# Patient Record
Sex: FEMALE | Race: WHITE | NOT HISPANIC OR LATINO | Employment: OTHER | URBAN - METROPOLITAN AREA
[De-identification: names, ages, dates, MRNs, and addresses within clinical notes are randomized per-mention and may not be internally consistent; named-entity substitution may affect disease eponyms.]

---

## 2017-01-16 ENCOUNTER — COMMUNICATION - HEALTHEAST (OUTPATIENT)
Dept: FAMILY MEDICINE | Facility: CLINIC | Age: 82
End: 2017-01-16

## 2017-03-06 ENCOUNTER — COMMUNICATION - HEALTHEAST (OUTPATIENT)
Dept: FAMILY MEDICINE | Facility: CLINIC | Age: 82
End: 2017-03-06

## 2017-03-06 DIAGNOSIS — E78.00 PURE HYPERCHOLESTEROLEMIA: ICD-10-CM

## 2017-04-15 ENCOUNTER — COMMUNICATION - HEALTHEAST (OUTPATIENT)
Dept: FAMILY MEDICINE | Facility: CLINIC | Age: 82
End: 2017-04-15

## 2017-05-25 ENCOUNTER — COMMUNICATION - HEALTHEAST (OUTPATIENT)
Dept: FAMILY MEDICINE | Facility: CLINIC | Age: 82
End: 2017-05-25

## 2017-05-25 DIAGNOSIS — I10 ESSENTIAL HYPERTENSION: ICD-10-CM

## 2017-06-07 ENCOUNTER — OFFICE VISIT - HEALTHEAST (OUTPATIENT)
Dept: FAMILY MEDICINE | Facility: CLINIC | Age: 82
End: 2017-06-07

## 2017-06-07 DIAGNOSIS — R53.83 OTHER MALAISE AND FATIGUE: ICD-10-CM

## 2017-06-07 DIAGNOSIS — E78.00 PURE HYPERCHOLESTEROLEMIA: ICD-10-CM

## 2017-06-07 DIAGNOSIS — M10.9 GOUT: ICD-10-CM

## 2017-06-07 DIAGNOSIS — R53.81 OTHER MALAISE AND FATIGUE: ICD-10-CM

## 2017-06-07 DIAGNOSIS — I10 ESSENTIAL HYPERTENSION: ICD-10-CM

## 2017-06-07 DIAGNOSIS — M12.9 ARTHROPATHY, MULTIPLE SITES: ICD-10-CM

## 2017-06-07 LAB
CHOLEST SERPL-MCNC: 152 MG/DL
FASTING STATUS PATIENT QL REPORTED: YES
HDLC SERPL-MCNC: 48 MG/DL
LDLC SERPL CALC-MCNC: 92 MG/DL
TRIGL SERPL-MCNC: 61 MG/DL

## 2017-06-07 ASSESSMENT — MIFFLIN-ST. JEOR: SCORE: 1009.88

## 2017-06-08 ENCOUNTER — COMMUNICATION - HEALTHEAST (OUTPATIENT)
Dept: ADMINISTRATIVE | Facility: CLINIC | Age: 82
End: 2017-06-08

## 2017-06-08 ENCOUNTER — AMBULATORY - HEALTHEAST (OUTPATIENT)
Dept: FAMILY MEDICINE | Facility: CLINIC | Age: 82
End: 2017-06-08

## 2017-06-08 ENCOUNTER — COMMUNICATION - HEALTHEAST (OUTPATIENT)
Dept: FAMILY MEDICINE | Facility: CLINIC | Age: 82
End: 2017-06-08

## 2017-06-08 DIAGNOSIS — R53.83 TIRED: ICD-10-CM

## 2017-07-16 ENCOUNTER — COMMUNICATION - HEALTHEAST (OUTPATIENT)
Dept: FAMILY MEDICINE | Facility: CLINIC | Age: 82
End: 2017-07-16

## 2017-07-19 ENCOUNTER — COMMUNICATION - HEALTHEAST (OUTPATIENT)
Dept: FAMILY MEDICINE | Facility: CLINIC | Age: 82
End: 2017-07-19

## 2017-09-03 ENCOUNTER — COMMUNICATION - HEALTHEAST (OUTPATIENT)
Dept: FAMILY MEDICINE | Facility: CLINIC | Age: 82
End: 2017-09-03

## 2017-10-02 ENCOUNTER — OFFICE VISIT - HEALTHEAST (OUTPATIENT)
Dept: FAMILY MEDICINE | Facility: CLINIC | Age: 82
End: 2017-10-02

## 2017-10-02 DIAGNOSIS — I10 HYPERTENSION: ICD-10-CM

## 2017-10-02 DIAGNOSIS — E78.5 HYPERLIPIDEMIA: ICD-10-CM

## 2017-10-02 ASSESSMENT — MIFFLIN-ST. JEOR: SCORE: 998.54

## 2017-10-24 ENCOUNTER — RECORDS - HEALTHEAST (OUTPATIENT)
Dept: ADMINISTRATIVE | Facility: OTHER | Age: 82
End: 2017-10-24

## 2018-01-12 ENCOUNTER — RECORDS - HEALTHEAST (OUTPATIENT)
Dept: ADMINISTRATIVE | Facility: OTHER | Age: 83
End: 2018-01-12

## 2018-01-18 ENCOUNTER — RECORDS - HEALTHEAST (OUTPATIENT)
Dept: ADMINISTRATIVE | Facility: OTHER | Age: 83
End: 2018-01-18

## 2018-01-18 ENCOUNTER — AMBULATORY - HEALTHEAST (OUTPATIENT)
Dept: RESPIRATORY THERAPY | Facility: CLINIC | Age: 83
End: 2018-01-18

## 2018-01-18 ENCOUNTER — AMBULATORY - HEALTHEAST (OUTPATIENT)
Dept: CARDIOLOGY | Facility: CLINIC | Age: 83
End: 2018-01-18

## 2018-01-18 DIAGNOSIS — L98.499: ICD-10-CM

## 2018-01-24 ENCOUNTER — HOSPITAL ENCOUNTER (OUTPATIENT)
Dept: RESPIRATORY THERAPY | Facility: CLINIC | Age: 83
Discharge: HOME | End: 2018-01-24
Attending: SURGERY

## 2018-01-24 DIAGNOSIS — L98.499: ICD-10-CM

## 2018-01-25 ENCOUNTER — HOSPITAL ENCOUNTER (OUTPATIENT)
Dept: MRI IMAGING | Facility: CLINIC | Age: 83
Discharge: HOME | End: 2018-01-25
Attending: SURGERY

## 2018-01-25 DIAGNOSIS — L98.499 NON-PRESSURE CHRONIC ULCER OF SKIN OF OTHER SITES WITH UNSPECIFIED SEVERITY (H): ICD-10-CM

## 2018-02-07 ENCOUNTER — RECORDS - HEALTHEAST (OUTPATIENT)
Dept: ADMINISTRATIVE | Facility: OTHER | Age: 83
End: 2018-02-07

## 2018-02-13 ENCOUNTER — HOSPITAL ENCOUNTER (OUTPATIENT)
Dept: INTERVENTIONAL RADIOLOGY/VASCULAR | Facility: CLINIC | Age: 83
Discharge: HOME | End: 2018-02-13
Attending: SURGERY | Admitting: RADIOLOGY

## 2018-02-13 DIAGNOSIS — I99.8 ISCHEMIA OF DIGITS OF HAND: ICD-10-CM

## 2018-02-13 LAB
CREAT SERPL-MCNC: 0.71 MG/DL (ref 0.6–1.1)
GFR SERPL CREATININE-BSD FRML MDRD: >60 ML/MIN/1.73M2
GLUCOSE BLDC GLUCOMTR-MCNC: 91 MG/DL
HGB BLD-MCNC: 14.5 G/DL (ref 12–16)
INR PPP: 1 (ref 0.9–1.1)
PLATELET # BLD AUTO: 159 THOU/UL (ref 140–440)

## 2018-02-13 ASSESSMENT — MIFFLIN-ST. JEOR: SCORE: 978.82

## 2018-03-19 ENCOUNTER — COMMUNICATION - HEALTHEAST (OUTPATIENT)
Dept: FAMILY MEDICINE | Facility: CLINIC | Age: 83
End: 2018-03-19

## 2018-03-19 DIAGNOSIS — E78.00 PURE HYPERCHOLESTEROLEMIA: ICD-10-CM

## 2018-05-08 ENCOUNTER — OFFICE VISIT - HEALTHEAST (OUTPATIENT)
Dept: FAMILY MEDICINE | Facility: CLINIC | Age: 83
End: 2018-05-08

## 2018-05-08 DIAGNOSIS — I10 ESSENTIAL HYPERTENSION: ICD-10-CM

## 2018-05-08 DIAGNOSIS — I73.9 PERIPHERAL VASCULAR DISEASE (H): ICD-10-CM

## 2018-05-08 DIAGNOSIS — E78.00 PURE HYPERCHOLESTEROLEMIA: ICD-10-CM

## 2018-05-08 ASSESSMENT — MIFFLIN-ST. JEOR: SCORE: 972.19

## 2018-05-31 ASSESSMENT — MIFFLIN-ST. JEOR: SCORE: 965.67

## 2018-06-03 ENCOUNTER — SURGERY - HEALTHEAST (OUTPATIENT)
Dept: GASTROENTEROLOGY | Facility: CLINIC | Age: 83
End: 2018-06-03

## 2018-06-04 ENCOUNTER — COMMUNICATION - HEALTHEAST (OUTPATIENT)
Dept: FAMILY MEDICINE | Facility: CLINIC | Age: 83
End: 2018-06-04

## 2018-06-08 ENCOUNTER — RECORDS - HEALTHEAST (OUTPATIENT)
Dept: ADMINISTRATIVE | Facility: OTHER | Age: 83
End: 2018-06-08

## 2018-06-11 ENCOUNTER — OFFICE VISIT - HEALTHEAST (OUTPATIENT)
Dept: FAMILY MEDICINE | Facility: CLINIC | Age: 83
End: 2018-06-11

## 2018-06-11 DIAGNOSIS — D64.9 ANEMIA: ICD-10-CM

## 2018-06-11 LAB — HGB BLD-MCNC: 12.5 G/DL (ref 12–16)

## 2018-06-11 ASSESSMENT — MIFFLIN-ST. JEOR: SCORE: 971.9

## 2018-07-17 ENCOUNTER — AMBULATORY - HEALTHEAST (OUTPATIENT)
Dept: MULTI SPECIALTY CLINIC | Facility: CLINIC | Age: 83
End: 2018-07-17

## 2018-07-17 LAB — HPV_EXT - HISTORICAL: NORMAL

## 2018-07-18 LAB — PAP SMEAR - HIM PATIENT REPORTED: NORMAL

## 2018-09-06 ENCOUNTER — OFFICE VISIT - HEALTHEAST (OUTPATIENT)
Dept: FAMILY MEDICINE | Facility: CLINIC | Age: 83
End: 2018-09-06

## 2018-09-06 DIAGNOSIS — N93.9 VAGINAL BLEEDING: ICD-10-CM

## 2018-09-06 LAB
ANION GAP SERPL CALCULATED.3IONS-SCNC: 9 MMOL/L (ref 5–18)
BUN SERPL-MCNC: 23 MG/DL (ref 8–28)
CALCIUM SERPL-MCNC: 9.6 MG/DL (ref 8.5–10.5)
CHLORIDE BLD-SCNC: 106 MMOL/L (ref 98–107)
CO2 SERPL-SCNC: 29 MMOL/L (ref 22–31)
CREAT SERPL-MCNC: 0.76 MG/DL (ref 0.6–1.1)
ERYTHROCYTE [DISTWIDTH] IN BLOOD BY AUTOMATED COUNT: 14 % (ref 11–14.5)
GFR SERPL CREATININE-BSD FRML MDRD: >60 ML/MIN/1.73M2
GLUCOSE BLD-MCNC: 95 MG/DL (ref 70–125)
HCT VFR BLD AUTO: 46.4 % (ref 35–47)
HGB BLD-MCNC: 15.3 G/DL (ref 12–16)
MCH RBC QN AUTO: 29.6 PG (ref 27–34)
MCHC RBC AUTO-ENTMCNC: 33 G/DL (ref 32–36)
MCV RBC AUTO: 90 FL (ref 80–100)
PLATELET # BLD AUTO: 165 THOU/UL (ref 140–440)
PMV BLD AUTO: 8.7 FL (ref 7–10)
POTASSIUM BLD-SCNC: 4.3 MMOL/L (ref 3.5–5)
RBC # BLD AUTO: 5.16 MILL/UL (ref 3.8–5.4)
SODIUM SERPL-SCNC: 144 MMOL/L (ref 136–145)
WBC: 6.7 THOU/UL (ref 4–11)

## 2018-09-06 ASSESSMENT — MIFFLIN-ST. JEOR: SCORE: 954.33

## 2018-09-07 ASSESSMENT — MIFFLIN-ST. JEOR: SCORE: 952.06

## 2018-09-11 ENCOUNTER — SURGERY - HEALTHEAST (OUTPATIENT)
Dept: SURGERY | Facility: CLINIC | Age: 83
End: 2018-09-11

## 2018-09-11 ENCOUNTER — ANESTHESIA - HEALTHEAST (OUTPATIENT)
Dept: SURGERY | Facility: CLINIC | Age: 83
End: 2018-09-11

## 2018-09-11 ASSESSMENT — MIFFLIN-ST. JEOR: SCORE: 948.37

## 2018-10-04 ENCOUNTER — OFFICE VISIT - HEALTHEAST (OUTPATIENT)
Dept: FAMILY MEDICINE | Facility: CLINIC | Age: 83
End: 2018-10-04

## 2018-10-04 DIAGNOSIS — M54.9 BACKACHE: ICD-10-CM

## 2018-10-10 ENCOUNTER — COMMUNICATION - HEALTHEAST (OUTPATIENT)
Dept: FAMILY MEDICINE | Facility: CLINIC | Age: 83
End: 2018-10-10

## 2018-10-18 ENCOUNTER — RECORDS - HEALTHEAST (OUTPATIENT)
Dept: ADMINISTRATIVE | Facility: OTHER | Age: 83
End: 2018-10-18

## 2018-11-23 ENCOUNTER — OFFICE VISIT - HEALTHEAST (OUTPATIENT)
Dept: FAMILY MEDICINE | Facility: CLINIC | Age: 83
End: 2018-11-23

## 2018-11-23 DIAGNOSIS — R41.3 SHORT-TERM MEMORY LOSS: ICD-10-CM

## 2018-11-23 DIAGNOSIS — I49.9 IRREGULAR HEART BEATS: ICD-10-CM

## 2018-11-23 LAB
ALBUMIN SERPL-MCNC: 3.9 G/DL (ref 3.5–5)
ALP SERPL-CCNC: 75 U/L (ref 45–120)
ALT SERPL W P-5'-P-CCNC: <9 U/L (ref 0–45)
ANION GAP SERPL CALCULATED.3IONS-SCNC: 10 MMOL/L (ref 5–18)
AST SERPL W P-5'-P-CCNC: 16 U/L (ref 0–40)
BILIRUB SERPL-MCNC: 0.5 MG/DL (ref 0–1)
BUN SERPL-MCNC: 19 MG/DL (ref 8–28)
CALCIUM SERPL-MCNC: 9.7 MG/DL (ref 8.5–10.5)
CHLORIDE BLD-SCNC: 105 MMOL/L (ref 98–107)
CO2 SERPL-SCNC: 29 MMOL/L (ref 22–31)
CREAT SERPL-MCNC: 0.76 MG/DL (ref 0.6–1.1)
ERYTHROCYTE [DISTWIDTH] IN BLOOD BY AUTOMATED COUNT: 14 % (ref 11–14.5)
FOLATE SERPL-MCNC: 9.5 NG/ML
GFR SERPL CREATININE-BSD FRML MDRD: >60 ML/MIN/1.73M2
GLUCOSE BLD-MCNC: 95 MG/DL (ref 70–125)
HCT VFR BLD AUTO: 44.1 % (ref 35–47)
HGB BLD-MCNC: 14.3 G/DL (ref 12–16)
MCH RBC QN AUTO: 30.1 PG (ref 27–34)
MCHC RBC AUTO-ENTMCNC: 32.5 G/DL (ref 32–36)
MCV RBC AUTO: 93 FL (ref 80–100)
PLATELET # BLD AUTO: 190 THOU/UL (ref 140–440)
PMV BLD AUTO: 9 FL (ref 7–10)
POTASSIUM BLD-SCNC: 4.4 MMOL/L (ref 3.5–5)
PROT SERPL-MCNC: 6.3 G/DL (ref 6–8)
RBC # BLD AUTO: 4.76 MILL/UL (ref 3.8–5.4)
SODIUM SERPL-SCNC: 144 MMOL/L (ref 136–145)
TSH SERPL DL<=0.005 MIU/L-ACNC: 1.04 UIU/ML (ref 0.3–5)
VIT B12 SERPL-MCNC: >2000 PG/ML (ref 213–816)
WBC: 7.3 THOU/UL (ref 4–11)

## 2018-11-23 ASSESSMENT — MIFFLIN-ST. JEOR: SCORE: 952.56

## 2018-11-26 LAB
ATRIAL RATE - MUSE: 74 BPM
DIASTOLIC BLOOD PRESSURE - MUSE: NORMAL MMHG
INTERPRETATION ECG - MUSE: NORMAL
P AXIS - MUSE: 36 DEGREES
PR INTERVAL - MUSE: 156 MS
QRS DURATION - MUSE: 70 MS
QT - MUSE: 378 MS
QTC - MUSE: 419 MS
R AXIS - MUSE: -19 DEGREES
SYSTOLIC BLOOD PRESSURE - MUSE: NORMAL MMHG
T AXIS - MUSE: 18 DEGREES
VENTRICULAR RATE- MUSE: 74 BPM

## 2018-12-12 ENCOUNTER — COMMUNICATION - HEALTHEAST (OUTPATIENT)
Dept: LAB | Facility: CLINIC | Age: 83
End: 2018-12-12

## 2018-12-12 DIAGNOSIS — R79.89 ELEVATED VITAMIN B12 LEVEL: ICD-10-CM

## 2018-12-13 ENCOUNTER — AMBULATORY - HEALTHEAST (OUTPATIENT)
Dept: LAB | Facility: CLINIC | Age: 83
End: 2018-12-13

## 2018-12-13 DIAGNOSIS — R79.89 ELEVATED VITAMIN B12 LEVEL: ICD-10-CM

## 2018-12-13 LAB — VIT B12 SERPL-MCNC: 1656 PG/ML (ref 213–816)

## 2018-12-14 ENCOUNTER — COMMUNICATION - HEALTHEAST (OUTPATIENT)
Dept: FAMILY MEDICINE | Facility: CLINIC | Age: 83
End: 2018-12-14

## 2019-01-11 ENCOUNTER — COMMUNICATION - HEALTHEAST (OUTPATIENT)
Dept: LAB | Facility: CLINIC | Age: 84
End: 2019-01-11

## 2019-01-11 DIAGNOSIS — R79.89 ELEVATED VITAMIN B12 LEVEL: ICD-10-CM

## 2019-01-14 ENCOUNTER — AMBULATORY - HEALTHEAST (OUTPATIENT)
Dept: LAB | Facility: CLINIC | Age: 84
End: 2019-01-14

## 2019-01-14 DIAGNOSIS — R79.89 ELEVATED VITAMIN B12 LEVEL: ICD-10-CM

## 2019-01-14 LAB — VIT B12 SERPL-MCNC: 1228 PG/ML (ref 213–816)

## 2019-01-15 ENCOUNTER — OFFICE VISIT - HEALTHEAST (OUTPATIENT)
Dept: FAMILY MEDICINE | Facility: CLINIC | Age: 84
End: 2019-01-15

## 2019-01-15 DIAGNOSIS — I10 ESSENTIAL HYPERTENSION: ICD-10-CM

## 2019-01-15 DIAGNOSIS — R79.89 ELEVATED VITAMIN B12 LEVEL: ICD-10-CM

## 2019-01-15 DIAGNOSIS — R41.3 MEMORY LOSS: ICD-10-CM

## 2019-02-08 ENCOUNTER — RECORDS - HEALTHEAST (OUTPATIENT)
Dept: ADMINISTRATIVE | Facility: OTHER | Age: 84
End: 2019-02-08

## 2019-02-13 ENCOUNTER — AMBULATORY - HEALTHEAST (OUTPATIENT)
Dept: LAB | Facility: CLINIC | Age: 84
End: 2019-02-13

## 2019-02-13 DIAGNOSIS — R41.3 MEMORY LOSS: ICD-10-CM

## 2019-02-13 LAB
CALCIUM, IONIZED MEASURED: 1.19 MMOL/L (ref 1.11–1.3)
ION CA PH 7.4: 1.17 MMOL/L (ref 1.11–1.3)
PH: 7.36 (ref 7.35–7.45)

## 2019-02-14 ENCOUNTER — HOSPITAL ENCOUNTER (OUTPATIENT)
Dept: MRI IMAGING | Facility: CLINIC | Age: 84
Discharge: HOME | End: 2019-02-14
Attending: PSYCHIATRY & NEUROLOGY

## 2019-02-14 DIAGNOSIS — R41.3 MEMORY DIFFICULTY: ICD-10-CM

## 2019-02-14 LAB
CREAT BLD-MCNC: 0.7 MG/DL
POC GFR AMER AF HE - HISTORICAL: >60 ML/MIN/1.73M2
POC GFR NON AMER AF HE - HISTORICAL: >60 ML/MIN/1.73M2

## 2019-02-17 LAB — VIT B1 PYROPHOSHATE BLD-SCNC: 93 NMOL/L (ref 70–180)

## 2019-02-26 ENCOUNTER — OFFICE VISIT - HEALTHEAST (OUTPATIENT)
Dept: FAMILY MEDICINE | Facility: CLINIC | Age: 84
End: 2019-02-26

## 2019-02-26 DIAGNOSIS — Z76.89 ESTABLISHING CARE WITH NEW DOCTOR, ENCOUNTER FOR: ICD-10-CM

## 2019-02-26 ASSESSMENT — MIFFLIN-ST. JEOR: SCORE: 948.65

## 2019-05-07 ENCOUNTER — RECORDS - HEALTHEAST (OUTPATIENT)
Dept: ADMINISTRATIVE | Facility: OTHER | Age: 84
End: 2019-05-07

## 2019-09-24 ENCOUNTER — RECORDS - HEALTHEAST (OUTPATIENT)
Dept: ADMINISTRATIVE | Facility: OTHER | Age: 84
End: 2019-09-24

## 2019-09-30 ENCOUNTER — RECORDS - HEALTHEAST (OUTPATIENT)
Dept: ADMINISTRATIVE | Facility: OTHER | Age: 84
End: 2019-09-30

## 2020-01-07 ENCOUNTER — HOSPITAL ENCOUNTER (INPATIENT)
Facility: CLINIC | Age: 85
LOS: 2 days | Discharge: HOME | DRG: 378 | End: 2020-01-09
Attending: INTERNAL MEDICINE | Admitting: INTERNAL MEDICINE
Payer: MEDICARE

## 2020-01-07 DIAGNOSIS — K92.2 GASTROINTESTINAL HEMORRHAGE, UNSPECIFIED GASTROINTESTINAL HEMORRHAGE TYPE: Primary | ICD-10-CM

## 2020-01-07 PROBLEM — N93.9 VAGINA BLEEDING: Status: ACTIVE | Noted: 2020-01-07

## 2020-01-07 LAB
ALBUMIN SERPL-MCNC: 3.4 G/DL (ref 3.4–5)
ALP SERPL-CCNC: 71 U/L (ref 40–150)
ALT SERPL W P-5'-P-CCNC: 13 U/L (ref 0–50)
ANION GAP SERPL CALCULATED.3IONS-SCNC: 3 MMOL/L (ref 3–14)
AST SERPL W P-5'-P-CCNC: 12 U/L (ref 0–45)
BASOPHILS # BLD AUTO: 0.1 10E9/L (ref 0–0.2)
BASOPHILS NFR BLD AUTO: 0.9 %
BILIRUB SERPL-MCNC: 0.4 MG/DL (ref 0.2–1.3)
BUN SERPL-MCNC: 19 MG/DL (ref 7–30)
CALCIUM SERPL-MCNC: 8.7 MG/DL (ref 8.5–10.1)
CHLORIDE SERPL-SCNC: 109 MMOL/L (ref 94–109)
CO2 SERPL-SCNC: 31 MMOL/L (ref 20–32)
CREAT SERPL-MCNC: 0.74 MG/DL (ref 0.52–1.04)
DIFFERENTIAL METHOD BLD: ABNORMAL
EOSINOPHIL # BLD AUTO: 0.2 10E9/L (ref 0–0.7)
EOSINOPHIL NFR BLD AUTO: 2.3 %
ERYTHROCYTE [DISTWIDTH] IN BLOOD BY AUTOMATED COUNT: 15 % (ref 10–15)
ERYTHROCYTE [DISTWIDTH] IN BLOOD BY AUTOMATED COUNT: 15 % (ref 10–15)
GFR SERPL CREATININE-BSD FRML MDRD: 73 ML/MIN/{1.73_M2}
GLUCOSE SERPL-MCNC: 103 MG/DL (ref 70–99)
HCT VFR BLD AUTO: 38.4 % (ref 35–47)
HCT VFR BLD AUTO: 39.8 % (ref 35–47)
HGB BLD-MCNC: 11.6 G/DL (ref 11.7–15.7)
HGB BLD-MCNC: 12 G/DL (ref 11.7–15.7)
IMM GRANULOCYTES # BLD: 0.1 10E9/L (ref 0–0.4)
IMM GRANULOCYTES NFR BLD: 1 %
INR PPP: 1.08 (ref 0.86–1.14)
LYMPHOCYTES # BLD AUTO: 1.7 10E9/L (ref 0.8–5.3)
LYMPHOCYTES NFR BLD AUTO: 20.7 %
MCH RBC QN AUTO: 30.1 PG (ref 26.5–33)
MCH RBC QN AUTO: 30.3 PG (ref 26.5–33)
MCHC RBC AUTO-ENTMCNC: 30.2 G/DL (ref 31.5–36.5)
MCHC RBC AUTO-ENTMCNC: 30.2 G/DL (ref 31.5–36.5)
MCV RBC AUTO: 100 FL (ref 78–100)
MCV RBC AUTO: 101 FL (ref 78–100)
MONOCYTES # BLD AUTO: 1 10E9/L (ref 0–1.3)
MONOCYTES NFR BLD AUTO: 12.7 %
NEUTROPHILS # BLD AUTO: 5.1 10E9/L (ref 1.6–8.3)
NEUTROPHILS NFR BLD AUTO: 62.4 %
NRBC # BLD AUTO: 0 10*3/UL
NRBC BLD AUTO-RTO: 0 /100
PLATELET # BLD AUTO: 159 10E9/L (ref 150–450)
PLATELET # BLD AUTO: 191 10E9/L (ref 150–450)
POTASSIUM SERPL-SCNC: 4.5 MMOL/L (ref 3.4–5.3)
PROT SERPL-MCNC: 6 G/DL (ref 6.8–8.8)
RBC # BLD AUTO: 3.86 10E12/L (ref 3.8–5.2)
RBC # BLD AUTO: 3.96 10E12/L (ref 3.8–5.2)
SODIUM SERPL-SCNC: 144 MMOL/L (ref 133–144)
WBC # BLD AUTO: 12.4 10E9/L (ref 4–11)
WBC # BLD AUTO: 8.2 10E9/L (ref 4–11)

## 2020-01-07 PROCEDURE — 85027 COMPLETE CBC AUTOMATED: CPT | Performed by: INTERNAL MEDICINE

## 2020-01-07 PROCEDURE — 36415 COLL VENOUS BLD VENIPUNCTURE: CPT | Performed by: INTERNAL MEDICINE

## 2020-01-07 PROCEDURE — 25000132 ZZH RX MED GY IP 250 OP 250 PS 637: Mod: GY | Performed by: INTERNAL MEDICINE

## 2020-01-07 PROCEDURE — 12000001 ZZH R&B MED SURG/OB UMMC

## 2020-01-07 PROCEDURE — 25800030 ZZH RX IP 258 OP 636: Performed by: INTERNAL MEDICINE

## 2020-01-07 PROCEDURE — 99221 1ST HOSP IP/OBS SF/LOW 40: CPT | Performed by: INTERNAL MEDICINE

## 2020-01-07 PROCEDURE — 85025 COMPLETE CBC W/AUTO DIFF WBC: CPT | Performed by: INTERNAL MEDICINE

## 2020-01-07 PROCEDURE — 99207 ZZC CDG-HISTORY COMP: MEETS EXP. PROB FOCUSED- DOWN CODED LACK OF PFSH: CPT | Performed by: INTERNAL MEDICINE

## 2020-01-07 PROCEDURE — 85610 PROTHROMBIN TIME: CPT | Performed by: INTERNAL MEDICINE

## 2020-01-07 PROCEDURE — 80053 COMPREHEN METABOLIC PANEL: CPT | Performed by: INTERNAL MEDICINE

## 2020-01-07 RX ORDER — NALOXONE HYDROCHLORIDE 0.4 MG/ML
.1-.4 INJECTION, SOLUTION INTRAMUSCULAR; INTRAVENOUS; SUBCUTANEOUS
Status: DISCONTINUED | OUTPATIENT
Start: 2020-01-07 | End: 2020-01-08

## 2020-01-07 RX ORDER — LIDOCAINE 40 MG/G
CREAM TOPICAL
Status: DISCONTINUED | OUTPATIENT
Start: 2020-01-07 | End: 2020-01-09 | Stop reason: HOSPADM

## 2020-01-07 RX ORDER — NALOXONE HYDROCHLORIDE 0.4 MG/ML
.1-.4 INJECTION, SOLUTION INTRAMUSCULAR; INTRAVENOUS; SUBCUTANEOUS
Status: DISCONTINUED | OUTPATIENT
Start: 2020-01-07 | End: 2020-01-09 | Stop reason: HOSPADM

## 2020-01-07 RX ORDER — SODIUM CHLORIDE 9 MG/ML
INJECTION, SOLUTION INTRAVENOUS CONTINUOUS
Status: DISCONTINUED | OUTPATIENT
Start: 2020-01-07 | End: 2020-01-07

## 2020-01-07 RX ORDER — ACETAMINOPHEN 325 MG/1
650 TABLET ORAL EVERY 4 HOURS PRN
Status: DISCONTINUED | OUTPATIENT
Start: 2020-01-07 | End: 2020-01-09 | Stop reason: HOSPADM

## 2020-01-07 RX ADMIN — POLYETHYLENE GLYCOL 3350, SODIUM SULFATE ANHYDROUS, SODIUM BICARBONATE, SODIUM CHLORIDE, POTASSIUM CHLORIDE 4000 ML: 236; 22.74; 6.74; 5.86; 2.97 POWDER, FOR SOLUTION ORAL at 20:20

## 2020-01-07 RX ADMIN — SODIUM CHLORIDE: 9 INJECTION, SOLUTION INTRAVENOUS at 09:27

## 2020-01-07 RX ADMIN — DEXTROSE AND SODIUM CHLORIDE: 5; 900 INJECTION, SOLUTION INTRAVENOUS at 15:42

## 2020-01-07 ASSESSMENT — ACTIVITIES OF DAILY LIVING (ADL)
ADLS_ACUITY_SCORE: 12
ADLS_ACUITY_SCORE: 10

## 2020-01-07 NOTE — CONSULTS
ATTESTATION:  I performed a history and physical examination of the above patient and discussed the management with the GI Fellow, Dr. STARK on 1/7/2020. I reviewed the note and there are no changes to the past medical, family or social history. A complete 10 point review of systems was obtained. Please see the HPI for pertinent positives and negatives. All other systems were reviewed and were found to be negative. I agree with the documented findings and plan of care as outlined.    Amanda Carson MD  Gastroenterology - Luminal Service  -----------------------------------------------------                GASTROENTEROLOGY CONSULTATION      Date of Admission:  1/7/2020           Reason for Consultation:   We were asked by Dr Sanches of Internal Medicine to evaluate this patient with hematochezia         ASSESSMENT AND RECOMMENDATIONS:   Assessment:  88 year old female with a history of obscure lower GI bleeding, hypertension hyperlipidemia, who presented with acute onset painless hematochezia 1 day ago.  Differential diagnosis includes diverticular bleed, arteriovenous malformations, or malignancy.  Due to lack of pain, less likely to be ischemic colitis.  This is unlikely to be upper GI bleed.  Discussed with the family and patient our thoughts and that she will need a colonoscopy for further evaluation. She is agreeable.    Recommendations  - continue to monitor Hgb  - transfuse if <7  - ensure two large bore IVs  - Please start colonoscopy prep tonight with 4 L GoLytely. If the stools aren't clear in the morning, please give additional GoLytely (1-2L) until she clears  - Will plan to do the colonoscopy tomorrow if prep is clear by the morning  - OK to take clear liquid down now, please keep NPO at midnight    Gastroenterology outpatient follow up recommendations: TBD    Thank you for involving us in this patient's care. Please do not hesitate to contact the GI service with any questions or concerns.     Pt care  plan discussed with Dr. Carson, GI staff physician.    Krista STARK MD  Gastroenterology Fellow  Division of Gastroenterology, Hepatology and Nutrition  [facility]    -------------------------------------------------------------------------------------------------------------------           History of Present Illness:   Angle Agudelo is a 88 year old female with a history of hypertension, hyperlipidemia, diverticular disease, who presents with 1 day history of hematochezia.    History is somewhat limited because of her memory loss.      Patient reports that she woke up last night because she could not fall asleep, went to the bathroom and had large amount of bright red blood in her toilet bowl.  Patient unable to quantify.  Patient does have history of abnormal uterine bleeding, patient did think it could be either rectal bleeding or vaginal bleeding, she was unable to differentiate.  She was in usual state of health prior day. She denies any abdominal pain. Denies any fever, chills, nausea, vomiting, sick contacts, travel.  She does have a history of prior acute GI bleed in June 2018, and no etiology could be identified per notes. She reportedly had a flex sig and tagged RBC scan at that time.              Past Medical History:   Reviewed and edited as appropriate  Osteopenia  HLD  HTN  Chronic low back pain  History of Diverticulosis         Past Surgical History:   Reviewed and edited as appropriate   Cataract surgery  Total should arthroplasty  Tonsillectomy  Adenoidectomy  Appendectomy  Cholecystectomy         Previous Endoscopy:     Sigmoidoscopy -- 2018, report not available  Prior colonoscopy - 2012 - report not available  Prior EGD - None           Social History:   Reviewed and edited as appropriate  Never smoker  Occasionally drinks small amount of alcohol  No other drug use         Family History:   Reviewed and edited as appropriate  CAD in father, brother  Dementia in sister  No known  history of colorectal cancer, liver disease, or inflammatory bowel disease.         Allergies:   Reviewed and edited as appropriate  Atorvastatin  Dextromethorphan  Oxycodoe  Pravastatin  Simvastatin  Codeine         Medications:     Aspirin-acetaminophen-caffeine PRN  Ca-VitD PRN  Premarin vaginal cream           Review of Systems:     A complete 10 point review of systems was performed and is negative except as noted in the HPI           Physical Exam:   /73 (BP Location: Right arm)   Pulse 77   Temp 97.9  F (36.6  C) (Oral)   Resp 16   SpO2 97%   Wt:   Wt Readings from Last 2 Encounters:   No data found for Wt      Constitutional: cooperative, pleasant, not dyspneic/diaphoretic, no acute distress  Eyes: Sclera anicteric/injected  Ears/nose/mouth/throat: Normal oropharynx without ulcers or exudate, mucus membranes moist, hearing intact  Neck: supple, thyroid normal size  CV: No edema  Respiratory: Unlabored breathing  Lymph: No axillary, submandibular, supraclavicular or inguinal lymphadenopathy  Abd:  Nondistended, +bs, no hepatosplenomegaly, nontender, no peritoneal signs  Rectal: No masses palpated. Bright red blood return on the glove   Skin: warm, perfused, no jaundice  Neuro: AAO x 3, No asterixis  Psych: Normal affect  MSK: No gross deformities         Data:   Labs and imaging below were independently reviewed and interpreted    BMP  Recent Labs   Lab 01/07/20  0717      POTASSIUM 4.5   CHLORIDE 109   BUBBA 8.7   CO2 31   BUN 19   CR 0.74   *     CBC  Recent Labs   Lab 01/07/20  1259 01/07/20  0717   WBC 12.4* 8.2   RBC 3.86 3.96   HGB 11.6* 12.0   HCT 38.4 39.8    101*   MCH 30.1 30.3   MCHC 30.2* 30.2*   RDW 15.0 15.0    159     INR  Recent Labs   Lab 01/07/20  0717   INR 1.08     LFTs  Recent Labs   Lab 01/07/20  0717   ALKPHOS 71   AST 12   ALT 13   BILITOTAL 0.4   PROTTOTAL 6.0*   ALBUMIN 3.4      PANCNo lab results found in last 7 days.    Imaging:  None new

## 2020-01-07 NOTE — PROGRESS NOTES
"SPIRITUAL HEALTH SERVICES  SPIRITUAL ASSESSMENT Progress Note  [facility] 5A     REFERRAL SOURCE: Consult to Spiritual Health.    I went to visit Angle.  She stated that she was Latter-day and that she had received Communion earlier today.  She also talked of \"Praying the Rosary every morning.\"  She said that her  and other family were coming shortly to visit.  When I said that she looked wonderful, she said, \"You have to go on living.\"    PLAN: I will follow up with Angle as appropriate.    Marvin Case  Chaplain Resident  Pager [phone]  "

## 2020-01-07 NOTE — PLAN OF CARE
Pt admitted due to vaginal bleeding/rectal bleeding. Pt arrived on unit @ 0530.Transferred via [facility] from [facility]. Pt ambulated from stretcher to bed, steady on feet. VSS on RA- hypertensive noted. Alert & Oriented. Oriented to room & unit. Pt up to bathroom, bright red blood with small clots noted in pullup. 4 eyes completed.     Admission/Transfer from: [facility]  2 RN skin assessment completed. Yes  Significant findings include:    -Small dime sized red area on left shin   -Blanchable redness on L/R heel   WOC Nurse Consult Ordered? No

## 2020-01-07 NOTE — PLAN OF CARE
8045-9280: A&Ox4 but forgetful. VSS on RA. Up A1 to the bathroom. Bed alarm on for safety. Voids spontaneously. Pt had 1 bloody BM today. Denies pain. 2 L PIVs. 1 SL and 1 running D5 NS at 75ml/hr. On clear liquid diet, tolerating well. Starting golytely tonight for colonoscopy tomorrow morning. Will continue to monitor and follow POC.

## 2020-01-07 NOTE — H&P
[facility]    History and Physical - Hospitalist Service, [place]       Date of Admission:  1/7/2020    Assessment & Plan   Angle Agudelo is a 88 year old female admitted on 1/7/2020. She presented to the hospital after she was transferred out from another facility for GI consult.  Patient reported to have 3 bloody bowel movement before she presented to the hospital.  Patient still- she was fine until 1 AM this morning when she went to the bathroom to move her bowel and noticed that she was red blood.  Patient was immediately taken to the ER where she was transferred to [facility].  Patient denied any abdominal pain, nausea or vomiting  Patient states that she had the same episode about 3 years ago for which she was observed in the hospital and discharged home  She denies taking any anticoagulant except baby aspirin  Patient lives in a [facility] but she is independent    GI bleed likely lower GI  - Hemoglobin dropped from 14-12  -Repeat hemoglobin  -GI consult requested  - Rectal exam was done which showed bright red blood per rectum  - Monitor CBC daily  - Normal saline at the rate of 50 mL/h  - Keep patient n.p.o.      Diet: NPO for Medical/Clinical Reasons Except for: Meds    DVT Prophylaxis: Pneumatic Compression Devices  Graves Catheter: not present  Code Status: Full Code      Disposition Plan   Expected discharge: 4 - 7 days, recommended to Work-up for GI bleed once GI bleed resolved.  Entered: Amrik Sanches MD 01/07/2020, 1:13 PM     The patient's care was discussed with the Bedside Nurse and Patient.    Amrik Sanches MD  [facility] [place]  Pager: [phone]  Please see sticky note for cross cover information  ______________________________________________________________________    Chief Complaint   Bloody bowel movements      History is obtained from the patient    History of Present Illness   Angle Agudelo is a 88 year old female who  presented today after she was transferred from another hospital for bright blood per rectum.  Patient reported to have 3 bloody bowel movement before she presented to the hospital.  Patient still- she was fine until 1 AM this morning when she went to the bathroom to move her bowel and noticed that she was red blood.  Patient was immediately taken to the ER where she was transferred to [facility].  Patient denied any abdominal pain, nausea or vomiting  Patient states that she had the same episode about 3 years ago for which she was observed in the hospital and discharged home  She denies taking any anticoagulant except baby aspirin  Patient lives in a [facility] but she is independent    Review of Systems    The 10 point Review of Systems is negative other than noted in the HPI or here.       Past Medical History    I have reviewed this patient's medical history and updated it with pertinent information if needed.   No past medical history on file.    Past Surgical History   I have reviewed this patient's surgical history and updated it with pertinent information if needed.  No past surgical history on file.    Social History   I have reviewed this patient's social history and updated it with pertinent information if needed.  Social History     Tobacco Use     Smoking status: Not on file   Substance Use Topics     Alcohol use: Not on file     Drug use: Not on file       Family History   I have reviewed this patient's family history and updated it with pertinent information if needed.   No family history on file.    Prior to Admission Medications   None     Allergies   No Known Allergies    Physical Exam   Vital Signs: Temp: 97.9  F (36.6  C) Temp src: Oral BP: (!) 141/85 Pulse: 98     SpO2: 96 % O2 Device: None (Room air)    Weight: 0 lbs 0 oz    Constitutional: awake, alert, cooperative, no apparent distress, and appears stated age  Hematologic / Lymphatic: no cervical lymphadenopathy  Respiratory: No  increased work of breathing, good air exchange, clear to auscultation bilaterally, no crackles or wheezing  Cardiovascular: Normal apical impulse, regular rate and rhythm, normal S1 and S2, no S3 or S4, and no murmur noted  GI: No scars, normal bowel sounds, soft, non-distended, non-tender, no masses palpated, no hepatosplenomegally and Hemorrhoids absent and Abnormal findings: Dark blood on the examining finger  Neurologic: Awake, alert, oriented to name, place and time.  Cranial nerves II-XII are grossly intact.  Motor is 5 out of 5 bilaterally.  Cerebellar finger to nose, heel to shin intact.  Sensory is intact.  Babinski down going, Romberg negative, and gait is normal.    Data   Data reviewed today: I reviewed all medications, new labs and imaging results over the last 24 hours. I personally reviewed no images or EKG's today.    Recent Labs   Lab 01/07/20  0717   WBC 8.2   HGB 12.0   *      INR 1.08      POTASSIUM 4.5   CHLORIDE 109   CO2 31   BUN 19   CR 0.74   ANIONGAP 3   BUBBA 8.7   *   ALBUMIN 3.4   PROTTOTAL 6.0*   BILITOTAL 0.4   ALKPHOS 71   ALT 13   AST 12

## 2020-01-08 LAB
ANION GAP SERPL CALCULATED.3IONS-SCNC: 2 MMOL/L (ref 3–14)
BUN SERPL-MCNC: 13 MG/DL (ref 7–30)
CALCIUM SERPL-MCNC: 8.1 MG/DL (ref 8.5–10.1)
CHLORIDE SERPL-SCNC: 113 MMOL/L (ref 94–109)
CO2 SERPL-SCNC: 30 MMOL/L (ref 20–32)
COLONOSCOPY: NORMAL
CREAT SERPL-MCNC: 0.58 MG/DL (ref 0.52–1.04)
ERYTHROCYTE [DISTWIDTH] IN BLOOD BY AUTOMATED COUNT: 15.3 % (ref 10–15)
GFR SERPL CREATININE-BSD FRML MDRD: 82 ML/MIN/{1.73_M2}
GLUCOSE SERPL-MCNC: 119 MG/DL (ref 70–99)
HCT VFR BLD AUTO: 31.6 % (ref 35–47)
HGB BLD-MCNC: 9.6 G/DL (ref 11.7–15.7)
MCH RBC QN AUTO: 30.5 PG (ref 26.5–33)
MCHC RBC AUTO-ENTMCNC: 30.4 G/DL (ref 31.5–36.5)
MCV RBC AUTO: 100 FL (ref 78–100)
PLATELET # BLD AUTO: 147 10E9/L (ref 150–450)
POTASSIUM SERPL-SCNC: 4.7 MMOL/L (ref 3.4–5.3)
RBC # BLD AUTO: 3.15 10E12/L (ref 3.8–5.2)
SODIUM SERPL-SCNC: 144 MMOL/L (ref 133–144)
WBC # BLD AUTO: 8.4 10E9/L (ref 4–11)

## 2020-01-08 PROCEDURE — 40000556 ZZH STATISTIC PERIPHERAL IV START W US GUIDANCE

## 2020-01-08 PROCEDURE — 45378 DIAGNOSTIC COLONOSCOPY: CPT | Performed by: INTERNAL MEDICINE

## 2020-01-08 PROCEDURE — 99153 MOD SED SAME PHYS/QHP EA: CPT | Performed by: INTERNAL MEDICINE

## 2020-01-08 PROCEDURE — 99207 ZZC CDG-MDM COMPONENT: MEETS LOW - DOWN CODED: CPT | Performed by: INTERNAL MEDICINE

## 2020-01-08 PROCEDURE — 25000132 ZZH RX MED GY IP 250 OP 250 PS 637: Mod: GY | Performed by: INTERNAL MEDICINE

## 2020-01-08 PROCEDURE — 12000001 ZZH R&B MED SURG/OB UMMC

## 2020-01-08 PROCEDURE — 0DJD8ZZ INSPECTION OF LOWER INTESTINAL TRACT, VIA NATURAL OR ARTIFICIAL OPENING ENDOSCOPIC: ICD-10-PCS | Performed by: INTERNAL MEDICINE

## 2020-01-08 PROCEDURE — 25000128 H RX IP 250 OP 636: Performed by: INTERNAL MEDICINE

## 2020-01-08 PROCEDURE — 25800030 ZZH RX IP 258 OP 636: Performed by: INTERNAL MEDICINE

## 2020-01-08 PROCEDURE — 85027 COMPLETE CBC AUTOMATED: CPT | Performed by: INTERNAL MEDICINE

## 2020-01-08 PROCEDURE — G0500 MOD SEDAT ENDO SERVICE >5YRS: HCPCS | Performed by: INTERNAL MEDICINE

## 2020-01-08 PROCEDURE — 80048 BASIC METABOLIC PNL TOTAL CA: CPT | Performed by: INTERNAL MEDICINE

## 2020-01-08 PROCEDURE — 99232 SBSQ HOSP IP/OBS MODERATE 35: CPT | Performed by: INTERNAL MEDICINE

## 2020-01-08 PROCEDURE — 36415 COLL VENOUS BLD VENIPUNCTURE: CPT | Performed by: INTERNAL MEDICINE

## 2020-01-08 RX ORDER — FENTANYL CITRATE 50 UG/ML
INJECTION, SOLUTION INTRAMUSCULAR; INTRAVENOUS PRN
Status: DISCONTINUED | OUTPATIENT
Start: 2020-01-08 | End: 2020-01-08 | Stop reason: HOSPADM

## 2020-01-08 RX ORDER — SIMETHICONE
LIQUID (ML) MISCELLANEOUS PRN
Status: DISCONTINUED | OUTPATIENT
Start: 2020-01-08 | End: 2020-01-08 | Stop reason: HOSPADM

## 2020-01-08 RX ADMIN — DEXTROSE AND SODIUM CHLORIDE: 5; 900 INJECTION, SOLUTION INTRAVENOUS at 05:25

## 2020-01-08 RX ADMIN — DEXTROSE AND SODIUM CHLORIDE: 5; 900 INJECTION, SOLUTION INTRAVENOUS at 18:50

## 2020-01-08 ASSESSMENT — ACTIVITIES OF DAILY LIVING (ADL)
ADLS_ACUITY_SCORE: 10
ADLS_ACUITY_SCORE: 14

## 2020-01-08 NOTE — PROCEDURES
Gastroenterology Endoscopy Suite Brief Operative Note    Procedure:  Upper endoscopy   Post-operative diagnosis:  BRBPR   Staff Physician:  Dr. Amanda Carson   Fellow/Assistant(s):  Dr. Krista STARK    Specimens:  Please see final procedure note for further details.   Findings:  Many diverticulae - most prominent on left side, suspected source of bleeding. 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 Otherwise normal exam.   Complications:  None.   Condition:  Stable   Recommendations  Diet:  Return to previous diet  PPI:  N/A  Anti-coagulants/platelets:  N/A  Octreotide:  N/A  Discharge Planning:   [facility] to [facility].

## 2020-01-08 NOTE — OR NURSING
EGD performed, tolerated well. Conscious sedation. No interventions. On 2LNC throughout procedure. VSS. Over to recovery with RN.

## 2020-01-08 NOTE — PROGRESS NOTES
[facility]    Medicine Progress Note - Hospitalist Service, Gold 09       Date of Admission:  1/7/2020  Assessment & Plan   Angle Agudelo is a 88 year old female admitted on 1/7/2020. She presented to the hospital after she was transferred out from another facility for GI consult.    Changes today   - Colonoscopy done   - AVM and diverticulosis: likely source of bleed   - resume diet   - stool softners     GI bleed likely lower GI: Due to Diverticular bleed and AVM   - Hemoglobin dropped from 14-to 9  -monitor CBC   -GI consult requested  - Colonoscopy done  Showed AVM and diverticulosis: likely source of bleed    - Monitor CBC daily  - discontinue Iv fluid   -restart diet      Acute blood loss Anemia   - continue to monitor CBC   - Iron on discharge        Diet: regular diet  DVT Prophylaxis: Pneumatic Compression Devices  Graves Catheter: not present  Code Status: Full Code         Disposition Plan   Expected discharge: 2 - 3 days, recommended to prior living arrangement once hemoglobin stable.  Entered: Amrik Sanches MD 01/08/2020, 2:31 PM       The patient's care was discussed with the Bedside Nurse and Patient.    Amrik Sanches MD  Hospitalist Service, [address]  Pager: [phone]   Please see sticky note for cross cover information  ______________________________________________________________________    Interval History   underwent colonoscopy   Hgb drop noted   Resume diet       Data reviewed today: I reviewed all medications, new labs and imaging results over the last 24 hours.    Physical Exam   Vital Signs: Temp: 97.8  F (36.6  C) Temp src: Oral BP: (!) 150/75 Pulse: 62 Heart Rate: 64 Resp: 18 SpO2: 96 % O2 Device: None (Room air)    Weight: 0 lbs 0 oz  Constitutional: awake, alert, cooperative, no apparent distress, and appears stated age  Hematologic / Lymphatic: no cervical lymphadenopathy  Respiratory: No increased work  of breathing, good air exchange, clear to auscultation bilaterally, no crackles or wheezing  Cardiovascular: Normal apical impulse, regular rate and rhythm, normal S1 and S2, no S3 or S4, and no murmur noted  GI: No scars, normal bowel sounds, soft, non-distended, non-tender, no masses palpated, no hepatosplenomegally  Neurologic: Awake, alert, oriented to name, place and time.  Cranial nerves II-XII are grossly intact.  Motor is 5 out of 5 bilaterally.  Cerebellar finger to nose, heel to shin intact.  Sensory is intact.  Babinski down going, Romberg negative, and gait is normal.    Data   Recent Labs   Lab 01/08/20  0642 01/07/20  1259 01/07/20  0717   WBC 8.4 12.4* 8.2   HGB 9.6* 11.6* 12.0    100 101*   * 191 159   INR  --   --  1.08     --  144   POTASSIUM 4.7  --  4.5   CHLORIDE 113*  --  109   CO2 30  --  31   BUN 13  --  19   CR 0.58  --  0.74   ANIONGAP 2*  --  3   BUBBA 8.1*  --  8.7   *  --  103*   ALBUMIN  --   --  3.4   PROTTOTAL  --   --  6.0*   BILITOTAL  --   --  0.4   ALKPHOS  --   --  71   ALT  --   --  13   AST  --   --  12     No results found for this or any previous visit (from the past 24 hour(s)).  Medications     dextrose 5% and 0.9% NaCl 75 mL/hr at 01/08/20 0525       sodium chloride (PF)  3 mL Intracatheter Q8H     sodium chloride (PF)  3 mL Intracatheter Q8H

## 2020-01-08 NOTE — PLAN OF CARE
1900 - 0730:    VSS on RA. A&Ox4. Forgetful. SBA/Assist of 1 to BSC. Bed alarm on for safety. PIV infusing D5NS @ 75 mL/hour. Pt denies pain overnight. NPO since midnight ex bowel prep. Voiding WNL. Numerous bloody BM's overnight. GoLytely bowel prep completed. Stools are liquid but remain red tinged becoming more clear however still remain fairly cloudy.

## 2020-01-08 NOTE — PLAN OF CARE
7032-3267: A&Ox4 but forgetful. VSS on RA. Up A1 to the bathroom. Bed alarm on for safety. Voids spontaneously. Pt had 1 bloody BM today. Denies pain. 1 L PIV running D5 NS at 75ml/hr. 1 R PIV SL. Went for colonoscopy this am and came back around 1300. Will continue to monitor and follow POC.

## 2020-01-09 ENCOUNTER — PATIENT OUTREACH (OUTPATIENT)
Dept: CARE COORDINATION | Facility: CLINIC | Age: 85
End: 2020-01-09

## 2020-01-09 ENCOUNTER — APPOINTMENT (OUTPATIENT)
Dept: PHYSICAL THERAPY | Facility: CLINIC | Age: 85
DRG: 378 | End: 2020-01-09
Attending: INTERNAL MEDICINE
Payer: MEDICARE

## 2020-01-09 VITALS
SYSTOLIC BLOOD PRESSURE: 134 MMHG | DIASTOLIC BLOOD PRESSURE: 67 MMHG | TEMPERATURE: 98.2 F | RESPIRATION RATE: 16 BRPM | OXYGEN SATURATION: 98 % | HEART RATE: 77 BPM

## 2020-01-09 LAB
ERYTHROCYTE [DISTWIDTH] IN BLOOD BY AUTOMATED COUNT: 15 % (ref 10–15)
HCT VFR BLD AUTO: 29.6 % (ref 35–47)
HGB BLD-MCNC: 8.9 G/DL (ref 11.7–15.7)
MCH RBC QN AUTO: 30.1 PG (ref 26.5–33)
MCHC RBC AUTO-ENTMCNC: 30.1 G/DL (ref 31.5–36.5)
MCV RBC AUTO: 100 FL (ref 78–100)
PLATELET # BLD AUTO: 148 10E9/L (ref 150–450)
RBC # BLD AUTO: 2.96 10E12/L (ref 3.8–5.2)
WBC # BLD AUTO: 7.5 10E9/L (ref 4–11)

## 2020-01-09 PROCEDURE — 36415 COLL VENOUS BLD VENIPUNCTURE: CPT | Performed by: INTERNAL MEDICINE

## 2020-01-09 PROCEDURE — 97161 PT EVAL LOW COMPLEX 20 MIN: CPT | Mod: GP

## 2020-01-09 PROCEDURE — 97530 THERAPEUTIC ACTIVITIES: CPT | Mod: GP

## 2020-01-09 PROCEDURE — 97116 GAIT TRAINING THERAPY: CPT | Mod: GP

## 2020-01-09 PROCEDURE — 99239 HOSP IP/OBS DSCHRG MGMT >30: CPT | Performed by: INTERNAL MEDICINE

## 2020-01-09 PROCEDURE — 25800030 ZZH RX IP 258 OP 636: Performed by: INTERNAL MEDICINE

## 2020-01-09 PROCEDURE — 85027 COMPLETE CBC AUTOMATED: CPT | Performed by: INTERNAL MEDICINE

## 2020-01-09 RX ORDER — DOCUSATE SODIUM 100 MG/1
100 CAPSULE, LIQUID FILLED ORAL 2 TIMES DAILY
Qty: 60 CAPSULE | Refills: 3 | Status: SHIPPED | OUTPATIENT
Start: 2020-01-09 | End: 2020-07-22

## 2020-01-09 RX ORDER — FERROUS SULFATE 325(65) MG
325 TABLET ORAL
Qty: 30 TABLET | Refills: 3 | Status: SHIPPED | OUTPATIENT
Start: 2020-01-09 | End: 2020-07-22

## 2020-01-09 RX ADMIN — DEXTROSE AND SODIUM CHLORIDE: 5; 900 INJECTION, SOLUTION INTRAVENOUS at 08:14

## 2020-01-09 ASSESSMENT — ACTIVITIES OF DAILY LIVING (ADL)
ADLS_ACUITY_SCORE: 14
ADLS_ACUITY_SCORE: 13

## 2020-01-09 NOTE — PROGRESS NOTES
Care Coordinator - Discharge Planning    Admission Date/Time:  1/7/2020  Attending MD:  Amrik Sanches MD     Data  Chart reviewed, discussed with interdisciplinary team.   Patient was admitted for: No diagnosis found.     Assessment   Full assessment completed in previous note    Met with patient in room. RNCC role was introduced and discharge plans were discussed. Patient lives in an independent apartment in a senior living [facility] ([facility] with her , with no services. Patient feels comfortable with a discharge back there and anticipates no needs. Patient's  will pick her up and he will have apartment keys.     IMM given, signature obtained and placed in patient chart.       Plan  Anticipated Discharge Date:  1/9 vs 1/10  Anticipated Discharge Plan:  Home    Shauna Garcia RN, A  Internal Medicine Care Coordinator  Phone: [phone] Pager: [phone]  [facility]     To contact Weekend RNCC, dial [phone] and enter job code [phone] at prompt.   This pager can not be contacted by text page or outside line.

## 2020-01-09 NOTE — PLAN OF CARE
Time 4541-8201     Reason for admission: GI bleed    Vitals: VSS on RA, except HTN    BP (!) 144/57 (BP Location: Left arm)   Pulse 62   Temp 98.7  F (37.1  C) (Oral)   Resp 16   SpO2 92%     Activity: A1  Pain: denies  Neuro: A&Ox4, forgetful  Cardiac: WDL  Respiratory: WDL on RA  GI/: voiding spontaneously. No BM on shift. Denies nausea.  Diet: Reg  Lines: 2 PIVs  Labs: hgb 9.6, colonoscopy completed today  New this shift: denies pain. Rested in bed during shift. No BM. No s/s of bleed.     Plan: trend hgb       Continue to monitor and follow POC

## 2020-01-09 NOTE — PLAN OF CARE
Discharge Planner PT   5A - PT evaluation completed, treatment initiated.  Patient plan for discharge: Home with assist as needed.  Current status: Pt completes bed mobility IND. Pt transfers with SBA. Pt ambulates ~500ft with no AD and SBA. Pt ambulating at slow pace and occasionally reaching out for railing in hallway - though when further questioned pt reporting it to be a habit of hers and that she does in fact feel stable on her feet.  Barriers to return to prior living situation: Medical needs, deconditioning.  Recommendations for discharge: Home with assist as needed.  Rationale for recommendations: Pt appears near baseline level of function - anticipate safe discharge home with assist as needed once medically cleared.

## 2020-01-09 NOTE — PLAN OF CARE
Pt A&O, forgetful, bed alarm on. VSS on RA. Up with assist x 1. No BM this shift. D5NS at 75 ml/hr. Denies pain/nausea. PIV x 2. Plan to monitor stool output, trend hgb, discharge back to senior residence when medically stable.

## 2020-01-09 NOTE — PLAN OF CARE
A&Ox4. VSS on RA. Up A1 to the bathroom. Voids spontaneously. No BM this shift but passing flatus. Bed alarm in place for safety. Denies pain. PIVs removed before discharge. On regular diet, tolerating well. Discharged home at 1330.

## 2020-01-09 NOTE — PROGRESS NOTES
"   01/09/20 1156   Quick Adds   Type of Visit Initial PT Evaluation   Living Environment   Lives With spouse   Living Arrangements independent living facility   Home Accessibility no concerns   Transportation Anticipated family or friend will provide   Self-Care   Usual Activity Tolerance moderate   Current Activity Tolerance fair   Regular Exercise Other (see comments)  (Reports no formal program but lives an 'active lifestyle'.)   Equipment Currently Used at Home none   Functional Level Prior   Ambulation 0-->independent   Transferring 0-->independent   Toileting 0-->independent   Bathing 0-->independent   Communication 0-->understands/communicates without difficulty   Swallowing 0-->swallows foods/liquids without difficulty   Cognition 0 - no cognition issues reported   Fall history within last six months no   Which of the above functional risks had a recent onset or change? ambulation;transferring   Prior Functional Level Comment Pt IND with mobility/ADLs at baseline.   General Information   Onset of Illness/Injury or Date of Surgery - Date 01/07/20   Referring Physician Amrik Sanches MD    Patient/Family Goals Statement Pt would like to return home.   Pertinent History of Current Problem (include personal factors and/or comorbidities that impact the POC) Per chart \"Angle Agudelo is a 88 year old female admitted on 1/7/2020. She presented to the hospital after she was transferred out from another facility for GI consult.\"   Precautions/Limitations no known precautions/limitations   General Observations Upon arrival, pt supine in bed and agreeable to PT session.   General Info Comments Activity - up ad avelino.   Cognitive Status Examination   Orientation orientation to person, place and time   Level of Consciousness alert   Follows Commands and Answers Questions 100% of the time   Personal Safety and Judgment intact   Memory intact   Pain Assessment   Patient Currently in Pain No   Posture    Posture Forward " "head position;Protracted shoulders   Range of Motion (ROM)   ROM Comment B LE ROM WFL.   Strength   Strength Comments B LE strength at least >3/5 based on functional mobility assessment, generalized functional weakness noted.   Bed Mobility   Bed Mobility Comments Pt completes supine <> sit IND.   Transfer Skills   Transfer Comments Pt transfers sit <> stand with SBA.   Gait   Gait Comments Pt ambulates ~500ft with no AD and SBA. Pt ambulating at slow pace and occasionally reaching out for railing in hallway though experiences no overt LOB.   Balance   Balance Comments See gait comments.   Sensory Examination   Sensory Perception Comments Pt denies N/T.   General Therapy Interventions   Planned Therapy Interventions balance training;gait training;strengthening;transfer training;home program guidelines;progressive activity/exercise   Clinical Impression   Criteria for Skilled Therapeutic Intervention yes, treatment indicated   PT Diagnosis Impaired functional mobility   Influenced by the following impairments LE weakness, impaired dynamic standing balance, decreased activity tolerance   Functional limitations due to impairments Transfers, gait, functional endurance   Clinical Presentation Stable/Uncomplicated   Clinical Presentation Rationale Clinical judgement   Clinical Decision Making (Complexity) Low complexity   Therapy Frequency 5x/week   Predicted Duration of Therapy Intervention (days/wks) 1 week   Anticipated Equipment Needs at Discharge   (TBD.)   Anticipated Discharge Disposition Home with Assist   Risk & Benefits of therapy have been explained Yes   Patient, Family & other staff in agreement with plan of care Yes   [facility] [employer] \"6 Clicks\"   2016, Trustees of [facility], under license to [employer].  All rights reserved.   6 Clicks Short Forms Basic Mobility Inpatient Short Form   [facility] AM-PAC  \"6 Clicks\" V.2 Basic Mobility Inpatient Short Form   1. Turning from your " back to your side while in a flat bed without using bedrails? 4 - None   2. Moving from lying on your back to sitting on the side of a flat bed without using bedrails? 4 - None   3. Moving to and from a bed to a chair (including a wheelchair)? 4 - None   4. Standing up from a chair using your arms (e.g., wheelchair, or bedside chair)? 4 - None   5. To walk in hospital room? 3 - A Little   6. Climbing 3-5 steps with a railing? 3 - A Little   Basic Mobility Raw Score (Score out of 24.Lower scores equate to lower levels of function) 22   Total Evaluation Time   Total Evaluation Time (Minutes) 8

## 2020-01-09 NOTE — DISCHARGE SUMMARY
[facility], [place]  Hospitalist Discharge Summary       Date of Admission:  1/7/2020  Date of Discharge:  1/9/2020  Discharging Provider: Amrik Sanches MD  Discharge Team: Hospitalist Service, [place]     Discharge Diagnoses   Admitted to the hospital with GI bleed  Status post endoscopy and colonoscopy  Acute blood loss anemia    Follow-ups Needed After Discharge   {Additional follow-up instructions/to-do's for PCP: Post discharge follow-up    Unresulted Labs Ordered in the Past 30 Days of this Admission     No orders found from 12/8/2019 to 1/8/2020.      These results will be followed up by CBC in a week    Discharge Disposition   Discharged to home  Condition at discharge: Stable    Hospital Course   Angle Agudelo is a 88 year old female admitted on 1/7/2020. She presented to the hospital after she was transferred out from another facility for GI consult.  After patient was transferred to the hospital GI was consulted and patient was scheduled for colonoscopy and bowel preparation was initiated.  In the meantime her hemoglobin and hematocrit started to decline from 14-9.  In the course of her bowel preparation patient reported that she never had any blood in the stool anymore.  Colonoscopy was performed and was found to have diverticulosis and mild AVM but no major bleeding no active bleeding was observed.  Following the procedure patient is doing well and eating well and has no bloody bowel movements.  Her hemoglobin/hematocrit remained stable  Patient was seen by physical therapy and she is ambulatory independently.  Patient is being discharged home to follow-up with primary physician within a week.  Patient is given iron documentation for iron deficiency anemia.      GI bleed likely lower GI: Due to Diverticular bleed and AVM   - Hemoglobin dropped from 14-to 9  -monitor CBC   -GI consult requested  - Colonoscopy done  Showed AVM and diverticulosis: likely source of bleed     -restart diet      Acute blood loss Anemia   - continue to monitor CBC   - Iron on discharge       Consultations This Hospital Stay   OB GYN IP CONSULT  GI LUMINAL ADULT IP CONSULT  VASCULAR ACCESS CARE ADULT IP CONSULT  PHYSICAL THERAPY ADULT IP CONSULT    Code Status   Full Code    Time Spent on this Encounter   I, Amrik Sanches MD, personally saw the patient today and spent greater than 30 minutes discharging this patient.       Amrik Sanches MD  [facility], [place]  ______________________________________________________________________    Physical Exam   Vital Signs: Temp: 98.2  F (36.8  C) Temp src: Oral BP: 134/67 Pulse: 77 Heart Rate: 77 Resp: 16 SpO2: 98 % O2 Device: None (Room air)    Weight: 0 lbs 0 oz  Constitutional: awake, alert, cooperative, no apparent distress, and appears stated age  Hematologic / Lymphatic: no cervical lymphadenopathy  Respiratory: No increased work of breathing, good air exchange, clear to auscultation bilaterally, no crackles or wheezing  Cardiovascular: Normal apical impulse, regular rate and rhythm, normal S1 and S2, no S3 or S4, and no murmur noted  GI: No scars, normal bowel sounds, soft, non-distended, non-tender, no masses palpated, no hepatosplenomegally  Neurologic: Awake, alert, oriented to name, place and time.  Cranial nerves II-XII are grossly intact.  Motor is 5 out of 5 bilaterally.  Cerebellar finger to nose, heel to shin intact.  Sensory is intact.  Babinski down going, Romberg negative, and gait is normal.          Primary Care Physician   Physician No Ref-Primary    Discharge Orders      Reason for your hospital stay    Patient was admitted to the hospital  with bloody stools and was found top have acute blood loss anemia     Adult [facility] Follow-up and recommended labs and tests    Follow up with primary care provider, Physician No Ref-Primary, within 7 days for hospital follow- up.  {recommended     Appointments on [place]  and/or [facility] (with [facility] or [facility] provider or service). Call [phone] if you haven't heard regarding these appointments within 7 days of discharge.     Activity    Your activity upon discharge: activity as tolerated     Full Code     Diet    Follow this diet upon discharge: Regular       Significant Results and Procedures   Most Recent 3 CBC's:  Recent Labs   Lab Test 01/09/20  0552 01/08/20  0642 01/07/20  1259   WBC 7.5 8.4 12.4*   HGB 8.9* 9.6* 11.6*    100 100   * 147* 191       Discharge Medications   Current Discharge Medication List      START taking these medications    Details   docusate sodium (COLACE) 100 MG capsule Take 1 capsule (100 mg) by mouth 2 times daily  Qty: 60 capsule, Refills: 3    Associated Diagnoses: Gastrointestinal hemorrhage, unspecified gastrointestinal hemorrhage type      ferrous sulfate (FEROSUL) 325 (65 Fe) MG tablet Take 1 tablet (325 mg) by mouth daily (with breakfast)  Qty: 30 tablet, Refills: 3    Associated Diagnoses: Gastrointestinal hemorrhage, unspecified gastrointestinal hemorrhage type           Allergies   Allergies   Allergen Reactions     Atorvastatin Muscle Pain (Myalgia)     Dextromethorphan Hives     Oxycodone Unknown     Pravastatin Muscle Pain (Myalgia)     Simvastatin Muscle Pain (Myalgia)     Codeine Rash

## 2020-01-10 NOTE — PLAN OF CARE
Physical Therapy Discharge Summary    Reason for therapy discharge:    Discharged to home.    Progress towards therapy goal(s). See goals on Care Plan in [facility] electronic health record for goal details.  Goals not met.  Barriers to achieving goals:   discharge on same date as initial evaluation.    Therapy recommendation(s):    No further therapy is recommended.

## 2020-01-13 ENCOUNTER — OFFICE VISIT - HEALTHEAST (OUTPATIENT)
Dept: FAMILY MEDICINE | Facility: CLINIC | Age: 85
End: 2020-01-13

## 2020-01-13 DIAGNOSIS — K57.91 GASTROINTESTINAL HEMORRHAGE ASSOCIATED WITH INTESTINAL DIVERTICULOSIS: ICD-10-CM

## 2020-01-13 DIAGNOSIS — Z78.9 INPATIENT HOSPITALIZATION WITHIN LAST 30 DAYS: ICD-10-CM

## 2020-01-13 DIAGNOSIS — I49.9 IRREGULAR HEARTBEAT: ICD-10-CM

## 2020-01-13 LAB
ANION GAP SERPL CALCULATED.3IONS-SCNC: 9 MMOL/L (ref 5–18)
ATRIAL RATE - MUSE: 74 BPM
BUN SERPL-MCNC: 11 MG/DL (ref 8–28)
CALCIUM SERPL-MCNC: 9.5 MG/DL (ref 8.5–10.5)
CHLORIDE BLD-SCNC: 105 MMOL/L (ref 98–107)
CO2 SERPL-SCNC: 29 MMOL/L (ref 22–31)
CREAT SERPL-MCNC: 0.75 MG/DL (ref 0.6–1.1)
DIASTOLIC BLOOD PRESSURE - MUSE: NORMAL
GFR SERPL CREATININE-BSD FRML MDRD: >60 ML/MIN/1.73M2
GLUCOSE BLD-MCNC: 89 MG/DL (ref 70–125)
HGB BLD-MCNC: 9.8 G/DL (ref 12–16)
INTERPRETATION ECG - MUSE: NORMAL
P AXIS - MUSE: 22 DEGREES
POTASSIUM BLD-SCNC: 4.1 MMOL/L (ref 3.5–5)
PR INTERVAL - MUSE: 144 MS
QRS DURATION - MUSE: 70 MS
QT - MUSE: 362 MS
QTC - MUSE: 401 MS
R AXIS - MUSE: -17 DEGREES
SODIUM SERPL-SCNC: 143 MMOL/L (ref 136–145)
SYSTOLIC BLOOD PRESSURE - MUSE: NORMAL
T AXIS - MUSE: 4 DEGREES
VENTRICULAR RATE- MUSE: 74 BPM

## 2020-01-13 ASSESSMENT — MIFFLIN-ST. JEOR: SCORE: 910.77

## 2020-01-14 ENCOUNTER — COMMUNICATION - HEALTHEAST (OUTPATIENT)
Dept: FAMILY MEDICINE | Facility: CLINIC | Age: 85
End: 2020-01-14

## 2020-01-14 DIAGNOSIS — D64.9 LOW HEMOGLOBIN: ICD-10-CM

## 2020-01-21 ENCOUNTER — AMBULATORY - HEALTHEAST (OUTPATIENT)
Dept: LAB | Facility: CLINIC | Age: 85
End: 2020-01-21

## 2020-01-21 DIAGNOSIS — D64.9 LOW HEMOGLOBIN: ICD-10-CM

## 2020-01-21 LAB — HGB BLD-MCNC: 10.9 G/DL (ref 12–16)

## 2020-03-11 ENCOUNTER — COMMUNICATION - HEALTHEAST (OUTPATIENT)
Dept: FAMILY MEDICINE | Facility: CLINIC | Age: 85
End: 2020-03-11

## 2020-05-01 ENCOUNTER — AMBULATORY - HEALTHEAST (OUTPATIENT)
Dept: SURGERY | Facility: CLINIC | Age: 85
End: 2020-05-01

## 2020-05-01 ENCOUNTER — COMMUNICATION - HEALTHEAST (OUTPATIENT)
Dept: FAMILY MEDICINE | Facility: CLINIC | Age: 85
End: 2020-05-01

## 2020-05-01 DIAGNOSIS — Z11.59 ENCOUNTER FOR SCREENING FOR OTHER VIRAL DISEASES: ICD-10-CM

## 2020-05-07 ENCOUNTER — OFFICE VISIT - HEALTHEAST (OUTPATIENT)
Dept: FAMILY MEDICINE | Facility: CLINIC | Age: 85
End: 2020-05-07

## 2020-05-07 DIAGNOSIS — Z01.818 PRE-OP EXAM: ICD-10-CM

## 2020-05-07 DIAGNOSIS — I10 ESSENTIAL HYPERTENSION: ICD-10-CM

## 2020-05-07 LAB
ANION GAP SERPL CALCULATED.3IONS-SCNC: 8 MMOL/L (ref 5–18)
BUN SERPL-MCNC: 16 MG/DL (ref 8–28)
CALCIUM SERPL-MCNC: 9.5 MG/DL (ref 8.5–10.5)
CHLORIDE BLD-SCNC: 105 MMOL/L (ref 98–107)
CO2 SERPL-SCNC: 30 MMOL/L (ref 22–31)
CREAT SERPL-MCNC: 0.8 MG/DL (ref 0.6–1.1)
ERYTHROCYTE [DISTWIDTH] IN BLOOD BY AUTOMATED COUNT: 14.3 % (ref 11–14.5)
GFR SERPL CREATININE-BSD FRML MDRD: >60 ML/MIN/1.73M2
GLUCOSE BLD-MCNC: 72 MG/DL (ref 70–125)
HCT VFR BLD AUTO: 42.3 % (ref 35–47)
HGB BLD-MCNC: 13.8 G/DL (ref 12–16)
MCH RBC QN AUTO: 29.4 PG (ref 27–34)
MCHC RBC AUTO-ENTMCNC: 32.6 G/DL (ref 32–36)
MCV RBC AUTO: 90 FL (ref 80–100)
PLATELET # BLD AUTO: 178 THOU/UL (ref 140–440)
PMV BLD AUTO: 8.8 FL (ref 7–10)
POTASSIUM BLD-SCNC: 4.2 MMOL/L (ref 3.5–5)
RBC # BLD AUTO: 4.69 MILL/UL (ref 3.8–5.4)
SODIUM SERPL-SCNC: 143 MMOL/L (ref 136–145)
WBC: 7.1 THOU/UL (ref 4–11)

## 2020-05-07 ASSESSMENT — MIFFLIN-ST. JEOR: SCORE: 894.32

## 2020-05-11 ASSESSMENT — MIFFLIN-ST. JEOR: SCORE: 906.23

## 2020-05-12 ENCOUNTER — OFFICE VISIT - HEALTHEAST (OUTPATIENT)
Dept: FAMILY MEDICINE | Facility: CLINIC | Age: 85
End: 2020-05-12

## 2020-05-12 DIAGNOSIS — Z11.59 ENCOUNTER FOR SCREENING FOR OTHER VIRAL DISEASES: ICD-10-CM

## 2020-05-13 ENCOUNTER — ANESTHESIA - HEALTHEAST (OUTPATIENT)
Dept: SURGERY | Facility: CLINIC | Age: 85
End: 2020-05-13

## 2020-05-14 ENCOUNTER — SURGERY - HEALTHEAST (OUTPATIENT)
Dept: SURGERY | Facility: CLINIC | Age: 85
End: 2020-05-14

## 2020-05-14 ASSESSMENT — MIFFLIN-ST. JEOR: SCORE: 897.16

## 2020-05-22 ENCOUNTER — RECORDS - HEALTHEAST (OUTPATIENT)
Dept: ADMINISTRATIVE | Facility: OTHER | Age: 85
End: 2020-05-22

## 2020-06-10 ENCOUNTER — HOME CARE/HOSPICE - HEALTHEAST (OUTPATIENT)
Dept: HOME HEALTH SERVICES | Facility: HOME HEALTH | Age: 85
End: 2020-06-10

## 2020-06-12 ENCOUNTER — HOME CARE/HOSPICE - HEALTHEAST (OUTPATIENT)
Dept: HOME HEALTH SERVICES | Facility: HOME HEALTH | Age: 85
End: 2020-06-12

## 2020-06-12 ENCOUNTER — COMMUNICATION - HEALTHEAST (OUTPATIENT)
Dept: HOME HEALTH SERVICES | Facility: HOME HEALTH | Age: 85
End: 2020-06-12

## 2020-06-15 ENCOUNTER — HOME CARE/HOSPICE - HEALTHEAST (OUTPATIENT)
Dept: HOME HEALTH SERVICES | Facility: HOME HEALTH | Age: 85
End: 2020-06-15

## 2020-06-15 ENCOUNTER — COMMUNICATION - HEALTHEAST (OUTPATIENT)
Dept: SCHEDULING | Facility: CLINIC | Age: 85
End: 2020-06-15

## 2020-06-15 DIAGNOSIS — I63.9 ACUTE CVA (CEREBROVASCULAR ACCIDENT) (H): ICD-10-CM

## 2020-06-16 ENCOUNTER — HOME CARE/HOSPICE - HEALTHEAST (OUTPATIENT)
Dept: HOME HEALTH SERVICES | Facility: HOME HEALTH | Age: 85
End: 2020-06-16

## 2020-06-17 ENCOUNTER — COMMUNICATION - HEALTHEAST (OUTPATIENT)
Dept: HOME HEALTH SERVICES | Facility: HOME HEALTH | Age: 85
End: 2020-06-17

## 2020-06-18 ENCOUNTER — OFFICE VISIT - HEALTHEAST (OUTPATIENT)
Dept: FAMILY MEDICINE | Facility: CLINIC | Age: 85
End: 2020-06-18

## 2020-06-18 DIAGNOSIS — F03.90 DEMENTIA WITHOUT BEHAVIORAL DISTURBANCE, UNSPECIFIED DEMENTIA TYPE: ICD-10-CM

## 2020-06-18 DIAGNOSIS — I63.9 ACUTE CEREBROVASCULAR ACCIDENT (CVA) (H): ICD-10-CM

## 2020-06-18 DIAGNOSIS — I10 ESSENTIAL HYPERTENSION: ICD-10-CM

## 2020-06-18 DIAGNOSIS — I71.40 ABDOMINAL AORTIC ANEURYSM (AAA) WITHOUT RUPTURE (H): ICD-10-CM

## 2020-06-18 DIAGNOSIS — E04.9 ENLARGED THYROID: ICD-10-CM

## 2020-06-18 DIAGNOSIS — E78.00 PURE HYPERCHOLESTEROLEMIA: ICD-10-CM

## 2020-06-19 ENCOUNTER — HOME CARE/HOSPICE - HEALTHEAST (OUTPATIENT)
Dept: HOME HEALTH SERVICES | Facility: HOME HEALTH | Age: 85
End: 2020-06-19

## 2020-06-22 ENCOUNTER — HOME CARE/HOSPICE - HEALTHEAST (OUTPATIENT)
Dept: HOME HEALTH SERVICES | Facility: HOME HEALTH | Age: 85
End: 2020-06-22

## 2020-06-22 ENCOUNTER — AMBULATORY - HEALTHEAST (OUTPATIENT)
Dept: CARE COORDINATION | Facility: CLINIC | Age: 85
End: 2020-06-22

## 2020-06-22 ENCOUNTER — COMMUNICATION - HEALTHEAST (OUTPATIENT)
Dept: NURSING | Facility: CLINIC | Age: 85
End: 2020-06-22

## 2020-06-22 ENCOUNTER — COMMUNICATION - HEALTHEAST (OUTPATIENT)
Dept: FAMILY MEDICINE | Facility: CLINIC | Age: 85
End: 2020-06-22

## 2020-06-22 DIAGNOSIS — I63.9 ACUTE CEREBROVASCULAR ACCIDENT (CVA) (H): ICD-10-CM

## 2020-06-23 ENCOUNTER — HOME CARE/HOSPICE - HEALTHEAST (OUTPATIENT)
Dept: HOME HEALTH SERVICES | Facility: HOME HEALTH | Age: 85
End: 2020-06-23

## 2020-06-24 ENCOUNTER — HOME CARE/HOSPICE - HEALTHEAST (OUTPATIENT)
Dept: HOME HEALTH SERVICES | Facility: HOME HEALTH | Age: 85
End: 2020-06-24

## 2020-06-25 ENCOUNTER — HOME CARE/HOSPICE - HEALTHEAST (OUTPATIENT)
Dept: HOME HEALTH SERVICES | Facility: HOME HEALTH | Age: 85
End: 2020-06-25

## 2020-06-25 ENCOUNTER — COMMUNICATION - HEALTHEAST (OUTPATIENT)
Dept: NURSING | Facility: CLINIC | Age: 85
End: 2020-06-25

## 2020-06-25 ASSESSMENT — ACTIVITIES OF DAILY LIVING (ADL): DEPENDENT_IADLS:: TRANSPORTATION

## 2020-06-26 ENCOUNTER — AMBULATORY - HEALTHEAST (OUTPATIENT)
Dept: VASCULAR SURGERY | Facility: CLINIC | Age: 85
End: 2020-06-26

## 2020-06-26 DIAGNOSIS — I71.40 ABDOMINAL AORTIC ANEURYSM (AAA) WITHOUT RUPTURE (H): ICD-10-CM

## 2020-06-29 ENCOUNTER — HOME CARE/HOSPICE - HEALTHEAST (OUTPATIENT)
Dept: HOME HEALTH SERVICES | Facility: HOME HEALTH | Age: 85
End: 2020-06-29

## 2020-06-30 ENCOUNTER — HOME CARE/HOSPICE - HEALTHEAST (OUTPATIENT)
Dept: HOME HEALTH SERVICES | Facility: HOME HEALTH | Age: 85
End: 2020-06-30

## 2020-07-01 ENCOUNTER — HOME CARE/HOSPICE - HEALTHEAST (OUTPATIENT)
Dept: HOME HEALTH SERVICES | Facility: HOME HEALTH | Age: 85
End: 2020-07-01

## 2020-07-02 ENCOUNTER — HOME CARE/HOSPICE - HEALTHEAST (OUTPATIENT)
Dept: HOME HEALTH SERVICES | Facility: HOME HEALTH | Age: 85
End: 2020-07-02

## 2020-07-06 ENCOUNTER — COMMUNICATION - HEALTHEAST (OUTPATIENT)
Dept: NURSING | Facility: CLINIC | Age: 85
End: 2020-07-06

## 2020-07-06 ENCOUNTER — HOME CARE/HOSPICE - HEALTHEAST (OUTPATIENT)
Dept: HOME HEALTH SERVICES | Facility: HOME HEALTH | Age: 85
End: 2020-07-06

## 2020-07-08 ENCOUNTER — HOME CARE/HOSPICE - HEALTHEAST (OUTPATIENT)
Dept: HOME HEALTH SERVICES | Facility: HOME HEALTH | Age: 85
End: 2020-07-08

## 2020-07-09 ENCOUNTER — HOME CARE/HOSPICE - HEALTHEAST (OUTPATIENT)
Dept: HOME HEALTH SERVICES | Facility: HOME HEALTH | Age: 85
End: 2020-07-09

## 2020-07-14 ENCOUNTER — OFFICE VISIT - HEALTHEAST (OUTPATIENT)
Dept: FAMILY MEDICINE | Facility: CLINIC | Age: 85
End: 2020-07-14

## 2020-07-14 DIAGNOSIS — E04.9 ENLARGED THYROID: ICD-10-CM

## 2020-07-14 DIAGNOSIS — E78.00 PURE HYPERCHOLESTEROLEMIA: ICD-10-CM

## 2020-07-14 DIAGNOSIS — I71.40 ABDOMINAL AORTIC ANEURYSM (AAA) WITHOUT RUPTURE (H): ICD-10-CM

## 2020-07-15 ENCOUNTER — HOSPITAL ENCOUNTER (OUTPATIENT)
Dept: ULTRASOUND IMAGING | Facility: CLINIC | Age: 85
Discharge: HOME | End: 2020-07-15

## 2020-07-15 DIAGNOSIS — E04.9 ENLARGED THYROID: ICD-10-CM

## 2020-07-17 ENCOUNTER — COMMUNICATION - HEALTHEAST (OUTPATIENT)
Dept: FAMILY MEDICINE | Facility: CLINIC | Age: 85
End: 2020-07-17

## 2020-07-22 ENCOUNTER — RECORDS - HEALTHEAST (OUTPATIENT)
Dept: ADMINISTRATIVE | Facility: OTHER | Age: 85
End: 2020-07-22

## 2020-07-22 ENCOUNTER — OFFICE VISIT (OUTPATIENT)
Dept: NEUROLOGY | Facility: CLINIC | Age: 85
End: 2020-07-22
Payer: MEDICARE

## 2020-07-22 VITALS — BODY MASS INDEX: 25.4 KG/M2 | HEIGHT: 59 IN | WEIGHT: 126 LBS

## 2020-07-22 DIAGNOSIS — F03.90 DEMENTIA WITHOUT BEHAVIORAL DISTURBANCE, UNSPECIFIED DEMENTIA TYPE: Primary | ICD-10-CM

## 2020-07-22 DIAGNOSIS — I63.9 ACUTE CEREBROVASCULAR ACCIDENT (CVA) (H): ICD-10-CM

## 2020-07-22 DIAGNOSIS — G89.29 CHRONIC BILATERAL LOW BACK PAIN WITHOUT SCIATICA: ICD-10-CM

## 2020-07-22 DIAGNOSIS — M54.50 CHRONIC BILATERAL LOW BACK PAIN WITHOUT SCIATICA: ICD-10-CM

## 2020-07-22 PROBLEM — M89.9 DISORDER OF BONE AND CARTILAGE: Status: ACTIVE | Noted: 2020-07-22

## 2020-07-22 PROBLEM — R41.3 MEMORY IMPAIRMENT: Status: ACTIVE | Noted: 2020-07-22

## 2020-07-22 PROBLEM — M94.9 DISORDER OF BONE AND CARTILAGE: Status: ACTIVE | Noted: 2020-07-22

## 2020-07-22 PROBLEM — M48.061 SPINAL STENOSIS OF LUMBAR REGION: Status: ACTIVE | Noted: 2020-07-22

## 2020-07-22 PROBLEM — D12.6 BENIGN NEOPLASM OF COLON: Status: ACTIVE | Noted: 2020-07-22

## 2020-07-22 PROBLEM — E78.00 PRIMARY HYPERCHOLESTEROLEMIA: Status: ACTIVE | Noted: 2020-07-22

## 2020-07-22 PROCEDURE — 99442 ZZC PHYSICIAN TELEPHONE EVALUATION 11-20 MIN: CPT | Performed by: PSYCHIATRY & NEUROLOGY

## 2020-07-22 RX ORDER — LOVASTATIN 40 MG
40 TABLET ORAL
COMMUNITY
Start: 2020-06-22 | End: 2022-03-17

## 2020-07-22 RX ORDER — ASPIRIN 81 MG/1
81 TABLET, CHEWABLE ORAL DAILY
COMMUNITY
Start: 2020-06-11 | End: 2023-10-06

## 2020-07-22 RX ORDER — MEMANTINE HYDROCHLORIDE 10 MG/1
TABLET ORAL
COMMUNITY
Start: 2020-06-01 | End: 2020-12-01

## 2020-07-22 RX ORDER — LATANOPROST 50 UG/ML
SOLUTION/ DROPS OPHTHALMIC
Status: ON HOLD | COMMUNITY
Start: 2020-07-16 | End: 2022-03-31

## 2020-07-22 ASSESSMENT — MIFFLIN-ST. JEOR: SCORE: 907.16

## 2020-07-22 NOTE — PROGRESS NOTES
NEUROLOGY NOTE        Assessment/Plan        Dementia: progressing, most AD type. thiamine, calcium, TSH, B12 ok. MRI study of the brain showing atrophy in hippocampus. Discussed about safety issues. Off Aricept. Will add Namenda. Her  is the caregiver, but having a lot health problem now with skin cancer, PD, and breaching difficulty. The added stress can be a problem, discussed about home safety issues and getting more help.    Recurrent stroke in right IC, old left thalamic on 6/6/2020 at [facility]. Lacunar infarct in the left frontal white matter in the past: recommend ASA 81 mg. F/u with primary to keep BP in normal range, and LDL below 75, if above 85, add statin medications. Echo ok.     HTN: keep in normal range for stroke prevention and to work with primary.    Some enlarged nodule in the neck likely enlarged thyroid. Her TSH was normal. Follow-up with primary on this.    History of myalgia and abnormal EMG suggestive of motor neuropathy in 2012. Clinically doing well. Asymptomatic and neurological examination does not suggest any neuropathy at this point.    Large left thyroid nodule noted 7/15/2020 awaiting for biopsy.     This is a telephone visit due to COVID-19 Pandemic to mitigate potential disease spreading. Consent to charge obtained for call visit. Total time spent about 15 minutes.       SUBJECTIVE         The patient came here with her , Deejay Agudelo who is also my patient for Parkinson disease, for evaluation and managements of dementia.    She has noted some memory difficulty initially in about 2017. No language difficulty. Short-term memory significantly affected. They have moved into independent senior apartment October 2018. She has cooked all her life for the family but now having trouble to do that. She quit driving in since 10/2018. Denies any headaches. No vision difficulty. No trouble to hear. Denies any weakness or sensory difficulties. Has very active  life. No depression or anxiety. Memory difficulty progressing. But at the same time, her  is having more difficulty with his health too. No falls.    ADL: not driving, some difficulty with cooking.    MOCA 23/30 on 2/8/19. She has high school diploma. 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    No h/o of head trauma.    No family history of dementia.     She has seen Dr. Ramos in 2012. She was evaluated for pain in her thigh as well as calf area for 5 years. It was severe in degree. She was on Lipitor with history of arthropathy of shoulders hyperlipidemia, hypertension and ostial feilpe. 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 The symptoms has since resolved.    Blood test in 2012 showed an elevated calcium, otherwise CBC, C-reactive protein, TSH, iron level, ESR, A1c, unremarkable. Immunoelectrophoresis negative for monoclonal IgG.    MRI of the lumbar spine showing L5-S1 severe degenerative disc disease is but no nerve impingement. Severe foraminal stenosis at the right L4 ganglion impingement. Moderate to severe foraminal stenosis on the left and impingement left L3 nerve root.    IMPRESSION: MRI brain 2/2019  CONCLUSION:  1. No finding for acute infarct, intracranial hemorrhage, or abnormally enhancing mass.  2. Age-related changes including mild burden scattered chronic small vessel ischemic change with a moderate diffuse intraparenchymal volume loss. Small chronic lacunar infarct in the left frontal white matter.  3. Hippocampal volume loss is in keeping with the background diffuse intraparenchymal volume loss. No findings to suggest a mesial temporal lobe directed volume loss.      HEAD MRI: 6/8/2020  1.  Small acute infarct involving the posterior limb of the right internal capsule.  2.  Chronic lacunar infarcts in the left thalamus and inferior left cerebellum.    HEAD MRA:   1.  No aneurysm, high flow AVM or significant stenosis  "identified.    NECK MRA:  1.  No significant stenosis of the bilateral internal carotid arteries based on NASCET criteria.  2.  Enlarged left lobe of the thyroid gland with deviation of the trachea to the right. Further evaluation with thyroid sonogram is advised if not already performed.    Thyroid [id number]  1.  Large central nodule left lobe of the thyroid. This nodule is a TI- RADS 4 lesion greater than 1.5 cm. This nodule meets criteria for ultrasound-guided fine-needle aspiration biopsy and biopsy is recommended.             Review of system     10 point system review otherwise unremarkable    PHYSICAL EXAMINATION     Vital signs in last 24 hours:  Vitals:    07/22/20 1224   Weight: 57.2 kg (126 lb)   Height: 1.499 m (4' 11\")           per patient report, similar to last visit note. Please refer to my last clinic note and and subjective report documentation of the current note.             Problem List     Patient Active Problem List    Diagnosis Date Noted     Memory impairment 07/22/2020     Priority: Medium     Myalgia and myositis, unspecified 07/22/2020     Priority: Medium     Created by Conversion       Spinal stenosis of lumbar region 07/22/2020     Priority: Medium     Primary hypercholesterolemia 07/22/2020     Priority: Medium     Created by Conversion       Benign neoplasm of colon 07/22/2020     Priority: Medium     Created by Conversion       Dementia without behavioral disturbance, unspecified dementia type (H) 07/22/2020     Priority: Medium     Disorder of bone and cartilage 07/22/2020     Priority: Medium     Created by Conversion    Replacement Utility updated for latest IMO load       Acute cerebrovascular accident (CVA) (H) 06/08/2020     Priority: Medium     GI bleeding 01/07/2020     Priority: Medium     Vagina bleeding 01/07/2020     Priority: Medium     Chronic bilateral low back pain without sciatica 08/12/2016     Priority: Medium     Backache 06/08/2015     Priority: Medium "         Past medical history     Past Surgical History:   Procedure Laterality Date     CHOLECYSTECTOMY       COLONOSCOPY N/A 1/8/2020    Procedure: COLONOSCOPY;  Surgeon: Amanda Carson MD;  Location:  GI     SHOULDER SURGERY      bilateral shoulder       History reviewed. No pertinent past medical history.      Family history     Family History   Problem Relation Age of Onset     Heart Disease Father          Social history     Social History     Socioeconomic History     Marital status:      Spouse name: Not on file     Number of children: Not on file     Years of education: Not on file     Highest education level: Not on file   Occupational History     Not on file   Social Needs     Financial resource strain: Not on file     Food insecurity     Worry: Not on file     Inability: Not on file     Transportation needs     Medical: Not on file     Non-medical: Not on file   Tobacco Use     Smoking status: Never Smoker     Smokeless tobacco: Never Used   Substance and Sexual Activity     Alcohol use: Yes     Comment: social     Drug use: Never     Sexual activity: Not on file   Lifestyle     Physical activity     Days per week: Not on file     Minutes per session: Not on file     Stress: Not on file   Relationships     Social connections     Talks on phone: Not on file     Gets together: Not on file     Attends [employer] service: Not on file     Active member of club or organization: Not on file     Attends meetings of clubs or organizations: Not on file     Relationship status: Not on file     Intimate partner violence     Fear of current or ex partner: Not on file     Emotionally abused: Not on file     Physically abused: Not on file     Forced sexual activity: Not on file   Other Topics Concern     Parent/sibling w/ CABG, MI or angioplasty before 65F 55M? Not Asked   Social History Narrative     Not on file         Allergy     Atorvastatin; Dextromethorphan; Oxycodone; Pravastatin;  Simvastatin; and Codeine    MEDICATIONS List     Current Outpatient Medications   Medication Sig Dispense Refill     aspirin (ASA) 81 MG chewable tablet Take 81 mg by mouth       calcium citrate-vitamin D (CITRACAL) 315-200 MG-UNIT TABS per tablet Take 1 tablet by mouth       latanoprost (XALATAN) 0.005 % ophthalmic solution        lovastatin (MEVACOR) 40 MG tablet Take 40 mg by mouth       memantine (NAMENDA) 10 MG tablet                    RESULTS REVIEW         Nona Mckeon MD, MD, PhD  Neurology   Office tel: [phone]

## 2020-07-22 NOTE — LETTER
7/22/2020         RE: Angle Agudelo  [id number]  [facility] [address]        Dear Colleague,    Thank you for referring your patient, Angle Agudelo, to the [facility] [place]. Please see a copy of my visit note below.        NEUROLOGY NOTE        Assessment/Plan        Dementia: progressing, most AD type. thiamine, calcium, TSH, B12 ok. MRI study of the brain showing atrophy in hippocampus. Discussed about safety issues. Off Aricept. Will add Namenda. Her  is the caregiver, but having a lot health problem now with skin cancer, PD, and breaching difficulty. The added stress can be a problem, discussed about home safety issues and getting more help.    Recurrent stroke in right IC, old left thalamic on 6/6/2020 at [facility]. Lacunar infarct in the left frontal white matter in the past: recommend ASA 81 mg. F/u with primary to keep BP in normal range, and LDL below 75, if above 85, add statin medications. Echo ok.     HTN: keep in normal range for stroke prevention and to work with primary.    Some enlarged nodule in the neck likely enlarged thyroid. Her TSH was normal. Follow-up with primary on this.    History of myalgia and abnormal EMG suggestive of motor neuropathy in 2012. Clinically doing well. Asymptomatic and neurological examination does not suggest any neuropathy at this point.    Large left thyroid nodule noted 7/15/2020 awaiting for biopsy.     This is a telephone visit due to COVID-19 Pandemic to mitigate potential disease spreading. Consent to charge obtained for call visit. Total time spent about 15 minutes.       SUBJECTIVE         The patient came here with her , Deejay Agudelo who is also my patient for Parkinson disease, for evaluation and managements of dementia.    She has noted some memory difficulty initially in about 2017. No language difficulty. Short-term memory significantly affected. They have moved into independent senior  apartment October 2018. She has cooked all her life for the family but now having trouble to do that. She quit driving in since 10/2018. Denies any headaches. No vision difficulty. No trouble to hear. Denies any weakness or sensory difficulties. Has very active life. No depression or anxiety. Memory difficulty progressing. But at the same time, her  is having more difficulty with his health too. No falls.    ADL: not driving, some difficulty with cooking.    MOCA 23/30 on 2/8/19. She has high school diploma. 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    No h/o of head trauma.    No family history of dementia.     She has seen Dr. Ramos in 2012. She was evaluated for pain in her thigh as well as calf area for 5 years. It was severe in degree. She was on Lipitor with history of arthropathy of shoulders hyperlipidemia, hypertension and ostial felipe. 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 The symptoms has since resolved.    Blood test in 2012 showed an elevated calcium, otherwise CBC, C-reactive protein, TSH, iron level, ESR, A1c, unremarkable. Immunoelectrophoresis negative for monoclonal IgG.    MRI of the lumbar spine showing L5-S1 severe degenerative disc disease is but no nerve impingement. Severe foraminal stenosis at the right L4 ganglion impingement. Moderate to severe foraminal stenosis on the left and impingement left L3 nerve root.    IMPRESSION: MRI brain 2/2019  CONCLUSION:  1. No finding for acute infarct, intracranial hemorrhage, or abnormally enhancing mass.  2. Age-related changes including mild burden scattered chronic small vessel ischemic change with a moderate diffuse intraparenchymal volume loss. Small chronic lacunar infarct in the left frontal white matter.  3. Hippocampal volume loss is in keeping with the background diffuse intraparenchymal volume loss. No findings to suggest a mesial temporal lobe directed volume  "loss.      HEAD MRI: 6/8/2020  1.  Small acute infarct involving the posterior limb of the right internal capsule.  2.  Chronic lacunar infarcts in the left thalamus and inferior left cerebellum.    HEAD MRA:   1.  No aneurysm, high flow AVM or significant stenosis identified.    NECK MRA:  1.  No significant stenosis of the bilateral internal carotid arteries based on NASCET criteria.  2.  Enlarged left lobe of the thyroid gland with deviation of the trachea to the right. Further evaluation with thyroid sonogram is advised if not already performed.    Thyroid [id number]  1.  Large central nodule left lobe of the thyroid. This nodule is a TI- RADS 4 lesion greater than 1.5 cm. This nodule meets criteria for ultrasound-guided fine-needle aspiration biopsy and biopsy is recommended.             Review of system     10 point system review otherwise unremarkable    PHYSICAL EXAMINATION     Vital signs in last 24 hours:  Vitals:    07/22/20 1224   Weight: 57.2 kg (126 lb)   Height: 1.499 m (4' 11\")           per patient report, similar to last visit note. Please refer to my last clinic note and and subjective report documentation of the current note.             Problem List     Patient Active Problem List    Diagnosis Date Noted     Memory impairment 07/22/2020     Priority: Medium     Myalgia and myositis, unspecified 07/22/2020     Priority: Medium     Created by Conversion       Spinal stenosis of lumbar region 07/22/2020     Priority: Medium     Primary hypercholesterolemia 07/22/2020     Priority: Medium     Created by Conversion       Benign neoplasm of colon 07/22/2020     Priority: Medium     Created by Conversion       Dementia without behavioral disturbance, unspecified dementia type (H) 07/22/2020     Priority: Medium     Disorder of bone and cartilage 07/22/2020     Priority: Medium     Created by Conversion    Replacement Utility updated for latest IMO load       Acute cerebrovascular accident (CVA) (H) " 06/08/2020     Priority: Medium     GI bleeding 01/07/2020     Priority: Medium     Vagina bleeding 01/07/2020     Priority: Medium     Chronic bilateral low back pain without sciatica 08/12/2016     Priority: Medium     Backache 06/08/2015     Priority: Medium         Past medical history     Past Surgical History:   Procedure Laterality Date     CHOLECYSTECTOMY       COLONOSCOPY N/A 1/8/2020    Procedure: COLONOSCOPY;  Surgeon: Amanda Carson MD;  Location:  GI     SHOULDER SURGERY      bilateral shoulder       History reviewed. No pertinent past medical history.      Family history     Family History   Problem Relation Age of Onset     Heart Disease Father          Social history     Social History     Socioeconomic History     Marital status:      Spouse name: Not on file     Number of children: Not on file     Years of education: Not on file     Highest education level: Not on file   Occupational History     Not on file   Social Needs     Financial resource strain: Not on file     Food insecurity     Worry: Not on file     Inability: Not on file     Transportation needs     Medical: Not on file     Non-medical: Not on file   Tobacco Use     Smoking status: Never Smoker     Smokeless tobacco: Never Used   Substance and Sexual Activity     Alcohol use: Yes     Comment: social     Drug use: Never     Sexual activity: Not on file   Lifestyle     Physical activity     Days per week: Not on file     Minutes per session: Not on file     Stress: Not on file   Relationships     Social connections     Talks on phone: Not on file     Gets together: Not on file     Attends Mormonism service: Not on file     Active member of club or organization: Not on file     Attends meetings of clubs or organizations: Not on file     Relationship status: Not on file     Intimate partner violence     Fear of current or ex partner: Not on file     Emotionally abused: Not on file     Physically abused: Not on file      Forced sexual activity: Not on file   Other Topics Concern     Parent/sibling w/ CABG, MI or angioplasty before 65F 55M? Not Asked   Social History Narrative     Not on file         Allergy     Atorvastatin; Dextromethorphan; Oxycodone; Pravastatin; Simvastatin; and Codeine    MEDICATIONS List     Current Outpatient Medications   Medication Sig Dispense Refill     aspirin (ASA) 81 MG chewable tablet Take 81 mg by mouth       calcium citrate-vitamin D (CITRACAL) 315-200 MG-UNIT TABS per tablet Take 1 tablet by mouth       latanoprost (XALATAN) 0.005 % ophthalmic solution        lovastatin (MEVACOR) 40 MG tablet Take 40 mg by mouth       memantine (NAMENDA) 10 MG tablet                    RESULTS REVIEW         Nona Mckeon MD, MD, PhD  Neurology   Office tel: [phone]        Again, thank you for allowing me to participate in the care of your patient.        Sincerely,        Nona Mckeon MD

## 2020-07-23 ENCOUNTER — AMBULATORY - HEALTHEAST (OUTPATIENT)
Dept: FAMILY MEDICINE | Facility: CLINIC | Age: 85
End: 2020-07-23

## 2020-07-23 ENCOUNTER — AMBULATORY - HEALTHEAST (OUTPATIENT)
Dept: ULTRASOUND IMAGING | Facility: CLINIC | Age: 85
End: 2020-07-23

## 2020-07-23 DIAGNOSIS — Z11.59 ENCOUNTER FOR SCREENING FOR OTHER VIRAL DISEASES: ICD-10-CM

## 2020-07-23 DIAGNOSIS — E07.9 THYROID MASS: ICD-10-CM

## 2020-07-24 ENCOUNTER — COMMUNICATION - HEALTHEAST (OUTPATIENT)
Dept: CARE COORDINATION | Facility: CLINIC | Age: 85
End: 2020-07-24

## 2020-07-27 ENCOUNTER — RECORDS - HEALTHEAST (OUTPATIENT)
Dept: VASCULAR ULTRASOUND | Facility: CLINIC | Age: 85
End: 2020-07-27

## 2020-07-27 DIAGNOSIS — I71.40 ABDOMINAL AORTIC ANEURYSM, WITHOUT RUPTURE: ICD-10-CM

## 2020-07-30 ENCOUNTER — OFFICE VISIT - HEALTHEAST (OUTPATIENT)
Dept: VASCULAR SURGERY | Facility: CLINIC | Age: 85
End: 2020-07-30

## 2020-07-30 DIAGNOSIS — I71.40 ABDOMINAL AORTIC ANEURYSM (AAA) WITHOUT RUPTURE (H): ICD-10-CM

## 2020-08-04 ENCOUNTER — AMBULATORY - HEALTHEAST (OUTPATIENT)
Dept: FAMILY MEDICINE | Facility: CLINIC | Age: 85
End: 2020-08-04

## 2020-08-04 DIAGNOSIS — Z11.59 ENCOUNTER FOR SCREENING FOR OTHER VIRAL DISEASES: ICD-10-CM

## 2020-08-06 ENCOUNTER — COMMUNICATION - HEALTHEAST (OUTPATIENT)
Dept: NURSING | Facility: CLINIC | Age: 85
End: 2020-08-06

## 2020-08-07 ENCOUNTER — COMMUNICATION - HEALTHEAST (OUTPATIENT)
Dept: TELEHEALTH | Facility: CLINIC | Age: 85
End: 2020-08-07

## 2020-08-07 ENCOUNTER — HOSPITAL ENCOUNTER (OUTPATIENT)
Dept: ULTRASOUND IMAGING | Facility: CLINIC | Age: 85
Discharge: HOME | End: 2020-08-07
Admitting: RADIOLOGY

## 2020-08-07 DIAGNOSIS — E07.9 THYROID MASS: ICD-10-CM

## 2020-08-14 LAB
CAP COMMENT: NORMAL
LAB AP CHARGES (HE HISTORICAL CONVERSION): NORMAL
LAB AP INITIAL CYTO EVAL (HE HISTORICAL CONVERSION): NORMAL
LAB MED GENERAL PATH INTERP (HE HISTORICAL CONVERSION): NORMAL
PATH REPORT.COMMENTS IMP SPEC: NORMAL
PATH REPORT.COMMENTS IMP SPEC: NORMAL
PATH REPORT.FINAL DX SPEC: NORMAL
PATH REPORT.MICROSCOPIC SPEC OTHER STN: NORMAL
PATH REPORT.RELEVANT HX SPEC: NORMAL
SPECIMEN DESCRIPTION: NORMAL

## 2020-08-21 ENCOUNTER — COMMUNICATION - HEALTHEAST (OUTPATIENT)
Dept: NURSING | Facility: CLINIC | Age: 85
End: 2020-08-21

## 2020-08-24 ENCOUNTER — COMMUNICATION - HEALTHEAST (OUTPATIENT)
Dept: CARE COORDINATION | Facility: CLINIC | Age: 85
End: 2020-08-24

## 2020-08-27 ENCOUNTER — COMMUNICATION - HEALTHEAST (OUTPATIENT)
Dept: TELEHEALTH | Facility: CLINIC | Age: 85
End: 2020-08-27

## 2020-08-27 ENCOUNTER — COMMUNICATION - HEALTHEAST (OUTPATIENT)
Dept: FAMILY MEDICINE | Facility: CLINIC | Age: 85
End: 2020-08-27

## 2020-09-11 ENCOUNTER — COMMUNICATION - HEALTHEAST (OUTPATIENT)
Dept: NURSING | Facility: CLINIC | Age: 85
End: 2020-09-11

## 2020-09-14 ENCOUNTER — COMMUNICATION - HEALTHEAST (OUTPATIENT)
Dept: CARE COORDINATION | Facility: CLINIC | Age: 85
End: 2020-09-14

## 2020-10-08 ENCOUNTER — COMMUNICATION - HEALTHEAST (OUTPATIENT)
Dept: FAMILY MEDICINE | Facility: CLINIC | Age: 85
End: 2020-10-08

## 2020-10-08 DIAGNOSIS — I63.9 ACUTE CEREBROVASCULAR ACCIDENT (CVA) (H): ICD-10-CM

## 2020-10-21 ENCOUNTER — OFFICE VISIT (OUTPATIENT)
Dept: NEUROLOGY | Facility: CLINIC | Age: 85
End: 2020-10-21
Payer: MEDICARE

## 2020-10-21 VITALS — BODY MASS INDEX: 25.4 KG/M2 | WEIGHT: 126 LBS | HEIGHT: 59 IN

## 2020-10-21 DIAGNOSIS — M54.50 CHRONIC BILATERAL LOW BACK PAIN WITHOUT SCIATICA: ICD-10-CM

## 2020-10-21 DIAGNOSIS — G89.29 CHRONIC BILATERAL LOW BACK PAIN WITHOUT SCIATICA: ICD-10-CM

## 2020-10-21 DIAGNOSIS — Z86.73 HISTORY OF STROKE: ICD-10-CM

## 2020-10-21 DIAGNOSIS — F03.90 DEMENTIA WITHOUT BEHAVIORAL DISTURBANCE, UNSPECIFIED DEMENTIA TYPE: Primary | ICD-10-CM

## 2020-10-21 PROCEDURE — 99214 OFFICE O/P EST MOD 30 MIN: CPT | Performed by: PSYCHIATRY & NEUROLOGY

## 2020-10-21 RX ORDER — ACETAMINOPHEN 500 MG
500-1000 TABLET ORAL EVERY 6 HOURS PRN
Status: ON HOLD | COMMUNITY
End: 2022-03-31

## 2020-10-21 RX ORDER — VALACYCLOVIR HYDROCHLORIDE 1 G/1
1000 TABLET, FILM COATED ORAL
COMMUNITY
Start: 2020-10-19 | End: 2021-10-15

## 2020-10-21 ASSESSMENT — MIFFLIN-ST. JEOR: SCORE: 907.16

## 2020-10-21 NOTE — PROGRESS NOTES
NEUROLOGY NOTE        Assessment/Plan          Dementia: progressing, most AD type. thiamine, calcium, TSH, B12 ok. MRI study of the brain showing atrophy in hippocampus. Discussed about safety issues. Off Aricept. Will add Namenda. Her  is the caregiver, but having a lot health problem now with skin cancer, PD, and breaching difficulty. The added stress can be a problem, discussed about home safety issues and getting more help.     Recurrent stroke in right IC, old left thalamic on 6/6/2020 at [facility]. Lacunar infarct in the left frontal white matter in the past: recommend ASA 81 mg. F/u with primary to keep BP in normal range, and LDL below 75, if above 85, add statin medications. Echo ok.     HTN: keep in normal range for stroke prevention and to work with primary.    Some enlarged nodule in the neck likely enlarged thyroid. Her TSH was normal. Follow-up with primary on this.    History of myalgia and abnormal EMG suggestive of motor neuropathy in 2012. Clinically doing well. Asymptomatic and neurological examination does not suggest any neuropathy at this point.     Large left thyroid nodule noted 7/15/2020 awaiting for biopsy.     Left UE shingles developed 10/2020 and was treated at the ER 10/18/2020.    Plan:  Labs in 6 months  Continue memantine.  No current treatment for stroke prevention.    This is a virtual visit due to COVID-19 Pandemic to mitigate potential disease spreading. Consent obtained for charge with this visit. Total time spent about 20 minutes.       SUBJECTIVE         The patient came here with her , Deejay Agudelo, who goes by Jamal, who is also my patient for Parkinson disease, for evaluation and managements of dementia.    Daughter: Daija.      Left UE shingles developed 10/2020 and was treated at the ER 10/18/2020.  No weakness.  No confusion.  Clinically improving at this time.  Last shingle vaccine received 6 years ago in 2014.      She has noted some memory  difficulty initially in about 2017. No language difficulty. Short-term memory significantly affected. They have moved into independent senior apartment October 2018. She has cooked all her life for the family but now having trouble to do that, quit cooking in 2019. She quit driving in since 10/2018. Denies any headaches. No vision difficulty. No trouble to hear. Denies any weakness or sensory difficulties. Has very active life. No depression or anxiety. Memory difficulty progressing. But at the same time, her  is having more difficulty with his health too. No falls.    ADL: not driving or cooking.  And then independent dressing self and showering.  Crease balance using a walker since 2020.  Good support from family's.  They have 8 children taking turns helping.    MOCA 23/30 on 2/8/19. She has high school diploma. 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    No h/o of head trauma.    No family history of dementia.     She has seen Dr. Ramos in 2012. She was evaluated for pain in her thigh as well as calf area for 5 years. It was severe in degree. She was on Lipitor with history of arthropathy of shoulders hyperlipidemia, hypertension. 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 The symptoms has since resolved.    Blood test in 2012 showed an elevated calcium, otherwise CBC, C-reactive protein, TSH, iron level, ESR, A1c, unremarkable. Immunoelectrophoresis negative for monoclonal IgG.    MRI of the lumbar spine showing L5-S1 severe degenerative disc disease is but no nerve impingement. Severe foraminal stenosis at the right L4 ganglion impingement. Moderate to severe foraminal stenosis on the left and impingement left L3 nerve root.    IMPRESSION: MRI brain 2/2019  CONCLUSION:  1. No finding for acute infarct, intracranial hemorrhage, or abnormally enhancing mass.  2. Age-related changes including mild burden scattered chronic small vessel  "ischemic change with a moderate diffuse intraparenchymal volume loss. Small chronic lacunar infarct in the left frontal white matter.  3. Hippocampal volume loss is in keeping with the background diffuse intraparenchymal volume loss. No findings to suggest a mesial temporal lobe directed volume loss.      HEAD MRI: 6/8/2020  1.  Small acute infarct involving the posterior limb of the right internal capsule.  2.  Chronic lacunar infarcts in the left thalamus and inferior left cerebellum.    HEAD MRA:   1.  No aneurysm, high flow AVM or significant stenosis identified.    NECK MRA:  1.  No significant stenosis of the bilateral internal carotid arteries based on NASCET criteria.  2.  Enlarged left lobe of the thyroid gland with deviation of the trachea to the right. Further evaluation with thyroid sonogram is advised if not already performed.    Thyroid [id number]  1.  Large central nodule left lobe of the thyroid. This nodule is a TI- RADS 4 lesion greater than 1.5 cm. This nodule meets criteria for ultrasound-guided fine-needle aspiration biopsy and biopsy is recommended.              Review of system     10 point system review otherwise unremarkable    PHYSICAL EXAMINATION     Vital signs in last 24 hours:  Vitals:    10/21/20 1139   Weight: 57.2 kg (126 lb)   Height: 1.499 m (4' 11\")     Very pleasant.  Normal mental status, language and speech.  Cranial nerves II through XII intact.  Seem to have adequate muscle strength.  Decreased balance but able to walk in the room without any assistive device.  No complaint of sensory difficulty.  Decreased coordination.  Elderly cautious gait.      Problem List     Patient Active Problem List    Diagnosis Date Noted     Memory impairment 07/22/2020     Priority: Medium     Myalgia and myositis, unspecified 07/22/2020     Priority: Medium     Created by Conversion       Spinal stenosis of lumbar region 07/22/2020     Priority: Medium     Primary hypercholesterolemia 07/22/2020 "     Priority: Medium     Created by Conversion       Benign neoplasm of colon 07/22/2020     Priority: Medium     Created by Conversion       Dementia without behavioral disturbance, unspecified dementia type (H) 07/22/2020     Priority: Medium     Disorder of bone and cartilage 07/22/2020     Priority: Medium     Created by Conversion    Replacement Utility updated for latest IMO load       Acute cerebrovascular accident (CVA) (H) 06/08/2020     Priority: Medium     GI bleeding 01/07/2020     Priority: Medium     Vagina bleeding 01/07/2020     Priority: Medium     Chronic bilateral low back pain without sciatica 08/12/2016     Priority: Medium     Backache 06/08/2015     Priority: Medium         Past medical history     Past Surgical History:   Procedure Laterality Date     CHOLECYSTECTOMY       COLONOSCOPY N/A 1/8/2020    Procedure: COLONOSCOPY;  Surgeon: Amanda Carson MD;  Location:  GI     SHOULDER SURGERY      bilateral shoulder       History reviewed. No pertinent past medical history.        Family history     Family History   Problem Relation Age of Onset     Heart Disease Father          Social history     Social History     Socioeconomic History     Marital status:      Spouse name: Not on file     Number of children: Not on file     Years of education: Not on file     Highest education level: Not on file   Occupational History     Not on file   Social Needs     Financial resource strain: Not on file     Food insecurity     Worry: Not on file     Inability: Not on file     Transportation needs     Medical: Not on file     Non-medical: Not on file   Tobacco Use     Smoking status: Never Smoker     Smokeless tobacco: Never Used   Substance and Sexual Activity     Alcohol use: Yes     Comment: social     Drug use: Never     Sexual activity: Not on file   Lifestyle     Physical activity     Days per week: Not on file     Minutes per session: Not on file     Stress: Not on file    Relationships     Social connections     Talks on phone: Not on file     Gets together: Not on file     Attends Spiritism service: Not on file     Active member of club or organization: Not on file     Attends meetings of clubs or organizations: Not on file     Relationship status: Not on file     Intimate partner violence     Fear of current or ex partner: Not on file     Emotionally abused: Not on file     Physically abused: Not on file     Forced sexual activity: Not on file   Other Topics Concern     Parent/sibling w/ CABG, MI or angioplasty before 65F 55M? Not Asked   Social History Narrative     Not on file         Allergy     Atorvastatin, Dextromethorphan, Oxycodone, Pravastatin, Simvastatin, and Codeine    MEDICATIONS List     Current Outpatient Medications   Medication Sig Dispense Refill     acetaminophen (TYLENOL) 500 MG tablet Take 500-1,000 mg by mouth every 6 hours as needed for mild pain       aspirin (ASA) 81 MG chewable tablet Take 81 mg by mouth       calcium citrate-vitamin D (CITRACAL) 315-200 MG-UNIT TABS per tablet Take 1 tablet by mouth       latanoprost (XALATAN) 0.005 % ophthalmic solution        lovastatin (MEVACOR) 40 MG tablet Take 40 mg by mouth       memantine (NAMENDA) 10 MG tablet        valACYclovir (VALTREX) 1000 mg tablet Take 1,000 mg by mouth                     Nona Mckeon MD, MD, PhD  Neurology   Office tel: [phone]

## 2020-10-21 NOTE — LETTER
10/21/2020         RE: Angle Agudelo  [address]  [facility] [address]        Dear Colleague,    Thank you for referring your patient, Angle Agudelo, to the [facility] [place]. Please see a copy of my visit note below.        NEUROLOGY NOTE        Assessment/Plan          Dementia: progressing, most AD type. thiamine, calcium, TSH, B12 ok. MRI study of the brain showing atrophy in hippocampus. Discussed about safety issues. Off Aricept. Will add Namenda. Her  is the caregiver, but having a lot health problem now with skin cancer, PD, and breaching difficulty. The added stress can be a problem, discussed about home safety issues and getting more help.     Recurrent stroke in right IC, old left thalamic on 6/6/2020 at [facility]. Lacunar infarct in the left frontal white matter in the past: recommend ASA 81 mg. F/u with primary to keep BP in normal range, and LDL below 75, if above 85, add statin medications. Echo ok.     HTN: keep in normal range for stroke prevention and to work with primary.    Some enlarged nodule in the neck likely enlarged thyroid. Her TSH was normal. Follow-up with primary on this.    History of myalgia and abnormal EMG suggestive of motor neuropathy in 2012. Clinically doing well. Asymptomatic and neurological examination does not suggest any neuropathy at this point.     Large left thyroid nodule noted 7/15/2020 awaiting for biopsy.     Left UE shingles developed 10/2020 and was treated at the ER 10/18/2020.    Plan:  Labs in 6 months  Continue memantine.  No current treatment for stroke prevention.    This is a virtual visit due to COVID-19 Pandemic to mitigate potential disease spreading. Consent obtained for charge with this visit. Total time spent about 20 minutes.       SUBJECTIVE         The patient came here with her , Deejay Agudelo, who goes by Jamal, who is also my patient for Parkinson disease, for evaluation and  managements of dementia.    Daughter: Daija.      Left UE shingles developed 10/2020 and was treated at the ER 10/18/2020.  No weakness.  No confusion.  Clinically improving at this time.  Last shingle vaccine received 6 years ago in 2014.      She has noted some memory difficulty initially in about 2017. No language difficulty. Short-term memory significantly affected. They have moved into independent senior apartment October 2018. She has cooked all her life for the family but now having trouble to do that, quit cooking in 2019. She quit driving in since 10/2018. Denies any headaches. No vision difficulty. No trouble to hear. Denies any weakness or sensory difficulties. Has very active life. No depression or anxiety. Memory difficulty progressing. But at the same time, her  is having more difficulty with his health too. No falls.    ADL: not driving or cooking.  And then independent dressing self and showering.  Crease balance using a walker since 2020.  Good support from family's.  They have 8 children taking turns helping.    MOCA 23/30 on 2/8/19. She has high school diploma. 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    No h/o of head trauma.    No family history of dementia.     She has seen Dr. Ramos in 2012. She was evaluated for pain in her thigh as well as calf area for 5 years. It was severe in degree. She was on Lipitor with history of arthropathy of shoulders hyperlipidemia, hypertension. 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 The symptoms has since resolved.    Blood test in 2012 showed an elevated calcium, otherwise CBC, C-reactive protein, TSH, iron level, ESR, A1c, unremarkable. Immunoelectrophoresis negative for monoclonal IgG.    MRI of the lumbar spine showing L5-S1 severe degenerative disc disease is but no nerve impingement. Severe foraminal stenosis at the right L4 ganglion impingement. Moderate to severe foraminal  "stenosis on the left and impingement left L3 nerve root.    IMPRESSION: MRI brain 2/2019  CONCLUSION:  1. No finding for acute infarct, intracranial hemorrhage, or abnormally enhancing mass.  2. Age-related changes including mild burden scattered chronic small vessel ischemic change with a moderate diffuse intraparenchymal volume loss. Small chronic lacunar infarct in the left frontal white matter.  3. Hippocampal volume loss is in keeping with the background diffuse intraparenchymal volume loss. No findings to suggest a mesial temporal lobe directed volume loss.      HEAD MRI: 6/8/2020  1.  Small acute infarct involving the posterior limb of the right internal capsule.  2.  Chronic lacunar infarcts in the left thalamus and inferior left cerebellum.    HEAD MRA:   1.  No aneurysm, high flow AVM or significant stenosis identified.    NECK MRA:  1.  No significant stenosis of the bilateral internal carotid arteries based on NASCET criteria.  2.  Enlarged left lobe of the thyroid gland with deviation of the trachea to the right. Further evaluation with thyroid sonogram is advised if not already performed.    Thyroid 7/039638  1.  Large central nodule left lobe of the thyroid. This nodule is a TI- RADS 4 lesion greater than 1.5 cm. This nodule meets criteria for ultrasound-guided fine-needle aspiration biopsy and biopsy is recommended.              Review of system     10 point system review otherwise unremarkable    PHYSICAL EXAMINATION     Vital signs in last 24 hours:  Vitals:    10/21/20 1139   Weight: 57.2 kg (126 lb)   Height: 1.499 m (4' 11\")     Very pleasant.  Normal mental status, language and speech.  Cranial nerves II through XII intact.  Seem to have adequate muscle strength.  Decreased balance but able to walk in the room without any assistive device.  No complaint of sensory difficulty.  Decreased coordination.  Elderly cautious gait.      Problem List     Patient Active Problem List    Diagnosis Date " Noted     Memory impairment 07/22/2020     Priority: Medium     Myalgia and myositis, unspecified 07/22/2020     Priority: Medium     Created by Conversion       Spinal stenosis of lumbar region 07/22/2020     Priority: Medium     Primary hypercholesterolemia 07/22/2020     Priority: Medium     Created by Conversion       Benign neoplasm of colon 07/22/2020     Priority: Medium     Created by Conversion       Dementia without behavioral disturbance, unspecified dementia type (H) 07/22/2020     Priority: Medium     Disorder of bone and cartilage 07/22/2020     Priority: Medium     Created by Conversion    Replacement Utility updated for latest IMO load       Acute cerebrovascular accident (CVA) (H) 06/08/2020     Priority: Medium     GI bleeding 01/07/2020     Priority: Medium     Vagina bleeding 01/07/2020     Priority: Medium     Chronic bilateral low back pain without sciatica 08/12/2016     Priority: Medium     Backache 06/08/2015     Priority: Medium         Past medical history     Past Surgical History:   Procedure Laterality Date     CHOLECYSTECTOMY       COLONOSCOPY N/A 1/8/2020    Procedure: COLONOSCOPY;  Surgeon: Amanda Carson MD;  Location:  GI     SHOULDER SURGERY      bilateral shoulder       History reviewed. No pertinent past medical history.        Family history     Family History   Problem Relation Age of Onset     Heart Disease Father          Social history     Social History     Socioeconomic History     Marital status:      Spouse name: Not on file     Number of children: Not on file     Years of education: Not on file     Highest education level: Not on file   Occupational History     Not on file   Social Needs     Financial resource strain: Not on file     Food insecurity     Worry: Not on file     Inability: Not on file     Transportation needs     Medical: Not on file     Non-medical: Not on file   Tobacco Use     Smoking status: Never Smoker     Smokeless tobacco:  Never Used   Substance and Sexual Activity     Alcohol use: Yes     Comment: social     Drug use: Never     Sexual activity: Not on file   Lifestyle     Physical activity     Days per week: Not on file     Minutes per session: Not on file     Stress: Not on file   Relationships     Social connections     Talks on phone: Not on file     Gets together: Not on file     Attends Hinduism service: Not on file     Active member of club or organization: Not on file     Attends meetings of clubs or organizations: Not on file     Relationship status: Not on file     Intimate partner violence     Fear of current or ex partner: Not on file     Emotionally abused: Not on file     Physically abused: Not on file     Forced sexual activity: Not on file   Other Topics Concern     Parent/sibling w/ CABG, MI or angioplasty before 65F 55M? Not Asked   Social History Narrative     Not on file         Allergy     Atorvastatin, Dextromethorphan, Oxycodone, Pravastatin, Simvastatin, and Codeine    MEDICATIONS List     Current Outpatient Medications   Medication Sig Dispense Refill     acetaminophen (TYLENOL) 500 MG tablet Take 500-1,000 mg by mouth every 6 hours as needed for mild pain       aspirin (ASA) 81 MG chewable tablet Take 81 mg by mouth       calcium citrate-vitamin D (CITRACAL) 315-200 MG-UNIT TABS per tablet Take 1 tablet by mouth       latanoprost (XALATAN) 0.005 % ophthalmic solution        lovastatin (MEVACOR) 40 MG tablet Take 40 mg by mouth       memantine (NAMENDA) 10 MG tablet        valACYclovir (VALTREX) 1000 mg tablet Take 1,000 mg by mouth                     Nona Mckeon MD, MD, PhD  Neurology   Office tel: [phone]          Again, thank you for allowing me to participate in the care of your patient.        Sincerely,        Nona Mckeon MD

## 2020-11-24 ENCOUNTER — RECORDS - HEALTHEAST (OUTPATIENT)
Dept: ADMINISTRATIVE | Facility: OTHER | Age: 85
End: 2020-11-24

## 2020-11-28 DIAGNOSIS — F03.90 DEMENTIA WITHOUT BEHAVIORAL DISTURBANCE, UNSPECIFIED DEMENTIA TYPE: Primary | ICD-10-CM

## 2020-12-01 RX ORDER — MEMANTINE HYDROCHLORIDE 10 MG/1
TABLET ORAL
Qty: 180 TABLET | Refills: 3 | Status: SHIPPED | OUTPATIENT
Start: 2020-12-01 | End: 2024-01-17

## 2021-01-09 ENCOUNTER — HOSPITAL ENCOUNTER (INPATIENT)
Facility: CLINIC | Age: 86
LOS: 4 days | Discharge: HOME | DRG: 378 | End: 2021-01-13
Attending: INTERNAL MEDICINE | Admitting: INTERNAL MEDICINE
Payer: MEDICARE

## 2021-01-09 ENCOUNTER — RECORDS - HEALTHEAST (OUTPATIENT)
Dept: ADMINISTRATIVE | Facility: OTHER | Age: 86
End: 2021-01-09

## 2021-01-09 PROBLEM — K92.2 GI BLEED: Status: ACTIVE | Noted: 2021-01-09

## 2021-01-09 PROCEDURE — 120N000002 HC R&B MED SURG/OB UMMC

## 2021-01-09 RX ORDER — LIDOCAINE 40 MG/G
CREAM TOPICAL
Status: DISCONTINUED | OUTPATIENT
Start: 2021-01-09 | End: 2021-01-13 | Stop reason: HOSPADM

## 2021-01-10 LAB
ABO + RH BLD: NORMAL
ABO + RH BLD: NORMAL
ALBUMIN SERPL-MCNC: 3.2 G/DL (ref 3.4–5)
ALP SERPL-CCNC: 66 U/L (ref 40–150)
ALT SERPL W P-5'-P-CCNC: 13 U/L (ref 0–50)
ANION GAP SERPL CALCULATED.3IONS-SCNC: 5 MMOL/L (ref 3–14)
ANION GAP SERPL CALCULATED.3IONS-SCNC: 6 MMOL/L (ref 3–14)
AST SERPL W P-5'-P-CCNC: 11 U/L (ref 0–45)
BILIRUB SERPL-MCNC: 0.6 MG/DL (ref 0.2–1.3)
BLD GP AB SCN SERPL QL: NORMAL
BLD PROD TYP BPU: NORMAL
BLD PROD TYP BPU: NORMAL
BLD UNIT ID BPU: 0
BLOOD BANK CMNT PATIENT-IMP: NORMAL
BLOOD PRODUCT CODE: NORMAL
BPU ID: NORMAL
BUN SERPL-MCNC: 21 MG/DL (ref 7–30)
BUN SERPL-MCNC: 24 MG/DL (ref 7–30)
CALCIUM SERPL-MCNC: 8.4 MG/DL (ref 8.5–10.1)
CALCIUM SERPL-MCNC: 8.6 MG/DL (ref 8.5–10.1)
CHLORIDE SERPL-SCNC: 112 MMOL/L (ref 94–109)
CHLORIDE SERPL-SCNC: 112 MMOL/L (ref 94–109)
CO2 SERPL-SCNC: 26 MMOL/L (ref 20–32)
CO2 SERPL-SCNC: 28 MMOL/L (ref 20–32)
CREAT SERPL-MCNC: 0.78 MG/DL (ref 0.52–1.04)
CREAT SERPL-MCNC: 0.81 MG/DL (ref 0.52–1.04)
ERYTHROCYTE [DISTWIDTH] IN BLOOD BY AUTOMATED COUNT: 15.1 % (ref 10–15)
ERYTHROCYTE [DISTWIDTH] IN BLOOD BY AUTOMATED COUNT: 15.3 % (ref 10–15)
GFR SERPL CREATININE-BSD FRML MDRD: 65 ML/MIN/{1.73_M2}
GFR SERPL CREATININE-BSD FRML MDRD: 68 ML/MIN/{1.73_M2}
GLUCOSE SERPL-MCNC: 144 MG/DL (ref 70–99)
GLUCOSE SERPL-MCNC: 90 MG/DL (ref 70–99)
HCT VFR BLD AUTO: 33.6 % (ref 35–47)
HCT VFR BLD AUTO: 36.4 % (ref 35–47)
HCT VFR BLD AUTO: 37.3 % (ref 35–47)
HGB BLD-MCNC: 10.2 G/DL (ref 11.7–15.7)
HGB BLD-MCNC: 10.3 G/DL (ref 11.7–15.7)
HGB BLD-MCNC: 11.3 G/DL (ref 11.7–15.7)
HGB BLD-MCNC: 11.7 G/DL (ref 11.7–15.7)
INR PPP: 1.09 (ref 0.86–1.14)
MCH RBC QN AUTO: 30.5 PG (ref 26.5–33)
MCH RBC QN AUTO: 30.9 PG (ref 26.5–33)
MCHC RBC AUTO-ENTMCNC: 30.4 G/DL (ref 31.5–36.5)
MCHC RBC AUTO-ENTMCNC: 31.4 G/DL (ref 31.5–36.5)
MCV RBC AUTO: 101 FL (ref 78–100)
MCV RBC AUTO: 98 FL (ref 78–100)
NUM BPU REQUESTED: 1
PLATELET # BLD AUTO: 136 10E9/L (ref 150–450)
PLATELET # BLD AUTO: 137 10E9/L (ref 150–450)
POTASSIUM SERPL-SCNC: 3.9 MMOL/L (ref 3.4–5.3)
POTASSIUM SERPL-SCNC: 4.2 MMOL/L (ref 3.4–5.3)
PROT SERPL-MCNC: 5.9 G/DL (ref 6.8–8.8)
RBC # BLD AUTO: 3.34 10E12/L (ref 3.8–5.2)
RBC # BLD AUTO: 3.79 10E12/L (ref 3.8–5.2)
SODIUM SERPL-SCNC: 144 MMOL/L (ref 133–144)
SODIUM SERPL-SCNC: 146 MMOL/L (ref 133–144)
SPECIMEN EXP DATE BLD: NORMAL
TRANSFUSION STATUS PATIENT QL: NORMAL
TRANSFUSION STATUS PATIENT QL: NORMAL
WBC # BLD AUTO: 6.3 10E9/L (ref 4–11)
WBC # BLD AUTO: 8.1 10E9/L (ref 4–11)

## 2021-01-10 PROCEDURE — 85014 HEMATOCRIT: CPT | Performed by: INTERNAL MEDICINE

## 2021-01-10 PROCEDURE — 120N000002 HC R&B MED SURG/OB UMMC

## 2021-01-10 PROCEDURE — 80053 COMPREHEN METABOLIC PANEL: CPT | Performed by: STUDENT IN AN ORGANIZED HEALTH CARE EDUCATION/TRAINING PROGRAM

## 2021-01-10 PROCEDURE — 86900 BLOOD TYPING SEROLOGIC ABO: CPT | Performed by: INTERNAL MEDICINE

## 2021-01-10 PROCEDURE — 86923 COMPATIBILITY TEST ELECTRIC: CPT | Performed by: INTERNAL MEDICINE

## 2021-01-10 PROCEDURE — 36415 COLL VENOUS BLD VENIPUNCTURE: CPT | Performed by: STUDENT IN AN ORGANIZED HEALTH CARE EDUCATION/TRAINING PROGRAM

## 2021-01-10 PROCEDURE — 80048 BASIC METABOLIC PNL TOTAL CA: CPT | Performed by: STUDENT IN AN ORGANIZED HEALTH CARE EDUCATION/TRAINING PROGRAM

## 2021-01-10 PROCEDURE — 93010 ELECTROCARDIOGRAM REPORT: CPT | Performed by: INTERNAL MEDICINE

## 2021-01-10 PROCEDURE — 85027 COMPLETE CBC AUTOMATED: CPT | Performed by: STUDENT IN AN ORGANIZED HEALTH CARE EDUCATION/TRAINING PROGRAM

## 2021-01-10 PROCEDURE — 250N000013 HC RX MED GY IP 250 OP 250 PS 637: Performed by: STUDENT IN AN ORGANIZED HEALTH CARE EDUCATION/TRAINING PROGRAM

## 2021-01-10 PROCEDURE — 99233 SBSQ HOSP IP/OBS HIGH 50: CPT | Mod: GC | Performed by: INTERNAL MEDICINE

## 2021-01-10 PROCEDURE — 99222 1ST HOSP IP/OBS MODERATE 55: CPT | Performed by: INTERNAL MEDICINE

## 2021-01-10 PROCEDURE — P9016 RBC LEUKOCYTES REDUCED: HCPCS | Performed by: INTERNAL MEDICINE

## 2021-01-10 PROCEDURE — 86901 BLOOD TYPING SEROLOGIC RH(D): CPT | Performed by: INTERNAL MEDICINE

## 2021-01-10 PROCEDURE — 93005 ELECTROCARDIOGRAM TRACING: CPT

## 2021-01-10 PROCEDURE — 258N000003 HC RX IP 258 OP 636: Performed by: STUDENT IN AN ORGANIZED HEALTH CARE EDUCATION/TRAINING PROGRAM

## 2021-01-10 PROCEDURE — 85018 HEMOGLOBIN: CPT | Performed by: INTERNAL MEDICINE

## 2021-01-10 PROCEDURE — 85610 PROTHROMBIN TIME: CPT | Performed by: STUDENT IN AN ORGANIZED HEALTH CARE EDUCATION/TRAINING PROGRAM

## 2021-01-10 PROCEDURE — 86850 RBC ANTIBODY SCREEN: CPT | Performed by: INTERNAL MEDICINE

## 2021-01-10 PROCEDURE — 36415 COLL VENOUS BLD VENIPUNCTURE: CPT | Performed by: INTERNAL MEDICINE

## 2021-01-10 PROCEDURE — 85018 HEMOGLOBIN: CPT | Performed by: STUDENT IN AN ORGANIZED HEALTH CARE EDUCATION/TRAINING PROGRAM

## 2021-01-10 RX ORDER — SODIUM CHLORIDE, SODIUM LACTATE, POTASSIUM CHLORIDE, CALCIUM CHLORIDE 600; 310; 30; 20 MG/100ML; MG/100ML; MG/100ML; MG/100ML
INJECTION, SOLUTION INTRAVENOUS CONTINUOUS
Status: ACTIVE | OUTPATIENT
Start: 2021-01-10 | End: 2021-01-10

## 2021-01-10 RX ORDER — MEMANTINE HYDROCHLORIDE 10 MG/1
10 TABLET ORAL 2 TIMES DAILY
Status: DISCONTINUED | OUTPATIENT
Start: 2021-01-10 | End: 2021-01-13 | Stop reason: HOSPADM

## 2021-01-10 RX ORDER — LOVASTATIN 20 MG
40 TABLET ORAL DAILY
Status: DISCONTINUED | OUTPATIENT
Start: 2021-01-10 | End: 2021-01-13 | Stop reason: HOSPADM

## 2021-01-10 RX ADMIN — MEMANTINE 10 MG: 10 TABLET ORAL at 21:09

## 2021-01-10 RX ADMIN — Medication 1 MG: at 21:52

## 2021-01-10 RX ADMIN — LOVASTATIN 40 MG: 20 TABLET ORAL at 07:42

## 2021-01-10 RX ADMIN — SODIUM CHLORIDE, POTASSIUM CHLORIDE, SODIUM LACTATE AND CALCIUM CHLORIDE: 600; 310; 30; 20 INJECTION, SOLUTION INTRAVENOUS at 02:24

## 2021-01-10 RX ADMIN — MEMANTINE 10 MG: 10 TABLET ORAL at 07:42

## 2021-01-10 ASSESSMENT — ACTIVITIES OF DAILY LIVING (ADL)
ADLS_ACUITY_SCORE: 14
ADLS_ACUITY_SCORE: 13
ADLS_ACUITY_SCORE: 14

## 2021-01-10 NOTE — PLAN OF CARE
Patient came in to unit at shift change at 2315. A/Ox4 with forgetfulness. SBA with walker. VSS on RA. Denies SOB. Clear LS. +BS, soft non distended abdomen, denies nausea. 1x medium bloody stool incontinence. Denies pain. MD came to see and assess pt at around 0100. Will follow up Hemoglobin recheck. Will continue to monitor.   Vitals:    01/10/21 0022   BP: 121/75   Pulse: 72   Resp: 16   Temp: 96.8  F (36  C)   TempSrc: Oral   SpO2: 93%   0300: Hemoglobin 10.2, no bloody stool at this time. LR started at 75 ml/hr via right PIV.   0420: notified MD re: BP 80/54; 90/53   Vitals:    01/10/21 0022 01/10/21 0407 01/10/21 0413   BP: 121/75 (!) 80/54 90/53   Pulse: 72 76 72   Resp: 16 16    Temp: 96.8  F (36  C) 97.4  F (36.3  C)    TempSrc: Oral Oral    SpO2: 93% 97% 95%   0630: 1 unit PRBC ongoing, no reaction noted, VSS.   1x small bloody stool. Uses commode, voided 250 ml.   Vitals:    01/10/21 0413 01/10/21 0615 01/10/21 0636 01/10/21 0657   BP: 90/53 110/60 108/58 121/78   BP Location:  Right arm  Left arm   Pulse: 72 76 83 86   Resp:  17 17 17   Temp:  97.1  F (36.2  C) 97.2  F (36.2  C) 97.2  F (36.2  C)   TempSrc:  Oral Oral Oral   SpO2: 95% 92% 92% 92%

## 2021-01-10 NOTE — UTILIZATION REVIEW
"  Admission Status; Secondary Review Determination       Under the authority of the Utilization Management Committee, the utilization review process indicated a secondary review on the above patient. The review outcome is based on review of the medical records, discussions with staff, and applying clinical experience noted on the date of the review.     (x) Inpatient Status Appropriate - This patient's medical care is consistent with medical management for inpatient care and reasonable inpatient medical practice.     RATIONALE FOR DETERMINATION   \"88 yo F w/ diverticulosis, CVA, abdominal aortic aneurysm, HTN, and dementia who presents w/ painless rectal bleeding. Suspect etiology of hematochezia most likely caused by underlying diverticulosis. BP as low as 80/54. Discussed w/ GI provider prior to admission w/ recommendations for possible IR embolization. Required blood transfusion. Expected discharge: 2 - 3 days.\"   This is a conditional review for a Medicare patient, at the time of this review patient is anticipated to require at least 1 more night of hospital care; however if plan changes and discharges before 2 midnights please send for post discharge review.     This document was produced using voice recognition software       The information on this document is developed by the utilization review team in order for the business office to ensure compliance. This only denotes the appropriateness of proper admission status and does not reflect the quality of care rendered.   The definitions of Inpatient Status and Observation Status used in making the determination above are those provided in the CMS Coverage Manual, Chapter 1 and Chapter 6, section 70.4.   Sincerely,   JADEN PATEL MD   System Medical Director   Utilization Management   [facility].        "

## 2021-01-10 NOTE — CONSULTS
"INTERVENTIONAL RADIOLOGY CONSULT NOTE    Request, patient data, and imaging are being reviewed. The request has been placed in our procedure request que for review by the staff radiologist(s) to review and appoint to the schedule if approved.    89-year-old female with history of diverticulosis, CVA abdominal aortic aneurysm and dementia presenting with painless rectal bleeding.  CTA 1/9/2021 shows active bleeding in the sigmoid colon. Colonoscopy not performed on this admission.    Hemoglobin at admission was 10.2 (2:30 AM 10/10/2021.  Patient received blood transfusion 1 unit at 6:30 AM. Current hemoglobin is 11.3 (11 AM 1/10/2021).    INR 1.09 (10/10/2031) platelets 137.  Creatinine 0.78, EGFR 68.    She had a similar episode in January last year with colonoscopy (1/8/2020) showing medium-sized angiodysplastic lesion without bleeding and was treated conservatively.    Vital signs:  Temp: 97.2  F (36.2  C) Temp src: Oral BP: 115/62 Pulse: 72   Resp: 18 SpO2: 95 % O2 Device: None (Room air)        Estimated body mass index is 25.45 kg/m  as calculated from the following:    Height as of 10/21/20: 1.499 m (4' 11\").    Weight as of 10/21/20: 57.2 kg (126 lb).    Recommendations:-  -Plan to treat conservatively for now.  -Trend hemoglobin.  -If the hemoglobin drop is persistent or patient becomes unstable we will proceed with angiogram.  -We will continue to follow.    Platelet Count   Date Value Ref Range Status   01/10/2021 137 (L) 150 - 450 10e9/L Final     INR   Date Value Ref Range Status   01/07/2020 1.08 0.86 - 1.14 Final      Please contact the IR charge RN at [phone] for estimated time of procedure.     Case discussed with Dr. Cazares and rachel 1 medicine team.    Oswaldo Villar MD  Fellow, Department of Vascular and Interventional Radiology  January 10, 2021  Beeper:  [phone] After Hours    "

## 2021-01-10 NOTE — PROGRESS NOTES
[facility]  Transfer Triage Note    Date of call: 01/09/21  Time of call: 8:20 PM    Is pandemic COVID-19 a concern? NO    Reason for transfer: Procedure can be done here and not at referring hospital   Diagnosis: Hematochezia    Outside Records: Available  Additional records requested to be faxed to [phone].    Stability of Patient: Patient is vitally stable, with no critical labs, and will likely remain stable throughout the transfer process  ICU: No    Expected Time of Arrival for Transfer: 0-8 hours    Arrival Location:  [address] [address] Phone: [phone]    Recommendations for Management and Stabilization: Not needed    Additional Comments: Presenting with bright red blood per rectum. On aspirin, no other anticoagulation. Vital signs stable. Hemoglobin went from 13.5 to 11.8. [facility] ED discussed with GI, and they recommended transfer to a hospital with IR capabilities just in case.    Palomo Dillon MD

## 2021-01-10 NOTE — CONSULTS
GASTROENTEROLOGY CONSULTATION      Date of Admission:  1/9/2021          ASSESSMENT AND RECOMMENDATIONS:   Assessment:  89 year old female with a history of presumed prior diverticular bleed (1/2020), HTN, AAA and dementia admitted with hematochezia.    CTA from overnight showed sigmoid diverticular blush. IR feels angiogram would be too risky at this time given AAA and recommend conservative management.    Hgb decreased from 12 -> 9 and increased to 11 after 1 unit blood. HR and BP stable.    Clinical scenario consistent with diverticular bleed. This appears to have stopped at this time. Recommend observing patient for evidence of recurrent bleeding. I expect she will continue to have some bloody bowel movements but this should be fewer and far between. If she has evidence of recurrent bleeding we can prep for diagnostic colonoscopy. If she becomes hemodynamically unstable would call IR to reconsider angiogram.     Recommendations:  -no plan for colonoscopy at this time as she appears to have stopped bleeding and has recent colonoscopy last year  -ok to give clear liquids  -trend hgb  -expect ongoing bloody BMs but this should decrease over the next 24-48 hours and blood clears  -If she has evidence of recurrent bleeding we can prep for diagnostic colonoscopy. If she becomes hemodynamically unstable would call IR to reconsider angiogram.    Thank you for involving us in this patient's care. Please do not hesitate to contact the GI service with any questions or concerns.       Dada Blackmon MD  -------------------------------------------------------------------------------------------------------------------          Chief Complaint:   We were asked by Betsey of internal medicine to evaluate this patient with hematochezia    History is obtained from the patient and the medical record.          History of Present Illness:   Angle Agudelo is a 89 year old female with a history of known diverticulosis  with presumed diverticular bleed in 1/2020, AAA, HTN and dementia who is admitted with multiple episodes of hematochezia.    Symptoms began yesterday afternoon. Hematochezia with abrupt onset. At least 3-4 episodes. Last around 7AM this AM.    Seen in [facility] and CTA with blush of contrast in sigmoid diverticulum consistent with GI bleed. She was transferred here for GI/IR evaluation.    Pt denies any abdominal pain. Does not feel lightheaded.     Similar presentation last year. Had diverticulosis in entire colon (sigmoid, descending, transverse and ascending colon). Possible AVM in ascending colon with no bleeding - not treated.             Past Medical History:   Reviewed and edited as appropriate  Colon diverticulosis  Presumed diverticular bleed 1/2020  HTN  AAA  Dementia         Past Surgical History:   Reviewed and edited as appropriate   Past Surgical History:   Procedure Laterality Date     CHOLECYSTECTOMY       COLONOSCOPY N/A 1/8/2020    Procedure: COLONOSCOPY;  Surgeon: Amanda Carson MD;  Location:  GI     SHOULDER SURGERY      bilateral shoulder            Previous Endoscopy:     Results for orders placed or performed during the hospital encounter of 01/07/20   COLONOSCOPY   Result Value Ref Range    COLONOSCOPY       [address] [place] [address] [phone]     Endoscopy Department  _______________________________________________________________________________  Patient Name: Angle Agudelo            Procedure Date: 1/8/2020 11:14 AM  MRN: [id number]                       Account Number: [id number]  YOB: 1932               Admit Type: Inpatient  Age: 88                               Room: [facility]                      Gender: Female                        Note Status: Finalized  Attending MD: Amanda Carson MD  Total Sedation Time:   _______________________________________________________________________________      Procedure:           Colonoscopy  Indications:         Hematochezia  Providers:           Amanda Carson MD, Krista Kirkpatrick, Micki Solis, CEE  Patient Profile:     Ms. Agudelo is an 88 year old female with history of                        obscure bleeding in the past, who presents with another                        episode of h ematochezia. Prior colonoscopy and                        sigmoidoscopy reports were not available for review, but                        notes state she had diverticula.  Referring MD:          Medicines:           Midazolam 2 mg IV, Fentanyl 50 micrograms IV  Complications:       No immediate complications.  _______________________________________________________________________________  Procedure:           Pre-Anesthesia Assessment:                       - Prior to the procedure, a History and Physical was                        performed, and patient medications and allergies were                        reviewed. The patient is competent. The risks and                        benefits of the procedure and the sedation options and                        risks were discussed with the patient. All questions                        were answered and informed consent was obtained. Patient                        identification and proposed procedure were verified by                         the physician and the nurse in the procedure room.                        Mental Status Examination: normal. Airway Examination:                        normal oropharyngeal airway and neck mobility and                        Mallampati Class II (the uvula but not tonsillar pillars                        visualized). Respiratory Examination: clear to                        auscultation. CV Examination: normal. Prophylactic                        Antibiotics: The patient does not require prophylactic                        antibiotics. Prior Anticoagulants: The patient has taken                        no  previous anticoagulant or antiplatelet agents. ASA                        Grade Assessment: III - A patient with severe systemic                        disease. After reviewing the risks and benefits, the                        patient was deemed in satisfactory condition to undergo                        the procedure. The anesthesia plan was to use moderate                        maryann tion / analgesia (conscious sedation). Immediately                        prior to administration of medications, the patient was                        re-assessed for adequacy to receive sedatives. The heart                        rate, respiratory rate, oxygen saturations, blood                        pressure, adequacy of pulmonary ventilation, and                        response to care were monitored throughout the                        procedure. The physical status of the patient was                        re-assessed after the procedure.                       After obtaining informed consent, the colonoscope was                        passed under direct vision. Throughout the procedure,                        the patient's blood pressure, pulse, and oxygen                        saturations were monitored continuously. The Colonoscope                        was introduced through the anus and advanced to the                        cecum, identified by appendiceal orifice and  ileocecal                        valve. The colonoscopy was performed without difficulty.                        The patient tolerated the procedure well. The quality of                        the bowel preparation was evaluated using the BBPS                        ([place] Bowel Preparation Scale) with scores of: Right                        Colon = 2 (minor amount of residual staining, small                        fragments of stool and/or opaque liquid, but mucosa seen                        well), Transverse Colon = 3 (entire mucosa seen well                         with no residual staining, small fragments of stool or                        opaque liquid) and Left Colon = 3 (entire mucosa seen                        well with no residual staining, small fragments of stool                        or opaque liquid). The total BBPS score equals 8. The                        quality of the bowel preparation was good.                                                                                    Findings:       Skin tags were found on perianal exam.       A single medium-sized angiodysplastic lesion without bleeding was found        in the ascending colon. No bleeding was induced with lavage.       Multiple small and large-mouthed diverticula were found in the sigmoid        colon, descending colon, transverse colon and ascending colon. No        biopsies or other specimens were collected for this exam.       The retroflexed view of the distal rectum and anal verge was normal and        showed no anal or rectal abnormalities.                                                                                   Moderate Sedation:       Moderate (conscious) sedation was administered by the endoscopy nurse        and supervised by the endoscopist. The patient's oxygen saturation,        heart rate, blood pressure and response to care were monitored. Total        physician intraservice time was 44 minutes.  Impression:          - No active bleeding was noted in the entir e colon with                        bile entering from the IC valve. Suspected AVM seen in                        right colon with no bleeding able to be induced with                        lavage. Given bright red blood passed by patient (with                        no marked hemodynamic changes) suspect that diverticular                        bleeding was most likely cause of her recent                        hematochezia.                       - Perianal skin tags found on perianal exam.                        - Diverticulosis in the sigmoid colon, in the descending                        colon, in the transverse colon and in the ascending                        colon. No specimens collected.                       - The distal rectum and anal verge are normal on                        retroflexion view.  Recommendation:      - [facility] patient to [facility] care.                       - Advance diet as tolerated.                       - Continue present medications.                        - If there is further bleeding that is similar in                        nature, would have discussion about supportive cares                        with transfusions unless hemodynamically unstable                        bleeding.                                                                                     Amanda Carson MD  _______________________  Amanda Carson MD  1/8/2020 7:06:06 PM  I was physically present for the entire viewing portion of the exam.  __________________________  Signature of teaching physician  Mary/Malachi Carson MD    ___________  Krista Kirkpatrick,   Number of Addenda: 0    Note Initiated On: 1/8/2020 11:14 AM  Scope In:  Scope Out:              Social History:   Reviewed and edited as appropriate  Social History     Socioeconomic History     Marital status:      Spouse name: Not on file     Number of children: Not on file     Years of education: Not on file     Highest education level: Not on file   Occupational History     Not on file   Social Needs     Financial resource strain: Not on file     Food insecurity     Worry: Not on file     Inability: Not on file     Transportation needs     Medical: Not on file     Non-medical: Not on file   Tobacco Use     Smoking status: Never Smoker     Smokeless tobacco: Never Used   Substance and Sexual Activity     Alcohol use: Yes     Comment: social     Drug use: Never     Sexual activity: Not on file   Lifestyle      Physical activity     Days per week: Not on file     Minutes per session: Not on file     Stress: Not on file   Relationships     Social connections     Talks on phone: Not on file     Gets together: Not on file     Attends [employer] service: Not on file     Active member of club or organization: Not on file     Attends meetings of clubs or organizations: Not on file     Relationship status: Not on file     Intimate partner violence     Fear of current or ex partner: Not on file     Emotionally abused: Not on file     Physically abused: Not on file     Forced sexual activity: Not on file   Other Topics Concern     Parent/sibling w/ CABG, MI or angioplasty before 65F 55M? Not Asked   Social History Narrative     Not on file            Family History:   Reviewed and edited as appropriate  Family History   Problem Relation Age of Onset     Heart Disease Father            Allergies:   Reviewed and edited as appropriate     Allergies   Allergen Reactions     Atorvastatin Muscle Pain (Myalgia)     Dextromethorphan Hives     Oxycodone Unknown     Pravastatin Muscle Pain (Myalgia)     Simvastatin Muscle Pain (Myalgia)     Codeine Rash            Medications:     Medications Prior to Admission   Medication Sig Dispense Refill Last Dose     acetaminophen (TYLENOL) 500 MG tablet Take 500-1,000 mg by mouth every 6 hours as needed for mild pain        aspirin (ASA) 81 MG chewable tablet Take 81 mg by mouth        calcium citrate-vitamin D (CITRACAL) 315-200 MG-UNIT TABS per tablet Take 1 tablet by mouth        latanoprost (XALATAN) 0.005 % ophthalmic solution         lovastatin (MEVACOR) 40 MG tablet Take 40 mg by mouth        memantine (NAMENDA) 10 MG tablet TAKE 1 TABLET TWICE A  tablet 3      valACYclovir (VALTREX) 1000 mg tablet Take 1,000 mg by mouth                Review of Systems:   A complete review of systems was otherwise negative.           Physical Exam:   /62   Pulse 72   Temp 97.2  F (36.2  C)    Resp 18   SpO2 95%   Wt:   Wt Readings from Last 2 Encounters:   10/21/20 57.2 kg (126 lb)   07/22/20 57.2 kg (126 lb)      Constitutional: cooperative, pleasant, not dyspneic/diaphoretic, no acute distress  Eyes: Sclera anicteric/injected  Ears/nose/mouth/throat: Normal oropharynx without ulcers or exudate, mucus membranes moist, hearing intact  CV: No edema  Respiratory: Unlabored breathing  Abd:  Nondistended, +bs, no hepatosplenomegaly, nontender, no peritoneal signs  Fox: deferred  Skin: warm, perfused, no jaundice  Neuro: AAO x 3, No asterixis  Psych: Normal affect  MSK: Normal gait         Data:   BMP  Recent Labs   Lab 01/10/21  1014 01/10/21  0003   * 144   POTASSIUM 3.9 4.2   CHLORIDE 112* 112*   BUBBA 8.6 8.4*   CO2 28 26   BUN 21 24   CR 0.78 0.81   GLC 90 144*     CBC  Recent Labs   Lab 01/10/21  1108 01/10/21  1014 01/10/21  0234   WBC  --  6.3 8.1   RBC  --  3.79* 3.34*   HGB 11.3* 11.7 10.2*   HCT 36.4 37.3 33.6*   MCV  --  98 101*   MCH  --  30.9 30.5   MCHC  --  31.4* 30.4*   RDW  --  15.3* 15.1*   PLT  --  137* 136*     INR  Recent Labs   Lab 01/10/21  1014   INR 1.09     LFTs  Recent Labs   Lab 01/10/21  0003   ALKPHOS 66   AST 11   ALT 13   BILITOTAL 0.6   PROTTOTAL 5.9*   ALBUMIN 3.2*      PANCNo lab results found in last 7 days.    Imaging:    CTA:   1.  Focal intraluminal contrast accumulation on the proximal sigmoid associated with a noninflamed diverticulum and accumulation between arterial and venous phase acquisitions consistent with source of acute GI bleed. The diverticulum is not inflamed and   there is no colon wall thickening raising question of underlying angiodysplasia.  2.  Infrarenal abdominal aortic aneurysm measuring 28 x 34 mm near the origin of the HELEN.  3.  Scattered diverticula throughout the colon most numerous in the sigmoid.  4.  Low-attenuation material within the endometrium and 14 mm focus of enhancement along the posterior body. Nonemergent evaluation with  pelvic ultrasound is suggested.

## 2021-01-10 NOTE — H&P
[facility]     History and Physical - [facility]        Date of Admission:  1/9/2021    Assessment & Plan   Angle Agudelo is a 88 yo F w/ diverticulosis, CVA, abdominal aortic aneurysm, HTN, and dementia who presents w/ painless rectal bleeding.    Hematochezia, painless  Hx of diverticulosis  Presents w/ 2-3 episodes of bright red blood on day of admit; otherwise, asymptomatic. Afebrile, HD stable. Exam overall unremarkable but did have moderate amount of blood in brief and irregular heart rhythm. Pt takes aspirin at home due to history of CVA and denies other anticoagulation or regular NSAIDs use. Initial admission labs showed hgb 13.2 w/ repeat in ED of 11.8. CTA A/P c/f active bleeding at proximal sigmoid region and scattered diverticula. Per chart review, similar episode occurred about one year ago after eating popcorn. Colonoscopy completed at that time (1/2020) which showed evidence of diverticulosis as well as suspected AVM in right colon. Suspect etiology of hematochezia most likely caused by underlying diverticulosis. Discussed w/ GI provider prior to admission w/ recommendations for possible IR embolization. Pt admitted for further management and possible intervention.   - Routine labs, ECG  - GI consult  - IR consult for possible intervention  - NPO  - mIVF: 75cc/hr x1L  - Trend hgb; transfuse for hgb <7 or if symptomatic/HD instability/evidence of active bleeding with acute drop in hgb  - Two large-bore pIVs, consented for blood in case transfusion needed  - Hold pta aspirin as below    Chronic Issues  Hx of CVA   Per chart review, experienced small acute infarct involving posterior R IC (6/2020). MRI imaging at that time also c/f chronic lacunar infarcts in L thalamus and inferior L cerebellum.   - Cont pta lovastatin 20mg daily  - Hold pta aspirin in setting of bleeding as above    Dementia/AD: cont pta memantine 10mg bid; follows w/  "outpatient Neurology.   AAA: current measurement [id number] near origin of HELEN; per family, monitored as outpatient.        Diet: NPO for Medical/Clinical Reasons Except for: Meds  Fluids: mIVF as above  DVT Prophylaxis: Pneumatic Compression Devices  Graves Catheter: not present  Code Status: Full Code         Disposition Plan   Expected discharge: 2 - 3 days, recommended to prior living arrangement once workup completed and hgb stable.  Entered: Trisha Alvarado MD 01/10/2021, 1:42 AM        To be staffed in AM.     Trisha Alvarado MD  [facility]   Contact information available via [facility] Paging/Directory  Please see sign in/sign out for up to date coverage information  ______________________________________________________________________    Chief Complaint   Painless rectal bleeding    History is obtained from the patient, electronic health record and emergency department physician    History of Present Illness   Angle Agudelo is a 88 yo F w/ diverticulosis, CVA, abdominal aortic aneurysm, HTN, and dementia who presents w/ painless hematochezia.     Angle reports that around 2:00pm on afternoon of admission, she had an episode of bleeding when she used the restroom. She describes the amount of bleeding as \"enough to be concerned about.\" She did not report associated stool with her initial episode. Due to her concern, she requested that her  take her to the hospital. She recalls having another episode of bleeding prior to hospital arrival and a soiled brief in the ED. Her last BM was a few hours prior to admission and she states it was formed. Her describes her usual bowel habits as \"beautiful and firm.\" She denies fatigue, chest pain, abdominal pain, dyspnea, lightheadedness, or other discomfort. She feels she is at her baseline health, other than the bleeding.     ED provider at OSH discussed further history with Angle's daughter:  - 3:00 pm: episode " "of blood in stool  - While getting ready to come to hospital, experienced \"gush of blood\" which was confirmed  - Remained asymptomatic (no abd pain or nausea) in ED  - Similar episode 1 yr ago 1/2020 after eating popcorn    ED Course  - Afebrile, HD stable  - Labs: hgb 13.2 and 11.8 on repeat  - CTA A/P: active bleeding at proximal sigmoid region and scattered diverticula  - Transferred to [facility] for further management including possible IR intervention    Review of Systems    The 10 point Review of Systems is negative other than noted in the HPI or here.     Past Medical History    I have reviewed this patient's medical history and updated it with pertinent information if needed.   No past medical history on file.     Past Surgical History   I have reviewed this patient's surgical history and updated it with pertinent information if needed.  Past Surgical History:   Procedure Laterality Date     CHOLECYSTECTOMY       COLONOSCOPY N/A 1/8/2020    Procedure: COLONOSCOPY;  Surgeon: Amanda Carson MD;  Location:  GI     SHOULDER SURGERY      bilateral shoulder       Social History   Tobacco: never  Alcohol: once a week (vodka tonic or wine with spaghetti)  Other recreational drug use: denies  Lives in Independent Living with . Five children. Good support system.     Family History   I have reviewed this patient's family history and updated it with pertinent information if needed.  Family History   Problem Relation Age of Onset     Heart Disease Father        Prior to Admission Medications   Prior to Admission Medications   Prescriptions Last Dose Informant Patient Reported? Taking?   acetaminophen (TYLENOL) 500 MG tablet   Yes No   Sig: Take 500-1,000 mg by mouth every 6 hours as needed for mild pain   aspirin (ASA) 81 MG chewable tablet   Yes No   Sig: Take 81 mg by mouth   calcium citrate-vitamin D (CITRACAL) 315-200 MG-UNIT TABS per tablet   Yes No   Sig: Take 1 tablet by mouth   latanoprost " (XALATAN) 0.005 % ophthalmic solution   Yes No   lovastatin (MEVACOR) 40 MG tablet   Yes No   Sig: Take 40 mg by mouth   memantine (NAMENDA) 10 MG tablet   No No   Sig: TAKE 1 TABLET TWICE A DAY   valACYclovir (VALTREX) 1000 mg tablet   Yes No   Sig: Take 1,000 mg by mouth      Facility-Administered Medications: None     Allergies   Allergies   Allergen Reactions     Atorvastatin Muscle Pain (Myalgia)     Dextromethorphan Hives     Oxycodone Unknown     Pravastatin Muscle Pain (Myalgia)     Simvastatin Muscle Pain (Myalgia)     Codeine Rash       Physical Exam   Vital Signs: Temp: 96.8  F (36  C) Temp src: Oral BP: 121/75 Pulse: 72   Resp: 16 SpO2: 93 % O2 Device: None (Room air)    Weight: 0 lbs 0 oz    General: NAD, pleasant, interactive  Skin: no rashes or bruising on exposed skin  CV: regular rate, irregular rhythm  Resp: CTAB, no crackles or wheezing  Abd: Soft, non-tender, non-distended, BS+, no masses appreciated  Extremities: warm to touch, no LE edema  Neuro: No lateralizing symptoms or focal neurologic deficits; does have mild confusion and often repeats questions; equal strength bilaterally; moving extremities independently  Psych: Mood and affect  appropriate for situation    Data   Data reviewed today: I reviewed all medications, new labs and imaging results over the last 24 hours.   Hgb 13.2 > 11.8    CTA A/P Impression (read per OSH hospital):  1. Focal intraluminal contrast accumulation on the proximal sigmoid associated with a noninflamed diverticulum and accumulation between arterial and venous phase acquisitions consistent with source of acute GI bleed. The diverticulum is not inflamed and  there is no colon wall thickening raising question of underlying angiodysplasia.  2. Infrarenal abdominal aortic aneurysm measuring 28 x 34 mm near the origin of the HELEN.  3. Scattered diverticula throughout the colon most numerous in the sigmoid.  4. Low-attenuation material within the endometrium and 14 mm  focus of enhancement along the posterior body. Nonemergent evaluation with pelvic ultrasound is suggested.

## 2021-01-10 NOTE — PROGRESS NOTES
Admitted/transferred from:   2 RN full skin assessment completed by Vicki Muniz RN and Nelda Jefferson RN .  Skin assessment finding: skin intact, no problems.   Interventions/actions: no intervention at this time.     Will continue to monitor.

## 2021-01-11 LAB
ANION GAP SERPL CALCULATED.3IONS-SCNC: 7 MMOL/L (ref 3–14)
BUN SERPL-MCNC: 17 MG/DL (ref 7–30)
CALCIUM SERPL-MCNC: 8.9 MG/DL (ref 8.5–10.1)
CHLORIDE SERPL-SCNC: 113 MMOL/L (ref 94–109)
CO2 SERPL-SCNC: 27 MMOL/L (ref 20–32)
CREAT SERPL-MCNC: 0.71 MG/DL (ref 0.52–1.04)
ERYTHROCYTE [DISTWIDTH] IN BLOOD BY AUTOMATED COUNT: 15.5 % (ref 10–15)
GFR SERPL CREATININE-BSD FRML MDRD: 75 ML/MIN/{1.73_M2}
GLUCOSE SERPL-MCNC: 97 MG/DL (ref 70–99)
HCT VFR BLD AUTO: 34 % (ref 35–47)
HGB BLD-MCNC: 10.6 G/DL (ref 11.7–15.7)
HGB BLD-MCNC: 11.3 G/DL (ref 11.7–15.7)
INTERPRETATION ECG - MUSE: NORMAL
MCH RBC QN AUTO: 30.9 PG (ref 26.5–33)
MCHC RBC AUTO-ENTMCNC: 31.2 G/DL (ref 31.5–36.5)
MCV RBC AUTO: 99 FL (ref 78–100)
PLATELET # BLD AUTO: 128 10E9/L (ref 150–450)
POTASSIUM SERPL-SCNC: 3.9 MMOL/L (ref 3.4–5.3)
RBC # BLD AUTO: 3.43 10E12/L (ref 3.8–5.2)
SODIUM SERPL-SCNC: 147 MMOL/L (ref 133–144)
WBC # BLD AUTO: 6.3 10E9/L (ref 4–11)

## 2021-01-11 PROCEDURE — 250N000013 HC RX MED GY IP 250 OP 250 PS 637: Performed by: STUDENT IN AN ORGANIZED HEALTH CARE EDUCATION/TRAINING PROGRAM

## 2021-01-11 PROCEDURE — 99232 SBSQ HOSP IP/OBS MODERATE 35: CPT | Mod: GC | Performed by: INTERNAL MEDICINE

## 2021-01-11 PROCEDURE — 120N000002 HC R&B MED SURG/OB UMMC

## 2021-01-11 PROCEDURE — 36415 COLL VENOUS BLD VENIPUNCTURE: CPT | Performed by: STUDENT IN AN ORGANIZED HEALTH CARE EDUCATION/TRAINING PROGRAM

## 2021-01-11 PROCEDURE — 85018 HEMOGLOBIN: CPT | Performed by: STUDENT IN AN ORGANIZED HEALTH CARE EDUCATION/TRAINING PROGRAM

## 2021-01-11 PROCEDURE — 80048 BASIC METABOLIC PNL TOTAL CA: CPT | Performed by: INTERNAL MEDICINE

## 2021-01-11 PROCEDURE — 85027 COMPLETE CBC AUTOMATED: CPT | Performed by: INTERNAL MEDICINE

## 2021-01-11 PROCEDURE — 36415 COLL VENOUS BLD VENIPUNCTURE: CPT | Performed by: INTERNAL MEDICINE

## 2021-01-11 RX ADMIN — LOVASTATIN 40 MG: 20 TABLET ORAL at 07:49

## 2021-01-11 RX ADMIN — MEMANTINE 10 MG: 10 TABLET ORAL at 20:06

## 2021-01-11 RX ADMIN — MEMANTINE 10 MG: 10 TABLET ORAL at 07:48

## 2021-01-11 ASSESSMENT — ACTIVITIES OF DAILY LIVING (ADL)
ADLS_ACUITY_SCORE: 15
ADLS_ACUITY_SCORE: 14
ADLS_ACUITY_SCORE: 15
ADLS_ACUITY_SCORE: 14
ADLS_ACUITY_SCORE: 15
ADLS_ACUITY_SCORE: 14

## 2021-01-11 NOTE — PLAN OF CARE
BP (!) 151/80 (BP Location: Left arm)   Pulse 68   Temp 97.5  F (36.4  C) (Oral)   Resp 18   SpO2 100%     Neuro: Confused. Disoriented to time and situation.  Bed alarm for patient safety.   Respiratory: sats mid 90s on RA. Denies SOB.  Cardiac: WNL  GI/: +BS, +passing flatus, no BM this shift. Last BM was this morning around 7am. Pt voiding in bedside commode and incontinent at times.  Diet: Clears, tolerating well  Pain: Denies  Incisions/Drains: None   IV Access: R and Left PIV- both saline locked  Labs: Hgb: 11.3; q8 scheduled hemoglobin checks. Pt received 1 PRBC this AM.   Activity: Up with assist of 1 and walker. Bed alarm on    Plan: Next Hgb re-check scheduled for 2000. Continue to monitor hgb.

## 2021-01-11 NOTE — PLAN OF CARE
"Admitting Diagnosis:  GI Bleed  Neuro: A&O x3, disoriented to situation. Bed and chair alarm in place.   GI/: Voiding spontaneously. Loose stool with dark red blood x2.  Resp: Sating mid 90's on room air, denies SOB.  VS: VSS  IV Access: PIV x2 SL  Labs: Reviewed, Hgb: 10.6  Pain: Denies  Diet: Clear liquid  Activity: Up with assist of 1 to chair and toilet.   This shift: Patient confused, notified family she was \"going home\". Writer and provider clarified with patient and family that patient will not be discharging today due to continued blood in stool and unstable hemoglobin.   Plan:  Continue to monitor stools, discourage patient from straining when toileting and begin use of miralax.   "

## 2021-01-11 NOTE — PROGRESS NOTES
[facility]     Progress Note - Jesus 1 Service        Date of Admission:  1/9/2021    Assessment & Plan       Angle Agudelo is a 89 yo F w/ diverticulosis, CVA, abdominal aortic aneurysm, HTN, and dementia who presents w/ painless rectal bleeding, found to have active bleed on OSH CTA.     UPDATES:    Pursuing conservative management, per IR + GI    CLD given recurrence of hematochezia with bowel movements, otherwise HDS    Hgb BID checks    # Hematochezia, painless  # Hx of diverticulosis  2-3 episodes of BRBPR PTA, otherwise asymptomatic. Afebrile, HDS. No blood in briefs, but per nursing, with stools. Aspirin, but no anti-coagulation nor regular NSAIDs use. Initial Hgb 13.2 w/  ED 11.8. CTA A/P c/f active bleeding at proximal sigmoid region and scattered diverticula. Similar episode occurred about 1y ago, managed conservatively with resolution. Colonoscopy (1/2020) showed diverticulosis + AVM in right colon. GI + IR rec conservative management at this time.   - Routine labs, ECG  - GI + IR ==> conservative mgmt  - CLD continues, given ongoing hematochezia  - Trend hgb; transfuse for hgb <7 or if symptomatic/HD instability/evidence of active bleeding with acute drop in hgb  - 1u PRBCs (01/10)  - Holding aspirin as below     Chronic, Stable Issues:  # Hx of CVA   Small acute infarct involving posterior R IC (6/2020). MRI imaging at that time also c/f chronic lacunar infarcts in L thalamus and inferior L cerebellum.   - Cont PTA lovastatin 20mg daily  - Hold PTA aspirin in setting of bleeding as above     # Dementia/AD - memantine 10mg bid; has OP Neuro.   # AAA - current measurement [id number] near origin of HELEN; per family, monitored as outpatient.      Diet: Clear Liquid Diet    Fluids: PO, 1L of LR previous  Lines: PIV  DVT Prophylaxis: not at this time  Graves Catheter: not present  Code Status: Full Code         Disposition Plan   Expected discharge: Tomorrow,  recommended to prior living arrangement once hemoglobin stable.  Entered: Pa Jamison MD 01/11/2021, 7:34 AM     The patient's care was discussed with the Attending Physician, Dr. Keron Urena.    Pa Jamison MD, PhD  PGY-1 Internal Medicine  [phone]  [facility]  [facility]   Please see sign in/sign out for up to date coverage information  ______________________________________________________________________    Interval History   Tolerated blood well No HA, SOB, CP, F, NVD. Last BMs this morning were bloody, pt did not recall correctly. Per nursing, BM were soft, maroon + BRBPR, but no blood after BM. Will TBW Daughter, Daija    Data reviewed today: I reviewed all medications, new labs and imaging results over the last 24 hours. I personally reviewed no images or EKG's today.    Physical Exam   Vital Signs: Temp: 97.3  F (36.3  C) Temp src: Oral BP: (!) 142/72 Pulse: 60   Resp: 18 SpO2: 97 % O2 Device: None (Room air)    Weight: 0 lbs 0 oz  General: NAD, pleasant, interactive  Skin: no rashes or bruising on exposed skin; no conjunctival pallor  CV: regular rate, irregular rhythm  Resp: CTAB, no crackles or wheezing  Abd: Soft, non-tender, non-distended, BS+, no masses appreciated  Extremities: warm to touch, no LE edema  Neuro: No lateralizing symptoms or focal neurologic deficits; does have mild confusion and often repeats questions; equal strength bilaterally; moving extremities independently  Psych: Mood and affect  appropriate for situation, pleasant, poor recall     Data   Recent Labs   Lab 01/11/21  0611 01/10/21  1947 01/10/21  1108 01/10/21  1014 01/10/21  0234 01/10/21  0003 01/10/21  0003   WBC 6.3  --   --  6.3 8.1  --   --    HGB 10.6* 10.3* 11.3* 11.7 10.2*   < >  --    MCV 99  --   --  98 101*  --   --    *  --   --  137* 136*  --   --    INR  --   --   --  1.09  --   --   --    *  --   --  146*  --   --  144    POTASSIUM 3.9  --   --  3.9  --   --  4.2   CHLORIDE 113*  --   --  112*  --   --  112*   CO2 27  --   --  28  --   --  26   BUN 17  --   --  21  --   --  24   CR 0.71  --   --  0.78  --   --  0.81   ANIONGAP 7  --   --  5  --   --  6   BUBBA 8.9  --   --  8.6  --   --  8.4*   GLC 97  --   --  90  --   --  144*   ALBUMIN  --   --   --   --   --   --  3.2*   PROTTOTAL  --   --   --   --   --   --  5.9*   BILITOTAL  --   --   --   --   --   --  0.6   ALKPHOS  --   --   --   --   --   --  66   ALT  --   --   --   --   --   --  13   AST  --   --   --   --   --   --  11    < > = values in this interval not displayed.     No results found for this or any previous visit (from the past 24 hour(s)).  Medications       lovastatin  40 mg Oral Daily     memantine  10 mg Oral BID     sodium chloride (PF)  3 mL Intracatheter Q8H

## 2021-01-11 NOTE — PROGRESS NOTES
[facility]     Progress Note - Jesus 1 Service        Date of Admission:  1/9/2021    Assessment & Plan       Angle Agudelo is a 89 yo F w/ diverticulosis, CVA, abdominal aortic aneurysm, HTN, and dementia who presents w/ painless rectal bleeding, found to have active bleed on OSH CTA.     UPDATES    Discussed with IR and GI who recommend conservative management    CLD while waiting if hematochezia reoccurs, easier to prep    Good hgb response to 1U PRBCs    Hematochezia, painless  Hx of diverticulosis  Presents w/ 2-3 episodes of bright red blood on day of admit; otherwise, asymptomatic. Afebrile, HD stable. Exam overall unremarkable but did have moderate amount of blood in brief and irregular heart rhythm. Pt takes aspirin at home due to history of CVA and denies other anticoagulation or regular NSAIDs use. Initial admission labs showed hgb 13.2 w/ repeat in ED of 11.8. CTA A/P c/f active bleeding at proximal sigmoid region and scattered diverticula. Per chart review, similar episode occurred about one year ago after eating popcorn. Colonoscopy completed at that time (1/2020) which showed evidence of diverticulosis as well as suspected AVM in right colon. Suspect etiology of hematochezia most likely caused by underlying diverticulosis. Discussed w/ GI provider prior to admission w/ recommendations for possible IR embolization. Pt admitted for further management and possible intervention.   - Routine labs, ECG  - GI consult, appreciate recs  - IR consult, recs appreciated  - NPO  - mIVF: 75cc/hr x1L  - Trend hgb; transfuse for hgb <7 or if symptomatic/HD instability/evidence of active bleeding with acute drop in hgb  - Two large-bore pIVs, consented for blood in case transfusion needed  - Hold pta aspirin as below     Chronic Issues  Hx of CVA   Per chart review, experienced small acute infarct involving posterior R IC (6/2020). MRI imaging at that time also c/f  chronic lacunar infarcts in L thalamus and inferior L cerebellum.   - Cont pta lovastatin 20mg daily  - Hold pta aspirin in setting of bleeding as above     Dementia/AD: cont pta memantine 10mg bid; follows w/ outpatient Neurology.   AAA: current measurement [id number] near origin of HELEN; per family, monitored as outpatient.      Diet: Clear Liquid Diet    Fluids: PO, 1L of LR previous  Lines: PIV  DVT Prophylaxis: not at this time  Graves Catheter: not present  Code Status: Full Code           Disposition Plan   Expected discharge: Tomorrow, recommended to prior living arrangement once hemoglobin stable.  Entered: Pa Jamison MD 01/11/2021, 5:57 AM       The patient's care was discussed with the Attending Physician, Dr. Keron Urena.    Pa Jamison MD  [address]   Please see sign in/sign out for up to date coverage information  ______________________________________________________________________    Interval History   Tolerated blood well today. No HA, SOB, CP, F, NVD. Last BM was bloody, earlier in AM. Does not feel stool is very hard at this time. Acknowledges her  is very involved in her medical care, and he manages her medications.     Data reviewed today: I reviewed all medications, new labs and imaging results over the last 24 hours. I personally reviewed no images or EKG's today.    Physical Exam   Vital Signs: Temp: 97.3  F (36.3  C) Temp src: Oral BP: (!) 142/72 Pulse: 60   Resp: 18 SpO2: 97 % O2 Device: None (Room air)    Weight: 0 lbs 0 oz  General: NAD, pleasant, interactive  Skin: no rashes or bruising on exposed skin; no conjunctival pallor  CV: regular rate, irregular rhythm  Resp: CTAB, no crackles or wheezing  Abd: Soft, non-tender, non-distended, BS+, no masses appreciated  Extremities: warm to touch, no LE edema  Neuro: No lateralizing symptoms or focal neurologic deficits; does have mild confusion and often  repeats questions; equal strength bilaterally; moving extremities independently  Psych: Mood and affect  appropriate for situation, pleasant, poor recall     Data   Recent Labs   Lab 01/10/21  1947 01/10/21  1108 01/10/21  1014 01/10/21  0234 01/10/21  0003 01/10/21  0003   WBC  --   --  6.3 8.1  --   --    HGB 10.3* 11.3* 11.7 10.2*   < >  --    MCV  --   --  98 101*  --   --    PLT  --   --  137* 136*  --   --    INR  --   --  1.09  --   --   --    NA  --   --  146*  --   --  144   POTASSIUM  --   --  3.9  --   --  4.2   CHLORIDE  --   --  112*  --   --  112*   CO2  --   --  28  --   --  26   BUN  --   --  21  --   --  24   CR  --   --  0.78  --   --  0.81   ANIONGAP  --   --  5  --   --  6   BUBBA  --   --  8.6  --   --  8.4*   GLC  --   --  90  --   --  144*   ALBUMIN  --   --   --   --   --  3.2*   PROTTOTAL  --   --   --   --   --  5.9*   BILITOTAL  --   --   --   --   --  0.6   ALKPHOS  --   --   --   --   --  66   ALT  --   --   --   --   --  13   AST  --   --   --   --   --  11    < > = values in this interval not displayed.     No results found for this or any previous visit (from the past 24 hour(s)).  Medications       lovastatin  40 mg Oral Daily     memantine  10 mg Oral BID     sodium chloride (PF)  3 mL Intracatheter Q8H

## 2021-01-11 NOTE — PROGRESS NOTES
Gastroenterology Inpatient Sign Off Note    Luminal service will sign off. No further recommendations at this time. If primary team has addition questions, please page the consult fellow listed in [place].    Current GI Consult Staff: Dr. Blackmon    #1 Hematochezia  #2 Suspected sigmoid diverticular bleed, based on outside CTA 1/9/2021  #3 Diverticulosis  #4 History of abdominal aortic aneurysm  #5 History of CVA    Assessment/Recommendations:  89 F with diverticulosis, AAA and dementia who presented with acute hematochezia.  CTA prior to admission in care everywhere suggestive of sigmoid diverticular bleed.  Did well with supportive care and was unable to go to IR due to risk given AAA.  Now remains stable with improved bleeding.    Recommendations:  1. Advance diet as tolerated.  2. If develops sudden, hemodynamically significant overt bleeding, discuss with IR again.  3. OK to resume ASA at the discretion of primary team.  4. GI will sign off.    Follow up recommendations:   Follow-up in GI clinic is not indicated.  Follow-up with primary care provider at timing determined by discharge physician.    If outpatient GI follow-up is felt indicated by primary care, they may coordinate this by placing new GI referral and contacting  General GI clinic - ([phone])     Case staffed with attending, Dr. Blackmon who agrees with this assessment and plan.

## 2021-01-11 NOTE — PROVIDER NOTIFICATION
Team paged regarding pt having two bloody stools this morning. Team also notified that patient is confused regarding discharge.    Provider responded and clarified that discharge will not occur until bloody stools lessen and hemoglobin remains stable. Family and patient notified of plan.

## 2021-01-11 NOTE — PLAN OF CARE
Vital signs:  Temp: 97.3  F (36.3  C) Temp src: Oral BP: 126/53 Pulse: 61   Resp: 18 SpO2: 96 % O2 Device: None (Room air)          3719-2839:  Activity: Up with SBA, gait belt, and walker.   Neuros: A & Ox3, disoriented to situation. Lightheaded at times.   Cardiac: HR 60s. Regular rate and rhythm. Asymptomatic.   Respiratory: LS clear. O2 sats high 90s on RA. Denies SOB.   GI/: BS+, passing flatus, had one BM. Voiding adequate amount.   Diet: Tolerating clears.   Skin: Blanchable redness on coccyx, encouraged repositioning every two hours.   Lines: PIV saline locked.   Incisions/Drains: None.   Labs: Hgb 10.3 at 2000.   Pain/nausea: Denies pain. Denies nausea.   New changes this shift: None.   Plan: Continue POC.

## 2021-01-12 ENCOUNTER — COMMUNICATION - HEALTHEAST (OUTPATIENT)
Dept: SCHEDULING | Facility: CLINIC | Age: 86
End: 2021-01-12

## 2021-01-12 LAB
ANION GAP SERPL CALCULATED.3IONS-SCNC: 5 MMOL/L (ref 3–14)
BUN SERPL-MCNC: 11 MG/DL (ref 7–30)
CALCIUM SERPL-MCNC: 8.8 MG/DL (ref 8.5–10.1)
CHLORIDE SERPL-SCNC: 113 MMOL/L (ref 94–109)
CO2 SERPL-SCNC: 29 MMOL/L (ref 20–32)
CREAT SERPL-MCNC: 0.78 MG/DL (ref 0.52–1.04)
ERYTHROCYTE [DISTWIDTH] IN BLOOD BY AUTOMATED COUNT: 15.3 % (ref 10–15)
GFR SERPL CREATININE-BSD FRML MDRD: 67 ML/MIN/{1.73_M2}
GLUCOSE SERPL-MCNC: 87 MG/DL (ref 70–99)
HCT VFR BLD AUTO: 34.5 % (ref 35–47)
HGB BLD-MCNC: 10.8 G/DL (ref 11.7–15.7)
HGB BLD-MCNC: 12.6 G/DL (ref 11.7–15.7)
MCH RBC QN AUTO: 30.9 PG (ref 26.5–33)
MCHC RBC AUTO-ENTMCNC: 31.3 G/DL (ref 31.5–36.5)
MCV RBC AUTO: 99 FL (ref 78–100)
PLATELET # BLD AUTO: 131 10E9/L (ref 150–450)
POTASSIUM SERPL-SCNC: 3.8 MMOL/L (ref 3.4–5.3)
RBC # BLD AUTO: 3.5 10E12/L (ref 3.8–5.2)
SODIUM SERPL-SCNC: 148 MMOL/L (ref 133–144)
WBC # BLD AUTO: 5.5 10E9/L (ref 4–11)

## 2021-01-12 PROCEDURE — 80048 BASIC METABOLIC PNL TOTAL CA: CPT | Performed by: INTERNAL MEDICINE

## 2021-01-12 PROCEDURE — 85027 COMPLETE CBC AUTOMATED: CPT | Performed by: INTERNAL MEDICINE

## 2021-01-12 PROCEDURE — 36415 COLL VENOUS BLD VENIPUNCTURE: CPT | Performed by: STUDENT IN AN ORGANIZED HEALTH CARE EDUCATION/TRAINING PROGRAM

## 2021-01-12 PROCEDURE — 36415 COLL VENOUS BLD VENIPUNCTURE: CPT | Performed by: INTERNAL MEDICINE

## 2021-01-12 PROCEDURE — 250N000013 HC RX MED GY IP 250 OP 250 PS 637: Performed by: STUDENT IN AN ORGANIZED HEALTH CARE EDUCATION/TRAINING PROGRAM

## 2021-01-12 PROCEDURE — 85018 HEMOGLOBIN: CPT | Performed by: STUDENT IN AN ORGANIZED HEALTH CARE EDUCATION/TRAINING PROGRAM

## 2021-01-12 PROCEDURE — 99232 SBSQ HOSP IP/OBS MODERATE 35: CPT | Mod: GC | Performed by: STUDENT IN AN ORGANIZED HEALTH CARE EDUCATION/TRAINING PROGRAM

## 2021-01-12 PROCEDURE — 120N000002 HC R&B MED SURG/OB UMMC

## 2021-01-12 RX ADMIN — MEMANTINE 10 MG: 10 TABLET ORAL at 08:45

## 2021-01-12 RX ADMIN — MEMANTINE 10 MG: 10 TABLET ORAL at 20:24

## 2021-01-12 RX ADMIN — Medication 1 MG: at 22:10

## 2021-01-12 RX ADMIN — LOVASTATIN 40 MG: 20 TABLET ORAL at 08:45

## 2021-01-12 ASSESSMENT — ACTIVITIES OF DAILY LIVING (ADL)
ADLS_ACUITY_SCORE: 15

## 2021-01-12 NOTE — PLAN OF CARE
/66 (BP Location: Left arm)   Pulse 69   Temp 96.7  F (35.9  C) (Oral)   Resp 16   SpO2 92%     [id number]:    Neuros: A & Ox3, disoriented to situation.Bed/chair alarm on for safety.  .   Cardiac: WNL.hypertensive, denies chest pain  Respiratory: LS clear. O2 sats high 90s on RA. Denies SOB.   GI/: BS+, passing flatus, had one BM. Voiding adequate amount.   Diet: Tolerating clears.   Skin: WNL  Lines: PIV saline locked.   Incisions/Drains: None.   Labs: Hgb 11.3 at 1813.   Pain/nausea: Denies pain. Denies nausea.   Activity: Up with SBA, gait belt, and walker.   New changes this shift: None.   Plan: Patient appears to sleep between cares. Continue with POC.

## 2021-01-12 NOTE — PROGRESS NOTES
[facility]     Progress Note - Markasi 1 Service        Date of Admission:  1/9/2021    Assessment & Plan       Angle Agudelo is a 88 yo F w/ diverticulosis, CVA, abdominal aortic aneurysm, HTN, and dementia who presents w/ painless rectal bleeding, found to have active bleed on OSH CTA.     UPDATES:    Pursuing conservative management, per IR + GI    CLD given recurrence of hematochezia with bowel movements, otherwise HDS    Hgb BID checks    # Hematochezia, painless  # Hx of diverticulosis  2-3 episodes of BRBPR PTA, otherwise asymptomatic. Afebrile, HDS. No blood in briefs, but per nursing, with stools. Aspirin, but no anti-coagulation nor regular NSAIDs use. Initial Hgb 13.2 w/  ED 11.8. CTA A/P c/f active bleeding at proximal sigmoid region and scattered diverticula. Similar episode occurred about 1y ago, managed conservatively with resolution. Colonoscopy (1/2020) showed diverticulosis + AVM in right colon. GI + IR rec conservative management at this time.   - Routine labs, ECG  - GI + IR ==> conservative mgmt  - CLD continues, given ongoing hematochezia  - Trend hgb; transfuse for hgb <7 or if symptomatic/HD instability/evidence of active bleeding with acute drop in hgb  - s/p 1u PRBCs (01/10)  - Holding aspirin as below, consider restarting at discharge given h/o CVA     Chronic, Stable Issues:  # Hx of CVA   Small acute infarct involving posterior R IC (6/2020). MRI imaging at that time also c/f chronic lacunar infarcts in L thalamus and inferior L cerebellum.   - Cont PTA lovastatin 20mg daily  - Holding PTA aspirin in setting of bleeding as above     # Dementia/AD - memantine 10mg bid; has OP Neuro.   # AAA - current measurement [id number] near origin of HELEN; per family, monitored as outpatient.   #H/o mild Aortic stenosis: per chart, ECHO done on 06/09/2020 showed mild aortic stenosis. Given coexisting BRPR and AVM on colonoscopy, presentation c/w Heyde  syndrome.     Diet: Clear Liquid Diet    Fluids: PO, 1L of LR previous  Lines: PIV  DVT Prophylaxis: not at this time  Graves Catheter: not present  Code Status: Full Code         Disposition Plan   Expected discharge: 2 - 3 days, recommended to prior living arrangement once hemoglobin stable and BRBPR improves.  Entered: Jarad Knight MD 01/12/2021, 12:13 PM     The patient's care was discussed with the Attending Physician, Dr. Kin Xiong.    Jarad Knight MD  PGY-1 Internal Medicine  [phone]  [address]   Please see sign in/sign out for up to date coverage information  ______________________________________________________________________    Interval History   Overnight events reviewed; no acute events. BM this morning were bloody, pt did not recall correctly.     Data reviewed today: I reviewed all medications, new labs and imaging results over the last 24 hours. I personally reviewed no images or EKG's today.    Physical Exam   Vital Signs: Temp: 96.6  F (35.9  C) Temp src: Oral BP: (!) 142/74 Pulse: 80   Resp: 16 SpO2: 96 % O2 Device: None (Room air)    Weight: 129 lbs 1.6 oz  General: NAD, met lying flat in bed, pleasant, interactive  Skin: no rashes or bruising on exposed skin; no conjunctival pallor  CV: regular rate, irregular rhythm, 3/6 systolic murmur, URSE, radiates to neck  Resp: CTAB, no crackles or wheezing  Abd: Soft, non-tender, non-distended, BS+, no masses appreciated  Extremities: warm to touch, no LE edema  Neuro: No lateralizing symptoms or focal neurologic deficits; equal strength bilaterally; moving extremities independently  Psych: Mood and affect  appropriate for situation, pleasant, poor recall     Data   Recent Labs   Lab 01/12/21  0722 01/11/21  1813 01/11/21  0611 01/10/21  1014 01/10/21  1014 01/10/21  0003 01/10/21  0003   WBC 5.5  --  6.3  --  6.3   < >  --    HGB 10.8* 11.3* 10.6*   < > 11.7   < >  --     MCV 99  --  99  --  98   < >  --    *  --  128*  --  137*   < >  --    INR  --   --   --   --  1.09  --   --    *  --  147*  --  146*  --  144   POTASSIUM 3.8  --  3.9  --  3.9  --  4.2   CHLORIDE 113*  --  113*  --  112*  --  112*   CO2 29  --  27  --  28  --  26   BUN 11  --  17  --  21  --  24   CR 0.78  --  0.71  --  0.78  --  0.81   ANIONGAP 5  --  7  --  5  --  6   BUBBA 8.8  --  8.9  --  8.6  --  8.4*   GLC 87  --  97  --  90  --  144*   ALBUMIN  --   --   --   --   --   --  3.2*   PROTTOTAL  --   --   --   --   --   --  5.9*   BILITOTAL  --   --   --   --   --   --  0.6   ALKPHOS  --   --   --   --   --   --  66   ALT  --   --   --   --   --   --  13   AST  --   --   --   --   --   --  11    < > = values in this interval not displayed.     No results found for this or any previous visit (from the past 24 hour(s)).  Medications       lovastatin  40 mg Oral Daily     memantine  10 mg Oral BID     sodium chloride (PF)  3 mL Intracatheter Q8H

## 2021-01-12 NOTE — PLAN OF CARE
/57   Pulse 80   Temp 97.2  F (36.2  C) (Oral)   Resp 16   Wt 58.6 kg (129 lb 1.6 oz)   SpO2 99%   BMI 26.08 kg/m      Disoriented to situation, otherwise oriented with intermittent repeated questions/forgetfulness. Patient vitally stable on RA, afebrile. Denies pain and nausea. Tolerating clear liquid diet with good appetite. PIV SL'd. Voiding adequate amounts. Bright red, bloody stool x2 today, team aware. Up with SBA ww + GB. OOB activity encouraged. Patient currently up in chair. Chair alarm activated for safety. Plan for hgb recheck at 1900 this evening. Will continue with POC and notify MD with changes or concerns.

## 2021-01-12 NOTE — PLAN OF CARE
BP (!) 169/99 (BP Location: Left arm)   Pulse 62   Temp 96  F (35.6  C) (Oral)   Resp 16   SpO2 100%     Neuros: Alert and disoriented to situation. Bed/chair alarm on for safety.   Cardiac: hypertensive, denies chest pain.   Respiratory: denies SOB, sating in high 90's on RA.   GI/: voiding, not saving. +BS, + flatus, no BM this shift.   Diet: clear liquid diet.  Activity: up with walker, gait belt and assisted of 1.   Skin: WNL  LDA: PIV SL.   Pain: denies pain.   Lab: most recent hgb recheck was 11.3.   New changes this shift: none  Plan: continue to monitor and POC.

## 2021-01-13 ENCOUNTER — COMMUNICATION - HEALTHEAST (OUTPATIENT)
Dept: SCHEDULING | Facility: CLINIC | Age: 86
End: 2021-01-13

## 2021-01-13 ENCOUNTER — PATIENT OUTREACH (OUTPATIENT)
Dept: CARE COORDINATION | Facility: CLINIC | Age: 86
End: 2021-01-13

## 2021-01-13 ENCOUNTER — RECORDS - HEALTHEAST (OUTPATIENT)
Dept: ADMINISTRATIVE | Facility: OTHER | Age: 86
End: 2021-01-13

## 2021-01-13 VITALS
SYSTOLIC BLOOD PRESSURE: 128 MMHG | OXYGEN SATURATION: 97 % | RESPIRATION RATE: 18 BRPM | TEMPERATURE: 95.6 F | BODY MASS INDEX: 26.08 KG/M2 | DIASTOLIC BLOOD PRESSURE: 73 MMHG | HEART RATE: 63 BPM | WEIGHT: 129.1 LBS

## 2021-01-13 LAB
ANION GAP SERPL CALCULATED.3IONS-SCNC: 6 MMOL/L (ref 3–14)
BUN SERPL-MCNC: 9 MG/DL (ref 7–30)
CALCIUM SERPL-MCNC: 8.9 MG/DL (ref 8.5–10.1)
CHLORIDE SERPL-SCNC: 110 MMOL/L (ref 94–109)
CO2 SERPL-SCNC: 26 MMOL/L (ref 20–32)
CREAT SERPL-MCNC: 0.75 MG/DL (ref 0.52–1.04)
ERYTHROCYTE [DISTWIDTH] IN BLOOD BY AUTOMATED COUNT: 15.2 % (ref 10–15)
GFR SERPL CREATININE-BSD FRML MDRD: 70 ML/MIN/{1.73_M2}
GLUCOSE SERPL-MCNC: 82 MG/DL (ref 70–99)
HCT VFR BLD AUTO: 37.3 % (ref 35–47)
HGB BLD-MCNC: 11.1 G/DL (ref 11.7–15.7)
MCH RBC QN AUTO: 31.1 PG (ref 26.5–33)
MCHC RBC AUTO-ENTMCNC: 29.8 G/DL (ref 31.5–36.5)
MCV RBC AUTO: 105 FL (ref 78–100)
PLATELET # BLD AUTO: 132 10E9/L (ref 150–450)
POTASSIUM SERPL-SCNC: 4.2 MMOL/L (ref 3.4–5.3)
RBC # BLD AUTO: 3.57 10E12/L (ref 3.8–5.2)
SODIUM SERPL-SCNC: 142 MMOL/L (ref 133–144)
WBC # BLD AUTO: 5.2 10E9/L (ref 4–11)

## 2021-01-13 PROCEDURE — 85027 COMPLETE CBC AUTOMATED: CPT | Performed by: INTERNAL MEDICINE

## 2021-01-13 PROCEDURE — 85018 HEMOGLOBIN: CPT | Performed by: STUDENT IN AN ORGANIZED HEALTH CARE EDUCATION/TRAINING PROGRAM

## 2021-01-13 PROCEDURE — 999N000128 HC STATISTIC PERIPHERAL IV START W/O US GUIDANCE

## 2021-01-13 PROCEDURE — 250N000013 HC RX MED GY IP 250 OP 250 PS 637: Performed by: STUDENT IN AN ORGANIZED HEALTH CARE EDUCATION/TRAINING PROGRAM

## 2021-01-13 PROCEDURE — 80048 BASIC METABOLIC PNL TOTAL CA: CPT | Performed by: INTERNAL MEDICINE

## 2021-01-13 PROCEDURE — 36415 COLL VENOUS BLD VENIPUNCTURE: CPT | Performed by: INTERNAL MEDICINE

## 2021-01-13 PROCEDURE — 99238 HOSP IP/OBS DSCHRG MGMT 30/<: CPT | Performed by: STUDENT IN AN ORGANIZED HEALTH CARE EDUCATION/TRAINING PROGRAM

## 2021-01-13 RX ADMIN — MEMANTINE 10 MG: 10 TABLET ORAL at 07:44

## 2021-01-13 RX ADMIN — LOVASTATIN 40 MG: 20 TABLET ORAL at 07:44

## 2021-01-13 ASSESSMENT — ACTIVITIES OF DAILY LIVING (ADL)
ADLS_ACUITY_SCORE: 15

## 2021-01-13 NOTE — PLAN OF CARE
/58 (BP Location: Right arm)   Pulse 60   Temp 95.4  F (35.2  C) (Axillary)   Resp 18   Wt 58.6 kg (129 lb 1.6 oz)   SpO2 94%   BMI 26.08 kg/m        2750-4706  Neuro: Pt. disoriented to time & situation. Bed alarm on for pt's safety.  Behavior: Pt. calm & cooperative with cares.    Activity: Pt. up with assist of 1, gait belt & walker.    Vital: AVSS on RA  LDAs: Right   Cardiac: WNL  Respiratory: WNL  GI/: Pt. voiding spontaneously, no stools this shift.   Skin: Pale, intact  Pain/Nausea: Pt. denies pain or nausea.   Diet: Clear liquids  Labs/Imaging: See chart for reults.  Plan: Continue to follow POC & notify MD with change in status with change.

## 2021-01-13 NOTE — DISCHARGE SUMMARY
[facility]   Discharge Summary - Medicine & Pediatrics       Date of Admission:  1/9/2021  Date of Discharge:  1/13/2021  Discharging Provider: Kin Xiong MD  Discharge Service: Jesus 1    Discharge Diagnoses   Hematochezia, painless  Hx of diverticulosis  Hx of CVA   Dementia/AD  Abdominal aortic aneurysm  Hx of mild Aortic stenosis    Follow-ups Needed After Discharge   Follow up with primary care provider, Ester Sigala, within 5 days, for hospital follow- up and check labs. The following labs/tests are recommended: Hemoglobin.       Unresulted Labs Ordered in the Past 30 Days of this Admission     No orders found from 12/10/2020 to 1/10/2021.        Discharge Disposition   Discharged to home  Condition at discharge: Stable    Hospital Course   Angle Agudelo is a 88 yo F w/ diverticulosis, CVA, abdominal aortic aneurysm, HTN, and dementia who presented w/ painless rectal bleeding and was found to have active bleed on OSH CTA. She was admitted to [facility] on 1/9/2021. The following problems were addressed during her hospitalization:    # Hematochezia, painless  # Hx of diverticulosis  # Acute blood loss anemia    2-3 episodes of BRBPR PTA, otherwise asymptomatic. Afebrile, HDS. No blood in briefs, but per nursing, with stools. Was on PTA Aspirin for secondary prevention given h/o CVA, but no anti-coagulation nor regular NSAIDs use.  Initial Hgb 13.2 w/  ED 11.8. CTA A/P c/f active bleeding at proximal sigmoid region and scattered diverticula. Similar episode occurred about 1y ago and was managed conservatively with resolution. Colonoscopy (1/2020) showed diverticulosis + AVM in right colon. GI and IR recommended conservative management at this time. She was transfused w/ 1 unit(s) PRBC during this admission. Post-transfusion, she had additional episodes of hematochezia, however hgb remained stable and there was no change in hemodynamic status. She remained  clinically stable and was discharged home on 01/13/2021 w/ plan to recheck Hgb as outpatient .     Chronic, Stable Issues:  # Hx of CVA   Small acute infarct involving posterior R IC (6/2020). MRI imaging at that time also c/f chronic lacunar infarcts in L thalamus and inferior L cerebellum.   Continued PTA lovastatin 20 mg during hospital stay, held aspirin d/t hematochezia. Restarted aspirin at discharge for secondary prevention.     # Dementia/AD- on memantine 10mg bid; has OP Neuro.   # AAA - current measurement [id number] near origin of HELEN; per family, monitored as outpatient.   #H/o mild Aortic stenosis: per chart, ECHO done on 06/09/2020 showed mild aortic stenosis. 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    Consultations This Hospital Stay   GI LUMINAL ADULT IP CONSULT  INTERVENTIONAL RADIOLOGY ADULT/PEDS IP CONSULT  VASCULAR ACCESS CARE ADULT IP CONSULT    Code Status   Full Code       The patient was discussed with MD Jesus Starks [address] [place] [address] [address] [address]  Phone: [phone]  ______________________________________________________________________    Physical Exam   Vital Signs: Temp: 95.6  F (35.3  C) Temp src: Oral BP: 128/73 Pulse: 63   Resp: 18 SpO2: 97 % O2 Device: None (Room air)    Weight: 129 lbs 1.6 oz  General: NAD, met lying flat in bed, pleasant, interactive  Skin: no rashes or bruising on exposed skin; no conjunctival pallor  CV: regular rate, irregular rhythm, 3/6 systolic murmur, URSE, radiates to neck  Resp: CTAB, no crackles or wheezing  Abd: Soft, non-tender, non-distended, BS+, no masses appreciated  Extremities: warm to touch, no LE edema  Neuro: No lateralizing symptoms or focal neurologic deficits; equal strength bilaterally; moving extremities independently  Psych: Mood and affect  appropriate for situation, pleasant, poor recall        Primary Care Physician   Ester Sigala    Discharge Orders      Reason for your hospital stay    Angle was hospitalized due to blood in her still that we believe is related to known diverticula in her colon. She remained in the hospital a couple extra days after needing 2 blood transfusions.     Follow Up and recommended labs and tests    Follow up with primary care provider, Ester Sigala, within 5 days, for hospital follow- up and and check labs. The following labs/tests are recommended: Hemoglobin.     Activity    Your activity upon discharge: activity as tolerated     Discharge Instructions    She continues to have a little bit of blood in her stool intermittently, which is to be expected. She should return to the hospital if having large volume blood per rectum or is feeling lightheaded, weak, dizzy or having falls.     Full Code     Diet    Follow this diet upon discharge: Orders Placed This Encounter      Clear Liquid Diet       Significant Results and Procedures   Most Recent 3 CBC's:  Recent Labs   Lab Test 01/13/21  0659 01/12/21  1851 01/12/21  0722 01/11/21  0611 01/11/21  0611   WBC 5.2  --  5.5  --  6.3   HGB 11.1* 12.6 10.8*   < > 10.6*   *  --  99  --  99   *  --  131*  --  128*    < > = values in this interval not displayed.       Discharge Medications   Current Discharge Medication List      CONTINUE these medications which have NOT CHANGED    Details   acetaminophen (TYLENOL) 500 MG tablet Take 500-1,000 mg by mouth every 6 hours as needed for mild pain      aspirin (ASA) 81 MG chewable tablet Take 81 mg by mouth      calcium citrate-vitamin D (CITRACAL) 315-200 MG-UNIT TABS per tablet Take 1 tablet by mouth      latanoprost (XALATAN) 0.005 % ophthalmic solution       lovastatin (MEVACOR) 40 MG tablet Take 40 mg by mouth      memantine (NAMENDA) 10 MG tablet TAKE 1 TABLET TWICE A DAY  Qty: 180 tablet, Refills: 3    Associated Diagnoses: Dementia without behavioral disturbance,  unspecified dementia type (H)      valACYclovir (VALTREX) 1000 mg tablet Take 1,000 mg by mouth           Allergies   Allergies   Allergen Reactions     Atorvastatin Muscle Pain (Myalgia)     Dextromethorphan Hives     Oxycodone Unknown     Pravastatin Muscle Pain (Myalgia)     Simvastatin Muscle Pain (Myalgia)     Codeine Rash       Physician Attestation   I, Kin Xiong, saw and evaluated this patient prior to discharge.  I discussed the patient with the resident/fellow and agree with plan of care as documented in the note.      I personally reviewed vital signs, medications, labs and imaging.    I personally spent 25 minutes on discharge activities.    Kin Xiong MD  Date of Service (when I saw the patient): 01/13/21

## 2021-01-13 NOTE — PROGRESS NOTES
[facility]     Progress Note - Jesus 1 Service        Date of Admission:  1/9/2021    Assessment & Plan       Angle Agudelo is a 88 yo F w/ diverticulosis, CVA, abdominal aortic aneurysm, HTN, and dementia who presents w/ painless rectal bleeding, found to have active bleed on OSH CTA.     UPDATES:    Discharge home today    # Hematochezia, painless  # Hx of diverticulosis  2-3 episodes of BRBPR PTA, otherwise asymptomatic. Afebrile, HDS. No blood in briefs, but per nursing, with stools. Aspirin, but no anti-coagulation nor regular NSAIDs use. Initial Hgb 13.2 w/  ED 11.8. CTA A/P c/f active bleeding at proximal sigmoid region and scattered diverticula. Similar episode occurred about 1y ago, managed conservatively with resolution. Colonoscopy (1/2020) showed diverticulosis + AVM in right colon. GI + IR rec conservative management at this time.   - GI + IR ==> conservative mgmt  - CLD continues, given ongoing hematochezia  - Trend hgb; transfuse for hgb <7 or if symptomatic/HD instability/evidence of active bleeding with acute drop in hgb  - s/p 1u PRBCs (01/10)  - Restart ASA     Chronic, Stable Issues:  # Hx of CVA   Small acute infarct involving posterior R IC (6/2020). MRI imaging at that time also c/f chronic lacunar infarcts in L thalamus and inferior L cerebellum.   - Cont PTA lovastatin 20mg daily  - Restart ASA     # Dementia/AD - memantine 10mg bid; has OP Neuro.   # AAA - current measurement [id number] near origin of HELEN; per family, monitored as outpatient.   #H/o mild Aortic stenosis: per chart, ECHO done on 06/09/2020 showed mild aortic stenosis. Given coexisting BRPR and AVM on colonoscopy, presentation c/w Heyde syndrome.     Diet: Diet  Regular Diet Adult    Fluids: PO, 1L of LR previous  Lines: PIV  DVT Prophylaxis: not at this time  Graves Catheter: not present  Code Status: Full Code         Disposition Plan   Expected discharge: Today, recommended to  prior living arrangement   Entered: Jarad Knight MD 01/13/2021, 7:37 AM     The patient's care was discussed with the Attending Physician, Dr. Kin Xiong.    Jarad Knight MD  PGY-1 Internal Medicine  [phone]  [address]   Please see sign in/sign out for up to date coverage information  ______________________________________________________________________    Interval History   Overnight events reviewed; no acute events.     Data reviewed today: I reviewed all medications, new labs and imaging results over the last 24 hours. I personally reviewed no images or EKG's today.    Physical Exam   Vital Signs: Temp: 95.4  F (35.2  C) Temp src: Axillary BP: 126/58 Pulse: 60   Resp: 18 SpO2: 94 % O2 Device: None (Room air)    Weight: 129 lbs 1.6 oz  General: NAD, met lying flat in bed, pleasant, interactive  Skin: no rashes or bruising on exposed skin; no conjunctival pallor  CV: regular rate, irregular rhythm, 3/6 systolic murmur, URSE, radiates to neck  Resp: CTAB, no crackles or wheezing  Abd: Soft, non-tender, non-distended, BS+, no masses appreciated  Extremities: warm to touch, no LE edema  Neuro: No lateralizing symptoms or focal neurologic deficits; equal strength bilaterally; moving extremities independently  Psych: Mood and affect  appropriate for situation, pleasant, poor recall     Data   Recent Labs   Lab 01/13/21  0659 01/12/21  1851 01/12/21  0722 01/11/21  0611 01/11/21  0611 01/10/21  1014 01/10/21  1014 01/10/21  0003 01/10/21  0003   WBC 5.2  --  5.5  --  6.3  --  6.3   < >  --    HGB 11.1* 12.6 10.8*   < > 10.6*   < > 11.7   < >  --    *  --  99  --  99  --  98   < >  --    *  --  131*  --  128*  --  137*   < >  --    INR  --   --   --   --   --   --  1.09  --   --    NA  --   --  148*  --  147*  --  146*  --  144   POTASSIUM  --   --  3.8  --  3.9  --  3.9  --  4.2   CHLORIDE  --   --  113*  --  113*  --  112*   --  112*   CO2  --   --  29  --  27  --  28  --  26   BUN  --   --  11  --  17  --  21  --  24   CR  --   --  0.78  --  0.71  --  0.78  --  0.81   ANIONGAP  --   --  5  --  7  --  5  --  6   BUBBA  --   --  8.8  --  8.9  --  8.6  --  8.4*   GLC  --   --  87  --  97  --  90  --  144*   ALBUMIN  --   --   --   --   --   --   --   --  3.2*   PROTTOTAL  --   --   --   --   --   --   --   --  5.9*   BILITOTAL  --   --   --   --   --   --   --   --  0.6   ALKPHOS  --   --   --   --   --   --   --   --  66   ALT  --   --   --   --   --   --   --   --  13   AST  --   --   --   --   --   --   --   --  11    < > = values in this interval not displayed.     No results found for this or any previous visit (from the past 24 hour(s)).  Medications       lovastatin  40 mg Oral Daily     memantine  10 mg Oral BID     sodium chloride (PF)  3 mL Intracatheter Q8H

## 2021-01-13 NOTE — PLAN OF CARE
BP (!) 144/95 (BP Location: Right arm)   Pulse 69   Temp 97.5  F (36.4  C) (Oral)   Resp 18   Wt 58.6 kg (129 lb 1.6 oz)   SpO2 96%   BMI 26.08 kg/m      Reason for admission: GI bleed  Neuro: disoriented to situation, pleasant, cooperative, alarm on for safety, forgetful  Cardiac: WDL, denies chest pain  Respiratory: on RA, denies SOB  GI/: voiding adequately, LBM 1/12  Skin: intact  Pain: denies  LDA: 2 PIV SL  Activity: SBA with walker/gait belt   Diet/Appetite: clear liquid, denies N/V  Plan: continue POC    hgb check @ 1900 - 12.6

## 2021-01-13 NOTE — PLAN OF CARE
/73 (BP Location: Right arm)   Pulse 63   Temp 95.6  F (35.3  C) (Oral)   Resp 18   Wt 58.6 kg (129 lb 1.6 oz)   SpO2 97%   BMI 26.08 kg/m      Time: 5174-1915.  Reason for admission: GIB.     VS: VSS on RA with O2 sats in high 90s. Afebrile.   Activity: Up with assist x1 with walker, steady on feet. Calls appropriately. Bed and chair alarm set.   Neuros: A&Ox3-4, occasionally disorientated to time. Forgetful. Denies pain. Neuros intact.   Cardiac: Normotensive, 120s/80s. HR stable in 60s. Denies chest pain.   Respiratory: LS clear. Denies SOB or DEVINE. Stable on RA. No cough noted.  GI/: Voiding without difficulty. X1 small BM on this shift, no blood noted. +BS, +gas. Denies nausea or vomiting.   Diet: Advanced to regular diet.   Skin: WDL.   Lines: Right PIV removed.   Labs: WDL.     New changes this shift/Plan: VSS on RA, afebrile. Up with assist x1 with walker, calls appropriately, bed/chair alarm. Disorientated to time. Voiding well, x1 BM, denies nausea, tolerating diet. Medically cleared for discharge home. PIV removed. Belongings gathered. AVS reviewed with pt, questions answered. No changed to meds. Pt left via wheelchair at 1400.   Will continue to monitor & follow POC.

## 2021-01-14 NOTE — PROGRESS NOTES
[facility]: Post-Discharge Note  SITUATION                                                      Admission:    Admission Date: 01/09/21   Reason for Admission: Hematochezia, painless  Discharge:   Discharge Date: 01/13/21    BACKGROUND                                                      Angle Agudelo is a 89 yo F w/ diverticulosis, CVA, abdominal aortic aneurysm, HTN, and dementia who presented w/ painless rectal bleeding and was found to have active bleed on OSH CTA. She was admitted to [facility] on 1/9/2021. The following problems were addressed during her hospitalization:    ASSESSMENT      Discharge Assessment  Patient reports symptoms are: Improved  Does the patient have all of their medications?: Yes  Does patient know what their new medications are for?: Yes  Does patient have any other questions or concerns?: Yes    Post-op  Did the patient have surgery or a procedure: No  Fever: No  Chills: No  Eating & Drinking: eating and drinking without complaints/concerns  Urinary Status: voiding without complaint/concerns         PLAN                                                      Outpatient Plan:  Follow up with primary care provider, Ester Sigala, within 5 days, for hospital follow- up and check labs. The following labs/tests are recommended: Hemoglobin.       No future appointments.        Nuvia Abdul

## 2021-01-19 ENCOUNTER — OFFICE VISIT - HEALTHEAST (OUTPATIENT)
Dept: FAMILY MEDICINE | Facility: CLINIC | Age: 86
End: 2021-01-19

## 2021-01-19 ENCOUNTER — COMMUNICATION - HEALTHEAST (OUTPATIENT)
Dept: TELEHEALTH | Facility: CLINIC | Age: 86
End: 2021-01-19

## 2021-01-19 DIAGNOSIS — Z87.19 HISTORY OF GASTROINTESTINAL DIVERTICULAR HEMORRHAGE: ICD-10-CM

## 2021-01-19 LAB
BASOPHILS # BLD AUTO: 0 THOU/UL (ref 0–0.2)
BASOPHILS NFR BLD AUTO: 1 % (ref 0–2)
EOSINOPHIL # BLD AUTO: 0.1 THOU/UL (ref 0–0.4)
EOSINOPHIL NFR BLD AUTO: 3 % (ref 0–6)
ERYTHROCYTE [DISTWIDTH] IN BLOOD BY AUTOMATED COUNT: 13.2 % (ref 11–14.5)
HCT VFR BLD AUTO: 36.5 % (ref 35–47)
HGB BLD-MCNC: 11.9 G/DL (ref 12–16)
LYMPHOCYTES # BLD AUTO: 1.2 THOU/UL (ref 0.8–4.4)
LYMPHOCYTES NFR BLD AUTO: 21 % (ref 20–40)
MCH RBC QN AUTO: 31.2 PG (ref 27–34)
MCHC RBC AUTO-ENTMCNC: 32.6 G/DL (ref 32–36)
MCV RBC AUTO: 96 FL (ref 80–100)
MONOCYTES # BLD AUTO: 0.6 THOU/UL (ref 0–0.9)
MONOCYTES NFR BLD AUTO: 10 % (ref 2–10)
NEUTROPHILS # BLD AUTO: 3.8 THOU/UL (ref 2–7.7)
NEUTROPHILS NFR BLD AUTO: 66 % (ref 50–70)
PLATELET # BLD AUTO: 198 THOU/UL (ref 140–440)
PMV BLD AUTO: 8.3 FL (ref 7–10)
RBC # BLD AUTO: 3.82 MILL/UL (ref 3.8–5.4)
WBC: 5.7 THOU/UL (ref 4–11)

## 2021-03-10 ENCOUNTER — RECORDS - HEALTHEAST (OUTPATIENT)
Dept: ADMINISTRATIVE | Facility: OTHER | Age: 86
End: 2021-03-10

## 2021-03-10 ENCOUNTER — OFFICE VISIT (OUTPATIENT)
Dept: NEUROLOGY | Facility: CLINIC | Age: 86
End: 2021-03-10
Payer: MEDICARE

## 2021-03-10 VITALS
SYSTOLIC BLOOD PRESSURE: 143 MMHG | HEIGHT: 59 IN | WEIGHT: 129 LBS | DIASTOLIC BLOOD PRESSURE: 85 MMHG | HEART RATE: 75 BPM | BODY MASS INDEX: 26 KG/M2

## 2021-03-10 DIAGNOSIS — M54.50 CHRONIC BILATERAL LOW BACK PAIN WITHOUT SCIATICA: ICD-10-CM

## 2021-03-10 DIAGNOSIS — F03.90 DEMENTIA WITHOUT BEHAVIORAL DISTURBANCE, UNSPECIFIED DEMENTIA TYPE: Primary | ICD-10-CM

## 2021-03-10 DIAGNOSIS — G89.29 CHRONIC BILATERAL LOW BACK PAIN WITHOUT SCIATICA: ICD-10-CM

## 2021-03-10 DIAGNOSIS — F03.90 DEMENTIA WITHOUT BEHAVIORAL DISTURBANCE, UNSPECIFIED DEMENTIA TYPE: ICD-10-CM

## 2021-03-10 DIAGNOSIS — R26.89 POOR BALANCE OF BODY WEIGHT: ICD-10-CM

## 2021-03-10 PROCEDURE — 99213 OFFICE O/P EST LOW 20 MIN: CPT | Performed by: PSYCHIATRY & NEUROLOGY

## 2021-03-10 RX ORDER — RIVASTIGMINE TARTRATE 1.5 MG/1
1.5 CAPSULE ORAL 2 TIMES DAILY
Qty: 60 CAPSULE | Refills: 1 | Status: SHIPPED | OUTPATIENT
Start: 2021-03-10 | End: 2021-03-11

## 2021-03-10 ASSESSMENT — MIFFLIN-ST. JEOR: SCORE: 915.77

## 2021-03-10 NOTE — LETTER
3/10/2021         RE: Angle Agudelo  [address]  [facility] [address]        Dear Colleague,    Thank you for referring your patient, Angle Agudelo, to the [facility] [place]. Please see a copy of my visit note below.        NEUROLOGY NOTE        Assessment/Plan          Dementia: progressing, most AD type. thiamine, calcium, TSH, B12 ok. MRI study of the brain showing atrophy in hippocampus. Discussed about safety issues. Off Aricept. Will add Namenda. Her  is the caregiver, but having a lot health problem now with skin cancer, PD, and breaching difficulty. The added stress can be a problem, discussed about home safety issues and getting more help.     Recurrent stroke in right IC, old left thalamic on 6/6/2020 at [facility]. Lacunar infarct in the left frontal white matter in the past: recommend ASA 81 mg. F/u with primary to keep BP in normal range, and LDL below 75, if above 85, add statin medications. Echo ok.     HTN: keep in normal range for stroke prevention and to work with primary.    Some enlarged nodule in the neck likely enlarged thyroid. Her TSH was normal. Follow-up with primary on this.    History of myalgia and abnormal EMG suggestive of motor neuropathy in 2012. Clinically doing well. Asymptomatic and neurological examination does not suggest any neuropathy at this point.     Large left thyroid nodule noted 7/15/2020 awaiting for biopsy.      Left UE shingles developed 10/2020 and was treated at the ER 10/18/2020.     Plan:  Labs in 6 months  Continue memantine.  Add rivastigmine, 1.5 mg twice daily for 2 weeks then up to 2 tablets twice daily.  The patient will call how she does in about 4 weeks.  Then will send a new prescription to the pharmacist mail order at higher dosage tolerated.  Start physical therapy for balance and strengthening.            SUBJECTIVE         The patient came here with her , Deejay Agudelo, who goes by  Jamal, who is also my patient for Parkinson disease, for evaluation and managements of dementia.     Daughter: Daija.     Memory continues to worsen.  Otherwise walking with a walker with due to poor balance.  No falls.     Left UE shingles developed 10/2020 and was treated at the ER 10/18/2020.  No weakness.  No confusion.  Clinically improving at this time.  Last shingle vaccine received 6 years ago in 2014.       She has noted some memory difficulty initially in about 2017. No language difficulty. Short-term memory significantly affected. They have moved into independent senior apartment October 2018. She has cooked all her life for the family but now having trouble to do that, quit cooking in 2019. She quit driving in since 10/2018. Denies any headaches. No vision difficulty. No trouble to hear. Denies any weakness or sensory difficulties. Has very active life. No depression or anxiety. Memory difficulty progressing. But at the same time, her  is having more difficulty with his health too. No falls.    ADL: not driving or cooking.  And then independent dressing self and showering.  Crease balance using a walker since 2020.  Good support from family's.  They have 8 children taking turns helping.    MOCA 23/30 on 2/8/19. She has high school diploma. 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    No h/o of head trauma.    No family history of dementia.     She has seen Dr. Ramos in 2012. She was evaluated for pain in her thigh as well as calf area for 5 years. It was severe in degree. She was on Lipitor with history of arthropathy of shoulders hyperlipidemia, hypertension. 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 The symptoms has since resolved.    Blood test in 2012 showed an elevated calcium, otherwise CBC, C-reactive protein, TSH, iron level, ESR, A1c, unremarkable. Immunoelectrophoresis negative for monoclonal IgG.    MRI of the lumbar  "spine showing L5-S1 severe degenerative disc disease is but no nerve impingement. Severe foraminal stenosis at the right L4 ganglion impingement. Moderate to severe foraminal stenosis on the left and impingement left L3 nerve root.    IMPRESSION: MRI brain 2/2019  CONCLUSION:  1. No finding for acute infarct, intracranial hemorrhage, or abnormally enhancing mass.  2. Age-related changes including mild burden scattered chronic small vessel ischemic change with a moderate diffuse intraparenchymal volume loss. Small chronic lacunar infarct in the left frontal white matter.  3. Hippocampal volume loss is in keeping with the background diffuse intraparenchymal volume loss. No findings to suggest a mesial temporal lobe directed volume loss.      HEAD MRI: 6/8/2020  1.  Small acute infarct involving the posterior limb of the right internal capsule.  2.  Chronic lacunar infarcts in the left thalamus and inferior left cerebellum.    HEAD MRA:   1.  No aneurysm, high flow AVM or significant stenosis identified.    NECK MRA:  1.  No significant stenosis of the bilateral internal carotid arteries based on NASCET criteria.  2.  Enlarged left lobe of the thyroid gland with deviation of the trachea to the right. Further evaluation with thyroid sonogram is advised if not already performed.    Thyroid [id number]  1.  Large central nodule left lobe of the thyroid. This nodule is a TI- RADS 4 lesion greater than 1.5 cm. This nodule meets criteria for ultrasound-guided fine-needle aspiration biopsy and biopsy is recommended.              Review of system     10 point system review otherwise unremarkable    PHYSICAL EXAMINATION     Vital signs in last 24 hours:  Vitals:    03/10/21 1302   BP: (!) 143/85   Pulse: 75   Weight: 58.5 kg (129 lb)   Height: 1.499 m (4' 11\")       Very pleasant.  Normal mental status, language and speech.  Cranial nerves II through XII intact.  Seem to have adequate muscle strength.  Decreased balance but able " to walk in the room without any assistive device.  No complaint of sensory difficulty.  Decreased coordination.  Elderly cautious gait.  Able to get up from chair without pushing.         Problem List     Patient Active Problem List    Diagnosis Date Noted     GI bleed 01/09/2021     Priority: Medium     History of stroke 10/21/2020     Priority: Medium     Memory impairment 07/22/2020     Priority: Medium     Myalgia and myositis, unspecified 07/22/2020     Priority: Medium     Created by Conversion       Spinal stenosis of lumbar region 07/22/2020     Priority: Medium     Primary hypercholesterolemia 07/22/2020     Priority: Medium     Created by Conversion       Benign neoplasm of colon 07/22/2020     Priority: Medium     Created by Conversion       Dementia without behavioral disturbance, unspecified dementia type (H) 07/22/2020     Priority: Medium     Disorder of bone and cartilage 07/22/2020     Priority: Medium     Created by Conversion    Replacement Utility updated for latest IMO load       Acute cerebrovascular accident (CVA) (H) 06/08/2020     Priority: Medium     GI bleeding 01/07/2020     Priority: Medium     Vagina bleeding 01/07/2020     Priority: Medium     Chronic bilateral low back pain without sciatica 08/12/2016     Priority: Medium     Backache 06/08/2015     Priority: Medium         Past medical history     Past Surgical History:   Procedure Laterality Date     CHOLECYSTECTOMY       COLONOSCOPY N/A 1/8/2020    Procedure: COLONOSCOPY;  Surgeon: Amanda Carson MD;  Location:  GI     SHOULDER SURGERY      bilateral shoulder       No past medical history on file.        Family history     Family History   Problem Relation Age of Onset     Heart Disease Father          Social history     Social History     Socioeconomic History     Marital status:      Spouse name: Not on file     Number of children: Not on file     Years of education: Not on file     Highest education  level: Not on file   Occupational History     Not on file   Social Needs     Financial resource strain: Not on file     Food insecurity     Worry: Not on file     Inability: Not on file     Transportation needs     Medical: Not on file     Non-medical: Not on file   Tobacco Use     Smoking status: Never Smoker     Smokeless tobacco: Never Used   Substance and Sexual Activity     Alcohol use: Yes     Comment: social     Drug use: Never     Sexual activity: Not on file   Lifestyle     Physical activity     Days per week: Not on file     Minutes per session: Not on file     Stress: Not on file   Relationships     Social connections     Talks on phone: Not on file     Gets together: Not on file     Attends [employer]ism service: Not on file     Active member of club or organization: Not on file     Attends meetings of clubs or organizations: Not on file     Relationship status: Not on file     Intimate partner violence     Fear of current or ex partner: Not on file     Emotionally abused: Not on file     Physically abused: Not on file     Forced sexual activity: Not on file   Other Topics Concern     Parent/sibling w/ CABG, MI or angioplasty before 65F 55M? Not Asked   Social History Narrative     Not on file         Allergy     Atorvastatin, Dextromethorphan, Oxycodone, Pravastatin, Simvastatin, and Codeine    MEDICATIONS List     Current Outpatient Medications   Medication Sig Dispense Refill     acetaminophen (TYLENOL) 500 MG tablet Take 500-1,000 mg by mouth every 6 hours as needed for mild pain       aspirin (ASA) 81 MG chewable tablet Take 81 mg by mouth       latanoprost (XALATAN) 0.005 % ophthalmic solution        lovastatin (MEVACOR) 40 MG tablet Take 40 mg by mouth       memantine (NAMENDA) 10 MG tablet TAKE 1 TABLET TWICE A  tablet 3     calcium citrate-vitamin D (CITRACAL) 315-200 MG-UNIT TABS per tablet Take 1 tablet by mouth       valACYclovir (VALTREX) 1000 mg tablet Take 1,000 mg by mouth                  Nona Mckeon MD, MD, PhD  Neurology   Office tel: [phone]        This note was dictated using voice recognition software.  Any grammatical or context distortions are unintentional and inherent to the software. The note is tailored to serve physicians for communications among them, who knows what are the most important elements of history taken for disease diagnosis and differentials as well as management plans. Due to time factors, the notes are in general not reviewed before signing. Again due to time factor, follow-up notes often carries over old notes if they are relevant, so most clinic time is dedicated to interviewing with patients and caregivers, on clinical assessment, coordinating care and management.         Again, thank you for allowing me to participate in the care of your patient.        Sincerely,        Nona Mckeon MD

## 2021-03-10 NOTE — PROGRESS NOTES
NEUROLOGY NOTE        Assessment/Plan          Dementia: progressing, most AD type. thiamine, calcium, TSH, B12 ok. MRI study of the brain showing atrophy in hippocampus. Discussed about safety issues. Off Aricept. Will add Namenda. Her  is the caregiver, but having a lot health problem now with skin cancer, PD, and breaching difficulty. The added stress can be a problem, discussed about home safety issues and getting more help.     Recurrent stroke in right IC, old left thalamic on 6/6/2020 at [facility]. Lacunar infarct in the left frontal white matter in the past: recommend ASA 81 mg. F/u with primary to keep BP in normal range, and LDL below 75, if above 85, add statin medications. Echo ok.     HTN: keep in normal range for stroke prevention and to work with primary.    Some enlarged nodule in the neck likely enlarged thyroid. Her TSH was normal. Follow-up with primary on this.    History of myalgia and abnormal EMG suggestive of motor neuropathy in 2012. Clinically doing well. Asymptomatic and neurological examination does not suggest any neuropathy at this point.     Large left thyroid nodule noted 7/15/2020 awaiting for biopsy.      Left UE shingles developed 10/2020 and was treated at the ER 10/18/2020.     Plan:  Labs in 6 months  Continue memantine.  Add rivastigmine, 1.5 mg twice daily for 2 weeks then up to 2 tablets twice daily.  The patient will call how she does in about 4 weeks.  Then will send a new prescription to the pharmacist mail order at higher dosage tolerated.  Start physical therapy for balance and strengthening.            SUBJECTIVE         The patient came here with her , Deejay Agudelo, who goes by Jamal, who is also my patient for Parkinson disease, for evaluation and managements of dementia.     Daughter: Daija.     Memory continues to worsen.  Otherwise walking with a walker with due to poor balance.  No falls.     Left UE shingles developed 10/2020 and was  treated at the ER 10/18/2020.  No weakness.  No confusion.  Clinically improving at this time.  Last shingle vaccine received 6 years ago in 2014.       She has noted some memory difficulty initially in about 2017. No language difficulty. Short-term memory significantly affected. They have moved into independent senior apartment October 2018. She has cooked all her life for the family but now having trouble to do that, quit cooking in 2019. She quit driving in since 10/2018. Denies any headaches. No vision difficulty. No trouble to hear. Denies any weakness or sensory difficulties. Has very active life. No depression or anxiety. Memory difficulty progressing. But at the same time, her  is having more difficulty with his health too. No falls.    ADL: not driving or cooking.  And then independent dressing self and showering.  Crease balance using a walker since 2020.  Good support from family's.  They have 8 children taking turns helping.    MOCA 23/30 on 2/8/19. She has high school diploma. 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    No h/o of head trauma.    No family history of dementia.     She has seen Dr. Ramos in 2012. She was evaluated for pain in her thigh as well as calf area for 5 years. It was severe in degree. She was on Lipitor with history of arthropathy of shoulders hyperlipidemia, hypertension. 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 The symptoms has since resolved.    Blood test in 2012 showed an elevated calcium, otherwise CBC, C-reactive protein, TSH, iron level, ESR, A1c, unremarkable. Immunoelectrophoresis negative for monoclonal IgG.    MRI of the lumbar spine showing L5-S1 severe degenerative disc disease is but no nerve impingement. Severe foraminal stenosis at the right L4 ganglion impingement. Moderate to severe foraminal stenosis on the left and impingement left L3 nerve root.    IMPRESSION: MRI brain  "2/2019  CONCLUSION:  1. No finding for acute infarct, intracranial hemorrhage, or abnormally enhancing mass.  2. Age-related changes including mild burden scattered chronic small vessel ischemic change with a moderate diffuse intraparenchymal volume loss. Small chronic lacunar infarct in the left frontal white matter.  3. Hippocampal volume loss is in keeping with the background diffuse intraparenchymal volume loss. No findings to suggest a mesial temporal lobe directed volume loss.      HEAD MRI: 6/8/2020  1.  Small acute infarct involving the posterior limb of the right internal capsule.  2.  Chronic lacunar infarcts in the left thalamus and inferior left cerebellum.    HEAD MRA:   1.  No aneurysm, high flow AVM or significant stenosis identified.    NECK MRA:  1.  No significant stenosis of the bilateral internal carotid arteries based on NASCET criteria.  2.  Enlarged left lobe of the thyroid gland with deviation of the trachea to the right. Further evaluation with thyroid sonogram is advised if not already performed.    Thyroid [id number]  1.  Large central nodule left lobe of the thyroid. This nodule is a TI- RADS 4 lesion greater than 1.5 cm. This nodule meets criteria for ultrasound-guided fine-needle aspiration biopsy and biopsy is recommended.              Review of system     10 point system review otherwise unremarkable    PHYSICAL EXAMINATION     Vital signs in last 24 hours:  Vitals:    03/10/21 1302   BP: (!) 143/85   Pulse: 75   Weight: 58.5 kg (129 lb)   Height: 1.499 m (4' 11\")       Very pleasant.  Normal mental status, language and speech.  Cranial nerves II through XII intact.  Seem to have adequate muscle strength.  Decreased balance but able to walk in the room without any assistive device.  No complaint of sensory difficulty.  Decreased coordination.  Elderly cautious gait.  Able to get up from chair without pushing.         Problem List     Patient Active Problem List    Diagnosis Date Noted "     GI bleed 01/09/2021     Priority: Medium     History of stroke 10/21/2020     Priority: Medium     Memory impairment 07/22/2020     Priority: Medium     Myalgia and myositis, unspecified 07/22/2020     Priority: Medium     Created by Conversion       Spinal stenosis of lumbar region 07/22/2020     Priority: Medium     Primary hypercholesterolemia 07/22/2020     Priority: Medium     Created by Conversion       Benign neoplasm of colon 07/22/2020     Priority: Medium     Created by Conversion       Dementia without behavioral disturbance, unspecified dementia type (H) 07/22/2020     Priority: Medium     Disorder of bone and cartilage 07/22/2020     Priority: Medium     Created by Conversion    Replacement Utility updated for latest IMO load       Acute cerebrovascular accident (CVA) (H) 06/08/2020     Priority: Medium     GI bleeding 01/07/2020     Priority: Medium     Vagina bleeding 01/07/2020     Priority: Medium     Chronic bilateral low back pain without sciatica 08/12/2016     Priority: Medium     Backache 06/08/2015     Priority: Medium         Past medical history     Past Surgical History:   Procedure Laterality Date     CHOLECYSTECTOMY       COLONOSCOPY N/A 1/8/2020    Procedure: COLONOSCOPY;  Surgeon: Amanda Carson MD;  Location:  GI     SHOULDER SURGERY      bilateral shoulder       No past medical history on file.        Family history     Family History   Problem Relation Age of Onset     Heart Disease Father          Social history     Social History     Socioeconomic History     Marital status:      Spouse name: Not on file     Number of children: Not on file     Years of education: Not on file     Highest education level: Not on file   Occupational History     Not on file   Social Needs     Financial resource strain: Not on file     Food insecurity     Worry: Not on file     Inability: Not on file     Transportation needs     Medical: Not on file     Non-medical: Not on  file   Tobacco Use     Smoking status: Never Smoker     Smokeless tobacco: Never Used   Substance and Sexual Activity     Alcohol use: Yes     Comment: social     Drug use: Never     Sexual activity: Not on file   Lifestyle     Physical activity     Days per week: Not on file     Minutes per session: Not on file     Stress: Not on file   Relationships     Social connections     Talks on phone: Not on file     Gets together: Not on file     Attends [employer] service: Not on file     Active member of club or organization: Not on file     Attends meetings of clubs or organizations: Not on file     Relationship status: Not on file     Intimate partner violence     Fear of current or ex partner: Not on file     Emotionally abused: Not on file     Physically abused: Not on file     Forced sexual activity: Not on file   Other Topics Concern     Parent/sibling w/ CABG, MI or angioplasty before 65F 55M? Not Asked   Social History Narrative     Not on file         Allergy     Atorvastatin, Dextromethorphan, Oxycodone, Pravastatin, Simvastatin, and Codeine    MEDICATIONS List     Current Outpatient Medications   Medication Sig Dispense Refill     acetaminophen (TYLENOL) 500 MG tablet Take 500-1,000 mg by mouth every 6 hours as needed for mild pain       aspirin (ASA) 81 MG chewable tablet Take 81 mg by mouth       latanoprost (XALATAN) 0.005 % ophthalmic solution        lovastatin (MEVACOR) 40 MG tablet Take 40 mg by mouth       memantine (NAMENDA) 10 MG tablet TAKE 1 TABLET TWICE A  tablet 3     calcium citrate-vitamin D (CITRACAL) 315-200 MG-UNIT TABS per tablet Take 1 tablet by mouth       valACYclovir (VALTREX) 1000 mg tablet Take 1,000 mg by mouth                 Nona Mckeon MD, MD, PhD  Neurology   Office tel: [phone]        This note was dictated using voice recognition software.  Any grammatical or context distortions are unintentional and inherent to the software. The note is tailored to serve physicians for  communications among them, who knows what are the most important elements of history taken for disease diagnosis and differentials as well as management plans. Due to time factors, the notes are in general not reviewed before signing. Again due to time factor, follow-up notes often carries over old notes if they are relevant, so most clinic time is dedicated to interviewing with patients and caregivers, on clinical assessment, coordinating care and management.

## 2021-03-10 NOTE — PATIENT INSTRUCTIONS
Start a new medication for memory Rivastigmine 1.5 mg twice daily. If no problem after 2 weeks, go up to 2 tab twice daily to continue. Call in 4 weeks and let us know if you are doing ok on 2 tabs twice daily, then we can send a new Rx to your mail order.

## 2021-03-12 RX ORDER — RIVASTIGMINE TARTRATE 1.5 MG/1
CAPSULE ORAL
Qty: 180 CAPSULE | Refills: 1 | Status: SHIPPED | OUTPATIENT
Start: 2021-03-12 | End: 2021-03-29

## 2021-03-19 ENCOUNTER — OFFICE VISIT - HEALTHEAST (OUTPATIENT)
Dept: FAMILY MEDICINE | Facility: CLINIC | Age: 86
End: 2021-03-19

## 2021-03-19 DIAGNOSIS — L03.211 FACIAL CELLULITIS: ICD-10-CM

## 2021-03-19 ASSESSMENT — MIFFLIN-ST. JEOR: SCORE: 908.5

## 2021-03-21 LAB — BACTERIA SPEC CULT: NORMAL

## 2021-03-22 ENCOUNTER — COMMUNICATION - HEALTHEAST (OUTPATIENT)
Dept: FAMILY MEDICINE | Facility: CLINIC | Age: 86
End: 2021-03-22

## 2021-03-29 DIAGNOSIS — F03.90 DEMENTIA WITHOUT BEHAVIORAL DISTURBANCE, UNSPECIFIED DEMENTIA TYPE: ICD-10-CM

## 2021-03-29 NOTE — TELEPHONE ENCOUNTER
Pt  called requesting 90 supply of rivastigmine be sent to [facility]. He says it is cheaper to go through them. [phone]

## 2021-03-30 RX ORDER — RIVASTIGMINE TARTRATE 1.5 MG/1
CAPSULE ORAL
Qty: 180 CAPSULE | Refills: 1 | Status: SHIPPED | OUTPATIENT
Start: 2021-03-30 | End: 2021-10-15

## 2021-04-15 ENCOUNTER — TELEPHONE (OUTPATIENT)
Dept: NEUROLOGY | Facility: CLINIC | Age: 86
End: 2021-04-15

## 2021-04-15 NOTE — TELEPHONE ENCOUNTER
Gave Daija the number for [facility].   Winsome Samaniego, CMA on 4/15/2021 at 1:28 PM

## 2021-05-25 ENCOUNTER — RECORDS - HEALTHEAST (OUTPATIENT)
Dept: ADMINISTRATIVE | Facility: CLINIC | Age: 86
End: 2021-05-25

## 2021-05-26 ENCOUNTER — RECORDS - HEALTHEAST (OUTPATIENT)
Dept: ADMINISTRATIVE | Facility: CLINIC | Age: 86
End: 2021-05-26

## 2021-05-27 ENCOUNTER — COMMUNICATION - HEALTHEAST (OUTPATIENT)
Dept: FAMILY MEDICINE | Facility: CLINIC | Age: 86
End: 2021-05-27

## 2021-05-27 ENCOUNTER — RECORDS - HEALTHEAST (OUTPATIENT)
Dept: ADMINISTRATIVE | Facility: CLINIC | Age: 86
End: 2021-05-27

## 2021-05-27 VITALS
HEART RATE: 82 BPM | RESPIRATION RATE: 18 BRPM | BODY MASS INDEX: 25.91 KG/M2 | SYSTOLIC BLOOD PRESSURE: 120 MMHG | WEIGHT: 128.5 LBS | DIASTOLIC BLOOD PRESSURE: 82 MMHG | HEIGHT: 59 IN

## 2021-05-27 DIAGNOSIS — I63.9 ACUTE CEREBROVASCULAR ACCIDENT (CVA) (H): ICD-10-CM

## 2021-05-28 ENCOUNTER — RECORDS - HEALTHEAST (OUTPATIENT)
Dept: ADMINISTRATIVE | Facility: CLINIC | Age: 86
End: 2021-05-28

## 2021-05-29 ENCOUNTER — RECORDS - HEALTHEAST (OUTPATIENT)
Dept: ADMINISTRATIVE | Facility: CLINIC | Age: 86
End: 2021-05-29

## 2021-05-30 ENCOUNTER — RECORDS - HEALTHEAST (OUTPATIENT)
Dept: ADMINISTRATIVE | Facility: CLINIC | Age: 86
End: 2021-05-30

## 2021-05-31 VITALS — HEIGHT: 60 IN | BODY MASS INDEX: 29.06 KG/M2 | WEIGHT: 148 LBS

## 2021-05-31 VITALS — HEIGHT: 60 IN | BODY MASS INDEX: 28.57 KG/M2 | WEIGHT: 145.5 LBS

## 2021-06-01 VITALS — BODY MASS INDEX: 27.72 KG/M2 | WEIGHT: 137.5 LBS | HEIGHT: 59 IN

## 2021-06-01 VITALS — HEIGHT: 59 IN | WEIGHT: 141.44 LBS | BODY MASS INDEX: 28.52 KG/M2

## 2021-06-01 VITALS — BODY MASS INDEX: 28.81 KG/M2 | HEIGHT: 59 IN | WEIGHT: 142.9 LBS

## 2021-06-01 VITALS — BODY MASS INDEX: 28.22 KG/M2 | HEIGHT: 59 IN | WEIGHT: 140 LBS

## 2021-06-01 VITALS — WEIGHT: 141.38 LBS | HEIGHT: 59 IN | BODY MASS INDEX: 28.5 KG/M2

## 2021-06-02 VITALS — BODY MASS INDEX: 28.2 KG/M2 | WEIGHT: 138 LBS

## 2021-06-02 VITALS — HEIGHT: 59 IN | WEIGHT: 138.3 LBS | BODY MASS INDEX: 27.88 KG/M2

## 2021-06-02 VITALS — WEIGHT: 138 LBS | BODY MASS INDEX: 27.82 KG/M2 | HEIGHT: 59 IN

## 2021-06-02 VITALS — WEIGHT: 136.19 LBS | BODY MASS INDEX: 27.45 KG/M2 | HEIGHT: 59 IN

## 2021-06-02 VITALS — WEIGHT: 136.56 LBS | BODY MASS INDEX: 27.58 KG/M2

## 2021-06-03 ENCOUNTER — RECORDS - HEALTHEAST (OUTPATIENT)
Dept: ADMINISTRATIVE | Facility: CLINIC | Age: 86
End: 2021-06-03

## 2021-06-04 ENCOUNTER — OFFICE VISIT - HEALTHEAST (OUTPATIENT)
Dept: FAMILY MEDICINE | Facility: CLINIC | Age: 86
End: 2021-06-04

## 2021-06-04 ENCOUNTER — COMMUNICATION - HEALTHEAST (OUTPATIENT)
Dept: FAMILY MEDICINE | Facility: CLINIC | Age: 86
End: 2021-06-04

## 2021-06-04 ENCOUNTER — COMMUNICATION - HEALTHEAST (OUTPATIENT)
Dept: HOME HEALTH SERVICES | Facility: HOME HEALTH | Age: 86
End: 2021-06-04

## 2021-06-04 DIAGNOSIS — E78.00 PURE HYPERCHOLESTEROLEMIA: ICD-10-CM

## 2021-06-04 DIAGNOSIS — Z71.89 ACP (ADVANCE CARE PLANNING): ICD-10-CM

## 2021-06-04 DIAGNOSIS — R53.81 PHYSICAL DECONDITIONING: ICD-10-CM

## 2021-06-04 DIAGNOSIS — E04.1 THYROID NODULE: ICD-10-CM

## 2021-06-04 DIAGNOSIS — Z86.73 HISTORY OF CVA (CEREBROVASCULAR ACCIDENT): ICD-10-CM

## 2021-06-04 DIAGNOSIS — F03.90 DEMENTIA WITHOUT BEHAVIORAL DISTURBANCE, UNSPECIFIED DEMENTIA TYPE: ICD-10-CM

## 2021-06-04 DIAGNOSIS — I10 ESSENTIAL HYPERTENSION: ICD-10-CM

## 2021-06-04 LAB
ANION GAP SERPL CALCULATED.3IONS-SCNC: 11 MMOL/L (ref 5–18)
BUN SERPL-MCNC: 15 MG/DL (ref 8–28)
CALCIUM SERPL-MCNC: 9.3 MG/DL (ref 8.5–10.5)
CHLORIDE BLD-SCNC: 106 MMOL/L (ref 98–107)
CHOLEST SERPL-MCNC: 142 MG/DL
CO2 SERPL-SCNC: 28 MMOL/L (ref 22–31)
CREAT SERPL-MCNC: 0.81 MG/DL (ref 0.6–1.1)
FASTING STATUS PATIENT QL REPORTED: YES
GFR SERPL CREATININE-BSD FRML MDRD: >60 ML/MIN/1.73M2
GLUCOSE BLD-MCNC: 88 MG/DL (ref 70–125)
HDLC SERPL-MCNC: 52 MG/DL
LDLC SERPL CALC-MCNC: 78 MG/DL
POTASSIUM BLD-SCNC: 4.7 MMOL/L (ref 3.5–5)
SODIUM SERPL-SCNC: 145 MMOL/L (ref 136–145)
TRIGL SERPL-MCNC: 59 MG/DL

## 2021-06-04 ASSESSMENT — MIFFLIN-ST. JEOR: SCORE: 894.89

## 2021-06-05 NOTE — PROGRESS NOTES
1. Inpatient hospitalization within last 30 days  Hemoglobin    Basic Metabolic Panel   2. Gastrointestinal hemorrhage associated with intestinal diverticulosis  Hemoglobin    Basic Metabolic Panel   3. Irregular heartbeat  Electrocardiogram Perform and Read     Post Discharge Medication Reconciliation Status: discharge medications reconciled, continue medications without change    ASSESSMENT/PLAN:     The following high BMI interventions were performed this visit: weight monitoring    1. Inpatient hospitalization within last 30 days    - Hemoglobin  - Basic Metabolic Panel  -We did review her medication list and need to continue her iron and stool softener    2. Gastrointestinal hemorrhage associated with intestinal diverticulosis    - Hemoglobin  - Basic Metabolic Panel  -no new signs of bleeding reported today  -continue with iron daily and stool softner    3. Irregular heartbeat    -EKG personally reviewed, sent to [facility] for interpretation  - Electrocardiogram Perform and Read  -patient is asymptomatic today  -no previous history of Afib, she does have a history of CAD  -currently, she is not on any blood thinning medication    There are no Patient Instructions on file for this visit.  There are no discontinued medications.  Return in about 6 months (around 7/13/2020) for check Hgb and iron levels.    The visit lasted a total of 40 minutes face to face with the patient.  Over 50% of the time spent counseling and educating the patient about her recent hospitalization for her GI bleed and discussion regarding her irregular heartbeat today.  Med list reviewed with her today.    Ester Sigala NP          SUBJECTIVE:  Angle Agudelo is a 88 y.o. female presents today for inpatient follow-up for GI bleed.    Summary of hospitalization: Angle Agudelo is a 88 year old female admitted on 1/7/2020. She presented to the hospital after she was transferred out from another facility for GI consult. After patient was  transferred to the hospital GI was consulted and patient was scheduled for colonoscopy and bowel preparation was initiated.  In the meantime her hemoglobin and hematocrit started to decline from 14-9.  In the course of her bowel preparation patient reported that she never had any blood in the stool anymore.  Colonoscopy was performed and was found to have diverticulosis and mild AVM but no major bleeding no active bleeding was observed.  Following the procedure patient is doing well and eating well and has no bloody bowel movements.  Her hemoglobin/hematocrit remained stable  Patient was seen by physical therapy and she is ambulatory independently.  Patient is being discharged home to follow-up with primary physician within a week.  Patient is given iron documentation for iron deficiency anemia.    GI bleed likely lower GI: Due to Diverticular bleed and AVM   - Hemoglobin dropped from 14-to 9  -monitor CBC   -GI consult requested - Colonoscopy done Showed AVM and diverticulosis: likely source of bleed   -restart diet      Acute blood loss Anemia   - continue to monitor CBC   - Iron on discharge        Today in clinic: She reports no new signs of bleeding or any new symptoms that are concerning, her last bowel movement was 2 days ago, she states it was brown and formed.  Her appetite is stable.  No concerns for constipation since starting the daily iron supplement.  He denies any recent chest pain, abdominal pain, dizziness or shortness of breath since she has been home.  Her blood pressure is stable, heart rate 101 today.  Chief Complaint   Patient presents with     Hospital Visit Follow Up     [facility] - GI bleed/Rectum         Patient Active Problem List   Diagnosis     Osteopenia     Arthropathy Of The Shoulder Region     Myalgia And Myositis     Feeling Tired Or Poorly     Myopathies     Open Wound Of The Forearm     Essential Hypercholesterolemia     Essential hypertension     Abnormal Glucose     Benign  Adenomatous Polyp Of The Large Intestine     Actinic Keratosis     Unspecified arthropathy, multiple sites     Backache     Chronic bilateral low back pain without sciatica     Rectal bleeding     Rectal bleed     Lower GI bleed     Acute blood loss anemia     Diverticulosis of large intestine with hemorrhage       Current Outpatient Medications   Medication Sig Dispense Refill     docusate sodium (COLACE) 100 MG capsule Take 100 mg by mouth.       ferrous sulfate 325 (65 FE) MG tablet Take 325 mg by mouth.       aspirin-acetaminophen-caffeine (EXCEDRIN MIGRAINE) 250-250-65 mg per tablet Take 1-2 tablets by mouth every 6 (six) hours as needed for pain.       calcium citrate-vitamin D (CITRACAL+D) 315-200 mg-unit per tablet Take 1 tablet by mouth daily.        conjugated estrogens (PREMARIN) vaginal cream Insert 0.5 g into the vagina see administration instructions. Use every 8 days (alternate every 4 days with Trimo cream).       No current facility-administered medications for this visit.        Social History     Tobacco Use   Smoking Status Never Smoker   Smokeless Tobacco Never Used       REVIEW OF SYSTEMS: Denies abdominal pain, changes in bowel habits, diarrhea, constipation, nausea, vomiting, rectal bleeding, melena, new/unusual/ uncooked foods, no sick contacts or recent travel.       TOBACCO USE:  Social History     Tobacco Use   Smoking Status Never Smoker   Smokeless Tobacco Never Used     Social History     Socioeconomic History     Marital status:      Spouse name: Not on file     Number of children: Not on file     Years of education: Not on file     Highest education level: Not on file   Occupational History     Not on file   Social Needs     Financial resource strain: Not on file     Food insecurity:     Worry: Not on file     Inability: Not on file     Transportation needs:     Medical: Not on file     Non-medical: Not on file   Tobacco Use     Smoking status: Never Smoker     Smokeless  tobacco: Never Used   Substance and Sexual Activity     Alcohol use: Yes     Alcohol/week: 2.0 standard drinks     Types: 2 Glasses of wine per week     Drug use: No     Sexual activity: Not on file   Lifestyle     Physical activity:     Days per week: Not on file     Minutes per session: Not on file     Stress: Not on file   Relationships     Social connections:     Talks on phone: Not on file     Gets together: Not on file     Attends Hindu service: Not on file     Active member of club or organization: Not on file     Attends meetings of clubs or organizations: Not on file     Relationship status: Not on file     Intimate partner violence:     Fear of current or ex partner: Not on file     Emotionally abused: Not on file     Physically abused: Not on file     Forced sexual activity: Not on file   Other Topics Concern     Not on file   Social History Narrative     Not on file         OBJECTIVE:            Vitals:    01/13/20 1352   BP: 132/74   Pulse: (!) 101   Resp: 18   Temp: 98.3  F (36.8  C)   SpO2: 98%     Weight: 129 lb (58.5 kg)    Wt Readings from Last 3 Encounters:   01/13/20 129 lb (58.5 kg)   01/07/20 125 lb (56.7 kg)   09/29/19 145 lb (65.8 kg)     Body mass index is 26.05 kg/m .        Physical Exam:  GENERAL APPEARANCE: A&A, NAD, well hydrated, well nourished  HEAD: atraumatic, no deformity  EYES: PERRL, EOM's intact, no redness or swelling  NECK: Supple, without lymphadenopathy, no thyroid mass, no JVD, no bruit  CV: Irregular, rate controlled, grade 2/6 systolic murmur cultivated at the right upper sternal border, no /G/R, no LE swelling   LUNGS: CTAB, normal respiratory effort  ABDOMEN: S&NT, no masses, no organomegaly, BS present x4    SKIN:  Normal skin turgor, no lesions/rashes, pink, warm and dry   NEURO: no gross deficits

## 2021-06-06 NOTE — TELEPHONE ENCOUNTER
New Appointment Needed  What is the reason for the visit:    Pre-Op Appt Request  When is the surgery? :  04/23/2020  Where is the surgery?:   [facility]  Who is the surgeon? :  Dr. Mustafa  What type of surgery is being done?: D&C  Provider Preference: PCP only  How soon do you need to be seen?: Before 04/23/2020  Waitlist offered?: No  Okay to leave a detailed message:  Yes

## 2021-06-07 ENCOUNTER — COMMUNICATION - HEALTHEAST (OUTPATIENT)
Dept: FAMILY MEDICINE | Facility: CLINIC | Age: 86
End: 2021-06-07

## 2021-06-07 NOTE — TELEPHONE ENCOUNTER
New Appointment Needed  What is the reason for the visit:    Pre-Op Appt Request  When is the surgery? :  05/14  Where is the surgery?:   [facility]   Who is the surgeon? :  Dr. Biggs  What type of surgery is being done?: dnc  Provider Preference: Any available   How soon do you need to be seen?: Next week   Waitlist offered?: No  Okay to leave a detailed message:  Yes

## 2021-06-07 NOTE — TELEPHONE ENCOUNTER
Patient aware PCP not offering in clinic visits next week. Apt scheduled with Dr. Pandya. No further concerns at the time.

## 2021-06-08 NOTE — PROGRESS NOTES
TYPE OF VISIT: OT evaluation   HIGHEST EDUCATION LEVEL: high school  PATIENT GOAL:  be able to live her life with quality   RELEVANT WORK HISTORY:  worked for [employer] for 35 years  NEXT MD APPOINTMENT:  Neurologist July 8  PERSONS PRESENT FOR VISIT:  patient's  Jamal, daughter Daija, patient and therapist Dilma Segura OTR/L   NEEDED: no  SUMMARY OF REASON FOR REFERRAL:   HOME FROM HOSPITAL/CARE CENTER:   Pt   in hosp 6/8/2020-6/ with acute CVA   CURRENT MEDICAL CONDITIONS: HTN, dementia   PRECAUTIONS:  high fall risk   PRIOR LEVEL OF FUNCTION: Did not use device and was able to move indep. Pt has had issues with dementia for over 1 year with primary symptoms seen in short term memory per daughter  CURRENT LEVEL OF FUNCTION: Patient demonstrates better insight than  and daughter on her cognitive decline and decline physically.    It is highly taxing for patient to perform basic self cares and needs lengthy rest breaks for recover enough just to to the next step and nothing more.  Patient wants quality in her life and worries because her  has advanced Parkinson's disease  HOME ENVIRONMENT: senior apartment  ASSISTANCE AVAILABLE: family  COLLABORATION WITH : PT start of care OASIS and MSW on needs for resources.  At time of PT evaluation, the family was no  t ready for MSW services after lengthy education of services patient and family would like help establishing services post therapy.  Patient's  is retired  and might be eligible for benefits.  PLAN/FOLLOW UP NEEDED: OT tx 2x x 4w for stamina building exercises for safety with BADLs; neurological activities to increase coordination of fine and gross motor movements; education and assessment of cognitive deficit

## 2021-06-08 NOTE — ANESTHESIA CARE TRANSFER NOTE
Last vitals:   Vitals:    05/14/20 1255   BP: 130/73   Pulse: 62   Resp: 16   Temp: 37.4  C (99.4  F)   SpO2: 94%     Patient's level of consciousness is drowsy  Spontaneous respirations: yes  Maintains airway independently: yes  Dentition unchanged: yes  Oropharynx: oropharynx clear of all foreign objects    QCDR Measures:  ASA# 20 - Surgical Safety Checklist: WHO surgical safety checklist completed prior to induction    PQRS# 430 - Adult PONV Prevention: 4558F - Pt received => 2 anti-emetic agents (different classes) preop & intraop  ASA# 8 - Peds PONV Prevention: NA - Not pediatric patient, not GA or 2 or more risk factors NOT present  PQRS# 424 - Margie-op Temp Management: 4559F - At least one body temp DOCUMENTED => 35.5C or 95.9F within required timeframe  PQRS# 426 - PACU Transfer Protocol: - Transfer of care checklist used  ASA# 14 - Acute Post-op Pain: ASA14B - Patient did NOT experience pain >= 7 out of 10

## 2021-06-08 NOTE — ANESTHESIA POSTPROCEDURE EVALUATION
Patient: Angle Agudelo  Procedure(s):  VULVAR BIOPSY  PUNCH BIOPSY, CERVIX  DILATION AND CURETTAGE, UTERUS  Anesthesia type: MAC    Patient location: PACU  Last vitals:   Vitals Value Taken Time   /84 5/14/2020  1:32 PM   Temp 37.4  C (99.4  F) 5/14/2020 12:55 PM   Pulse 62 5/14/2020  1:36 PM   Resp 16 5/14/2020  1:30 PM   SpO2 85 % 5/14/2020  1:36 PM   Vitals shown include unvalidated device data.  Post vital signs: stable  Level of consciousness: awake and responds to simple questions  Post-anesthesia pain: pain controlled  Post-anesthesia nausea and vomiting: no  Pulmonary: unassisted, return to baseline  Cardiovascular: stable and blood pressure at baseline  Hydration: adequate  Anesthetic events: no    QCDR Measures:  ASA# 11 - Margie-op Cardiac Arrest: ASA11B - Patient did NOT experience unanticipated cardiac arrest  ASA# 12 - Margie-op Mortality Rate: ASA12B - Patient did NOT die  ASA# 13 - PACU Re-Intubation Rate: ASA13B - Patient did NOT require a new airway mgmt  ASA# 10 - Composite Anes Safety: ASA10A - No serious adverse event    Additional Notes:

## 2021-06-08 NOTE — PROGRESS NOTES
OT Collaboration Note    OT Eval Complete.    DM5181A Toilet Hygiene: same as assessed  [id number]  Shower/bathe self: same as assessed  [id number]   Upper Body Dressing: same as assessed  [id number]   Lower Body Dressing: same as assessed  [id number]  On/off footwear: same as assessed    No changes needed on PT start of care OASIS  Patient and her family are agreeable to MSW and OT will call for orders

## 2021-06-08 NOTE — TELEPHONE ENCOUNTER
Request for Orders    Who s Requesting: Home Care Occupational Therapist    Orders being requested: OT tx 2x x 4w for stamina building exercises for safety with BADLs; neurological activities to increase coordination of fine and gross motor movements; education and assessment of cognitive deficit     Additional Orders being requested: MSW evaluation 1x for 8 days for resources    Where to send Orders: [facility], response through Epic preferred. Please call if you have questions.    Thank you!  Dilma Segura, OTR/L  [phone]

## 2021-06-08 NOTE — TELEPHONE ENCOUNTER
Refill Approved    Rx renewed per Medication Renewal Policy. Medication was last renewed on 6/10/20.    Vandana Betts, [facility] Connection Triage/Med Refill 6/17/2020     Requested Prescriptions   Pending Prescriptions Disp Refills     lovastatin (MEVACOR) 20 MG tablet 30 tablet 0     Sig: Take 1 tablet (20 mg total) by mouth at bedtime.       Statins Refill Protocol (Hmg CoA Reductase Inhibitors) Passed - 6/15/2020 12:36 PM        Passed - PCP or prescribing provider visit in past 12 months      Last office visit with prescriber/PCP: 1/13/2020 Ester Sigala NP OR same dept: Visit date not found OR same specialty: Visit date not found  Last physical: Visit date not found Last MTM visit: Visit date not found   Next visit within 3 mo: Visit date not found  Next physical within 3 mo: Visit date not found  Prescriber OR PCP: Ester Sigala NP  Last diagnosis associated with med order: 1. Acute CVA (cerebrovascular accident) (H)  - lovastatin (MEVACOR) 20 MG tablet; Take 1 tablet (20 mg total) by mouth at bedtime.  Dispense: 30 tablet; Refill: 0    If protocol passes may refill for 12 months if within 3 months of last provider visit (or a total of 15 months).

## 2021-06-08 NOTE — TELEPHONE ENCOUNTER
Request for Orders    Who s Requesting: Home Care Physical Therapist    Orders being requested:  PT 3x/wk x 2 weeks then 2x/wk x 2 weeks for strengthening, gait, transfers and balance    OT eval for ADL safety and cognition  HHA 2x/wk x 4 weeks for bathing and personal cares    Where to send Orders: Please reply through Epic    Thank you

## 2021-06-08 NOTE — ANESTHESIA PREPROCEDURE EVALUATION
Anesthesia Evaluation      Patient summary reviewed   No history of anesthetic complications     Airway   Mallampati: II  Neck ROM: full   Pulmonary - negative ROS and normal exam                          Cardiovascular - normal exam   Neuro/Psych      Endo/Other - negative ROS      GI/Hepatic/Renal - negative ROS           Dental                         Anesthesia Plan  Planned anesthetic: MAC    ASA 3     Anesthetic plan and risks discussed with: patient

## 2021-06-08 NOTE — PROGRESS NOTES
Preoperative Exam    Scheduled Procedure: D&C  Surgery Date:  5/14  Surgery Location: [facility], fax [phone]    Surgeon:  Dr. Kalyan giles 88-year-old female with a past history of hypertension presents today for preop evaluation for D&C at [facility] on 5/14/2020.  The patient has had multiple surgeries in the past without complication.  She is not have sleep apnea, she does not take blood thinners, she has no family history of malignant hyperthermia and she is a Mallampati class II.    Assessment/Plan:     1. Pre-op exam  Patient is been optimized for surgery  - HM2(CBC w/o Differential)  - Basic Metabolic Panel    2. Essential hypertension  Patient has a history of hypertension but blood pressures currently well controlled with no medications.    Surgical Procedure Risk: Low (reported cardiac risk generally < 1%)  Have you had prior anesthesia?: Yes  Have you or any family members had a previous anesthesia reaction:  No  Do you or any family members have a history of a clotting or bleeding disorder?: No  Cardiac Risk Assessment: no increased risk for major cardiac complications    Pt has been optimized for surgery      Functional Status: Independent  Patient plans to recover at home with family.     Subjective:      Angle Agudelo is a 88 y.o. female who presents for a preoperative consultation.      All other systems reviewed and are negative, other than those listed in the HPI.    Pertinent History  Do you have difficulty breathing or chest pain after walking up a flight of stairs: No  History of obstructive sleep apnea: No  Steroid use in the last 6 months: No  Frequent Aspirin/NSAID use: No  Prior Blood Transfusion: No  Prior Blood Transfusion Reaction: No    Current Outpatient Medications   Medication Sig Dispense Refill     aspirin-acetaminophen-caffeine (EXCEDRIN MIGRAINE) 250-250-65 mg per tablet Take 1-2 tablets by mouth every 6 (six) hours as needed for pain.        calcium citrate-vitamin D (CITRACAL+D) 315-200 mg-unit per tablet Take 1 tablet by mouth daily.        conjugated estrogens (PREMARIN) vaginal cream Insert 0.5 g into the vagina see administration instructions. Use every 8 days (alternate every 4 days with Trimo cream).       No current facility-administered medications for this visit.         Allergies   Allergen Reactions     Atorvastatin Myalgia     Dextromethorphan Hives     Oxycodone Unknown     Pravastatin Myalgia     Pravastatin Sodium Myalgia     Simvastatin Myalgia     Codeine Rash       Patient Active Problem List   Diagnosis     Osteopenia     Arthropathy Of The Shoulder Region     Myalgia And Myositis     Feeling Tired Or Poorly     Myopathies     Open Wound Of The Forearm     Essential Hypercholesterolemia     Essential hypertension     Abnormal Glucose     Benign Adenomatous Polyp Of The Large Intestine     Actinic Keratosis     Unspecified arthropathy, multiple sites     Backache     Chronic bilateral low back pain without sciatica     Rectal bleeding     Rectal bleed     Lower GI bleed     Acute blood loss anemia     Diverticulosis of large intestine with hemorrhage       Past Medical History:   Diagnosis Date     Benign neoplasm of colon      Disorder of bone and cartilage, unspecified      Pure hypercholesterolemia      Skin cancer     Not Melanoma, not sure what kind though     Unspecified arthropathy, shoulder region      Unspecified essential hypertension        Past Surgical History:   Procedure Laterality Date     APPENDECTOMY       CATARACT EXTRACTION EXTRACAPSULAR W/ INTRAOCULAR LENS IMPLANTATION Bilateral      CERVICAL BIOPSY  W/ LOOP ELECTRODE EXCISION N/A 2018    Procedure: LOOP ELECTROSURGICAL EXCISION PROCEDURE, REMOVAL PESSARY;  Surgeon: Olive Biggs MD;  Location: [facility];  Service:      finger abscess             x5     SIGMOIDOSCOPY N/A 6/3/2018    Procedure: SIGMOIDOSCOPY;  Surgeon: Dayana Joshi MD;   "Location: [facility];  Service:      TONSILLECTOMY AND ADENOIDECTOMY       TOTAL SHOULDER ARTHROPLASTY Bilateral R 7/12 L 9/11       Social History     Socioeconomic History     Marital status:      Spouse name: Not on file     Number of children: Not on file     Years of education: Not on file     Highest education level: Not on file   Occupational History     Not on file   Social Needs     Financial resource strain: Not on file     Food insecurity     Worry: Not on file     Inability: Not on file     Transportation needs     Medical: Not on file     Non-medical: Not on file   Tobacco Use     Smoking status: Never Smoker     Smokeless tobacco: Never Used   Substance and Sexual Activity     Alcohol use: Yes     Alcohol/week: 2.0 standard drinks     Types: 2 Glasses of wine per week     Drug use: No     Sexual activity: Not on file   Lifestyle     Physical activity     Days per week: Not on file     Minutes per session: Not on file     Stress: Not on file   Relationships     Social connections     Talks on phone: Not on file     Gets together: Not on file     Attends Zoroastrianism service: Not on file     Active member of club or organization: Not on file     Attends meetings of clubs or organizations: Not on file     Relationship status: Not on file     Intimate partner violence     Fear of current or ex partner: Not on file     Emotionally abused: Not on file     Physically abused: Not on file     Forced sexual activity: Not on file   Other Topics Concern     Not on file   Social History Narrative     Not on file       ROS:  10 pt system review complete.  Notable for osteopenia, past history of GI bleed.    Patient Care Team:  Ester Sigala NP as PCP - General (Nurse Practitioner)  Ester Sigala NP as Assigned PCP          Objective:     Vitals:    05/07/20 1253   BP: 128/64   Pulse: 63   Resp: 16   Temp: 98  F (36.7  C)   SpO2: 94%   Weight: 128 lb (58.1 kg)   Height: 4' 10.25\" (1.48 m) "         Physical Exam:  Constitutional:    --Vitals as above  --No acute distress  Eyes-  --Sclera noninjected  --Lids and conjunctiva normal  ENT-  --TMs clear  --Sclera noninjected  --Pharynx not erythematous  Neck-  --Neck supple with no cervical lymphadenopathy  Lungs-  --Clear to Auscultation  Heart-  --Regular rate and rhythm  Abdomen--  --Soft, non-tender, non-distended  Skin-  --Pink and dry  Psych-  --Alert and oriented  --Normal affect      There are no Patient Instructions on file for this visit.        Labs:  Recent Results (from the past 24 hour(s))   HM2(CBC w/o Differential)    Collection Time: 05/07/20  1:15 PM   Result Value Ref Range    WBC 7.1 4.0 - 11.0 thou/uL    RBC 4.69 3.80 - 5.40 mill/uL    Hemoglobin 13.8 12.0 - 16.0 g/dL    Hematocrit 42.3 35.0 - 47.0 %    MCV 90 80 - 100 fL    MCH 29.4 27.0 - 34.0 pg    MCHC 32.6 32.0 - 36.0 g/dL    RDW 14.3 11.0 - 14.5 %    Platelets 178 140 - 440 thou/uL    MPV 8.8 7.0 - 10.0 fL       Immunization History   Administered Date(s) Administered     Influenza high dose,seasonal,PF, 65+ yrs 10/10/2014, 10/08/2015, 09/14/2017, 09/14/2018, 09/26/2019     Influenza, Seasonal, Inj PF IIV3 09/28/2010     Influenza, inj, historic,unspecified 10/11/2016, 09/14/2018     Influenza,seasonal, Inj IIV3 10/17/2011, 09/18/2012, 09/30/2013     Pneumo Conj 13-V (2010&after) 05/04/2016     Pneumo Polysac 23-V 01/13/2003     Td,adult,historic,unspecified 01/02/1932     Tdap 08/12/2016     BMP pending.    Thank you for the opportunity to participate in the care for this patient.  If you have any concerns please do not hesitate to contact me at the [facility] at [phone].    Electronically signed by Ayaan Pandya MD 05/07/20 12:56 PM

## 2021-06-09 ENCOUNTER — HOSPITAL ENCOUNTER (EMERGENCY)
Dept: EMERGENCY MEDICINE | Facility: CLINIC | Age: 86
Discharge: HOME | End: 2021-06-09
Payer: MEDICARE

## 2021-06-09 ENCOUNTER — COMMUNICATION - HEALTHEAST (OUTPATIENT)
Dept: FAMILY MEDICINE | Facility: CLINIC | Age: 86
End: 2021-06-09

## 2021-06-09 DIAGNOSIS — R03.0 ELEVATED BP WITHOUT DIAGNOSIS OF HYPERTENSION: ICD-10-CM

## 2021-06-09 DIAGNOSIS — R42 DIZZINESS: ICD-10-CM

## 2021-06-09 LAB
ALBUMIN SERPL-MCNC: 4.3 G/DL (ref 3.5–5)
ALBUMIN UR-MCNC: NEGATIVE G/DL
ALP SERPL-CCNC: 75 U/L (ref 45–120)
ALT SERPL W P-5'-P-CCNC: 9 U/L (ref 0–45)
ANION GAP SERPL CALCULATED.3IONS-SCNC: 8 MMOL/L (ref 5–18)
APPEARANCE UR: CLEAR
AST SERPL W P-5'-P-CCNC: 21 U/L (ref 0–40)
BACTERIA #/AREA URNS HPF: ABNORMAL /[HPF]
BASOPHILS # BLD AUTO: 0.1 THOU/UL (ref 0–0.2)
BASOPHILS NFR BLD AUTO: 1 % (ref 0–2)
BILIRUB SERPL-MCNC: 0.8 MG/DL (ref 0–1)
BILIRUB UR QL STRIP: NEGATIVE
BUN SERPL-MCNC: 12 MG/DL (ref 8–28)
CALCIUM SERPL-MCNC: 9.5 MG/DL (ref 8.5–10.5)
CHLORIDE BLD-SCNC: 107 MMOL/L (ref 98–107)
CO2 SERPL-SCNC: 29 MMOL/L (ref 22–31)
COLOR UR AUTO: COLORLESS
CREAT SERPL-MCNC: 0.74 MG/DL (ref 0.6–1.1)
EOSINOPHIL # BLD AUTO: 0 THOU/UL (ref 0–0.4)
EOSINOPHIL NFR BLD AUTO: 0 % (ref 0–6)
ERYTHROCYTE [DISTWIDTH] IN BLOOD BY AUTOMATED COUNT: 17.8 % (ref 11–14.5)
GFR SERPL CREATININE-BSD FRML MDRD: >60 ML/MIN/1.73M2
GLUCOSE BLD-MCNC: 117 MG/DL (ref 70–125)
GLUCOSE BLDC GLUCOMTR-MCNC: 116 MG/DL (ref 70–139)
GLUCOSE UR STRIP-MCNC: NEGATIVE MG/DL
HCT VFR BLD AUTO: 44 % (ref 35–47)
HGB BLD-MCNC: 13.3 G/DL (ref 12–16)
HGB UR QL STRIP: NEGATIVE
IMM GRANULOCYTES # BLD: 0 THOU/UL
IMM GRANULOCYTES NFR BLD: 0 %
KETONES UR STRIP-MCNC: ABNORMAL MG/DL
LEUKOCYTE ESTERASE UR QL STRIP: ABNORMAL
LYMPHOCYTES # BLD AUTO: 0.9 THOU/UL (ref 0.8–4.4)
LYMPHOCYTES NFR BLD AUTO: 11 % (ref 20–40)
MCH RBC QN AUTO: 27.9 PG (ref 27–34)
MCHC RBC AUTO-ENTMCNC: 30.2 G/DL (ref 32–36)
MCV RBC AUTO: 92 FL (ref 80–100)
MONOCYTES # BLD AUTO: 0.3 THOU/UL (ref 0–0.9)
MONOCYTES NFR BLD AUTO: 5 % (ref 2–10)
MUCOUS THREADS #/AREA URNS LPF: PRESENT LPF
NEUTROPHILS # BLD AUTO: 6.2 THOU/UL (ref 2–7.7)
NEUTROPHILS NFR BLD AUTO: 83 % (ref 50–70)
NITRATE UR QL: NEGATIVE
PH UR STRIP: 7 [PH] (ref 5–8)
PLATELET # BLD AUTO: 150 THOU/UL (ref 140–440)
PMV BLD AUTO: 11.1 FL (ref 8.5–12.5)
POTASSIUM BLD-SCNC: 4.1 MMOL/L (ref 3.5–5)
PROT SERPL-MCNC: 7.2 G/DL (ref 6–8)
RBC # BLD AUTO: 4.77 MILL/UL (ref 3.8–5.4)
RBC URINE: 1 HPF
SODIUM SERPL-SCNC: 144 MMOL/L (ref 136–145)
SP GR UR STRIP: 1.01 (ref 1–1.03)
SQUAMOUS EPITHELIAL: 1 /HPF
TROPONIN I SERPL-MCNC: <0.01 NG/ML (ref 0–0.29)
UROBILINOGEN UR STRIP-ACNC: ABNORMAL
WBC URINE: 6 HPF
WBC: 7.5 THOU/UL (ref 4–11)

## 2021-06-09 NOTE — PROGRESS NOTES
COGNITIVE FUNCTIONAL LEVEL: 5/5.6 indicates a mild  cognitive deficit. Test date: 6/24/2020 and handouts given to patient and family.      CHARACTERISTICS OF COGNITIVE FUNCTIONAL LEVEL 5.0; Mild cognitive decline. Start of difficulty with complex tasks.*At this level, problems with memory, judgment, reasoning and planning ahead are seen. For example, a person may make mistakes in their checkbook or struggle to take a new medication that   requires different dosages at different times of the day.  This person can learn to do things differently with repetition and reminders.  Learns best through trial and error and repeated practice.   Work pace WNL, but has difficulty varying pace and may be perceived as laziness by others.  Has difficulty working and talking at the same time.

## 2021-06-09 NOTE — TELEPHONE ENCOUNTER
Patient Returning Call  Reason for call:  Dilma called back.  Information relayed to patient: N/A  Patient has additional questions:  Yes  If YES, what are your questions/concerns:  Please have Koko Betancourt NP address these OT orders as patient will need them.  Recent discharge from hospital with CVA.  Okay to leave a detailed message?: Yes, Call Dilma at [phone]

## 2021-06-09 NOTE — PROGRESS NOTES
Clinic Care Coordination Contact  Community Health Worker Initial Outreach    CHW Initial Information Gathering:  Referral Source: (BPCIA)  Preferred Hospital: [facility]  [phone]  Preferred Urgent Care: [facility], [phone]  Current living arrangement:: Other  Type of residence:: Apartment  Supplies used at home:: None  Equipment Currently Used at Home: wheelchair, manual, grab bar, toilet, grab bar, tub/shower, shower chair  Informal Support system:: Family, Spouse, Children  Transportation means:: Regular car    Patient accepts CC: Yes     CHW is added to the chart that will be following the patient   BPCOA episode was made.

## 2021-06-09 NOTE — TELEPHONE ENCOUNTER
Please call the patient's daughter.  I had a chance to review more of Angle Hughes's notes from her past.  A couple things I noticed.    The first is that she had some previous imaging earlier this year which showed an enlargement of an abdominal aortic aneurysm.  Radiology had made recommendations in their note about consulting with vascular surgery.  Did anyone ever make any mention of this to them?  If not, I will go ahead and place the vascular surgery referral.    Second, there seem to be an enlargement of her thyroid gland noted back in February with recommendations to get a dedicated thyroid ultrasound.  Has anyone talked with him about that either?  I will go ahead and order that thyroid ultrasound for them.      Koko Betancourt, CNP

## 2021-06-09 NOTE — PROGRESS NOTES
Clinic Care Coordination Contact  [facility]/Voicemail       Clinical Data: Care Coordinator Outreach  Outreach attempted x 2.  Left message on patient's voicemail with call back information and requested return call.  Plan: Care Coordinator. Care Coordinator will try to reach patient again in 10 business days.

## 2021-06-09 NOTE — PROGRESS NOTES
"Angle Agudelo is a 88 y.o. female who is being evaluated via a billable video visit.      The patient has been notified of following:     \"This video visit will be conducted via a call between you and your physician/provider. We have found that certain health care needs can be provided without the need for an in-person physical exam.  This service lets us provide the care you need with a video conversation.  If a prescription is necessary we can send it directly to your pharmacy.  If lab work is needed we can place an order for that and you can then stop by our lab to have the test done at a later time.    Video visits are billed at different rates depending on your insurance coverage. Please reach out to your insurance provider with any questions.    If during the course of the call the physician/provider feels a video visit is not appropriate, you will not be charged for this service.\"    Patient has given verbal consent to a Video visit? Yes    How would you like to obtain your AVS? AVS Preference: Mail a copy.  Patient would like the video invitation sent by: Text to cell phone: [phone].    Will anyone else be joining your video visit? No        Video Start Time: 12:19 PM    Additional provider notes:   Hospital Follow-up Visit:    Hospital/Nursing Home/IP Rehab Facility: [facility]  Date of Admission: 6/8/2020  Date of Discharge: 6/10/2020  Reason(s) for Admission: CVA    Was your hospitalization related to COVID-19? No  Problems taking medications regularly:  None  Medication changes since discharge: None  Problems adhering to non-medication therapy:  None    Summary of hospitalization:  [facility] hospital discharge summary reviewed  Diagnostic Tests/Treatments reviewed.  Follow up needed: neurology  Other Healthcare Providers Involved in Patient s Care:         Physical Therapy and neurology  Update since discharge: improved.      Post Discharge Medication Reconciliation: " discharge medications reconciled and changed, per note/orders (see AVS).  Plan of care communicated with patient and family             Patient presents today following her hospital admission from 6/8/2020-6/10/2020 when she was admitted for a CVA.  MRI of the brain showed a small infarct within the posterior limb of the right internal capsule and chronic lacunar infarcts in the left thalamus and inferior left cerebellum.  Noted enlargement of the left thyroid gland as well.  Deficits included a left facial droop without dysphasia with some fluctuating dysarthria.  Noted elevated LDL with difficulty tolerating multiple statins in the past.  Initiated lovastatin which she is tolerating so well at 20 mg.  Neurology was consulted and they recommended continued follow-up with her outpatient neurologist whom she sees for dementia.  PT and OT ordered for home care.  She lives with her  who apparently has dementia himself, but the daughter also helps him out.    Blood pressure was noted to be elevated while in the hospital.  Unsure whether this was related to the CVA or baseline hypertension.  Given concerns for potential post stroke hypotension with initiation of antihypertensives, they elected to hold off and continue to monitor outpatient.  Review of home care visit notes shows that she has been normotensive outside of one episode when PT apparently worked her quite vigorously.    Echocardiogram with bubble study was negative.    Patient denies chest pain, palpitations, lightheadedness, dizziness, numbness, tingling, headaches, vision changes, weakness, facial droop.  Daughter who is assisting with the video visit today notes no change in her baseline mentation.  No recent falls.  She denies any new or worsening myalgias.    Of note, when I was reviewing imaging from February of this year there was a large left thyroid gland along with increase in size of her abdominal aortic aneurysm.  Recommendations for  dedicated ultrasound and vascular surgery consults were made, but it does not appear that this is happened.    Physical Examination: General appearance - alert, well appearing, and in no distress  Mental status -alert.  Moderately poor historian.  Neck -no obvious neck lumps.  Chest - No distress in breathing  Neurological - alert, oriented, normal speech, no focal findings or movement disorder noted  Skin - normal coloration and turgor, no rashes, no suspicious skin lesions noted           1. Acute cerebrovascular accident (CVA) (H)  lovastatin (MEVACOR) 40 MG tablet   2. Dementia without behavioral disturbance, unspecified dementia type (H)     3. Essential Hypercholesterolemia     4. Essential hypertension     5. Enlarged thyroid  US Thyroid   6. Abdominal aortic aneurysm (AAA) without rupture (H)  Ambulatory referral to Vascular Surgery     Trial increasing lovastatin to 40 mg.  Keep planned follow-up with neurologist.  Ultrasound of thyroid ordered along with referral to vascular surgery for consult on AAA.  Blood pressure has shown good control with the home care staff, so we will continue to monitor.  If rising, make adjustments.    Video-Visit Details    Type of service:  Video Visit    Video End Time (time video stopped): 1240  Originating Location (pt. Location): Home    Distant Location (provider location):  [facility] FAMILY MEDICINE/OB     Platform used for Video Visit: Adis Betancourt, CNP

## 2021-06-09 NOTE — PROGRESS NOTES
"Clinic Care Coordination Contact    Follow Up Progress Note - BPCI-A One Month Follow Up      Assessment:  Pt reports she's doing well, \"everything seems to be ok.\"  Denies pain.  She ambulates with her walker and she and her  walk the halls in their independent senior living building daily, \"which is a good length and worthwhile walk.\"       Goals addressed this encounter:   Goals Addressed                 This Visit's Progress       Patient Stated      Being Active (pt-stated)   50%     Goal Statement: I would like to increase my activity and \"stay active\" over the next 90 days.  Date Goal set: 6/25/20  Barriers: recent hospitalization, now using a walker  Strengths: motivated  Date to Achieve By: ongoing  Patient expressed understanding of goal: yes  Action steps to achieve this goal:  1. I will participate in home care PT/OT exercises on a regular basis.  2. I will complete the exercises on a daily basis as directed by PT/OT between therapy sessions and after therapy discharge.  3. I will walk in the halls of my independent living to gain strength with my walker.         Other (pt-stated)   50%     Goal Statement:  I want to stay out of the hospital for the next 90 days.  Date Goal set:  6/25/2020  Barriers:   Recent hospitalization  Strengths:  Able to identify and advocate for needs, good support system from family  Date to Achieve By:  9/8/20 (based on hospital discharge date of 6/10/20)  Patient expressed understanding of goal:  Yes  Action steps to achieve this goal:  1. I will attend all my appts as scheduled, and recommended follow up appts  2. I will call my clinic if I start to feel sick or notice any change(s) in my health, and schedule an appt if needed  3. I will call the triage nurse line for advice, before going to the hospital  4. I will go to  or Walk-In-Clinic before going to the hospital, if I am unable to get an appt with my PCP  5. I will update the Community Healthcare Worker during " outreach calls and ask for additional support or resources, if needed               Intervention/Education provided during outreach:  N/A          Plan:  RN Care Coordinator will follow up with patient again in one month, per BPCI-A guidelines.      Samanta Marcos RN Clinic Care Coordinator

## 2021-06-09 NOTE — PROGRESS NOTES
Your patient was discharged from [facility] on 7/08/2020 because goals met.  Thank you for allowing us to provide care to this patient!  A Discharge summary is available upon requestâ  [phone]

## 2021-06-09 NOTE — TELEPHONE ENCOUNTER
Discussed with daughter who is with the patient.  Referral for vascular surgery and thyroid ultrasound placed.    Koko Betancourt, CNP

## 2021-06-09 NOTE — PROGRESS NOTES
"Angle Agudelo is a 88 y.o. female who is being evaluated via a billable video visit.      The patient has been notified of following:     \"This video visit will be conducted via a call between you and your physician/provider. We have found that certain health care needs can be provided without the need for an in-person physical exam.  This service lets us provide the care you need with a video conversation.  If a prescription is necessary we can send it directly to your pharmacy.  If lab work is needed we can place an order for that and you can then stop by our lab to have the test done at a later time.    Video visits are billed at different rates depending on your insurance coverage. Please reach out to your insurance provider with any questions.    If during the course of the call the physician/provider feels a video visit is not appropriate, you will not be charged for this service.\"    Patient has given verbal consent to a Video visit? Yes  How would you like to obtain your AVS? N/A  Patient would like the video invitation sent by: Text to cell phone: [phone]  Will anyone else be joining your video visit? No        Video Start Time: 1140    Patient presents today with her family with concerns about her lovastatin.  Currently taking 20 mg.  In the past year she is suffered a CVA and was reinitiated on statin therapy after being taken off by Dr. Pandya in 2018.  I gave the patient recommendations to increase her lovastatin to 40 mg since she was tolerating that well for risk reduction and her  had concerns that her cholesterol will get \"too low \".  No myalgias with current lovastatin dosing.  She has had issues with other statins in the past, but this is the one she has been able to tolerate.    Previous records shows history of enlarged thyroid with recommendations for dedicated ultrasound.  Possible goiter.  This was never addressed until recently and the patient is now scheduled for her " ultrasound.    Past imaging showed incidentally found abdominal aortic aneurysm this is been scheduled.  No follow-up was performed on it yet.  No abdominal pain.    Additional provider notes: GENERAL: Healthy, alert and no distress  EYES: Eyes grossly normal to inspection. No discharge or erythema, or obvious scleral/conjunctival abnormalities.  RESP: No audible wheeze, cough, or visible cyanosis.  No visible retractions or increased work of breathing.    NEURO: Cranial nerves grossly intact. Mentation and speech appropriate for age.  PSYCH: Mentation appears normal, affect normal/bright, judgement and insight intact, normal speech and appearance well-groomed    1. Essential Hypercholesterolemia     2. Abdominal aortic aneurysm (AAA) without rupture (H)     3. Enlarged thyroid       Discussed with the family that overall the patient's health is quite good outside of her memory decline.  Discussed the role of statin therapy and risk reduction for further CVA.  Noted that statin was discontinued in the past due to her age, and not because of her cholesterol becoming too low.  Encouraged continued use.  Confirmation of follow-up ultrasound and consultation with vascular medicine performed.    Video-Visit Details    Type of service:  Video Visit    Video End Time (time video stopped): 1155  Originating Location (pt. Location): Home    Distant Location (provider location):  [facility] FAMILY MEDICINE/OB     Platform used for Video Visit: Chandan Betancourt CNP

## 2021-06-10 LAB
ATRIAL RATE - MUSE: 64 BPM
DIASTOLIC BLOOD PRESSURE - MUSE: NORMAL
INTERPRETATION ECG - MUSE: NORMAL
P AXIS - MUSE: 21 DEGREES
PR INTERVAL - MUSE: 164 MS
QRS DURATION - MUSE: 70 MS
QT - MUSE: 398 MS
QTC - MUSE: 410 MS
R AXIS - MUSE: -28 DEGREES
SYSTOLIC BLOOD PRESSURE - MUSE: NORMAL
T AXIS - MUSE: -2 DEGREES
VENTRICULAR RATE- MUSE: 64 BPM

## 2021-06-10 NOTE — PROGRESS NOTES
"Angle Agudelo is a 88 y.o. female who is being evaluated via a billable video visit.      The patient has been notified of following:     \"This video visit will be conducted via a call between you and your physician/provider. We have found that certain health care needs can be provided without the need for an in-person physical exam.  This service lets us provide the care you need with a video conversation.  If a prescription is necessary we can send it directly to your pharmacy.  If lab work is needed we can place an order for that and you can then stop by our lab to have the test done at a later time.    Video visits are billed at different rates depending on your insurance coverage. Please reach out to your insurance provider with any questions.    If during the course of the call the physician/provider feels a video visit is not appropriate, you will not be charged for this service.\"    Patient has given verbal consent to a Video visit? Yes  How would you like to obtain your AVS? AVS Preference: Mail a copy.  Will anyone else be joining your video visit? Yes, daughter Daija            Video-Visit Details    Type of service:  Video Visit    Originating Location (pt. Location): Home    Distant Location (provider location):  [facility] VASCULAR [facility]      Platform used for Video Visit: [email or web address]       comp 7/27-Saccular infrarenal abdominal aortic aneurysm measuring 3.4 cm. Past imaging showed incidentally found abdominal aortic aneurysm.  No abdominal pain. No vitals taken today.       Amanda Varghese RN  "

## 2021-06-10 NOTE — PROGRESS NOTES
"Clinic Care Coordination Contact    Community Health Worker Follow Up    Goals:   Goals Addressed                 This Visit's Progress       Patient Stated      COMPLETED: Being Active (pt-stated)   100%     Goal Statement: I would like to increase my activity and \"stay active\" over the next 90 days.  Date Goal set: 6/25/20  Barriers: recent hospitalization, now using a walker  Strengths: motivated  Date to Achieve By: ongoing  Patient expressed understanding of goal: yes  Action steps to achieve this goal:  1. I will participate in home care PT/OT exercises on a regular basis.  2. I will complete the exercises on a daily basis as directed by PT/OT between therapy sessions and after therapy discharge.  3. I will walk in the halls of my independent living to gain strength with my walker.             CHW Next Follow-up: two weeks     CHW Plan: to see if any new needs come up     Patient stated that she is walking and staying active and is feeling good.   "

## 2021-06-10 NOTE — PROGRESS NOTES
VASCULAR SURGERY OUTPATIENT VIRTUAL CONSULT OR VISIT   VASCULAR SURGEON: Massiel Forbes MD    LOCATION:  Virtual visit done using video    Angle Agudelo   Medical Record #:  [id number]  YOB: 1932  Age:  88 y.o.     Date of Service: 2020    PRIMARY CARE PROVIDER: Ester Sigala NP      Reason for consultation: Surveillance of aortic aneurysm    IMPRESSION: Patient with small abdominal aortic aneurysm identified in 2018 when she presented with diverticulosis and required a CT scan.  No symptoms whatsoever.  Minimal growth over the last year and a half.    RECOMMENDATION: Very small aneurysm in an 88-year-old female.  Extremely unlikely that this should ever grow large enough to require repair or which point it which rupture is a risk.  Discussed with the patient and they agree that we will stop performing surveillance for this.  No need for ongoing vascular evaluation.  No need for ongoing ultrasound evaluation.    HPI:  Angle Agudelo is a 88 y.o. female who was evaluated today for her abdominal aortic aneurysm.  This was discovered incidentally when she presented with diverticular disease and abdominal pain.  The aneurysm is never caused any problems and it is quite small.    She has never been a smoker.  She has no known family history of aortic aneurysm.  Her father  of coronary artery disease in her mother of suicide.    Overall doing quite well at her advanced 88 years of age.    We spent some time discussing that her small aneurysm had essentially no chance of going large enough to rupture given her age.  In this context ongoing surveillance seem to be a waste of her time and of money.  She was supportive of this thinking.    Father CAD.  MOther suicide.    PHH:    Past Medical History:   Diagnosis Date     Acute cerebrovascular accident (CVA) (H) 2020     Benign neoplasm of colon      Disorder of bone and cartilage, unspecified      Pure hypercholesterolemia      Skin cancer      Not Melanoma, not sure what kind though     Unspecified arthropathy, shoulder region      Unspecified essential hypertension         Past Surgical History:   Procedure Laterality Date     APPENDECTOMY       CATARACT EXTRACTION EXTRACAPSULAR W/ INTRAOCULAR LENS IMPLANTATION Bilateral      CERVICAL BIOPSY N/A 2020    Procedure: PUNCH BIOPSY, CERVIX;  Surgeon: Olive Biggs MD;  Location: [facility] Main OR;  Service: Gynecology     CERVICAL BIOPSY  W/ LOOP ELECTRODE EXCISION N/A 2018    Procedure: LOOP ELECTROSURGICAL EXCISION PROCEDURE, REMOVAL PESSARY;  Surgeon: Olive Biggs MD;  Location: [facility] Main OR;  Service:      finger abscess             x5     TX DILATION/CURETTAGE,DIAGNOSTIC N/A 2020    Procedure: DILATION AND CURETTAGE, UTERUS;  Surgeon: Olive Biggs MD;  Location: [facility] Main OR;  Service: Gynecology     SIGMOIDOSCOPY N/A 6/3/2018    Procedure: SIGMOIDOSCOPY;  Surgeon: Dayana Joshi MD;  Location: [facility];  Service:      TONSILLECTOMY AND ADENOIDECTOMY       TOTAL SHOULDER ARTHROPLASTY Bilateral R  L      VULVA / PERINEUM BIOPSY N/A 2020    Procedure: VULVAR BIOPSY;  Surgeon: Olive Biggs MD;  Location: [facility] OR;  Service: Gynecology       ALLERGIES:  Atorvastatin; Dextromethorphan; Oxycodone; Pravastatin; Pravastatin sodium; Simvastatin; and Codeine    MEDS:    Current Outpatient Medications:      aspirin 81 mg chewable tablet, Chew 1 tablet (81 mg total) daily., Disp: 90 tablet, Rfl: 0     aspirin-acetaminophen-caffeine (EXCEDRIN MIGRAINE) 250-250-65 mg per tablet, Take 1-2 tablets by mouth every 6 (six) hours as needed for pain., Disp: , Rfl:      calcium citrate-vitamin D (CITRACAL+D) 315-200 mg-unit per tablet, Take 1 tablet by mouth daily. , Disp: , Rfl:      lovastatin (MEVACOR) 40 MG tablet, Take 1 tablet (40 mg total) by mouth daily., Disp: 90 tablet, Rfl: 0     memantine (NAMENDA) 10 MG tablet,  Take 10 mg by mouth 2 (two) times a day., Disp: , Rfl:     SOCIAL HABITS:    Social History     Tobacco Use   Smoking Status Never Smoker   Smokeless Tobacco Never Used       Social History     Substance and Sexual Activity   Alcohol Use Yes     Alcohol/week: 2.0 standard drinks     Types: 2 Glasses of wine per week       Social History     Substance and Sexual Activity   Drug Use No       FAMILY HISTORY:    Family History   Problem Relation Age of Onset     Coronary artery disease Father      Stroke Father      Coronary artery disease Brother      Coronary artery disease Brother      Dementia Sister        REVIEW OF SYSTEMS:    A 12 point ROS was reviewed and except for what is listed in the HPI above, all others are negative    PE:  There were no vitals taken for this visit.  Wt Readings from Last 1 Encounters:   06/09/20 124 lb 9.6 oz (56.5 kg)     There is no height or weight on file to calculate BMI.    EXAM:  EXAM LIMITED AS THIS IS A VIRTUAL VISIT with video  GENERAL: This is a well-developed 88 y.o. female who appears her stated age  EYES: Grossly normal.  MOUTH: Buccal mucosa normal   MUSCULOSKELETAL: Grossly normal and both lower extremities are intact.  HEME/LYMPH: No lymphedema  NEUROLOGIC: Focally intact, Alert and oriented x 3.   PSYCH: appropriate affect  INTEGUMENT: No open lesions or ulcers            DIAGNOSTIC STUDIES:     Images:  Us Thyroid    Result Date: 7/15/2020  EXAM: US THYROID LOCATION: [facility] DATE/TIME: 7/15/2020 11:45 AM INDICATION: Nontoxic goiter, unspecified COMPARISON: Chest CTA dated 02/21/2020. TECHNIQUE: Thyroid ultrasound. FINDINGS: RIGHT lobe: 3.9 x 1.1 x 1.3  cm. Homogeneous echotexture. No focal right lobe nodule. Isthmus: 1 mm. LEFT lobe: 6.5 x 6 x 3.8 cm. Inhomogeneous with large mixed solid and cystic nodule in the central portion of the left lobe. NECK: No cervical lymphadenopathy. NODULES: Nodule 1: 5.6 x 5.3 x 2.9 cm central left lobe. Composition: Mixed  cystic and solid, 1 point Echogenicity: Unable to determine, 1 point Shape: Taller-than-wide, 3 points Margin: Smooth, 0 points Echogenic Foci: None, or large comet-tail artifacts, 0 points Point Total: 4-6 points. TI-RADS 4. If 1.5 cm or larger, recommend FNA; if 1 cm or larger, follow up US (annually for 5 years).       1.  Large central nodule left lobe of the thyroid. This nodule is a TI- RADS 4 lesion greater than 1.5 cm. This nodule meets criteria for ultrasound-guided fine-needle aspiration biopsy and biopsy is recommended. Nodules are characterized per ACR Thyroid Imaging, Reporting and Data System (TI-RADS): White Paper of the ACR TI-RADS Committee Jai Fang et al. Journal of the American College of Radiology 2017. Volume 14 (2017), Issue 5, 612-924.     Us Aorta Ivc Iliac Non Graft Complete    Result Date: 7/28/2020  .vcaortAorta Ultrasound Indication:  Surveillance of saccular abdominal aortic aneurysm measures 3.4 x 2.7 cm on CTA. Previous: 02/21/20-CTA Technique: Duplex imaging is performed utilizing gray-scale, two dimensional images, and color-flow imaging. Doppler waveform analysis and spectral Doppler imagine is also performed. Waveforms: Triphasic= T, Biphasic= B, Monophasic=M  Diameter (cm) AP X Width Velocities Waveform Proximal Aorta:   2.57 X 2.60 56  T Mid Aorta:   1.91 X 2.40 53  B Distal Aorta:   3.41 X 3.33 33  B Right ROSEANN:   1.20 X 1.20 56  B Left ROSEANN:   1.20 X 1.20 34  T Previous Dimensions:  3.4 X 2.7 cm Location of Aneurysm:  Infrarenal Aneurysm dimensions (AP x Width):   3.41 cm   X   3.33 cm Comments: TORTUOUS AORTA Impressions: Saccular infrarenal abdominal aortic aneurysm measuring 3.4 cm. Reference: Category Normal Intermediate Severe Occluded  PSV  cm/s 151-300 cm/s <45 or >300cm/s Absent Flow Ratio <1.5 1.5-3.4 >3.4 N/A       I personally reviewed the images and my interpretation is that her focal infrarenal aortic aneurysm is at most 3.4 cm in maximal  diameter..    LABS:      Sodium   Date Value Ref Range Status   06/09/2020 142 136 - 145 mmol/L Final   06/08/2020 146 (H) 136 - 145 mmol/L Final   05/07/2020 143 136 - 145 mmol/L Final     Potassium   Date Value Ref Range Status   06/08/2020 3.8 3.5 - 5.0 mmol/L Final   05/07/2020 4.2 3.5 - 5.0 mmol/L Final   02/21/2020 4.0 3.5 - 5.0 mmol/L Final     Chloride   Date Value Ref Range Status   06/08/2020 109 (H) 98 - 107 mmol/L Final   05/07/2020 105 98 - 107 mmol/L Final   02/21/2020 108 (H) 98 - 107 mmol/L Final     BUN   Date Value Ref Range Status   06/08/2020 12 8 - 28 mg/dL Final   05/07/2020 16 8 - 28 mg/dL Final   02/21/2020 25 8 - 28 mg/dL Final     Creatinine   Date Value Ref Range Status   06/08/2020 0.74 0.60 - 1.10 mg/dL Final   05/07/2020 0.80 0.60 - 1.10 mg/dL Final   02/21/2020 0.79 0.60 - 1.10 mg/dL Final     Hemoglobin   Date Value Ref Range Status   06/08/2020 13.9 12.0 - 16.0 g/dL Final   05/07/2020 13.8 12.0 - 16.0 g/dL Final   02/21/2020 12.9 12.0 - 16.0 g/dL Final     Platelets   Date Value Ref Range Status   06/10/2020 162 140 - 440 thou/uL Final   06/08/2020 169 140 - 440 thou/uL Final   05/07/2020 178 140 - 440 thou/uL Final     INR   Date Value Ref Range Status   02/21/2020 1.00 0.90 - 1.10 Final   01/07/2020 0.94 0.90 - 1.10 Final   05/31/2018 1.02 0.90 - 1.10 Final       This was a video based visit with start time of 11:42 and end time of 11:51    Massiel Forbes MD  VASCULAR SURGERY

## 2021-06-10 NOTE — PROGRESS NOTES
"Clinic Care Coordination Contact    Follow Up Progress Note - BPCI-A     Assessment:  Pt states she's doing well, walking the halls of her apt building almost daily, and feels overall she's doing \"ok.\"  She's had follow ups with PCP, neurology, and vascular clinics.        Goals addressed this encounter:   Goals Addressed                 This Visit's Progress       Patient Stated      Other (pt-stated)   80%     Goal Statement:  I want to stay out of the hospital for the next 90 days.  Date Goal set:  6/25/2020  Barriers:   Recent hospitalization  Strengths:  Able to identify and advocate for needs, good support system from family  Date to Achieve By:  9/8/20 (based on hospital discharge date of 6/10/20)  Patient expressed understanding of goal:  Yes  Action steps to achieve this goal:  1. I will attend all my appts as scheduled, and recommended follow up appts  2. I will call my clinic if I start to feel sick or notice any change(s) in my health, and schedule an appt if needed  3. I will call the triage nurse line for advice, before going to the hospital  4. I will go to UC or Walk-In-Clinic before going to the hospital, if I am unable to get an appt with my PCP  5. I will update the Community Healthcare Worker during outreach calls and ask for additional support or resources, if needed               Intervention/Education provided during outreach:  Today was the final RN Care Coordinator outreach; however, RN Care Coordinator will continue to be available as needed, through remaining BPCI-A monitoring period.  Informed pt of CCC services through primary clinic should she wish to continue with CCC; pt states she'll talk about it at her next follow up call.       Plan:  90 day post hospitalization monitoring ends 9/10/20, based on hospital d/c date of 6/10/20, per BPCI-A guidelines.  RN Care Coordinator will continue to be available as needed through end of the BPCI-A monitoring.      Samanta Marcos RN Clinic Care " Coordinator

## 2021-06-10 NOTE — PROGRESS NOTES
Clinic Care Coordination Contact  [facility]/Voicemail       Clinical Data: Care Coordinator Outreach  Outreach attempted x 1.  Left message on patient's voicemail with call back information and requested return call.  Plan: Care Coordinator will try to reach patient again in 3-5 business days.

## 2021-06-11 ENCOUNTER — HOSPITAL ENCOUNTER (EMERGENCY)
Dept: EMERGENCY MEDICINE | Facility: CLINIC | Age: 86
Discharge: HOME | End: 2021-06-11
Attending: EMERGENCY MEDICINE
Payer: MEDICARE

## 2021-06-11 ENCOUNTER — COMMUNICATION - HEALTHEAST (OUTPATIENT)
Dept: FAMILY MEDICINE | Facility: CLINIC | Age: 86
End: 2021-06-11

## 2021-06-11 ENCOUNTER — COMMUNICATION - HEALTHEAST (OUTPATIENT)
Dept: FAMILY MEDICINE | Facility: CLINIC | Age: 86
End: 2021-06-11
Payer: MEDICARE

## 2021-06-11 DIAGNOSIS — R11.2 NAUSEA VOMITING AND DIARRHEA: ICD-10-CM

## 2021-06-11 DIAGNOSIS — Z86.79 HISTORY OF ABDOMINAL AORTIC ANEURYSM (AAA): ICD-10-CM

## 2021-06-11 DIAGNOSIS — R19.7 NAUSEA VOMITING AND DIARRHEA: ICD-10-CM

## 2021-06-11 LAB
ALBUMIN SERPL-MCNC: 4.3 G/DL (ref 3.5–5)
ALBUMIN UR-MCNC: NEGATIVE G/DL
ALP SERPL-CCNC: 79 U/L (ref 45–120)
ALT SERPL W P-5'-P-CCNC: 13 U/L (ref 0–45)
ANION GAP SERPL CALCULATED.3IONS-SCNC: 14 MMOL/L (ref 5–18)
APPEARANCE UR: CLEAR
AST SERPL W P-5'-P-CCNC: 26 U/L (ref 0–40)
BACTERIA #/AREA URNS HPF: ABNORMAL /[HPF]
BASOPHILS # BLD AUTO: 0.1 THOU/UL (ref 0–0.2)
BASOPHILS NFR BLD AUTO: 1 % (ref 0–2)
BILIRUB SERPL-MCNC: 0.7 MG/DL (ref 0–1)
BILIRUB UR QL STRIP: NEGATIVE
BUN SERPL-MCNC: 19 MG/DL (ref 8–28)
CALCIUM SERPL-MCNC: 9.4 MG/DL (ref 8.5–10.5)
CHLORIDE BLD-SCNC: 109 MMOL/L (ref 98–107)
CO2 SERPL-SCNC: 21 MMOL/L (ref 22–31)
COLOR UR AUTO: COLORLESS
CREAT SERPL-MCNC: 0.76 MG/DL (ref 0.6–1.1)
EOSINOPHIL # BLD AUTO: 0 THOU/UL (ref 0–0.4)
EOSINOPHIL NFR BLD AUTO: 0 % (ref 0–6)
ERYTHROCYTE [DISTWIDTH] IN BLOOD BY AUTOMATED COUNT: 17.9 % (ref 11–14.5)
GFR SERPL CREATININE-BSD FRML MDRD: >60 ML/MIN/1.73M2
GLUCOSE BLD-MCNC: 99 MG/DL (ref 70–125)
GLUCOSE UR STRIP-MCNC: NEGATIVE MG/DL
HCT VFR BLD AUTO: 48.2 % (ref 35–47)
HGB BLD-MCNC: 14.4 G/DL (ref 12–16)
HGB UR QL STRIP: NEGATIVE
IMM GRANULOCYTES # BLD: 0 THOU/UL
IMM GRANULOCYTES NFR BLD: 0 %
KETONES UR STRIP-MCNC: ABNORMAL MG/DL
LEUKOCYTE ESTERASE UR QL STRIP: ABNORMAL
LYMPHOCYTES # BLD AUTO: 1.1 THOU/UL (ref 0.8–4.4)
LYMPHOCYTES NFR BLD AUTO: 12 % (ref 20–40)
MAGNESIUM SERPL-MCNC: 2.2 MG/DL (ref 1.8–2.6)
MCH RBC QN AUTO: 28.2 PG (ref 27–34)
MCHC RBC AUTO-ENTMCNC: 29.9 G/DL (ref 32–36)
MCV RBC AUTO: 95 FL (ref 80–100)
MONOCYTES # BLD AUTO: 0.4 THOU/UL (ref 0–0.9)
MONOCYTES NFR BLD AUTO: 4 % (ref 2–10)
MUCOUS THREADS #/AREA URNS LPF: PRESENT LPF
NEUTROPHILS # BLD AUTO: 7.5 THOU/UL (ref 2–7.7)
NEUTROPHILS NFR BLD AUTO: 83 % (ref 50–70)
NITRATE UR QL: NEGATIVE
PH UR STRIP: 6.5 [PH] (ref 5–8)
PLATELET # BLD AUTO: 134 THOU/UL (ref 140–440)
PMV BLD AUTO: 11.5 FL (ref 8.5–12.5)
POTASSIUM BLD-SCNC: 4.1 MMOL/L (ref 3.5–5)
PROT SERPL-MCNC: 7.5 G/DL (ref 6–8)
RBC # BLD AUTO: 5.1 MILL/UL (ref 3.8–5.4)
RBC URINE: 1 HPF
SODIUM SERPL-SCNC: 144 MMOL/L (ref 136–145)
SP GR UR STRIP: 1.01 (ref 1–1.03)
SQUAMOUS EPITHELIAL: 0 /HPF
UROBILINOGEN UR STRIP-ACNC: ABNORMAL
WBC URINE: 2 HPF
WBC: 9 THOU/UL (ref 4–11)

## 2021-06-11 NOTE — PROGRESS NOTES
Clinic Care Coordination Contact    Situation:  Patient chart reviewed by RN Care Coordinator for BPCI-A graduation approval.    Background:  Patient was hospitalized at [facility] from 6/8/20 to 6/10/20 with acute CVA, and has been followed by Care Coordination for 90 day post hospitalization monitoring, per BPCI-A guidelines.            Assessment:  90 day BPCI-A monitoring ended 9/8/20.     Plan/Recommendations:  BPCI-A monitoring completed.  CCRC team removed from care team, and BPCI-A episode resolved.  No further follow up needed, pt declined CCC services.  Graduation letter sent via mail.      Samanta Marcos RN Clinic Care Coordinator

## 2021-06-11 NOTE — PROGRESS NOTES
Clinic Care Coordination Contact    Community Health Worker Follow Up    Goals:   Goals Addressed                 This Visit's Progress       Patient Stated      Other (pt-stated)   90%     Goal Statement:  I want to stay out of the hospital for the next 90 days.  Date Goal set:  6/25/2020  Barriers:   Recent hospitalization  Strengths:  Able to identify and advocate for needs, good support system from family  Date to Achieve By:  9/8/20 (based on hospital discharge date of 6/10/20)  Patient expressed understanding of goal:  Yes  Action steps to achieve this goal:  1. I will attend all my appts as scheduled, and recommended follow up appts  2. I will call my clinic if I start to feel sick or notice any change(s) in my health, and schedule an appt if needed  3. I will call the triage nurse line for advice, before going to the hospital  4. I will go to  or Walk-In-Clinic before going to the hospital, if I am unable to get an appt with my PCP  5. I will update the Community Healthcare Worker during outreach calls and ask for additional support or resources, if needed              CHW Next Follow-up: in two weeks     CHW Plan: to see if any new needs come up.     Patient stated that things are goring great and she did not need any resources at this time. She also stated that she follows up with her providers when needed to.

## 2021-06-11 NOTE — PROGRESS NOTES
"Angle Agudelo is an 85-year-old with a history of hypertension hypercholesterolemia multi-joint arthropathy with chronic low back pain who presents today for follow-up.  She has had more tiredness lately she tells me she is overall sleeping well but during the day she just feels tired.  She has as a result of that and her achiness been playing less golf although it sounds like they still get out for 9 holes at least weekly.  They are not planning any further trips to [place] as it is just too difficult on both of them.  She would like me to take a look at her right third finger as she has recurrent sores on her finger ever since her previous finger surgery.  These seem to heal up with just topical antibiotics and she has little in the way of pain or drainage.  We reviewed heart disease prevention and cancer detection and she tells me she is not interested in a mammogram this year but would consider it again next year.  She also believes she had the shingles shot years ago.    Objective:/60  Pulse 71  Resp 16  Ht 4' 11.5\" (1.511 m)  Wt 148 lb (67.1 kg)  SpO2 95%  Breastfeeding? No  BMI 29.39 kg/m2  Neck is supple without lymphadenopathy thyromegaly or bruits lungs are clear heart with a regular rate and rhythm without murmur rub gallop normal S1-S2 lower extremities are free of edema skin on her right third finger near the DIP joint she does have a small sore that looks like a white area that is not fluctuant and makes me wonder about tophi  her neurological examinations nonfocal    Labs pending    Assessment: Tiredness probably multifactorial with underlying hypertension hypercholesterolemia chronic joint pain?  Gout    Plan: We will call with labs when available we will have her stop hydrochlorothiazide since her blood pressure seems well controlled and that could be contributing to some of her tiredness, encouraged her on fluids diet and follow-up here in 6 months or as needed    "

## 2021-06-12 LAB — BACTERIA SPEC CULT: NO GROWTH

## 2021-06-12 NOTE — TELEPHONE ENCOUNTER
Refill Approved    Rx renewed per Medication Renewal Policy. Medication was last renewed on 6/22/20.    Vandana Betts, [facility] Triage/Med Refill 10/9/2020     Requested Prescriptions   Pending Prescriptions Disp Refills     lovastatin (MEVACOR) 40 MG tablet 90 tablet 0     Sig: Take 1 tablet (40 mg total) by mouth daily.       Statins Refill Protocol (Hmg CoA Reductase Inhibitors) Passed - 10/8/2020  1:03 PM        Passed - PCP or prescribing provider visit in past 12 months      Last office visit with prescriber/PCP: 1/13/2020 Ester Sigala NP OR same dept: 1/13/2020 Ester Sigala NP OR same specialty: 1/13/2020 Ester Sigala NP  Last physical: Visit date not found Last MTM visit: Visit date not found   Next visit within 3 mo: Visit date not found  Next physical within 3 mo: Visit date not found  Prescriber OR PCP: Ester Sigala NP  Last diagnosis associated with med order: 1. Acute cerebrovascular accident (CVA) (H)  - lovastatin (MEVACOR) 40 MG tablet; Take 1 tablet (40 mg total) by mouth daily.  Dispense: 90 tablet; Refill: 0    If protocol passes may refill for 12 months if within 3 months of last provider visit (or a total of 15 months).

## 2021-06-13 NOTE — PROGRESS NOTES
DATE OF SERVICE: 10/02/2017    SUBJECTIVE:  Very pleasant 85-year-old lady who presents today for checkup.  She  currently is taking B12 supplement, Lodine XL for her arthritis, Mevacor for  hyperlipidemia and low-dose aspirin.  Her hypertension, hyperlipidemia and  arthropathies are stable.  She also has an inflamed right middle finger that has  been cared for by hand surgery and dermatology experts and continues to bother her,  so she has been unable to play golf.    ROS: Positive for arthritic pain and inflammation of finger.      SOCIAL HISTORY:  She does not smoke.  She and her  recently were residents of  [place] but have now moved back permanently to [place] area.  She has many family  members in the local area.      OBJECTIVE:  VITAL SIGNS:  Shows temperature afebrile, blood pressure 132/76, pulse 80,  respiration 12.  HEENT:  Grossly was normal.  SKIN:  Pink and dry.    PSYCHIATRIC:  She was alert, conversant, in no acute distress.    ASSESSMENT:    1.  Hyperlipidemia, stable.    2.  Hypertension, stable.  3.  Arthropathy and arthritis, stable.    PLAN:    1.  Refilled Lodine.  2.  Refilled Mevacor.  3.  Discussed laboratory results and went over her results from 06/2017, all of  which remain stable.  4.  Encourage flu shots which are up to date.  5.  Continue Premarin vaginal cream, refilled Premarin vaginal cream.  6.  Will see back in this coming June for full physical exam and blood work.      ESTELA GRIFFITH MD  pa  D 10/02/2017 11:55:06  T 10/02/2017 12:37:50  R 10/02/2017 12:37:50  [id number]        cc: ADY GRIFFITH MD

## 2021-06-14 ENCOUNTER — RECORDS - HEALTHEAST (OUTPATIENT)
Dept: ADMINISTRATIVE | Facility: OTHER | Age: 86
End: 2021-06-14

## 2021-06-14 ENCOUNTER — NURSE TRIAGE (OUTPATIENT)
Dept: NURSING | Facility: CLINIC | Age: 86
End: 2021-06-14

## 2021-06-14 ENCOUNTER — COMMUNICATION - HEALTHEAST (OUTPATIENT)
Dept: SCHEDULING | Facility: CLINIC | Age: 86
End: 2021-06-14

## 2021-06-14 ENCOUNTER — COMMUNICATION - HEALTHEAST (OUTPATIENT)
Dept: FAMILY MEDICINE | Facility: CLINIC | Age: 86
End: 2021-06-14

## 2021-06-14 ASSESSMENT — MIFFLIN-ST. JEOR
SCORE: 892.63
SCORE: 883.1

## 2021-06-14 NOTE — TELEPHONE ENCOUNTER
She started on Wednesday, dizzy diarrhea and vomiting. Seen in the ER. Tested blood and did a urine. Every thing was normal. She was fine in a couple of hours. Went home with nothing. Same thing happened on Friday. Seen in ER again, same symptoms. Within two hours she was fine again. On both days her blood pressure was high. Today it's 190/110. She's sick again, vomiting and is in the bathroom, feels ill. I connected with scheduling for an appointment and advised the ER if they can't get her in.  Madeline Luo RN  [facility]      Reason for Disposition    Systolic BP >= 180 OR Diastolic >= 110    Additional Information    Negative: Sounds like a life-threatening emergency to the triager    Negative: Pregnant > 20 weeks or postpartum (< 6 weeks after delivery) and new hand or face swelling    Negative: Pregnant > 20 weeks and BP > 140/90    Negative: Systolic BP >= 160 OR Diastolic >= 100, and any cardiac or neurologic symptoms (e.g., chest pain, difficulty breathing, unsteady gait, blurred vision)    Negative: Patient sounds very sick or weak to the triager    Negative: BP Systolic BP >= 140 OR Diastolic >= 90 and postpartum (from 0 to 6 weeks after delivery)    Negative: Systolic BP >= 180 OR Diastolic >= 110, and missed most recent dose of blood pressure medication    Protocols used: HIGH BLOOD PRESSURE-A-[place]

## 2021-06-14 NOTE — PATIENT INSTRUCTIONS - HE
I'm so glad to hear that you are continuing to feel well and have not noted any recurrent blood in the stools since returning home.  I'd like to recheck your blood counts today.  Please continue on the daily aspirin unless it appears that the hemoglobin is dropping.    We will call your daughter Daija with the results of your lab tests later today.      Please continue to avoid intake of nuts and popcorn.  And don't hesitate to call with any questions.

## 2021-06-14 NOTE — TELEPHONE ENCOUNTER
New Appointment Needed  What is the reason for the visit:    Inpatient/ED Follow Up Appt Request  At what hospital or facility were you seen?: [facility]  What is the reason you were seen?: Diverticulitis / Bleeding in colon / needs labs including hemoglobin   What date were you admitted?: date: 01/09  What date were you discharged?: date: 01/13  What was the recommended timeframe for your follow up appointment?: Monday or Tuesday   Provider Preference: Pt is aware that PCP will be doing VV and out on Monday. Please call to go over options with other providers as soon as possible.  How soon do you need to be seen?: next week  Waitlist offered?: No  Okay to leave a detailed message:  Yes    Daija:  [phone]

## 2021-06-14 NOTE — PROGRESS NOTES
Assessment / Plan  1. History of gastrointestinal diverticular hemorrhage  HM1(CBC and Differential)   recommend recheck of CBC today.  If hemoglobin appears to be stable, patient will continue use of daily ASA, which she has already resumed.   Encouraged avoidance of intake of nuts, seeds and popcorn in future.      Patient Instructions   I'm so glad to hear that you are continuing to feel well and have not noted any recurrent blood in the stools since returning home.  I'd like to recheck your blood counts today.  Please continue on the daily aspirin unless it appears that the hemoglobin is dropping.    We will call your daughter Daija with the results of your lab tests later today.      Please continue to avoid intake of nuts and popcorn.  And don't hesitate to call with any questions.     Return if you note any signs or symptoms suggestive of bleeding.     40 minutes spent on the date of the encounter doing chart review, history and exam, documentation and further activities as noted above    Subjective   Angle Agudelo is a 89 y.o. year old female with history of CVA, dementia, AAA, hypertension and hyperlipidemia with recent hospitalization related to diverticular bleed who presents with her daughter Daija for a post hospital visit today.      Hospital Follow-up Visit:    Hospital/Nursing Home/IP Rehab Facility: [facility]  Date of Admission: 1/9/21  Date of Discharge: 1/13/21  Reason(s) for Admission: acute lower GI bleed presumably due to sigmoid diverticulosis.     Was your hospitalization related to COVID-19? No  Problems taking medications regularly:  None  Medication changes since discharge: None  Problems adhering to non-medication therapy:  None    Summary of hospitalization:  [facility] inpatient notes reviewed. Discharge summary unavailable.     Patient presented to [facility] ER with painless BRBPR.  CTA performed showing sigmoid diverticular bleed.  Patient was  transferred to [facility].  Decision was made by interventional radiology and GI to pursue conservative management. Received 1 unit PRBC on 1/10/21.  Hemoglobin stabilized. Patient had been noted to have similar episode in January 2020 with colonoscopy at that time showing medium sized angiodysplastic lesion, which was treated conservatively. Discharge hemoglobin from 1/13 was 11.1g/dL.  BMP was normal on 1/13.   Diagnostic Tests/Treatments reviewed.  Follow up needed: follow up CBC needed . Consider whether or not to resume ASA, given history of CVA.   Other Healthcare Providers Involved in Patient s Care:         patient had seen IR and GI in hospital  Update since discharge: improved.      Post Discharge Medication Reconciliation: discharge medications reconciled, continue medications without change.  Plan of care communicated with patient and family        Patient Active Problem List   Diagnosis     Osteopenia     Arthropathy Of The Shoulder Region     Myalgia And Myositis     Feeling Tired Or Poorly     Myopathies     Open Wound Of The Forearm     Essential Hypercholesterolemia     Essential hypertension     Abnormal Glucose     Benign Adenomatous Polyp Of The Large Intestine     Actinic Keratosis     Unspecified arthropathy, multiple sites     Backache     Chronic bilateral low back pain without sciatica     Rectal bleeding     Rectal bleed     Lower GI bleed     Acute blood loss anemia     Diverticulosis of large intestine with hemorrhage     Acute cerebrovascular accident (CVA) (H)     Acute CVA (cerebrovascular accident) (H)     Dementia without behavioral disturbance, unspecified dementia type (H)     Past Surgical History:   Procedure Laterality Date     APPENDECTOMY       CATARACT EXTRACTION EXTRACAPSULAR W/ INTRAOCULAR LENS IMPLANTATION Bilateral      CERVICAL BIOPSY N/A 5/14/2020    Procedure: PUNCH BIOPSY, CERVIX;  Surgeon: Olive Biggs MD;  Location: [facility];  Service: Gynecology      CERVICAL BIOPSY  W/ LOOP ELECTRODE EXCISION N/A 2018    Procedure: LOOP ELECTROSURGICAL EXCISION PROCEDURE, REMOVAL PESSARY;  Surgeon: Olive Biggs MD;  Location: [facility] Main OR;  Service:      finger abscess             x5     IA DILATION/CURETTAGE,DIAGNOSTIC N/A 2020    Procedure: DILATION AND CURETTAGE, UTERUS;  Surgeon: Olive Biggs MD;  Location: [facility] Main OR;  Service: Gynecology     SIGMOIDOSCOPY N/A 6/3/2018    Procedure: SIGMOIDOSCOPY;  Surgeon: Dayana Joshi MD;  Location: [facility];  Service:      TONSILLECTOMY AND ADENOIDECTOMY       TOTAL SHOULDER ARTHROPLASTY Bilateral R  L       THYROID BIOPSY  2020     VULVA / PERINEUM BIOPSY N/A 2020    Procedure: VULVAR BIOPSY;  Surgeon: Olive Biggs MD;  Location: [facility];  Service: Gynecology       Social History     Tobacco Use     Smoking status: Never Smoker     Smokeless tobacco: Never Used   Substance Use Topics     Alcohol use: Yes     Alcohol/week: 2.0 standard drinks     Types: 2 Glasses of wine per week     Family History   Problem Relation Age of Onset     Coronary artery disease Father      Stroke Father      Coronary artery disease Brother      Coronary artery disease Brother      Dementia Sister          Current Outpatient Medications   Medication Sig Dispense Refill     aspirin 81 mg chewable tablet Chew 1 tablet (81 mg total) daily. 90 tablet 0     calcium citrate-vitamin D (CITRACAL+D) 315-200 mg-unit per tablet Take 1 tablet by mouth daily.        latanoprost (XALATAN) 0.005 % ophthalmic solution Administer 1 drop to both eyes at bedtime.       lovastatin (MEVACOR) 40 MG tablet Take 1 tablet (40 mg total) by mouth daily. 90 tablet 2     memantine (NAMENDA) 10 MG tablet Take 10 mg by mouth 2 (two) times a day.       aspirin-acetaminophen-caffeine (EXCEDRIN MIGRAINE) 250-250-65 mg per tablet Take 1-2 tablets by mouth every 6 (six) hours as needed for pain.        No current facility-administered medications for this visit.      Allergies   Allergen Reactions     Atorvastatin Myalgia     Dextromethorphan Hives     Oxycodone Unknown     Pravastatin Myalgia     Pravastatin Sodium Myalgia     Simvastatin Myalgia     Codeine Rash     ROS:   No recent fevers/chills.  No blood in stools since discharge from hospital.  No abdominal pain.  No shortness of breath or significant fatigue noted. Otherwise negative except as noted above in HPI.     Objective  /62 (Patient Position: Sitting, Cuff Size: Adult Regular)   Pulse 79   Wt 129 lb 3 oz (58.6 kg)   SpO2 100%   BMI 26.09 kg/m       General: alert, awake.. No apparent distress.   Affect: pleasant/cooperative.   CV: RR s1/s2. 3/6 systolic murmur.  Respiratory: clear to auscultation bilaterally  Abdomen: soft, non-tender, non-distended, + bowel sounds      CBC: pending     J Carlos Reyes MD   Family medicine float physician  [facility]

## 2021-06-15 ENCOUNTER — ANESTHESIA - HEALTHEAST (OUTPATIENT)
Dept: SURGERY | Facility: HOSPITAL | Age: 86
End: 2021-06-15

## 2021-06-15 ENCOUNTER — COMMUNICATION - HEALTHEAST (OUTPATIENT)
Dept: SCHEDULING | Facility: CLINIC | Age: 86
End: 2021-06-15

## 2021-06-15 ENCOUNTER — COMMUNICATION - HEALTHEAST (OUTPATIENT)
Dept: FAMILY MEDICINE | Facility: CLINIC | Age: 86
End: 2021-06-15

## 2021-06-15 ENCOUNTER — RECORDS - HEALTHEAST (OUTPATIENT)
Dept: ADMINISTRATIVE | Facility: OTHER | Age: 86
End: 2021-06-15

## 2021-06-15 NOTE — PROGRESS NOTES
TcpO2 ordered on patient's Right middle finger/forearm. Patient on RA at rest SPO2 94%, HR 77. Patient lying supine with palms of hands down. Right middle finger is very swollen around knuckle down to finger tip. Redness and a sore on middle finger on right sinde about a darcie size long. There is discoloration/whiteness in between knuckle and nail bed by wound. Patient tolerated procedure well and left in no distress.   RESPIRATORY CARE NOTE     Patient Name: Angle Agudelo  Today's Date: 1/24/2018     Problem List  Patient Active Problem List   Diagnosis     Osteopenia     Arthropathy Of The Shoulder Region     Myalgia And Myositis     Feeling Tired Or Poorly     Myopathies     Open Wound Of The Forearm     Essential Hypercholesterolemia     Essential Hypertension     Abnormal Glucose     Benign Adenomatous Polyp Of The Large Intestine     Actinic Keratosis     Unspecified arthropathy, multiple sites     Backache     Chronic bilateral low back pain without sciatica                           Carmen Latham LRT

## 2021-06-16 ENCOUNTER — RECORDS - HEALTHEAST (OUTPATIENT)
Dept: ADMINISTRATIVE | Facility: OTHER | Age: 86
End: 2021-06-16

## 2021-06-16 ENCOUNTER — SURGERY - HEALTHEAST (OUTPATIENT)
Dept: SURGERY | Facility: HOSPITAL | Age: 86
End: 2021-06-16
Payer: MEDICARE

## 2021-06-16 ASSESSMENT — MIFFLIN-ST. JEOR: SCORE: 889.45

## 2021-06-16 NOTE — PATIENT INSTRUCTIONS - HE
The skin lesion at your right cheek looks very suspicious for a superficial bacterial infection.  I would recommend that you start treating it with an antibiotic called doxycycline - please take 1 tablet by mouth twice daily for 10 days.  I should have results back from the bacterial culture by Monday. We'll give your daughter a call with the result once I see it.    In the meantime I would recommend discarding your makeup and would avoid applying anything to the skin until the infection has resolved.

## 2021-06-16 NOTE — PROCEDURES
[facility] RADIOLOGY  IR    Proc: Rt UE angiogram.  Meds: IV Versed and fentanyl.  Access: Rt CFA.  Findings: No significant central lesions. Chronic occlusion of distal ulnar artery. Segments of occlusion of small vessels in the hand and all digits, not reversible with nitroglycerin injection.  EBL: Minimal.  Complications: None immediate.

## 2021-06-16 NOTE — TELEPHONE ENCOUNTER
----- Message from J Carlos Reyes MD sent at 3/22/2021  9:40 AM CDT -----  Please contact patient's daughter to notify that the wound culture did not indicate infection of the skin.  If they are noting improvement over the weekend,  I would favor that they complete the antibiotic course.  If noting any increase in redness, swelling or enlarging lesion, please let me know and we can consider alternate treatment options.    Thanks,  J Carlos Reyes MD          Pt's daughter, Daija, called back. She will call back after speaking with patient to see how her skin is looking. Will await their call back.

## 2021-06-16 NOTE — TELEPHONE ENCOUNTER
Left message to call back for: pt & daughter hailee  Information to relay to patient:  Pt notified. Pt states that wound seems to be a bit better. Denies increased redness, swelling or enlarging lesion.  lmtcb for hailee pt daughter to call us back.

## 2021-06-16 NOTE — H&P
[facility] Radiology History and Physical Note    Procedure Requested: RUE Angiogram   Requesting Provider: Dr. Harsh Ross     HPI: Angle Agudelo is a 86 y.o. old female with a history of HTN and hyperlipidemia that presents per request of Dr. Ross for RUE angiogram due to ischemic digits of the right hand.      IMAGING:   MR Right Hand 1/25/18:    MRI RIGHT HAND WO CONTRAST  1/25/2018 12:31 PM                                               INDICATION: Middle and ring finger ulcer. Pain.  TECHNIQUE: Routine.  COMPARISON: None.  FINDINGS: Skin ulceration is difficult to appreciate on MRI. There is no underlying bony destructive change or stress response to suggest osteitis or osteomyelitis adjacent to the skin marker involving the middle or ring fingers. No drainable fluid   collection, phlegmon, or abscess. There is a small amount of fluid within the ring finger common flexor tendon sheath. Visualized flexor and extensor tendons are intact. No fracture or dislocation. Mild to moderate polyarticular degenerative changes most   pronounced in the interphalangeal joints, but also involving several of the metacarpophalangeal joints. No joint effusion.  IMPRESSION:   CONCLUSION:  1.  No evidence for osteitis, osteomyelitis, or abscess.  2.  Mild tenosynovitis involving the ring finger flexor tendon.  3.  No fracture or dislocation.    NPO Status: Midnight   Anticoagulation/Antiplatelets/Bleeding tendencies: Aspirin   Antibiotics: None       PAST MEDICAL HISTORY:   Past Medical History:   Diagnosis Date     Benign neoplasm of colon      Disorder of bone and cartilage, unspecified      Pure hypercholesterolemia      Skin cancer     Not Melanoma, not sure what kind though     Unspecified arthropathy, shoulder region      Unspecified essential hypertension        PAST SURGICAL HISTORY:  Past Surgical History:   Procedure Laterality Date     APPENDECTOMY       CATARACT EXTRACTION EXTRACAPSULAR W/ INTRAOCULAR LENS  IMPLANTATION Bilateral      finger abscess             x5     TONSILLECTOMY AND ADENOIDECTOMY       TOTAL SHOULDER ARTHROPLASTY Bilateral R  L        ALLERGIES:  Atorvastatin; Codeine; Oxycodone; Pravastatin sodium; and Simvastatin    MEDICATIONS:  Current Outpatient Prescriptions   Medication Sig Dispense Refill     aspirin 81 MG EC tablet Take 81 mg by mouth daily.       calcium citrate-vitamin D (CITRACAL+D) 315-200 mg-unit per tablet Take 1 tablet by mouth 2 (two) times a day.       cyanocobalamin (VITAMIN B-12) 1000 MCG tablet Take 1,000 mcg by mouth daily.       etodolac (LODINE XL) 400 MG 24 hr tablet Take 1 tablet (400 mg total) by mouth daily. 90 tablet 3     lovastatin (MEVACOR) 40 MG tablet TAKE 1 TABLET AT BEDTIME 90 tablet 0     PREMARIN vaginal cream        No current facility-administered medications for this encounter.        LABS:  No results found for: INR  Hemoglobin (g/dL)   Date Value   2017 14.6     Platelets (thou/uL)   Date Value   07/10/2015 178     Creatinine (mg/dL)   Date Value   2017 0.82         ASSESSMENT: 86 y.o. old female with a history of HTN and hyperlipidemia that presents per request of Dr. Ross for RUE angiogram due to ischemic digits of the right hand.       PLAN: RUE angiogram with possible angioplasty, stent or lysis with sedation.      Physical exam, sedation assessment and consent to be obtained by our radiologist pre procedure.      Muriel FARAH [facility]: Interventional Radiology   [phone]

## 2021-06-16 NOTE — PROGRESS NOTES
Assessment / Plan  1. Facial cellulitis  doxycycline (VIBRAMYCIN) 100 MG capsule    Culture, Wound   Patient with recent onset of crusted erythematous skin lesion at her right cheek suggestive of facial cellulitis.  Wound culture was obtained at today's visit to further clarify source and to guide therapy.  While awaiting results of this test, patient is advised to begin doxycycline as per orders.  She is also encouraged to use no cosmetics or lotions on her face until it is improved.  She will discard all of her current make-up (which she notes to be several years old) and purchase new, once infection has resolved.      In reviewing information about oral rivastigmine, I could find no mention of skin reaction similar to that which the patient has developed.  She will plan to continue on her current dose, unchanged.    Patient is advised that if the lesion appears to be worsening over the weekend, particularly if it should begin to include the periorbital area, I would recommend that she be evaluated in the ER.  Otherwise if area does not seem to be improving at all by Monday or Tuesday of next week, follow-up in clinic is recommended.    Patient Instructions   The skin lesion at your right cheek looks very suspicious for a superficial bacterial infection.  I would recommend that you start treating it with an antibiotic called doxycycline - please take 1 tablet by mouth twice daily for 10 days.  I should have results back from the bacterial culture by Monday. We'll give your daughter a call with the result once I see it.    In the meantime I would recommend discarding your makeup and would avoid applying anything to the skin until the infection has resolved.      Return in about 3 days (around 3/22/2021) for Follow up if not significantly improved .     Subjective   Angle Agudelo is a 89 y.o. year old female with history of CVA, dementia, AAA, hypertension and hyperlipidemia, who presents with her daughter Daija  with the following concerns:     Rash on face - patient reports that 2 days ago she began to develop a reddened irritated area at the right cheek.  It was not particularly painful to her and she didn't think much of it.  yesterday, however, it became much more red and appeared crusted.  The red area became more swollen and began to spread up to the right periorbital area (patient's daughter had brought a photo of how it looked yesterday).  This morning, patient's daughter had noted that the area seemed to be smaller than it had been the evening before and was no longer spreading up towards the eye.  Patient reports perhaps a bit of itching at the area, but no associated pain.  She has not noticed any fevers or chills recently.  No cough or cold symptoms.  No recent nausea or vomiting.  She does not feel as if the skin is weeping, necessarily, however there does seem to be a recurrent crust that forms over the surface of the lesion. No recollection of injury to the skin, new lotions or cosmetics recently used.     Initially, patient's daughter had wondered about a new medication, rivastigmine, that the patient had been prescribed a week prior to the development of the skin lesion.  She had been to see her neurologist, and they had noted progression of dementia symptoms, so had recommended addition of the rivastigmine. They cannot otherwise think of any recent changes to her medications.     Patient's past medical & social histories, medications and allergies are reviewed and updated.     ROS:   Negative except as noted above in HPI.     Objective  There were no vitals taken for this visit.     General: alert/oriented to person/place. No apparent distress.   Affect: pleasant/cooperative.   Face: Slightly raised, erythematous, crusted oval area of approximately 3 cm x 2 cm at the right upper cheek.  There are a few raw appearing areas scattered across the lesion.  These were swabbed at today's visit and sent for  bacterial culture.  Neck: Supple with mildly enlarged anterior cervical lymph nodes on the right.    Wound culture: pending      J Carlos Reyes MD   [facility]  [facility]

## 2021-06-17 ENCOUNTER — COMMUNICATION - HEALTHEAST (OUTPATIENT)
Dept: NURSING | Facility: CLINIC | Age: 86
End: 2021-06-17

## 2021-06-17 NOTE — PROGRESS NOTES
"Chief Complaint   Patient presents with     Weight Loss     short term memory loss       HPI: Angle presents today for follow-up of her chronic medical issues.  Her peripheral vascular disease continues to bother her and she has not been able to play golf.  She had angiograms performed by Dr. Marrufo and those were reviewed in the chart.  She appears to have good flow in her radial artery but her ulnar artery has no flow and markedly decreased flow to the distal fingers particularly 3 4 and 5.    Her hypertension remained stable.  Her hyperlipidemia remained stable and, in my opinion statin no longer needed at her age.    New problem of memory loss.   notes that \"she repeats herself a lot\".  The patient is aware that there is things that she cannot remember and it causes her occasional frustration.    ROS: No chest pain or shortness of breath.    SH: The Patient's  reports that she has never smoked. She has never used smokeless tobacco. She reports that she drinks alcohol. She reports that she does not use illicit drugs.      FH: The Patient's family history includes Coronary artery disease in her brother, brother, and father; Dementia in her sister; Stroke in her father.     Meds:  Angle has a current medication list which includes the following prescription(s): aspirin, calcium citrate-vitamin d, cyanocobalamin, etodolac, lovastatin, and premarin.    O:  /78 (Patient Site: Right Arm, Patient Position: Sitting, Cuff Size: Child)  Pulse 72  Temp 97.5  F (36.4  C)  Resp 16  Ht 4' 11\" (1.499 m)  Wt 141 lb 7 oz (64.2 kg)  BMI 28.57 kg/m2  Alert conversant no acute distress  Skin Pink and dry  Images: Peripheral vascular images of the hands show marketed decreased distally    A/P:   1. Essential hypertension  -Continue monitoring but blood pressure can appear stable    2. Essential Hypercholesterolemia  -Discontinue statin    3. Peripheral vascular disease  -Follow-up as needed with Dr. Marrufo.    4.  " Age-related memory loss  -Reassurance

## 2021-06-18 NOTE — PROGRESS NOTES
DATE OF SERVICE: 06/11/2018    The patient comes in today for recheck status post hospitalization.  She was  hospitalized at [facility] from 05/31/2018  to 06/04.  In summary, she had  a hemoglobin that dropped to 11.1 and bright red blood per rectum.  CT scan and  sigmoidoscopy revealed diverticulitis.  She was started on amlodipine as well as  iron.  She has continued to have no rectal bleeding, feels well and is here for  followup.  She has also discontinued her Lodine.    REVIEW OF SYSTEMS:  Negative for abdominal pain, negative for bleeding.  Negative  for fever or weakness.      OBJECTIVE:  90/58, pulse 70, respirations 16.  She is alert, conversant, in no acute  distress.  The skin was pink and dry.      Hemoglobin pending.    ASSESSMENT:  1.  Gastrointestinal bleed.      PLAN:    1.  Discontinue Lodine.  2.  Discontinue amlodipine as her blood pressure is already low.  3.  Continue her iron supplement.  4.  The patient had an incidental finding of an endometrial stripe thickening and  she sees a GYN every 3 months and she would like to follow up with the GYN and  agreed to let the GYN know of her endometrial thickening.      ESTELA GRIFFITH MD  pa  D 06/11/2018 11:30:48  T 06/11/2018 12:05:22  R 06/11/2018 14:04:28  [id number]        cc:ESTELA GRIFFITH MD

## 2021-06-20 NOTE — PROGRESS NOTES
Chief Complaint   Patient presents with     Hip Pain     Pt c/o her low back and R hip hurting after moving x 2 weeks.        HPI: Angle Hughes presents today with back pain for 2 weeks duration of mild intensity.  The pain is located in the right lower back, is mild, continuous, worse with walking, and does not prohibit her from getting up out of a chair.  Her and her  recently relocated to an apartment and she was packing and preparing for the move when this occurred.    ROS: No dysuria.  No bowel or bladder disorder.  No paresthesias in the lower extremities.    SH:    reports that she has never smoked. She has never used smokeless tobacco. She reports that she drinks about 1.2 oz of alcohol per week  She reports that she does not use illicit drugs.      FH: The Patient's family history includes Coronary artery disease in her brother, brother, and father; Dementia in her sister; Stroke in her father.     Meds:  Angle has a current medication list which includes the following prescription(s): aspirin-acetaminophen-caffeine, calcium citrate-vitamin d, celecoxib, and conjugated estrogens.    O:  /80  Pulse 72  Resp 16  Wt 136 lb 9 oz (61.9 kg)  BMI 27.58 kg/m2  Mild pain to deep palpation over that SI region of the right lower back.  Left back normal.  No midline tenderness.  Normal range of motion with no difficulty with movement.  Negative straight leg raise.    A/P:   1. Backache  -Most likely muscular skeletal.  Given her age would prefer a Johnson 2 inhibitor instead of an NSAID so we will try Celebrex, and referral to physical therapy.  If not improving she should return.  - Ambulatory referral to Physical Therapy  - celecoxib (CELEBREX) 200 MG capsule; Take 1 capsule (200 mg total) by mouth daily.  Dispense: 14 capsule; Refill: 2

## 2021-06-20 NOTE — LETTER
"Letter by Samanta Marcos, RN at      Author: Samanta Marcos RN Service: -- Author Type: --    Filed:  Encounter Date: 9/14/2020 Status: (Other)       CARE COORDINATION  [facility]  [address] [place] [address]       September 14, 2020      Angle Agudelo  [address]  [facility] [address]      Dear Angle,    Your Care Team congratulates you on your journey to maintain wellness. This document will help guide you on your journey to maintain a healthy lifestyle.  You can use this to help you overcome any barriers you may encounter.  If you should have any questions or concerns, you can contact the members of your Care Team or contact your Primary Care Clinic for assistance.    My Access Plan  Medical Emergency 911   Primary Clinic Line Ester Sigala NP - [phone]   24 Hour Appointment Line [phone] or  [phone]) (toll-free)   24 Hour Nurse Line [phone]   Preferred Urgent Care     Preferred Hospital     Preferred Pharmacy [facility] - [place], [place] - [address] Behavioral Health Crisis Line The National Suicide Prevention Lifeline at 1-250.178.5830 or 075     My Care Team Members  Patient Care Team       Relationship Specialty Notifications Start End    Ester Sigala NP PCP - General Nurse Practitioner  1/15/19     Phone: [phone] Fax: [phone] [address] [facility] [address] [facility] [address]    Ester Sigala NP Assigned PCP   11/25/19     Phone: [phone] Fax: [phone]         [address] [facility] [address] [facility] [address]        Goals        Patient Stated    ? COMPLETED: Being Active (pt-stated)      Goal Statement: I would like to increase my activity and \"stay active\" over the next 90 days.  Date Goal set: 6/25/20  Barriers: recent hospitalization, now using a walker  Strengths: motivated  Date to Achieve By: ongoing  Patient expressed " understanding of goal: yes  Action steps to achieve this goal:  1. I will participate in home care PT/OT exercises on a regular basis.  2. I will complete the exercises on a daily basis as directed by PT/OT between therapy sessions and after therapy discharge.  3. I will walk in the halls of my independent living to gain strength with my walker.     ? COMPLETED: Other (pt-stated)      Goal Statement:  I want to stay out of the hospital for the next 90 days.  Date Goal set:  6/25/2020  Barriers:   Recent hospitalization  Strengths:  Able to identify and advocate for needs, good support system from family  Date to Achieve By:  9/8/20 (based on hospital discharge date of 6/10/20)  Patient expressed understanding of goal:  Yes  Action steps to achieve this goal:  1. I will attend all my appts as scheduled, and recommended follow up appts  2. I will call my clinic if I start to feel sick or notice any change(s) in my health, and schedule an appt if needed  3. I will call the triage nurse line for advice, before going to the hospital  4. I will go to  or Walk-In-Clinic before going to the hospital, if I am unable to get an appt with my PCP  5. I will update the Community Healthcare Worker during outreach calls and ask for additional support or resources, if needed           Advance Care Plans/Directives Type: Thank you for providing us with your Advance Directive. We will keep this on file and recommend that it be updated every 2 years, if your health situation changes, if there is a death of one of your health care agents, and/or if there are changes in your marital status.    It has been your Clinic Care Team's pleasure to work with you on your goals.    Regards,  Your Clinic Care Team

## 2021-06-20 NOTE — PROGRESS NOTES
Preoperative Exam    Scheduled Procedure: cervix biopsy  Surgery Date:  9/11  Surgery Location: [facility], fax [phone]    Surgeon:  Dr. Biggs    Very pleasant 86-year-old female presents today for preoperative evaluation for cervical biopsy by Dr. Biggs at [facility] on September 11, 2018.  She was hospitalized at [facility] approximately 2 months ago for GI bleed and had subsequent GI workup which revealed no cause for the bleeding including a negative tagged red blood cell scan.  She had a 4 g hemoglobin loss.  She has dysfunctional uterine bleeding and uterine thickening and is scheduled for biopsy for further evaluation.  She has never had complications from her past surgical episodes, family history negative for lung hyperthermia, she has no history of sleep apnea, and she is currently taking NSAIDs.  She has been advised to stop.  She is a Mallampati class III.    Assessment/Plan:     1. Vaginal bleeding  -Patient's been optimized for surgery  - Basic Metabolic Panel  - HM2(CBC w/o Differential)     2.  NSAID use-risk factor  -She was advised to stop and will stop taking them today.    3.  Anemia  -Hemoglobin to be checked today.  Advise transfusion below 7.    4.  Hypertension  -Stable.  Continue current medications.    Surgical Procedure Risk: Low (reported cardiac risk generally < 1%)  Have you had prior anesthesia?: Yes  Have you or any family members had a previous anesthesia reaction:  No  Do you or any family members have a history of a clotting or bleeding disorder?: No  Cardiac Risk Assessment: no increased risk for major cardiac complications    Patient approved for surgery with general or local anesthesia.        Functional Status: Independent  Patient plans to recover at home with family.     Subjective:      Angle Agudelo is a 86 y.o. female who presents for a preoperative consultation.      All other systems reviewed and are negative, other than those listed in  the HPI.    Pertinent History  Do you have difficulty breathing or chest pain after walking up a flight of stairs: No  History of obstructive sleep apnea: No  Steroid use in the last 6 months: No  Frequent Aspirin/NSAID use: Yes: Excedrin, Aleve PM  Prior Blood Transfusion: No  Prior Blood Transfusion Reaction: No  If for some reason prior to, during or after the procedure, if it is medically indicated, would you be willing to have a blood transfusion?:  There is no transfusion refusal.    Current Outpatient Prescriptions   Medication Sig Dispense Refill     acetaminophen (TYLENOL) 500 MG tablet Take 2 tablets (1,000 mg total) by mouth every 6 (six) hours as needed for pain.  0     amLODIPine (NORVASC) 5 MG tablet Take 1 tablet (5 mg total) by mouth every morning. 30 tablet 0     calcium citrate-vitamin D (CITRACAL+D) 315-200 mg-unit per tablet Take 1 tablet by mouth daily.        conjugated estrogens (PREMARIN) vaginal cream Insert 0.5 g into the vagina every 96 hours.       cyanocobalamin (VITAMIN B-12) 1000 MCG tablet Take 1,000 mcg by mouth daily.       No current facility-administered medications for this visit.         Allergies   Allergen Reactions     Atorvastatin Myalgia     Dextromethorphan Hives     Oxycodone Unknown     Pravastatin Sodium Myalgia     Simvastatin Myalgia     Codeine Rash       Patient Active Problem List   Diagnosis     Osteopenia     Arthropathy Of The Shoulder Region     Myalgia And Myositis     Feeling Tired Or Poorly     Myopathies     Open Wound Of The Forearm     Essential Hypercholesterolemia     Essential hypertension     Abnormal Glucose     Benign Adenomatous Polyp Of The Large Intestine     Actinic Keratosis     Unspecified arthropathy, multiple sites     Backache     Chronic bilateral low back pain without sciatica     Rectal bleeding     Rectal bleed     Lower GI bleed     Acute blood loss anemia     Diverticulosis of large intestine with hemorrhage       Past Medical  History:   Diagnosis Date     Benign neoplasm of colon      Disorder of bone and cartilage, unspecified      Pure hypercholesterolemia      Skin cancer     Not Melanoma, not sure what kind though     Unspecified arthropathy, shoulder region      Unspecified essential hypertension        Past Surgical History:   Procedure Laterality Date     APPENDECTOMY       CATARACT EXTRACTION EXTRACAPSULAR W/ INTRAOCULAR LENS IMPLANTATION Bilateral      finger abscess             x5     SIGMOIDOSCOPY N/A 6/3/2018    Procedure: SIGMOIDOSCOPY;  Surgeon: Dayana Joshi MD;  Location: [facility];  Service:      TONSILLECTOMY AND ADENOIDECTOMY       TOTAL SHOULDER ARTHROPLASTY Bilateral R  L        Social History     Social History     Marital status:      Spouse name: N/A     Number of children: N/A     Years of education: N/A     Occupational History     Not on file.     Social History Main Topics     Smoking status: Never Smoker     Smokeless tobacco: Never Used     Alcohol use Yes     Drug use: No     Sexual activity: Not on file     Other Topics Concern     Not on file     Social History Narrative       Patient Care Team:  Ayaan Pandya MD as PCP - General (Family Medicine)          Objective:     There were no vitals filed for this visit.      Physical Exam:  Constitutional:    --Vitals as above  --No acute distress  Eyes-  --Sclera noninjected  --Lids and conjunctiva normal  ENT-  --TMs clear  --Sclera noninjected  --Pharynx not erythematous  Neck-  --Neck supple with no cervical lymphadenopathy  Lungs-  --Clear to Auscultation  Heart-  --Regular rate and rhythm  Abdomen--  --Soft, non-tender, non-distended  Skin-  --Pink and dry  Psych-  --Alert and oriented  --Normal affect    Labs:  Labs pending at this time.  Results will be reviewed when available.    Immunization History   Administered Date(s) Administered     Influenza high dose, seasonal 10/10/2014, 10/08/2015     Influenza, inj,  historic,unspecified 10/11/2016     Influenza, seasonal,quad inj 6-35 mos 09/18/2012, 09/30/2013     Pneumo Conj 13-V (2010&after) 05/04/2016     Pneumo Polysac 23-V 01/13/2003     Td,adult,historic,unspecified 01[birth year]02/1932     Tdap 08/12/2016     Thank you for the opportunity to participate in the care for this patient.  If you have any concerns please do not hesitate to contact me at the [facility] at [phone].      Electronically signed by Ayaan Pandya MD 09/06/18 10:55 AM

## 2021-06-20 NOTE — LETTER
Letter by Yanni Falcon RN at      Author: Yanni Falcon RN Service: -- Author Type: --    Filed:  Encounter Date: 6/25/2020 Status: (Other)       CARE COORDINATION  [facility] [place]  [address] [place] [address]    June 25, 2020    Angle Agudelo  [address]  [facility] [address]      Dear Angle,  I am a clinic care coordinator at [facility]. I wanted to thank you for spending the time to talk with me.  Below is a description of clinic care coordination and how I can further assist you.      The clinic care coordination team is made up of a registered nurse,  and community health worker who understand the health care system. The goal of clinic care coordination is to help you manage your health and improve access to the health care system in the most efficient manner. The team can assist you in meeting your health care goals by providing education, coordinating services, strengthening the communication among your providers and supporting you with any resource needs.    Please feel free to contact the Community Health Worker Damaris Frausto at [phone] with any questions or concerns. We are focused on providing you with the highest-quality healthcare experience possible and that all starts with you.     Sincerely,     Yanni Falcon RN  Clinic Care Coordination    Enclosed: I have enclosed a copy of the Care Plan. This has helpful information and goals that we have talked about. Please keep this in an easy to access place to use as needed.

## 2021-06-20 NOTE — LETTER
Letter by Koko Betancourt CNP at      Author: Koko Betancourt CNP Service: -- Author Type: --    Filed:  Encounter Date: 7/17/2020 Status: (Other)         Angle Agudelo  [address]  [facility] [address]             July 17, 2020         Dear Ms. Agudelo,    Below are the results from your recent visit:    Resulted Orders   US Thyroid    Narrative    EXAM: US THYROID  LOCATION: [facility]  DATE/TIME: 7/15/2020 11:45 AM    INDICATION: Nontoxic goiter, unspecified  COMPARISON: Chest CTA dated 02/21/2020.  TECHNIQUE: Thyroid ultrasound.     FINDINGS:  RIGHT lobe: 3.9 x 1.1 x 1.3  cm. Homogeneous echotexture. No focal right lobe nodule.  Isthmus: 1 mm.  LEFT lobe: 6.5 x 6 x 3.8 cm. Inhomogeneous with large mixed solid and cystic nodule in the central portion of the left lobe.    NECK: No cervical lymphadenopathy.    NODULES:    Nodule 1: 5.6 x 5.3 x 2.9 cm central left lobe.   Composition: Mixed cystic and solid, 1 point   Echogenicity: Unable to determine, 1 point   Shape: Taller-than-wide, 3 points   Margin: Smooth, 0 points   Echogenic Foci: None, or large comet-tail artifacts, 0 points   Point Total: 4-6 points. TI-RADS 4. If 1.5 cm or larger, recommend FNA; if 1 cm or larger, follow up US (annually for 5 years).        Impression    1.  Large central nodule left lobe of the thyroid. This nodule is a TI- RADS 4 lesion greater than 1.5 cm. This nodule meets criteria for ultrasound-guided fine-needle aspiration biopsy and biopsy is recommended.      Nodules are characterized per  ACR Thyroid Imaging, Reporting and Data System (TI-RADS): White Paper of the ACR TI-RADS Committee Jai Fang et al. Journal of the American College of Radiology 2017. Volume 14 (2017), Issue 5, 587-565.

## 2021-06-20 NOTE — LETTER
Letter by Yanni Falcon RN at      Author: Yanni Falocn RN Service: -- Author Type: --    Filed:  Encounter Date: 6/25/2020 Status: (Other)       Care Plan  About Me:    Patient Name:  Angle Agudelo    YOB: 1932  Age:         88 y.o.   [facility] MRN:    [id number] Telephone Information:  Home Phone [phone]   Mobile [phone]       Address:  [address] [place] [address] [address]  [facility] [address] Email address:  No e-mail address on record      Emergency Contact(s)  Extended Emergency Contact Information      Name: Deejay Agudelo  Address:       [address] [place] [address]  Home Phone Number: [phone]  Relation: Spouse      [place] [address]  Relation: Child          Primary language:  English     needed? No   [facility]:  [phone] op. 1  Other communication barriers: None  Preferred Method of Communication:     Current living arrangement: Other(Independent Living)  Mobility Status/ Medical Equipment: Independent w/Device    Health Maintenance  Health Maintenance Reviewed: Up to date    My Access Plan  Medical Emergency 911   Primary Clinic Line Ester Sigala NP - [phone]   24 Hour Appointment Line [phone] or  [phone]) (toll-free)   24 Hour Nurse Line [phone] (toll-free)   Preferred Urgent Care [facility], [phone]   Preferred [facility]  [phone]   Preferred Pharmacy [facility] [address] [place] [address]     Behavioral Health Crisis Line The National Suicide Prevention Lifeline at 1-550.109.2801 or 911             My Care Team Members  Patient Care Team       Relationship Specialty Notifications Start End    Ester Sigala NP PCP - General Nurse Practitioner  1/15/19     Phone: [phone] Fax: [phone] [address] [facility] [address] [facility]  "[id number]    Ester Sigala NP Assigned PCP   11/25/19     Phone: [phone] Fax: [phone]         [address] [facility] [address] [facility] [address]    Damaris Frausto, W Community Health Worker Primary Care - [facility] Admissions 6/22/20     [phone]    Phone: [phone] Fax: [phone]        Samanta Marcos, RN Lead Care Coordinator Primary Care - [facility] Ad[facility] 6/25/20             My Care Plans  Self Management and Treatment Plan  Goals and (Comments)  Goals        General    Being Active (pt-stated)     Notes - Note created  6/25/2020 12:32 PM by Yanni Falcon, RN    Goal Statement: I would like to increase my activity and \"stay active\" over the next 90 days.  Date Goal set: 6/25/20  Barriers: recent hospitalization, now using a walker  Strengths: motivated  Date to Achieve By: ongoing  Patient expressed understanding of goal: yes  Action steps to achieve this goal:  1. I will participate in home care PT/OT exercises on a regular basis.  2. I will complete the exercises on a daily basis as directed by PT/OT between therapy sessions and after therapy discharge.  3. I will walk in the halls of my independent living to gain strength with my walker.       Other (pt-stated)     Notes - Note created  6/25/2020 12:28 PM by Yanni Falcon, RN    Goal Statement:  I want to stay out of the hospital for the next 90 days.  Date Goal set:  6/25/2020  Barriers:   Recent hospitalization  Strengths:  Able to identify and advocate for needs, good support system from family  Date to Achieve By:  9/8/20 (based on hospital discharge date of 6/10/20)  Patient expressed understanding of goal:  Yes  Action steps to achieve this goal:  1. I will attend all my appts as scheduled, and recommended follow up appts  2. I will call my clinic if I start to feel sick or notice any change(s) in my health, and schedule an appt if needed  3. I will call the triage nurse line for advice, before going to the hospital  4. I " will go to  or Walk-In-Clinic before going to the hospital, if I am unable to get an appt with my PCP  5. I will update the Community Healthcare Worker during outreach calls and ask for additional support or resources, if needed                 Advance Care Plans/Directives Type:        My Medical and Care Information  Problem List   Patient Active Problem List   Diagnosis   ? Osteopenia   ? Arthropathy Of The Shoulder Region   ? Myalgia And Myositis   ? Feeling Tired Or Poorly   ? Myopathies   ? Open Wound Of The Forearm   ? Essential Hypercholesterolemia   ? Essential hypertension   ? Abnormal Glucose   ? Benign Adenomatous Polyp Of The Large Intestine   ? Actinic Keratosis   ? Unspecified arthropathy, multiple sites   ? Backache   ? Chronic bilateral low back pain without sciatica   ? Rectal bleeding   ? Rectal bleed   ? Lower GI bleed   ? Acute blood loss anemia   ? Diverticulosis of large intestine with hemorrhage   ? Acute cerebrovascular accident (CVA) (H)   ? Acute CVA (cerebrovascular accident) (H)   ? Dementia without behavioral disturbance, unspecified dementia type (H)      Current Medications and Allergies:  See printed Medication Report.    Care Coordination Start Date: No linked episodes   Frequency of Care Coordination:     Form Last Updated: 06/25/2020

## 2021-06-20 NOTE — ANESTHESIA CARE TRANSFER NOTE
Last vitals:   Vitals:    09/11/18 1412   BP: 116/72   Pulse: (!) 57   Resp: 16   Temp: 36  C (96.8  F)   SpO2: 99%     Patient's level of consciousness is drowsy  Spontaneous respirations: yes  Maintains airway independently: yes  Dentition unchanged: yes  Oropharynx: oropharynx clear of all foreign objects    QCDR Measures:  ASA# 20 - Surgical Safety Checklist: WHO surgical safety checklist completed prior to induction  PQRS# 430 - Adult PONV Prevention: 4558F - Pt received => 2 anti-emetic agents (different classes) preop & intraop  ASA# 8 - Peds PONV Prevention: NA - Not pediatric patient, not GA or 2 or more risk factors NOT present  PQRS# 424 - Margie-op Temp Management: 4559F - At least one body temp DOCUMENTED => 35.5C or 95.9F within required timeframe  PQRS# 426 - PACU Transfer Protocol: - Transfer of care checklist used  ASA# 14 - Acute Post-op Pain: ASA14B - Patient did NOT experience pain >= 7 out of 10

## 2021-06-20 NOTE — LETTER
Letter by Damaris Frausto CHW at      Author: Damaris Frausto CHW Service: -- Author Type: --    Filed:  Encounter Date: 6/22/2020 Status: (Other)                        This letter is to give you information only. No action is required on your part.                               Beneficiary Notification Letter - BPCI Advanced                     Your Doctor or Hospital Has Joined [facility] New Payment and                                                          Service Delivery Model     Hello,     We wanted to let you know that your health care provider, [facility], has volunteered to take part in our Centers for Medicare & Medicaid Services (CMS) Bundled Payments for Care Improvement Advanced Model (BPCI Advanced). This doesnt change your Medicare rights or benefits and you dont need to do anything.      What are bundled payments?   A bundled payment combines, or bundles together, payments that Medicare makes to your health care providers for the many different kinds of medical services you might get in a specific time period. In BPCI Advanced, this time period could include a hospital inpatient stay or outpatient procedure, plus 90 days.     Why would Medicare bundle payments?   Bundled payments are thought of as a value-based way to pay because health care providers are responsible for both the quality and cost of medical care they give. This is a relatively new way of paying health care providers compared to thefee-for-service way Medicare has traditionally paid, where providers are paid separately for each service they provide. Bundled payments encourage these providers to work together to provide better, more coordinated care during your hospital stay, or outpatient procedure, and through your recovery.     What does BPCI Advanced mean for me?   Youre more likely to get even better care when hospitals, doctors, and other health care providers work together. In BPCI Advanced, hospitals,  doctors, and other health care providers may be rewarded for providing better, more coordinated health care. Medicare will watch BPCI Advanced participants closely to make sure that you and other patients keep getting efficient, high quality care.     What do I need to know about BPCI Advanced?   Whats most important for you to know is that your Medicare rights and benefits dont change because your health care provider is participating in BPCI Advanced. Medicare will keep covering all of your medically necessary services.       January 2019    Beneficiary Notification Letter - BPCI Advanced (page 2)     Even though Medicare will pay your doctor in a different way under BPCI Advanced, how much you have to pay wont change. Health care providers and suppliers who are enrolled in Medicare will submit their Medicare claims like they always have.     Youll have all the same Medicare rights and protections, including the right to choose which hospital, doctor, or other health care provider you see. If you dont want to get care from a health care provider whos participating in BPCI Advanced, then youll have to choose a different health care provider whos not participating in the Model.     How can I give feedback about my health care?   Medicare might ask you to take a voluntary survey about the services and care you received from [facility] during your hospital stay or outpatient procedure and for a specific period of time afterwards. You can decide whether you want to take the voluntary survey, but if you do, itll help Medicare make BPCI Advanced and the care of other Medicare patients better.     If you have concerns or complaints about your care, you can:     Talk to your doctor or health care provider.     Contact your Beneficiary and Family Centered Care Quality Improvement Organization (CC-QIO). You can get your CC-QIOs phone number at Medicare.gov/contacts or by calling 1-800-MEDICARE. TTY users  can call 1-334.348.5405.     Where can I learn more about BPCI Advanced?   Learn more about BPCI Advanced at https://innovation.cms.gov/initiatives/bpci-advanced/:     A list of all the hospitals and physician group practices in the country participating in BPCI Advanced.     All of the inpatient and outpatient Clinical Episodes that are currently included under BPCI Advanced. A Clinical Episode is a grouping of medical conditions or diagnoses that are included in the BPCI Advanced Model.                                   January 2019

## 2021-06-20 NOTE — ANESTHESIA PREPROCEDURE EVALUATION
Anesthesia Evaluation      Patient summary reviewed   No history of anesthetic complications     Airway   Mallampati: II  Neck ROM: full   Pulmonary - normal exam   (-) not a smoker                         Cardiovascular - normal exam  (+) hypertension, , hypercholesterolemia,      Neuro/Psych    (+) neuromuscular disease,      Comments: Myopathies NOS, Myalgias NOS, Myositis NOS.  Fatigue/Tiredness NOS.    Endo/Other    (+) arthritis,      Comments: Skin cancer NOS.    GI/Hepatic/Renal - negative ROS     Comments: Rectal/Lower GI Bleed.          Dental - normal exam                        Anesthesia Plan  Planned anesthetic: MAC  Decadron, Zofran.  Diprivan infusion.  Fentanyl, No Versed.  Toradol (15 mg).  ASA 2     Anesthetic plan and risks discussed with: patient  Anesthesia plan special considerations: antiemetics,   Post-op plan: routine recovery

## 2021-06-20 NOTE — ANESTHESIA POSTPROCEDURE EVALUATION
Patient: Angle Agudelo  LOOP ELECTROSURGICAL EXCISION PROCEDURE, REMOVAL PESSARY  Anesthesia type: MAC    Patient location: Phase II Recovery  Last vitals:   Vitals:    09/11/18 1510   BP: 120/70   Pulse: 62   Resp: 16   Temp: 36.3  C (97.4  F)   SpO2: 99%     Post vital signs: stable  Level of consciousness: awake and responds to simple questions  Post-anesthesia pain: pain controlled  Post-anesthesia nausea and vomiting: no  Pulmonary: unassisted, return to baseline  Cardiovascular: stable and blood pressure at baseline  Hydration: adequate  Anesthetic events: no    QCDR Measures:  ASA# 11 - Margie-op Cardiac Arrest: ASA11B - Patient did NOT experience unanticipated cardiac arrest  ASA# 12 - Margie-op Mortality Rate: ASA12B - Patient did NOT die  ASA# 13 - PACU Re-Intubation Rate: NA - No ETT / LMA used for case  ASA# 10 - Composite Anes Safety: ASA10A - No serious adverse event    Additional Notes:

## 2021-06-21 ENCOUNTER — RECORDS - HEALTHEAST (OUTPATIENT)
Dept: ADMINISTRATIVE | Facility: OTHER | Age: 86
End: 2021-06-21

## 2021-06-21 ENCOUNTER — COMMUNICATION - HEALTHEAST (OUTPATIENT)
Dept: FAMILY MEDICINE | Facility: CLINIC | Age: 86
End: 2021-06-21

## 2021-06-21 NOTE — PROGRESS NOTES
PROGRESS NOTE   11/23/2018    SUBJECTIVE:  Angle Agudelo is a 86 y.o. female  who presents for   Chief Complaint   Patient presents with     Memory Loss     short-term per patient     Patient comes in today with her  because of issues with short-term memory loss.  Patient is overall very healthy female.  Does have a history of high cholesterol as well as hypertension.  Her blood pressure today looks okay.  Certainly is not excessively high.  She is not any medication currently for either of the high cholesterol or the hypertension.  Her and her  come in because she is had troubles with her memory.  Her  notes that for the last 3 or 4 months both he and her children have noticed that she is having difficulty with memory issues.  Patient's  notes that she is not aware of this.  When asked further about that the biggest concern is that she keeps asking the same questions.  Examples of that are that she keeps asking what they are they going to go to the doctor what dated they have an appointment.  They note that they are now living in [facility] and moved in October.  Prior to that they were living in their own house.  They moved from a 7 bedroom house to a 2 bedroom apartment.  They are enjoying it at [facility] quite a lot however this is been quite a change for them.  They sold their house to their son and daughter-in-law so it did not involve a whole lot of stress but certainly they had to consolidate and figure out what they are going to do with all of their stuff when they made a move from quite a large house down to a smaller house.  She notes that there is just been so much going on that she thinks that is where her problems are coming from because she is had so much on her mind for so many months.  We talked a long time today about the fact that certainly with her moving she was anticipating moving and having to figure out where she was going to put stuff and get rid of things  but also the active moving and the change in the environment can both affect her memory.  She notes that her son and daughter-in-law moved into the house about 6 weeks before they moved out of the house and so that was a bit different as well.  They did not have a whole lot of stress when they moved because they hired people to move them and they hired people to talk to pack them up but still it was a different experience for her.  She does not feel particularly stressed at this point.  She feels like [place] is [place] and she is getting settled in.  They note also that their apartment was not quite done in the building was not quite done when they moved and so they did a lot of walking because the elevator close to their apartment was not working.  They also note that she is been seeing things and then she does remember that she said them.  She repeats the same statements 3 or 4 times frequently.  Both she and her  note that they have been having difficulty in their new apartment because they hired somebody to move them and now cannot find things that they should know where they are.  They were in their previous house for over 40 years.  They note that they do not even know where to look for some of the things that they are looking for.  Both she and her  note that she has not had problems with forgetting to turn the burner off or doing other things that would be unsafe.  She is able to function and do what she needs to do on a daily basis.  She is overall feeling well and has no concerns.  She sleeps well at night.  Her  notes that the memory loss definitely seems to be getting gradually worse.  She notes that in the first 6 months of this year she lost a significant amount of weight but now she seems to have stabilized in terms of that.  She went from a size 16 down to a size 12.  She notes that her weight has been relatively stable since about September or so.    Patient Active  Problem List   Diagnosis     Osteopenia     Arthropathy Of The Shoulder Region     Myalgia And Myositis     Feeling Tired Or Poorly     Myopathies     Open Wound Of The Forearm     Essential Hypercholesterolemia     Essential hypertension     Abnormal Glucose     Benign Adenomatous Polyp Of The Large Intestine     Actinic Keratosis     Unspecified arthropathy, multiple sites     Backache     Chronic bilateral low back pain without sciatica     Rectal bleeding     Rectal bleed     Lower GI bleed     Acute blood loss anemia     Diverticulosis of large intestine with hemorrhage       Current Outpatient Medications   Medication Sig Dispense Refill     calcium citrate-vitamin D (CITRACAL+D) 315-200 mg-unit per tablet Take 1 tablet by mouth daily.        conjugated estrogens (PREMARIN) vaginal cream Insert 0.5 g into the vagina see administration instructions. Use every 8 days (alternate every 4 days with Trimo cream).       aspirin-acetaminophen-caffeine (EXCEDRIN MIGRAINE) 250-250-65 mg per tablet Take 1-2 tablets by mouth every 6 (six) hours as needed for pain.       celecoxib (CELEBREX) 200 MG capsule Take 1 capsule (200 mg total) by mouth daily. 14 capsule 2     No current facility-administered medications for this visit.        Allergies   Allergen Reactions     Atorvastatin Myalgia     Dextromethorphan Hives     Oxycodone Unknown     Pravastatin Sodium Myalgia     Simvastatin Myalgia     Codeine Rash       Past Medical History:   Diagnosis Date     Benign neoplasm of colon      Disorder of bone and cartilage, unspecified      Pure hypercholesterolemia      Skin cancer     Not Melanoma, not sure what kind though     Unspecified arthropathy, shoulder region      Unspecified essential hypertension        Past Surgical History:   Procedure Laterality Date     APPENDECTOMY       CATARACT EXTRACTION EXTRACAPSULAR W/ INTRAOCULAR LENS IMPLANTATION Bilateral      CERVICAL BIOPSY  W/ LOOP ELECTRODE EXCISION N/A 9/11/2018     "Procedure: LOOP ELECTROSURGICAL EXCISION PROCEDURE, REMOVAL PESSARY;  Surgeon: Olive Biggs MD;  Location: [facility] OR;  Service:      finger abscess             x5     SIGMOIDOSCOPY N/A 6/3/2018    Procedure: SIGMOIDOSCOPY;  Surgeon: Dayana Joshi MD;  Location: [facility] GI;  Service:      TONSILLECTOMY AND ADENOIDECTOMY       TOTAL SHOULDER ARTHROPLASTY Bilateral R  L        Social History     Tobacco Use   Smoking Status Never Smoker   Smokeless Tobacco Never Used       OBJECTIVE:     /82 (Patient Site: Right Arm, Patient Position: Sitting, Cuff Size: Adult Regular)   Pulse 84 Comment: apical  Temp 98.3  F (36.8  C) (Oral)   Ht 4' 10.66\" (1.49 m)   Wt 138 lb 4.8 oz (62.7 kg)   BMI 28.26 kg/m      Physical Exam:  GENERAL APPEARANCE: A&A, NAD, well hydrated, well nourished  SKIN:  Normal skin turgor, no lesions/rashes   HEENT: moist mucous membranes, no rhinorrhea, PERRLA, TM's clear bilaterally, Throat without significant erythema or exudate.  NECK: Supple, full ROM, no significant lymphadenopathy or thyromegaly  CV: Irregular rhythm was noted, no M/G/R   LUNGS: CTAB  ABDOMEN: S&NT, no masses or organomegaly, positive bowel sounds   EXTREMITY: no swelling noted.  Full range of motion of all 4 extremities.   NEURO: no gross deficits.  Neurological exam was completely within normal limits.  Deep tendon reflexes were equal and symmetrical.  Motor and sensation were intact both the upper and lower extremities.  Cranial nerves II through XII are grossly intact.  PSYCHIATRIC;  Mood appropriate    LABS:     Recent Results (from the past 240 hour(s))   Electrocardiogram Perform and Read   Result Value Ref Range    SYSTOLIC BLOOD PRESSURE  mmHg    DIASTOLIC BLOOD PRESSURE  mmHg    VENTRICULAR RATE 74 BPM    ATRIAL RATE 74 BPM    P-R INTERVAL 156 ms    QRS DURATION 70 ms    Q-T INTERVAL 378 ms    QTC CALCULATION (BEZET) 419 ms    P Axis 36 degrees    R AXIS -19 degrees    T AXIS " 18 degrees    MUSE DIAGNOSIS       Sinus rhythm with marked sinus arrhythmia  Otherwise normal ECG  No previous ECGs available     Comprehensive Metabolic Panel   Result Value Ref Range    Sodium 144 136 - 145 mmol/L    Potassium 4.4 3.5 - 5.0 mmol/L    Chloride 105 98 - 107 mmol/L    CO2 29 22 - 31 mmol/L    Anion Gap, Calculation 10 5 - 18 mmol/L    Glucose 95 70 - 125 mg/dL    BUN 19 8 - 28 mg/dL    Creatinine 0.76 0.60 - 1.10 mg/dL    GFR MDRD Af Amer >60 >60 mL/min/1.73m2    GFR MDRD Non Af Amer >60 >60 mL/min/1.73m2    Bilirubin, Total 0.5 0.0 - 1.0 mg/dL    Calcium 9.7 8.5 - 10.5 mg/dL    Protein, Total 6.3 6.0 - 8.0 g/dL    Albumin 3.9 3.5 - 5.0 g/dL    Alkaline Phosphatase 75 45 - 120 U/L    AST 16 0 - 40 U/L    ALT <9 0 - 45 U/L   HM2(CBC w/o Differential)   Result Value Ref Range    WBC 7.3 4.0 - 11.0 thou/uL    RBC 4.76 3.80 - 5.40 mill/uL    Hemoglobin 14.3 12.0 - 16.0 g/dL    Hematocrit 44.1 35.0 - 47.0 %    MCV 93 80 - 100 fL    MCH 30.1 27.0 - 34.0 pg    MCHC 32.5 32.0 - 36.0 g/dL    RDW 14.0 11.0 - 14.5 %    Platelets 190 140 - 440 thou/uL    MPV 9.0 7.0 - 10.0 fL   Vitamin B12   Result Value Ref Range    Vitamin B-12 >2,000 (H) 213 - 816 pg/mL   Folate, Serum   Result Value Ref Range    Folate 9.5 >=3.5 ng/mL   Thyroid Stimulating Hormone (TSH)   Result Value Ref Range    TSH 1.04 0.30 - 5.00 uIU/mL       ASSESSMENT/PLAN:     Short-term memory loss [R41.3]      1. Short-term memory loss  - Comprehensive Metabolic Panel  - HM2(CBC w/o Differential)  - Vitamin B12  - Folate, Serum  - Thyroid Stimulating Hormone (TSH)    2. Irregular heart beats  - Electrocardiogram Perform and Read    Patient overall seems to be doing okay.  She definitely seems to be functioning well and she has lost a significant amount of weight in the first 6 months of this year but now her weight has stabilized.  She notes that she does feel like she was more stressed over the first 6 months of the year especially as they are  trying to prepare for this move and getting the logistics all straighten out.  She denies that she is having any pain anywhere.  She did have an irregular heartbeat on exam today so an EKG was done.  It showed normal sinus rhythm but did have some sinus arrhythmia.  While she was here in the office discussing things with me she definitely repeated several statements and concepts multiple times.  Her  has Parkinson's and sees Dr. Nona Mckeon and they apparently made an appointment with her for evaluation in February.  I suggested that they definitely should keep that appointment we will plan on having her see Dr. Mckeon in February.  In the meantime I would like to do some lab work and make sure that is not have any abnormalities laboratory wise that could be contributing to her memory loss.  At this point it does not seem like she is unsafe at home.  I suspect that part of her memory loss and part of the issues that they are experiencing is due to the stress in the change in environment from moving to the from their own house into an apartment at [facility].  We discussed this at length today.  I also suspect that part of the weight loss that she is had over the last 10 months or so has been related to that as well.  Her weight is now stable and we discussed that that is excellent.  At this point the building is done and they have a closer elevator so they do not need to do all the walking that they had to do when they first moved in.  I will let them know as soon as I hear anything on the lab work from today.  As far as the irregular heartbeat I think were fine with monitoring that since there was no atrial fibrillation or other significant abnormality noted on the EKG.  I asked him to follow-up in about a month to 2 for recheck of how she is doing.  I am hopeful that with a little more time settling in in the new environment some of this will improve.  Certainly there is probably some degree of dementia which is  probably been exacerbated by the move.  Patient and her  both understood this.  Slums was administered today and patient scored 23.  All of their questions were answered today.  If they have additional questions or concerns should certainly let me know.  We otherwise will follow up with them in between 1 and 2 months. Total time spent with patient was at least 40 minutes,  of which greater than fifty percent was spent in counselling and coordination of care regarding her current medical problems.  Daija Whitmore MD

## 2021-06-22 ENCOUNTER — RECORDS - HEALTHEAST (OUTPATIENT)
Dept: ADMINISTRATIVE | Facility: OTHER | Age: 86
End: 2021-06-22

## 2021-06-23 NOTE — PROGRESS NOTES
PROGRESS NOTE   1/15/2019    SUBJECTIVE:  Angle Agudelo is a 87 y.o. female  who presents for   Chief Complaint   Patient presents with     Follow-up     Labs     Patient comes in today with her  for recheck.  I saw her about 2 months ago for evaluation of memory loss.  She and her  just recently moved into [facility] and had sold their house.   had noticed that her memory loss seems to be getting worse and therefore wanted evaluation.  We did several blood tests for further evaluation of the memory loss and they all returned normal other than a very elevated B12 level.  Patient had been taking B12 supplements and so we had her stop her B12 supplements entirely.  A month ago she had a repeat B12 level and it had decreased slightly but was still elevated at 1600.  She comes in today and would like recheck of that.  She notes that they have settled in well at their new apartment and she feels very comfortable there.  She is quite excited to tell me that they pretty much live in their apartment and do not really have to leave anywhere else because they have so many friends and neighbors who are so kind and wonderful.  They like it very very much.  When asked about her memory loss she said that she felt like it was about the same as it had been.  Her  notes that it seems to be about the same as it was before as well.  I was hoping that part of the memory loss was due to the fact that they just recently moved and changed their environment and that may be some of this would get better as time went by but it does not seem like that is happening.  She continues to function is cooking and doing fine.  She has not forgotten to turn off the stove or had any unsafe behaviors.  She repeats herself and off a lot and that is annoying to her .  She is otherwise feeling well.  She has absolutely no complaints about how she is feeling today.  Her blood pressure today was elevated.  Initially it  was 175/89 and on recheck was 150/82.  This is the first time that we have ever had a significant elevation in her blood pressure.     Patient Active Problem List   Diagnosis     Osteopenia     Arthropathy Of The Shoulder Region     Myalgia And Myositis     Feeling Tired Or Poorly     Myopathies     Open Wound Of The Forearm     Essential Hypercholesterolemia     Essential hypertension     Abnormal Glucose     Benign Adenomatous Polyp Of The Large Intestine     Actinic Keratosis     Unspecified arthropathy, multiple sites     Backache     Chronic bilateral low back pain without sciatica     Rectal bleeding     Rectal bleed     Lower GI bleed     Acute blood loss anemia     Diverticulosis of large intestine with hemorrhage       Current Outpatient Medications   Medication Sig Dispense Refill     aspirin-acetaminophen-caffeine (EXCEDRIN MIGRAINE) 250-250-65 mg per tablet Take 1-2 tablets by mouth every 6 (six) hours as needed for pain.       calcium citrate-vitamin D (CITRACAL+D) 315-200 mg-unit per tablet Take 1 tablet by mouth daily.        celecoxib (CELEBREX) 200 MG capsule Take 1 capsule (200 mg total) by mouth daily. 14 capsule 2     conjugated estrogens (PREMARIN) vaginal cream Insert 0.5 g into the vagina see administration instructions. Use every 8 days (alternate every 4 days with Trimo cream).       No current facility-administered medications for this visit.        Allergies   Allergen Reactions     Atorvastatin Myalgia     Dextromethorphan Hives     Oxycodone Unknown     Pravastatin Sodium Myalgia     Simvastatin Myalgia     Codeine Rash       Past Medical History:   Diagnosis Date     Benign neoplasm of colon      Disorder of bone and cartilage, unspecified      Pure hypercholesterolemia      Skin cancer     Not Melanoma, not sure what kind though     Unspecified arthropathy, shoulder region      Unspecified essential hypertension        Past Surgical History:   Procedure Laterality Date     APPENDECTOMY        CATARACT EXTRACTION EXTRACAPSULAR W/ INTRAOCULAR LENS IMPLANTATION Bilateral      CERVICAL BIOPSY  W/ LOOP ELECTRODE EXCISION N/A 2018    Procedure: LOOP ELECTROSURGICAL EXCISION PROCEDURE, REMOVAL PESSARY;  Surgeon: Olive Biggs MD;  Location: [facility] OR;  Service:      finger abscess             x5     SIGMOIDOSCOPY N/A 6/3/2018    Procedure: SIGMOIDOSCOPY;  Surgeon: Dayana Joshi MD;  [facility];  Service:      TONSILLECTOMY AND ADENOIDECTOMY       TOTAL SHOULDER ARTHROPLASTY Bilateral R  L        Social History     Tobacco Use   Smoking Status Never Smoker   Smokeless Tobacco Never Used       OBJECTIVE:     /86 (Patient Site: Right Arm, Patient Position: Sitting)   Pulse 72   Wt 138 lb (62.6 kg)   SpO2 97%   BMI 28.20 kg/m      Physical Exam:  GENERAL APPEARANCE: A&A, NAD, well hydrated, well nourished  SKIN:  Normal skin turgor, no lesions/rashes   CV: RRR, no M/G/R   LUNGS: CTAB  EXTREMITY: no swelling noted.  Full range of motion of all 4 extremities.   NEURO: no gross deficits   PSYCHIATRIC;  Mood appropriate, memory intact for normal conversation today in the office, but did repeat herself on a couple of occasions during our visit.     LABS:     Recent Results (from the past 240 hour(s))   Vitamin B12   Result Value Ref Range    Vitamin B-12 1,228 (H) 213 - 816 pg/mL       ASSESSMENT/PLAN:     Memory loss [R41.3]      1. Memory loss    2. Elevated vitamin B12 level    3. Essential hypertension    Patient overall seems to be doing okay.  In terms of the elevated B12 level we did repeat a B12 level today.  Her initial B12 level was over 2000 and the one a month ago was 1600.  Her B12 level today is 1228 which certainly continues to decrease but is still markedly elevated.  We discussed the need to keep off of all of her B12 supplements.  Her B12 level is decreasing nicely but I would like her to continue to monitor this carefully.  Her blood  pressure was elevated today as well.  This is the first time that is ever been elevated given the fact that it came down quite nicely when I rechecked it I think it is okay to continue to monitor this.  Her granddaughter is an EMT and so she will have her granddaughter take her blood pressure on an occasional basis over the next month or so.  In terms of her memory loss she has an appointment with the neurologist on 2/8/2019.  She is going to see Dr. Nona Mckeon.  Her  sees the same doctor for his Parkinson's and so she is quite excited to be seeing the same doctor.  I am hoping that they will do more of a full workup neurologically to further evaluate her memory loss and potential treatments that may be available for her.  I have asked her to return to clinic in about a month to follow-up on the memory loss as well as to recheck her B12 level.  She does have an elevated blood pressure today and will need to monitor that carefully as well.  She has had a history of hypertension in the past but is currently not any medication for this.   continues to do a lot of caretaking for patient and is eager to have the neurological workup to see if there is medications that she could start on that might be helpful for her memory loss.  She is overall feeling well and has no complaints or concerns today.  She is going to follow-up with Ester Sigala for continued care.  Her  has seen Ester in the past and multiple friends of seeing Ester as well and have told her what a good provider she is.  If patient and her  have additional questions or concerns they certainly should let me know.  All of their questions were answered today. Total time spent with patient was at least 25 minutes,  of which greater than fifty percent was spent in counselling and coordination of care of the above medical problems.  Daija Whitmore MD

## 2021-06-24 NOTE — PROGRESS NOTES
1. Establishing care with new doctor, encounter for           Office Visit - New Patient   Angle Agudelo   87 y.o.  female    Date of visit: 2019  Physician: Ester Sigala CNP     Assessment and Plan   1. Establishing care with new doctor, encounter for           Body Mass Index was not assessed due to normal BMI.    Return in about 6 months (around 2019) for annual physical, fasting lab work.     Chief Complaint   Chief Complaint   Patient presents with     Establish Care        Patient Profile   Social History     Social History Narrative     Not on file        Past Medical History   Patient Active Problem List   Diagnosis     Osteopenia     Arthropathy Of The Shoulder Region     Myalgia And Myositis     Feeling Tired Or Poorly     Myopathies     Open Wound Of The Forearm     Essential Hypercholesterolemia     Essential hypertension     Abnormal Glucose     Benign Adenomatous Polyp Of The Large Intestine     Actinic Keratosis     Unspecified arthropathy, multiple sites     Backache     Chronic bilateral low back pain without sciatica     Rectal bleeding     Rectal bleed     Lower GI bleed     Acute blood loss anemia     Diverticulosis of large intestine with hemorrhage       Past Surgical History  She has a past surgical history that includes Cataract extraction, extracapsular w/ intraocular lens implant (Bilateral); Total shoulder arthroplasty (Bilateral, R  L ); Tonsillectomy and adenoidectomy; Appendectomy; ; finger abscess; Sigmoidoscopy (N/A, 6/3/2018); and Cervical biopsy w/ loop electrode excision (N/A, 2018).     History of Present Illness   This 87 y.o. old female patient here to establish care.  Entire medical surgical, and family history in electronic health record reviewed with patient and spouse.  Patient denies any recent health changes.      Memory loss:  Patient was recently seen by Dr. Whitmore to evaluate short term memory loss.  Her  and family noticed  patient would repeat the same questions over and over and increased forgetfulness.  Patient was evaluated by Dr. Mckeon in Neurology and underwent MRI of the brain.  She is scheduled to see Dr. Mckeon to review the results next week.  Patient and her  recently moved into a new apartment which they like.      Blood pressure:  Patients  states she does have trouble with elevated blood pressure and should be on medication for it.  Her blood pressure was elevated initially today however normotensive on recheck.  Patient denies chest pain, shortness of breath, or peripheral edema. No history of CAD or other coronary interventions.         Finger:  Patient has a history of infection in her right middle finger.  It originated when she got some dental floss caught under her fingernail.  She did have surgery and some point.  She has been seen by hand surgery, dermatology, and general surgery.  She states the finger is not really painful but remains swollen with a scab at the DIP joint.  Patient denies drainage, increased redness, or fever.  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  Vital signs are stable today.  She will be seen for annual physical and fasting blood work in August, 2019.         She does have a pessary in place and sees a specialist for this as well.     Review of Systems: A comprehensive review of systems was negative except as noted.     Medications and Allergies   Current Outpatient Medications   Medication Sig Dispense Refill     aspirin-acetaminophen-caffeine (EXCEDRIN MIGRAINE) 250-250-65 mg per tablet Take 1-2 tablets by mouth every 6 (six) hours as needed for pain.       calcium citrate-vitamin D (CITRACAL+D) 315-200 mg-unit per tablet Take 1 tablet by mouth daily.        conjugated estrogens (PREMARIN) vaginal cream Insert 0.5 g into the vagina see administration instructions. Use every 8 days (alternate every 4 days with Trimo cream).    "    No current facility-administered medications for this visit.      Allergies   Allergen Reactions     Atorvastatin Myalgia     Dextromethorphan Hives     Oxycodone Unknown     Pravastatin Sodium Myalgia     Simvastatin Myalgia     Codeine Rash        Family and Social History   Family History   Problem Relation Age of Onset     Coronary artery disease Father      Stroke Father      Coronary artery disease Brother      Coronary artery disease Brother      Dementia Sister         Social History     Tobacco Use     Smoking status: Never Smoker     Smokeless tobacco: Never Used   Substance Use Topics     Alcohol use: Yes     Alcohol/week: 1.2 oz     Types: 2 Glasses of wine per week     Drug use: No        Physical Exam   General Appearance:   Alert, oriented, elderly female in no acute distress.     /88   Pulse 98   Temp 97.7  F (36.5  C)   Resp 18   Ht 4' 10.5\" (1.486 m)   Wt 138 lb (62.6 kg)   SpO2 98%   BMI 28.35 kg/m      EYES: EOMs intact bilaterally.  HEAD, EARS, NOSE, MOUTH, AND THROAT: Head and face were normal.  NECK: Neck appearance was normal.   RESPIRATORY: Breathing pattern was normal and the chest moved symmetrically.  Lung sounds were normal and there were no abnormal sounds.  CARDIOVASCULAR: Heart rate and rhythm were normal.  S1 and S2 were normal and there were no extra sounds or murmurs. Peripheral pulses in arms and legs were normal.  Jugular venous pressure was normal.  There was no peripheral edema.  MUSCULOSKELETAL: Right middle finger DIP joint enlarged.  Thickened skin from old surgical incision.  SKIN/HAIR/NAILS: Skin color was normal.  There were no skin lesions.  Hair and nails were normal.  NEUROLOGIC: The patient was alert and oriented to person, place, time, and circumstance. Speech was normal. Motor strength was normal for age. The patient was normally coordinated.  PSYCHIATRIC:  Mood and affect were normal and the patient had normal recent and remote memory. The " patient's judgment and insight were normal.       Additional Information     Review and/or order of clinical lab tests:  Review and/or order of radiology tests:  Review and/or order of medicine tests:  Discussion of test results with performing physician:  Decision to obtain old records and/or obtain history from someone other than the patient:  Review and summarization of old records and/or obtaining history from someone other than the patient and.or discussion of case with another health care provider:  Independent visualization of image, tracing or specimen itself:    Time: total time spent with the patient was 30 minutes of which >50% was spent in counseling and coordination of care     Ester Sigala, CNP  Family Nurse Practitioner  [facility]  [phone]

## 2021-06-25 NOTE — TELEPHONE ENCOUNTER
Okay to wait until June 9th. Please notify daughter Daija regarding the plan for in home PT/OT services.

## 2021-06-25 NOTE — TELEPHONE ENCOUNTER
Reason for Call: Request for an order or referral:    Order or referral being requested: req. An order for o.t. 2 week for 4 weeks including todays eval and a.d.l and home safety training.  Pt was vomiting today- has been having these episodes, but  Will call or come in if worse Date needed: as soon as possible    Has the patient been seen by the PCP for this problem? YES and Not Applicable    Additional comments:     Phone number can be reached at:  Other phone number:  [facility] [phone]    Best Time:      Can we leave a detailed message on this number?  Yes    Call taken on 6/14/2021 at 12:05 PM by Tigist Rahman

## 2021-06-25 NOTE — TELEPHONE ENCOUNTER
RN cannot approve Refill Request    RN can NOT refill this medication Allergy Contraindication. Last office visit: 1/13/2020 Ester Sigala NP Last Physical: Visit date not found Last MTM visit: Visit date not found Last visit same specialty: 3/19/2021 J Carlos Reyes MD.  Next visit within 3 mo: Visit date not found  Next physical within 3 mo: Visit date not found      Madiha Moscoso, [facility] Triage/Med Refill 5/27/2021    Requested Prescriptions   Pending Prescriptions Disp Refills     lovastatin (MEVACOR) 40 MG tablet [Pharmacy Med Name: LOVASTATIN TABS 40MG] 90 tablet 3     Sig: TAKE 1 TABLET DAILY       Statins Refill Protocol (Hmg CoA Reductase Inhibitors) Passed - 5/27/2021  2:28 AM        Passed - PCP or prescribing provider visit in past 12 months      Last office visit with prescriber/PCP: 1/13/2020 Ester Sigala NP OR same dept: 3/19/2021 J Carlos Reyes MD OR same specialty: 3/19/2021 J Carlos Reyes MD  Last physical: Visit date not found Last MTM visit: Visit date not found   Next visit within 3 mo: Visit date not found  Next physical within 3 mo: Visit date not found  Prescriber OR PCP: Ester Sigala NP  Last diagnosis associated with med order: 1. Acute cerebrovascular accident (CVA) (H)  - lovastatin (MEVACOR) 40 MG tablet [Pharmacy Med Name: LOVASTATIN TABS 40MG]; TAKE 1 TABLET DAILY  Dispense: 90 tablet; Refill: 3    If protocol passes may refill for 12 months if within 3 months of last provider visit (or a total of 15 months).

## 2021-06-25 NOTE — ED TRIAGE NOTES
"Patient presents to ED with daughter, patient was at home with her  and she got dizzy and nauseous and had one bout of diarrhea, had had PT earlier this morning.  Patient had one bout of emesis upon arrival to ED, patient had one BM here as well that was formed.  Patient reports that she feels \"groggy or froggy or loggy\" right now, Dr. Greer assessed patient in triage.  Paulette Reyes RN.......6/9/2021 3:09 PM  "

## 2021-06-25 NOTE — TELEPHONE ENCOUNTER
Spoke with daughter Daija and informed her of the thyroid biopsy results which were negative for malignancy.  Results show a benign nodule.  No further follow-up is recommended at this time.  She did inform you that their home care is scheduled for June 9, she spoke to the company today.  I also sent the refill of the Exelon to her [facility] pharmacy.

## 2021-06-25 NOTE — TELEPHONE ENCOUNTER
Forms Request  Name of form/paperwork:   Physician Order(s)  Have you been seen for this request: N/A  Do we have the form: Yes- placed in Provider's Inbox (yellow folder)  When is form needed by: when done  How would you like the form returned: Fax:  [phone]  Patient Notified form requests are processed in 3-5 business days: N/A  Okay to leave a detailed message? N/A

## 2021-06-25 NOTE — TELEPHONE ENCOUNTER
Reason for Call:  Request for results:    Name of test or procedure: U/C FROM 6-9    Date of test of procedure: U/A IS DONE, BUT IT SAYS THE CX. NEEDS TO BE COLLECTED YET.   PT IS STILL IN THE SAME PAIN AND WANTS TO GO BACK TO THE E.R. PLEASE CALL [facility] ASAP    Location of the test or procedure: [facility]    OK to leave the result message on voice mail or with a family member? Yes    Phone number Patient can be reached at:   Cell number on file:    Telephone Information:   Mobile [phone]   Mobile [phone]       Additional comments:     Call taken on 6/11/2021 at 12:27 PM by Tigist Rahman

## 2021-06-25 NOTE — TELEPHONE ENCOUNTER
She started on Wednesday, dizzy diarrhea and vomiting. Seen in the ER. Tested blood and did a urine. Every  thing was normal. SHe was fine in a couple of hours. Went home with nothing. Same thing happened on Friday. Seen in ER again, same symptoms. Within two hours she was fine again. On both days her blood pressure was high. Today it's 190/110. She's sick again, vomiting and is in the bathroom, feels ill. I connected with scheduling for an appointment. I advised the ER if they can't get her in to the clinic.  Madeline Luo RN  [facility]    Reason for Disposition    Systolic BP >= 180 OR Diastolic >= 110    Additional Information    Negative: Sounds like a life-threatening emergency to the triager    Negative: Pregnant > 20 weeks or postpartum (< 6 weeks after delivery) and new hand or face swelling    Negative: Pregnant > 20 weeks and BP > 140/90    Negative: Systolic BP >= 160 OR Diastolic >= 100, and any cardiac or neurologic symptoms (e.g., chest pain, difficulty breathing, unsteady gait, blurred vision)    Negative: Patient sounds very sick or weak to the triager    Negative: BP Systolic BP >= 140 OR Diastolic >= 90 and postpartum (from 0 to 6 weeks after delivery)    Negative: Systolic BP >= 180 OR Diastolic >= 110, and missed most recent dose of blood pressure medication    Protocols used: HIGH BLOOD PRESSURE-A-OH

## 2021-06-25 NOTE — TELEPHONE ENCOUNTER
Forms Request  Name of form/paperwork: Other:  [facility] HEALTH FORM   Have you been seen for this request: N/A  Do we have the form: Yes- IN [employer] INBOX  When is form needed by: WHEN DONE   How would you like the form returned: Fax:  [phone]  Patient Notified form requests are processed in 3-5 business days: No    Okay to leave a detailed message? No

## 2021-06-25 NOTE — ED TRIAGE NOTES
Pt here with nausea vomiting and diarrhea that started about an hour ago. Pt had similar condition on Wednesday and was seen here.

## 2021-06-25 NOTE — TELEPHONE ENCOUNTER
Reason for Call: Request for an order or referral:    Order or referral being requested: in need of a v.o, for nursing eval due to high b/p and vomiting episodes    Date needed: at your convenience    Has the patient been seen by the PCP for this problem? YES and Not Applicable    Additional comments:     Phone number [facility] can be reached at:  Other phone number:  [phone]    Best Time:      Can we leave a detailed message on this number?  Yes    Call taken on 6/15/2021 at 9:46 AM by Tigist Rahman

## 2021-06-25 NOTE — ED NOTES
Introduced self to patient. White board updated. Hourly rounding explained to patient. Side rails up. Call light given to patient. Pain assessed.

## 2021-06-25 NOTE — TELEPHONE ENCOUNTER
Reason for Call:  Home Health Care    JOHN with [facility] called regarding (reason for call): VERBAL PT ORDERS    Orders are needed for this patient. PT    PT: 2x/wk for 3wks, strengthening gait and balance    OT: none    Skilled Nursing: none    Pt Provider: Ester Sigala    Phone Number Homecare Nurse can be reached at: [phone]    Can we leave a detailed message on this number? Yes     Phone number patient can be reached at: Home number on file [phone] (home)    Best Time: anytime    Call taken on 6/9/2021 at 1:37 PM by Don Alonso

## 2021-06-26 NOTE — PROGRESS NOTES
"TCM DISCHARGE FOLLOW UP CALL     Tell me how you are doing now that you are home?\" Doing better. Nausea has subsided      Discharge Instructions     Do you understand your discharge instructions? Yes      \"Has an appointment with your primary care provider been scheduled?\" Yes  \"If yes, when is that appointment?  6/21/21    Medications    \"Did you have any medication changes?   No   Do you have any concerns with obtaining the medications or questions about your medications that you would like a RN to review with you?  No  \"When you see the provider, I would recommend that you bring your medications with you.\"    Call Summary    \"What questions or concerns do you have about your recent visit and your follow-up care?\"No questions or concerns          \"If you have questions or things don't continue to improve, we encourage you contact us through the main clinic number [phone].  Even if the clinic is not open, triage nurses are available 24/7 to help you.                    "

## 2021-06-26 NOTE — ANESTHESIA POSTPROCEDURE EVALUATION
Patient: Angle Agudelo  Procedure(s):  ESOPHAGOGASTRODUODENOSCOPY (EGD)  Anesthesia type: MAC    Patient location: [facility] Surgical Floor  Last vitals:   Vitals Value Taken Time   /72 06/16/21 1001   Temp  06/16/21 1046   Pulse 84 06/16/21 1045   Resp 16 06/16/21 0845   SpO2 97 % 06/16/21 0845   Vitals shown include unvalidated device data.  Post vital signs: stable  Level of consciousness: awake and responds to simple questions  Post-anesthesia pain: pain controlled  Post-anesthesia nausea and vomiting: no  Pulmonary: unassisted, return to baseline  Cardiovascular: stable and blood pressure at baseline  Hydration: adequate  Anesthetic events: no    QCDR Measures:  ASA# 11 - Margie-op Cardiac Arrest: ASA11B - Patient did NOT experience unanticipated cardiac arrest  ASA# 12 - Margie-op Mortality Rate: ASA12B - Patient did NOT die  ASA# 13 - PACU Re-Intubation Rate: NA - No ETT / LMA used for case  ASA# 10 - Composite Anes Safety: ASA10A - No serious adverse event    Additional Notes:

## 2021-06-26 NOTE — ANESTHESIA CARE TRANSFER NOTE
Last vitals:   Vitals:    06/16/21 0831   BP: 167/82   Pulse: 98   Resp: 12   Temp:    SpO2: 100%     Patient's level of consciousness is awake  Spontaneous respirations: yes  Maintains airway independently: yes  Dentition unchanged: yes  Oropharynx: oropharynx clear of all foreign objects    QCDR Measures:  ASA# 20 - Surgical Safety Checklist: WHO surgical safety checklist completed prior to induction    PQRS# 430 - Adult PONV Prevention: 4558F - Pt received => 2 anti-emetic agents (different classes) preop & intraop  ASA# 8 - Peds PONV Prevention: NA - Not pediatric patient, not GA or 2 or more risk factors NOT present  PQRS# 424 - Margie-op Temp Management: NA - MAC anesthesia or case < 60 minutes  PQRS# 426 - PACU Transfer Protocol: - Transfer of care checklist used  ASA# 14 - Acute Post-op Pain: ASA14B - Patient did NOT experience pain >= 7 out of 10

## 2021-06-26 NOTE — PROGRESS NOTES
1. Essential hypertension  Basic Metabolic Panel   2. Essential Hypercholesterolemia  Lipid Cascade FASTING   3. History of CVA (cerebrovascular accident)     4. Dementia without behavioral disturbance, unspecified dementia type (H)     5. Thyroid nodule     6. Physical deconditioning  Ambulatory referral to Home Health   7. ACP (advance care planning)           Assessment & Plan     Essential hypertension    - Basic Metabolic Panel  Well-controlled, she is currently not taking any antihypertensive medications    Essential Hypercholesterolemia    - Lipid Cascade FASTING  She is on statin therapy, will check serum lab work today  She was advised to continue with her daily aspirin    History of CVA (cerebrovascular accident)    She has no residual symptoms remaining from her previous stroke  She is in need of some additional PT and OT which was recommended by her neurologist back in March due to her deconditioned status  After the referral order was placed by neurology in March, her daughter Daija who is here today states that the PT and OT never happened, they never got a call from anybody to get this set up  They are requesting home care services for in-home PT/OT    Dementia without behavioral disturbance, unspecified dementia type (H)    We did review the documentation from Dr. Haines, her neurologist from her last office visit in March  She will see her neurologist again in September  The patient and her daughter are unsure if the Exelon dosage ever got increased to 2 tablets twice daily, they will check with the patient's  who manages her medications  If she has not been taking 2 tablets twice daily, I did advise him to increase her dosage since she has done well on the 1 tablet twice daily and continue with the Namenda as well based on the recommendations from her neurologist, Dr. Mckeon  Her daughter Daija would like to be the 1st contact on her chart going forward for any appointments that need to be schedule  "and for any updates, I did encourage her to stop at the  before they leave the clinic today to get the phone numbers updated on the chart.    Thyroid nodule    I was able to see that the fine-needle aspiration was completed in August 2020 on the left thyroid nodule, however, there were no results posted about what the biopsy results showed  We will place a call into [facility] to see what happened to those results and give the family and update once we know more    Physical deconditioning    - Ambulatory referral to Home Health  She has no residual symptoms remaining from her previous stroke  She is in need of some additional PT and OT which was recommended by her neurologist back in March due to her deconditioned status  After the referral order was placed by neurology in March, her daughter Daija who is here today states that the PT and OT never happened, they never got a call from anybody to get this set up  They are requesting home care services for in-home PT/OT      ACP (advance care planning)    She does have a healthcare directive on file that is in her chart    Review of external notes as documented in note  50 minutes spent on the date of the encounter doing chart review, review of outside records, review of test results, interpretation of tests, patient visit, documentation and discussion with family        BMI:   Estimated body mass index is 25.35 kg/m  as calculated from the following:    Height as of this encounter: 4' 11\" (1.499 m).    Weight as of this encounter: 125 lb 8 oz (56.9 kg).   I have had an Advance Directives discussion with the patient.  The following high BMI interventions were performed this visit: encouragement to exercise and weight monitoring    Return in about 6 months (around 12/4/2021) for hyperlipidemia, dementia follow up.    Ester Sigala NP  [facility]   Angle SELLERS Magnus is 89 y.o. and presents today for the following " health issues: med check   HPI dementia/history of stroke: Patient is accompanied by her daughter Daija today.  Daughter has noticed that the patient is now repeating herself more often, she is not exhibiting any signs of combativeness or labile mood swings, she has not been wandering off.  She continues to be independent with most activities of daily living, she lives in a senior apartment independent living with her .  They do have a cleaning lady that comes to the home every other week to assist them.  Family checks in on them often.  She does see her neurologist Dr. Haines every 6 months, she continues to take them and Exelon, she is no longer taking the Aricept.  When we reviewed her medications today, the daughter is uncertain if she has been taking the Exelon 2 tablets twice daily, as far she knows she is taking 1 tablet twice daily.  We did review the documentation from her neurologist which stated that if she tolerated the 1 tablet twice daily and she was to increase the dose to 2 tablets twice per day.  Her  typically loads her medications so the daughter will check with her father regarding the medication.  We also reviewed the previous MRI which showed a small infarct in the brain back in June 2020, she has no residual symptoms left over from her stroke.  Daughter reports that 2 weeks ago, she did have a very mild incident of slurring and confusion after getting out of the shower but the symptoms resolved within a few minutes.  It was not evaluated after the incident occurred.  Patient denies any recent chest pain, headaches or current dizziness symptoms, she is taking her statin medication and her aspirin daily.  Upon reviewing her previous neurology note, the neurologist did order physical therapy and Occupational Therapy however, the daughter states that this was never set up nor was it completed and they are needing new referrals today due to her physical deconditioning, shuffled gait and  "unsteady gait at times.     History of thyroid nodule: Patient underwent a fine-needle aspiration of the left-sided thyroid nodule in August 2020, I am unable to locate the results of the biopsy.  Patient denies any hot or cold intolerance, heart palpitations, constipation, skin or hair changes today.  She has no difficulty with swallowing or swelling in the neck.      She does have an advanced healthcare directive on file dated in 2011.      Review of Systems        Objective    /74   Pulse 80   Temp 98  F (36.7  C)   Resp 16   Ht 4' 11\" (1.499 m)   Wt 125 lb 8 oz (56.9 kg)   SpO2 98%   Breastfeeding No   BMI 25.35 kg/m    Body mass index is 25.35 kg/m .  Physical Exam      Review of Systems     Denies fever, chills,  fatigue, myalgias, nasal congestion, rhinorrhea, ear pain, or discharge, sore throat, swollen glands, breast mass, nipple discharge, breast changes, abdominal pain, cough, shortness of breath, chest pain, weight change, change in bowel habits, melena, rectal bleeding, dysuria, frequency, urgency, hematuria, polyuria, polydipsia, polyphagia, joint pain or swelling or erythema, edema, rash,  paresthesias, vaginal discharge or bleeding for a febrile       Objective:         /74   Pulse 80   Temp 98  F (36.7  C)   Resp 16   Ht 4' 11\" (1.499 m)   Wt 125 lb 8 oz (56.9 kg)   SpO2 98%   Breastfeeding No   BMI 25.35 kg/m       Physical Exam:  General Appearance: Alert, cooperative, no distress, appears stated age  Head: Normocephalic, without obvious abnormality, atraumatic  Eyes: PERRL, conjunctiva/corneas clear, EOM's intact  Neck: Supple, symmetrical, trachea midline, no adenopathy;  thyroid: not enlarged, symmetric, no tenderness/mass/nodules; no carotid bruit or JVD  Lungs: Clear to auscultation bilaterally, respirations unlabored  Heart: Regular rate and rhythm, S1 and S2 normal, no murmur, rub, or gallop  Skin: Skin color, texture, turgor normal, no rashes or " lesions  Neurologic: Memory recall poor, she was unable to tell me what the year was, who the president was and she was unable to tell me what her address was.  She does walk with a shuffled gait, she is slightly unsteady when she goes from a standing to turning position.  She is able to follow simple commands.  Face is symmetrical,  strength is equal, no drift noted, no tongue deviation.  She has a calm and pleasant demeanor, she is well-groomed.

## 2021-06-26 NOTE — ED PROVIDER NOTES
6:20 PM - Patient signed out to me by Dr. Long at routine shift change. 89 y.o. female with history of dementia presenting with nausea. Plan is follow up UA and CTA (known AAA). Hemodynamically stable and tolerating PO at this time. Please see Dr. Long's note for details.    8:19 PM - CT with stable AAA, no colitis, no diverticulitis, no SBO, no perforation; no acute abnormality or explanatory pathology; reassuring. Patient with benign abdomen with no tenderness on recheck and tolerating PO. No concern for ongoing emergent life-threatening etiology; ok for outpatient follow up. Will trial Zofran as outpatient. Patient and daughter reassured and comfortable with discharge at this time. Return precautions and need for PCP follow up discussed and understood. Patient understood and agreed with the plan; no further questions at the time of discharge.        CTA Abdomen Pelvis   Final Result   1.  No abnormalities are seen to explain pain.   2.  No bleeding from the proximal sigmoid diverticulum is noted on the earlier exam.   3.  Colon is collapsed suggestive of an enteritis. No evidence of a colitis.   4.  Stable lower abdominal aortic aneurysm near the origin of the HELEN measuring 3.5 x 2.8 cm.   5.  Extensive sigmoid diverticulosis.   6.  Other noncritical findings as noted above.          IMPRESSION:  1. Nausea vomiting and diarrhea  ondansetron (ZOFRAN ODT) 4 MG disintegrating tablet   2. History of abdominal aortic aneurysm (AAA)      stable on CT on 6/11/2021         Koko Weaver MD  06/11/21  Emergency Medicine  [facility] EMERGENCY ROOM  [facility] [facility] [address]  Dept: [phone]  Loc: [phone]     Koko Weaver MD  06/11/21 2020

## 2021-06-26 NOTE — PATIENT INSTRUCTIONS - HE
Check on Exelon prescription at home, see if she is taking 2 tablets twice daily, if need refill, call clinic and let me know. [facility]  I will call you once I figure out what happened with your thyroid biopsy results, will call Daija to discuss.   Home care PT/OT should be calling in the next 48 hours to get things set up for in home therapy.   Follow up with Neurology in September. Try to track down Dr. Mckeon

## 2021-06-26 NOTE — TELEPHONE ENCOUNTER
Forms Request  Name of form/paperwork:   Physician Order(s)  Have you been seen for this request: N/A  Do we have the form: Yes- placed in Provider's Inbox (yellow folder)  When is form needed by: when done  How would you like the form returned: Fax:  [phone]  Patient Notified form requests are processed in 3-5 business days: N/A  Okay to leave a detailed message? N/A

## 2021-06-26 NOTE — ED PROVIDER NOTES
EMERGENCY DEPARTMENT ENCOUNTER      NAME: Angle Agudelo  AGE: 89 y.o. female  YOB: 1932  MRN: [id number]  EVALUATION DATE & TIME: 6/11/2021  3:38 PM    PCP: Ester Sigala, NP    ED PROVIDER: Brittany Long M.D.      Chief Complaint   Patient presents with     Nausea/Vomiting/Diarrhea         FINAL IMPRESSION:  1. Nausea vomiting and diarrhea    2. History of abdominal aortic aneurysm (AAA)          ED COURSE & MEDICAL DECISION MAKING:    Pertinent Labs & Imaging studies reviewed. (See chart for details)    Elderly female here with acute episode of nausea with vomiting and loose stools after recent visit 2 days ago with similar symptoms of vomiting, diarrhea along with some vague dizziness for which she underwent stroke work-up with MRI and was ultimately discharged home.  Here she is well-appearing with somewhat elevated blood pressure but otherwise stable vitals.  Very benign abdominal exam and nonfocal neurologic exam with some underlying dementia.  With recent neuroimaging low concern for intracranial process at this time.  Also low concern for surgical process in the abdomen.  May be related to patient such as viral gastroenteritis or foodborne illness, though no one else around her has been ill with similar symptoms despite eating the same food.  Labs overall reassuring with normal electrolytes, good kidney function, and just minimal known anion gap metabolic acidosis which may be related to diarrhea.  Urinalysis done 2 days ago showed 6 white cells and few bacteria, and expectation was set that it would be sent for culture though does not appear that culture was ultimately performed.  Will repeat today.  Daughter expresses concern that symptoms could be related to patient's known AAA although he overall did not seem consistent with this.  However given advanced age and GI symptoms without clear etiology would have low threshold for performing CT abdomen pelvis for further evaluation.  Medication adverse effect is also a consideration w/ pt's dementia medication being increased on Wednesday, though they state this was after her n/v/d that day so this may be less likely.  Low concern for emergent process such as sepsis or atypical ACS. Sx resolved in ED and pt tolerating PO. Anticipate likely discharge home pending remaining results.     3:46 PM I met with the patient and her daughter to collect initial history and perform my initial exam. We discussed the plan of care while in the ED. PPE: N95 and glasses.  5:55 PM I rechecked the patient. Signed out to oncoming provider at shift change.  UA overall unremarkable but UCx sent. CTA a/p pending.    At the conclusion of the encounter I discussed the results of all of the tests and the disposition. The questions were answered. The patient or family acknowledged understanding and was agreeable with the care plan.       MEDICATIONS GIVEN IN THE EMERGENCY:  Medications   ondansetron injection 4 mg (ZOFRAN) (4 mg Intravenous Given 6/11/21 1558)   sodium chloride 0.9% 500 mL (0 mL Intravenous Stopped 6/11/21 1817)       NEW PRESCRIPTIONS STARTED AT TODAY'S ER VISIT  Current Discharge Medication List      CONTINUE these medications which have NOT CHANGED    Details   aspirin 81 mg chewable tablet Chew 1 tablet (81 mg total) daily.  Qty: 90 tablet, Refills: 0    Associated Diagnoses: Acute CVA (cerebrovascular accident) (H)      aspirin-acetaminophen-caffeine (EXCEDRIN MIGRAINE) 250-250-65 mg per tablet Take 1-2 tablets by mouth every 6 (six) hours as needed for pain.      calcium citrate-vitamin D (CITRACAL+D) 315-200 mg-unit per tablet Take 1 tablet by mouth daily.       latanoprost (XALATAN) 0.005 % ophthalmic solution Administer 1 drop to both eyes at bedtime.      lovastatin (MEVACOR) 40 MG tablet TAKE 1 TABLET DAILY  Qty: 30 tablet, Refills: 0    Comments: DUE FOR MED CHECK 5/27/21  Associated Diagnoses: Acute cerebrovascular accident (CVA) (H)       memantine (NAMENDA) 10 MG tablet Take 10 mg by mouth 2 (two) times a day.      rivastigmine tartrate (EXELON) 1.5 MG capsule Take 2 capsules (3 mg total) by mouth 2 (two) times a day.  Qty: 180 capsule, Refills: 1    Associated Diagnoses: Dementia without behavioral disturbance, unspecified dementia type (H)                =================================================================    HPI    Patient information was obtained from: Patient and her daughter    Use of : N/A        Angle Agudelo is a 89 y.o. female with a pertinent history of hypertension, CVA, diverticulosis, dementia who presents to this ED by walk in for evaluation of nausea and vomiting.    Patient woke this morning feeling well, took a shower, went out to breakfast, then went to her daughter's house. At her daughter's house, she felt nauseous and had two episodes of NBNB emesis. She was having some abdominal discomfort at time of emesis but indicates this was nausea rather than pain. She also reports episode of diarrhea x1 today. No bloody or black stools. She had diarrhea two days ago when she was evaluated for dizziness. Patient states she had a UA w/ 6 WBC and few bacteria but was not treated as she was asymptomatic, w/ reported plan to culture urine. No fevers, cough, cold symptoms, chest pain, shortness of breath, dysuria/freq/urgency. Prior abdominal surgeries include s/p appendectomy. No recent travel, sick contacts, recent suspicious foods. Medical history includes known AAA. Patient reports recent increase in Exelon (rivastigmine) dosage within the last week which she takes for memory deficits.    Hx mildly limited by dementia    REVIEW OF SYSTEMS   Review of Systems   Constitutional: Negative for fever.   Respiratory: Negative for cough and shortness of breath.    Cardiovascular: Negative for chest pain.   Gastrointestinal: Positive for abdominal pain, diarrhea, nausea and vomiting.   Genitourinary: Negative for  dysuria.    Hx mildly limited by dementia    PAST MEDICAL HISTORY:  Past Medical History:   Diagnosis Date     Acute cerebrovascular accident (CVA) (H) 2020     Benign neoplasm of colon      Dementia (H)      Disorder of bone and cartilage, unspecified      Pure hypercholesterolemia      Skin cancer     Not Melanoma, not sure what kind though     Unspecified arthropathy, shoulder region      Unspecified essential hypertension        PAST SURGICAL HISTORY:  Past Surgical History:   Procedure Laterality Date     APPENDECTOMY       CATARACT EXTRACTION EXTRACAPSULAR W/ INTRAOCULAR LENS IMPLANTATION Bilateral      CERVICAL BIOPSY N/A 2020    Procedure: PUNCH BIOPSY, CERVIX;  Surgeon: Olive Biggs MD;  Location: [facility] OR;  Service: Gynecology     CERVICAL BIOPSY  W/ LOOP ELECTRODE EXCISION N/A 2018    Procedure: LOOP ELECTROSURGICAL EXCISION PROCEDURE, REMOVAL PESSARY;  Surgeon: Olive Biggs MD;  Location: [facility] OR;  Service:      finger abscess             x5     FL DILATION/CURETTAGE,DIAGNOSTIC N/A 2020    Procedure: DILATION AND CURETTAGE, UTERUS;  Surgeon: Olive Biggs MD;  Location: [facility] OR;  Service: Gynecology     SIGMOIDOSCOPY N/A 6/3/2018    Procedure: SIGMOIDOSCOPY;  Surgeon: Dayana Joshi MD;  Location: [facility];  Service:      TONSILLECTOMY AND ADENOIDECTOMY       TOTAL SHOULDER ARTHROPLASTY Bilateral R  L       THYROID BIOPSY  2020     VULVA / PERINEUM BIOPSY N/A 2020    Procedure: VULVAR BIOPSY;  Surgeon: Olive Biggs MD;  Location: [facility];  Service: Gynecology           CURRENT MEDICATIONS:    No current facility-administered medications on file prior to encounter.      Current Outpatient Medications on File Prior to Encounter   Medication Sig     aspirin 81 mg chewable tablet Chew 1 tablet (81 mg total) daily.     aspirin-acetaminophen-caffeine (EXCEDRIN MIGRAINE) 250-250-65 mg  per tablet Take 1-2 tablets by mouth every 6 (six) hours as needed for pain.     calcium citrate-vitamin D (CITRACAL+D) 315-200 mg-unit per tablet Take 1 tablet by mouth daily.      latanoprost (XALATAN) 0.005 % ophthalmic solution Administer 1 drop to both eyes at bedtime.     lovastatin (MEVACOR) 40 MG tablet TAKE 1 TABLET DAILY     memantine (NAMENDA) 10 MG tablet Take 10 mg by mouth 2 (two) times a day.     rivastigmine tartrate (EXELON) 1.5 MG capsule Take 2 capsules (3 mg total) by mouth 2 (two) times a day.       ALLERGIES:  Allergies   Allergen Reactions     Atorvastatin Myalgia     Dextromethorphan Hives     Oxycodone Unknown     Pravastatin Myalgia     Pravastatin Sodium Myalgia     Simvastatin Myalgia     Codeine Rash       FAMILY HISTORY:  Family History   Problem Relation Age of Onset     Coronary artery disease Father      Stroke Father      Coronary artery disease Brother      Coronary artery disease Brother      Dementia Sister        SOCIAL HISTORY:   Social History     Socioeconomic History     Marital status:      Spouse name: None     Number of children: None     Years of education: None     Highest education level: None   Occupational History     None   Social Needs     Financial resource strain: None     Food insecurity     Worry: None     Inability: None     Transportation needs     Medical: None     Non-medical: None   Tobacco Use     Smoking status: Never Smoker     Smokeless tobacco: Never Used   Substance and Sexual Activity     Alcohol use: Yes     Alcohol/week: 2.0 standard drinks     Types: 2 Glasses of wine per week     Drug use: No     Sexual activity: None   Lifestyle     Physical activity     Days per week: None     Minutes per session: None     Stress: None   Relationships     Social connections     Talks on phone: None     Gets together: None     Attends [employer] service: None     Active member of club or organization: None     Attends meetings of clubs or organizations:  None     Relationship status: None     Intimate partner violence     Fear of current or ex partner: None     Emotionally abused: None     Physically abused: None     Forced sexual activity: None   Other Topics Concern     None   Social History Narrative     None       VITALS:  Patient Vitals for the past 24 hrs:   BP Temp Temp src Pulse Resp SpO2   06/11/21 1818 (!) 186/93 -- -- -- -- --   06/11/21 1600 (!) 178/96 -- -- 67 -- 96 %   06/11/21 1545 169/85 -- -- 65 -- 100 %   06/11/21 1313 178/81 (!) 96.5  F (35.8  C) Temporal (!) 59 18 97 %       PHYSICAL EXAM   Constitutional: Well developed, Well nourished, NAD  HEENT: Normocephalic, Atraumatic, wearing mask  Neck: Supple  Eyes: Conjunctiva normal  Respiratory: No respiratory distress, CTAB  Cardiovascular: Normal heart rate, Regular rhythm  GI: Soft, Nontender, Nondistended. No rebound or guarding.  Musculoskeletal: No clubbing, cyanosis, or edema.  Integument: Warm, Dry.   Neurologic: Alert & oriented x 3, No focal deficits noted.  Normal speech. Moving all four extremities equally. Grossly normal coordination. +Memory deficits noted.  Psychiatric: Affect normal, Cooperative.     LAB:  All pertinent labs reviewed and interpreted.  Results for orders placed or performed during the hospital encounter of 06/11/21   Comprehensive Metabolic Panel   Result Value Ref Range    Sodium 144 136 - 145 mmol/L    Potassium 4.1 3.5 - 5.0 mmol/L    Chloride 109 (H) 98 - 107 mmol/L    CO2 21 (L) 22 - 31 mmol/L    Anion Gap, Calculation 14 5 - 18 mmol/L    Glucose 99 70 - 125 mg/dL    BUN 19 8 - 28 mg/dL    Creatinine 0.76 0.60 - 1.10 mg/dL    GFR MDRD Af Amer >60 >60 mL/min/1.73m2    GFR MDRD Non Af Amer >60 >60 mL/min/1.73m2    Bilirubin, Total 0.7 0.0 - 1.0 mg/dL    Calcium 9.4 8.5 - 10.5 mg/dL    Protein, Total 7.5 6.0 - 8.0 g/dL    Albumin 4.3 3.5 - 5.0 g/dL    Alkaline Phosphatase 79 45 - 120 U/L    AST 26 0 - 40 U/L    ALT 13 0 - 45 U/L   Magnesium   Result Value Ref Range     Magnesium 2.2 1.8 - 2.6 mg/dL   HM1 (CBC with Diff)   Result Value Ref Range    WBC 9.0 4.0 - 11.0 thou/uL    RBC 5.10 3.80 - 5.40 mill/uL    Hemoglobin 14.4 12.0 - 16.0 g/dL    Hematocrit 48.2 (H) 35.0 - 47.0 %    MCV 95 80 - 100 fL    MCH 28.2 27.0 - 34.0 pg    MCHC 29.9 (L) 32.0 - 36.0 g/dL    RDW 17.9 (H) 11.0 - 14.5 %    Platelets 134 (L) 140 - 440 thou/uL    MPV 11.5 8.5 - 12.5 fL    Neutrophils % 83 (H) 50 - 70 %    Lymphocytes % 12 (L) 20 - 40 %    Monocytes % 4 2 - 10 %    Eosinophils % 0 0 - 6 %    Basophils % 1 0 - 2 %    Immature Granulocyte % 0 <=0 %    Neutrophils Absolute 7.5 2.0 - 7.7 thou/uL    Lymphocytes Absolute 1.1 0.8 - 4.4 thou/uL    Monocytes Absolute 0.4 0.0 - 0.9 thou/uL    Eosinophils Absolute 0.0 0.0 - 0.4 thou/uL    Basophils Absolute 0.1 0.0 - 0.2 thou/uL    Immature Granulocyte Absolute 0.0 <=0.0 thou/uL   Urinalysis-UC if Indicated   Result Value Ref Range    Color, UA Colorless Light Yellow, Yellow    Clarity, UA Clear Clear    Glucose, UA Negative Negative    Protein, UA Negative Negative    Bilirubin, UA Negative Negative    Urobilinogen, UA <2.0 mg/dL <2.0 mg/dL    pH, UA 6.5 5.0 - 8.0    Blood, UA Negative Negative    Ketones, UA 20 mg/dL Negative    Nitrite, UA Negative Negative    Leukocytes, UA 75 Mario/uL (!) Negative    Specific Gravity, UA 1.008 1.001 - 1.030    RBC, UA 1 <=2 hpf    WBC UA 2 <=5 hpf    Bacteria, UA None Seen None Seen    Squamous Epithel, UA 0 <=5 /HPF    Mucus, UA Present (!) None Seen lpf       RADIOLOGY:  Reviewed all pertinent imaging. Please see official radiology report.  No results found.  CTA ap pending    I, Keyla Mcqueen, am serving as a scribe to document services personally performed by Dr. Long based on my observation and the provider's statements to me. I, Brittany Long MD attest that Keyla Mcqueen is acting in a scribe capacity, has observed my performance of the services and has documented them in accordance with my  direction.    Brittany Long M.D.  Emergency Medicine  [facility] EMERGENCY ROOM  [address] [address]  Dept: [phone]  Loc: [phone]     Brittany Long MD  06/11/21 7041

## 2021-06-26 NOTE — ANESTHESIA PREPROCEDURE EVALUATION
Anesthesia Evaluation      Patient summary reviewed   No history of anesthetic complications     Airway   Mallampati: II  Neck ROM: full   Pulmonary - negative ROS    breath sounds clear to auscultation  (-) not a smoker                         Cardiovascular   Exercise tolerance: > or = 4 METS  (+) hypertension, ,     (-) angina  Rhythm: regular        Neuro/Psych    (+) neuromuscular disease,  CVA ,     Endo/Other    (+) arthritis,      GI/Hepatic/Renal    (+) GERD,        Other findings:     COVID neg 6/14/21      Dental - normal exam                        Anesthesia Plan  Planned anesthetic: MAC    ASA 3   Induction: intravenous   Anesthetic plan and risks discussed with: patient  Anesthesia plan special considerations: antiemetics,   Post-op plan: routine recovery

## 2021-06-29 NOTE — PROGRESS NOTES
Progress Notes by Yanni Falcon RN at 6/25/2020 12:00 PM     Author: Yanni Falcon RN Service: -- Author Type: Registered Nurse    Filed: 6/25/2020  1:24 PM Encounter Date: 6/25/2020 Status: Signed    : Yanni Falcon RN (Registered Nurse)       Clinic Care Coordination Contact    Clinic Care Coordination Contact  OUTREACH    Referral Information:  Referral Source: IP Handoff([facility]PCIA)    Primary Diagnosis: Other (include Comment box)(CVA)    Chief Complaint   Patient presents with   ? Clinic Care Coordination - Initial        Clinic Utilization  Difficulty keeping appointments:: No  Compliance Concerns: No  No-Show Concerns: No  No PCP office visit in Past Year: No  Utilization    Last refreshed: 6/25/2020  9:36 AM:  Hospital Admissions 2           Last refreshed: 6/25/2020  9:36 AM:  ED Visits 4           Last refreshed: 6/25/2020  9:36 AM:  No Show Count (past year) 0              Current as of: 6/25/2020  9:36 AM              Clinical Concerns:  Current Medical Concerns:  Osteopenia, myalgia, htn,  Lower gi bleed, diverticulosis of large intestine, cva, dementia  Current Behavioral Concerns:    Education Provided to patient: yes   Pain  Pain (GOAL):: No  Health Maintenance Reviewed: Up to date  Clinical Pathway: None     Medication Management:  Spouse assists patient to take medications out of the bottles on a daily basis     Functional Status:  Dependent ADLs:: Ambulation-walker  Dependent IADLs:: Transportation  Bed or wheelchair confined:: No  Mobility Status: Independent w/Device    Living Situation:  Current living arrangement:: Other(Independent Living)  Type of residence:: Apartment    Lifestyle & Psychosocial Needs:        Diet:: Regular  Inadequate nutrition (GOAL):: No  Tube Feeding: No  Inadequate activity/exercise (GOAL):: No  Significant changes in sleep pattern (GOAL): No  Transportation means:: Regular car     Roman Catholic or spiritual beliefs that impact treatment:: No  Mental health DX::  "No  Mental health management concern (GOAL):: No  Informal Support system:: Family, Spouse, Children   Socioeconomic History   ? Marital status:      Spouse name: Not on file   ? Number of children: Not on file   ? Years of education: Not on file   ? Highest education level: Not on file     Tobacco Use   ? Smoking status: Never Smoker   ? Smokeless tobacco: Never Used   Substance and Sexual Activity   ? Alcohol use: Yes     Alcohol/week: 2.0 standard drinks     Types: 2 Glasses of wine per week   ? Drug use: No                Resources and Interventions:  Current Resources:   List of home care services:: Home Health Aid, Physicial Therapy, Occupational Therapy  Community Resources: None  Supplies used at home:: None  Equipment Currently Used at Home: wheelchair, manual, grab bar, toilet, grab bar, tub/shower, shower chair, walker, rolling         Referrals Placed: None     88 yr F PMH: Osteopenia, myalgia, htn,  Lower gi bleed, diverticulosis of large intestine, cva, dementia.  Admitted 6/8-6/10 with CVA.  Currently lives with spouse in independent living.  She has 5 children who are able to assist.  Spouse drives.  She did not use a walker prior to her admission.  Angle Hughes currently has PT/OT and HHA.  She would like to \"stay active\" and remain out of the h  She is currently doing her own laundry.  Goals created to support patient.    Goals:   Goals        General    Being Active (pt-stated)     Notes - Note created  6/25/2020 12:32 PM by Yanni Falcon RN    Goal Statement: I would like to increase my activity and \"stay active\" over the next 90 days.  Date Goal set: 6/25/20  Barriers: recent hospitalization, now using a walker  Strengths: motivated  Date to Achieve By: ongoing  Patient expressed understanding of goal: yes  Action steps to achieve this goal:  1. I will participate in home care PT/OT exercises on a regular basis.  2. I will complete the exercises on a daily basis as directed by PT/OT " between therapy sessions and after therapy discharge.  3. I will walk in the halls of my independent living to gain strength with my walker.       Other (pt-stated)     Notes - Note created  6/25/2020 12:28 PM by Yanni Falcon RN    Goal Statement:  I want to stay out of the hospital for the next 90 days.  Date Goal set:  6/25/2020  Barriers:   Recent hospitalization  Strengths:  Able to identify and advocate for needs, good support system from family  Date to Achieve By:  9/8/20 (based on hospital discharge date of 6/10/20)  Patient expressed understanding of goal:  Yes  Action steps to achieve this goal:  1. I will attend all my appts as scheduled, and recommended follow up appts  2. I will call my clinic if I start to feel sick or notice any change(s) in my health, and schedule an appt if needed  3. I will call the triage nurse line for advice, before going to the hospital  4. I will go to  or Walk-In-Clinic before going to the hospital, if I am unable to get an appt with my PCP  5. I will update the Community Healthcare Worker during outreach calls and ask for additional support or resources, if needed              Patient/Caregiver understanding: yes       Future Appointments              In 4 days Silvia Jimenez, Aide [facility]    In 4 days Dilma Segura, OT [facility]    In 5 days Caroline Kelly, PTA [facility]    In 6 days Dilma Segura, OT [facility]    In 1 week Caroline Kelly, PTA [facility]    In 1 week Silvia Jimenez, Aide [facility]    In 1 week Dilma Segura, OT [facility]    In 1 week Silvia Jimenez, Aide [facility]    In 1 week Caroline Kelly, PTA [facility]    In 1 week Dilma Segura, OT [facility]    In 2 weeks Haleigh Menendez, PT [facility]    In 2 weeks Silvia Jimenez, Aide [facility]    In 2 weeks Dilma Segura, OT [facility]    In 2 weeks Dilma Segura, OT [facility]          Plan: DELEGATION: NONE

## 2021-07-04 NOTE — LETTER
Letter by Ester Sigala NP at      Author: Ester Sigala NP Service: -- Author Type: --    Filed:  Encounter Date: 6/7/2021 Status: (Other)         Angle Agudelo  [address] [facility] [address] [place] [id number]             June 7, 2021         Dear Ms. Agudelo,    Below are the results from your recent visit:    Resulted Orders   Basic Metabolic Panel   Result Value Ref Range    Sodium 145 136 - 145 mmol/L    Potassium 4.7 3.5 - 5.0 mmol/L    Chloride 106 98 - 107 mmol/L    CO2 28 22 - 31 mmol/L    Anion Gap, Calculation 11 5 - 18 mmol/L    Glucose 88 70 - 125 mg/dL    Calcium 9.3 8.5 - 10.5 mg/dL    BUN 15 8 - 28 mg/dL    Creatinine 0.81 0.60 - 1.10 mg/dL    GFR MDRD Af Amer >60 >60 mL/min/1.73m2    GFR MDRD Non Af Amer >60 >60 mL/min/1.73m2    Narrative    Fasting Glucose reference range is 70-99 mg/dL per  American Diabetes Association (ADA) guidelines.   Lipid [place] FASTING   Result Value Ref Range    Cholesterol 142 <=199 mg/dL    Triglycerides 59 <=149 mg/dL    HDL Cholesterol 52 >=50 mg/dL    LDL Calculated 78 <=129 mg/dL    Patient Fasting > 8hrs? Yes        Your lab work looks wonderful. Nothing to add at this time.     Please call with questions or contact us using [employer].    Sincerely,        Electronically signed by Ester Sigala NP

## 2021-07-04 NOTE — ED PROVIDER NOTES
ED Provider Notes by Ho Newman CNP at 2021  4:07 PM     Author: Ho Newman CNP Service: Emergency Medicine Author Type: Nurse Practitioner    Filed: 2021  8:04 PM Date of Service: 2021  4:07 PM Status: Signed    : Ho Newman CNP (Nurse Practitioner)       EMERGENCY DEPARTMENT ENCOUNTER      NAME: Angle Agudelo  AGE: 89 y.o. female  YOB: 1932  MRN: [id number]  EVALUATION DATE & TIME: 2021  4:05 PM    PCP: Ester Sigala NP    ED PROVIDER: SOFIE Watters CNP      Chief Complaint   Patient presents with   ? Achy   ? Dizziness         FINAL IMPRESSION:  1. Elevated BP without diagnosis of hypertension    2. Dizziness          ED COURSE & MEDICAL DECISION MAKIN:14 PM I met with the patient, obtained history, performed an initial exam, and discussed options and plan for treatment here in the ED. PPE (gloves, N95, and face shield) were worn during patient encounter.   7:54 PM I updated patient on imaging results. Discussed plan for discharge with patient and daughter who are agreeable with the plan.    Pertinent Labs & Imaging studies reviewed. (See chart for details)  89 y.o. female presents to the Emergency Department for evaluation of dizziness and vomiting. Life threatening differential diagnosis considered include: CVA. Other differential diagnoses include: BPPV, labyrinthitis, Ménière's disease, vestibular neuritis, migraine, Multiple Sclerosis, Otitis media, viral syndrome as well as other etiologies. She did have an episode of emesis in triage. No ongoing dizziness, vertigo, or lightheadedness. Workup including blood work and MRI of the brain does not show any acute etiology for symptoms. EKG reassuring. UA abnormal but she denies any urinary symptoms. Will culture urine and contact her if culture is positive. She is ambulatory and at her baseline. BP elevated but recently had normal BP in clinic. She should follow up in clinic  sometime in the next week for recheck. advised to return for any new / worsening symptoms or for other concerns.       At the conclusion of the encounter I discussed the results of all of the tests and the disposition. The questions were answered. The patient or family acknowledged understanding and was agreeable with the care plan.         MEDICATIONS GIVEN IN THE EMERGENCY:  Medications - No data to display    NEW PRESCRIPTIONS STARTED AT TODAY'S ER VISIT  Current Discharge Medication List      CONTINUE these medications which have NOT CHANGED    Details   aspirin 81 mg chewable tablet Chew 1 tablet (81 mg total) daily.  Qty: 90 tablet, Refills: 0    Associated Diagnoses: Acute CVA (cerebrovascular accident) (H)      aspirin-acetaminophen-caffeine (EXCEDRIN MIGRAINE) 250-250-65 mg per tablet Take 1-2 tablets by mouth every 6 (six) hours as needed for pain.      calcium citrate-vitamin D (CITRACAL+D) 315-200 mg-unit per tablet Take 1 tablet by mouth daily.       latanoprost (XALATAN) 0.005 % ophthalmic solution Administer 1 drop to both eyes at bedtime.      lovastatin (MEVACOR) 40 MG tablet TAKE 1 TABLET DAILY  Qty: 30 tablet, Refills: 0    Comments: DUE FOR MED CHECK 5/27/21  Associated Diagnoses: Acute cerebrovascular accident (CVA) (H)      memantine (NAMENDA) 10 MG tablet Take 10 mg by mouth 2 (two) times a day.      rivastigmine tartrate (EXELON) 1.5 MG capsule Take 2 capsules (3 mg total) by mouth 2 (two) times a day.  Qty: 180 capsule, Refills: 1    Associated Diagnoses: Dementia without behavioral disturbance, unspecified dementia type (H)                =================================================================    HPI    Patient information was obtained from: Patient    Use of Intrepreter: N/A         Angle Agudelo is a 89 y.o. female with a history of dementia, skin cancer, acute CVA,  and HTN who presents by private vehicle for evaluation of dizziness.     Patient Poor Historian Due to Patient's  "Dementia    Patient reports earlier this afternoon while she was either \"balancing the checkbook\" or \"on the toilet\" she had a sudden onset of dizziness, which she describes as lightheadedness. Per daughter, she was called by her father around 1330 (~3 hours ago), because patient was complaining of being dizzy and then had a bought of diarrhea. Patient was also complaining of mild epigastric discomfort that has since resolved. Additionally, patient kept saying she \"didn't feel well\" and \"groggy\". They brought patient to the ED for further evaluation and when she arrived she had one episode of emesis. Patient notes she still \"feels a little off\".     Patient denies chest pain, shortness of breath, headache, weakness, numbness, sensory changes, dysuria, urinary frequency, or additional medical concerns or complaints at this time.       REVIEW OF SYSTEMS   Review of Systems   Respiratory: Negative for shortness of breath.    Cardiovascular: Negative for chest pain.   Gastrointestinal: Positive for abdominal pain (epigastric discomfort (resolved)), diarrhea and vomiting.   Genitourinary: Negative for dysuria and frequency.   Neurological: Positive for dizziness and light-headedness. Negative for weakness, numbness and headaches.   All other systems reviewed and are negative.       PAST MEDICAL HISTORY:  Past Medical History:   Diagnosis Date   ? Acute cerebrovascular accident (CVA) (H) 6/8/2020   ? Benign neoplasm of colon    ? Dementia (H)    ? Disorder of bone and cartilage, unspecified    ? Pure hypercholesterolemia    ? Skin cancer     Not Melanoma, not sure what kind though   ? Unspecified arthropathy, shoulder region    ? Unspecified essential hypertension        PAST SURGICAL HISTORY:  Past Surgical History:   Procedure Laterality Date   ? APPENDECTOMY     ? CATARACT EXTRACTION EXTRACAPSULAR W/ INTRAOCULAR LENS IMPLANTATION Bilateral    ? CERVICAL BIOPSY N/A 5/14/2020    Procedure: PUNCH BIOPSY, CERVIX;  Surgeon: " Olive Biggs MD;  Location: [facility] OR;  Service: Gynecology   ? CERVICAL BIOPSY  W/ LOOP ELECTRODE EXCISION N/A 2018    Procedure: LOOP ELECTROSURGICAL EXCISION PROCEDURE, REMOVAL PESSARY;  Surgeon: Olive Biggs MD;  Location: [facility] OR;  Service:    ? finger abscess     ?       x5   ? LA DILATION/CURETTAGE,DIAGNOSTIC N/A 2020    Procedure: DILATION AND CURETTAGE, UTERUS;  Surgeon: Olive Biggs MD;  Location: [facility] OR;  Service: Gynecology   ? SIGMOIDOSCOPY N/A 6/3/2018    Procedure: SIGMOIDOSCOPY;  Surgeon: Dayana Joshi MD;  Location: [facility];  Service:    ? TONSILLECTOMY AND ADENOIDECTOMY     ? TOTAL SHOULDER ARTHROPLASTY Bilateral R  L    ? US THYROID BIOPSY  2020   ? VULVA / PERINEUM BIOPSY N/A 2020    Procedure: VULVAR BIOPSY;  Surgeon: Olive Biggs MD;  Location: [facility];  Service: Gynecology           CURRENT MEDICATIONS:    No current facility-administered medications on file prior to encounter.      Current Outpatient Medications on File Prior to Encounter   Medication Sig   ? aspirin 81 mg chewable tablet Chew 1 tablet (81 mg total) daily.   ? aspirin-acetaminophen-caffeine (EXCEDRIN MIGRAINE) 250-250-65 mg per tablet Take 1-2 tablets by mouth every 6 (six) hours as needed for pain.   ? calcium citrate-vitamin D (CITRACAL+D) 315-200 mg-unit per tablet Take 1 tablet by mouth daily.    ? latanoprost (XALATAN) 0.005 % ophthalmic solution Administer 1 drop to both eyes at bedtime.   ? lovastatin (MEVACOR) 40 MG tablet TAKE 1 TABLET DAILY   ? memantine (NAMENDA) 10 MG tablet Take 10 mg by mouth 2 (two) times a day.   ? rivastigmine tartrate (EXELON) 1.5 MG capsule Take 2 capsules (3 mg total) by mouth 2 (two) times a day.       ALLERGIES:  Allergies   Allergen Reactions   ? Atorvastatin Myalgia   ? Dextromethorphan Hives   ? Oxycodone Unknown   ? Pravastatin Myalgia   ? Pravastatin Sodium Myalgia    ? Simvastatin Myalgia   ? Codeine Rash       FAMILY HISTORY:  Family History   Problem Relation Age of Onset   ? Coronary artery disease Father    ? Stroke Father    ? Coronary artery disease Brother    ? Coronary artery disease Brother    ? Dementia Sister        SOCIAL HISTORY:   Social History     Socioeconomic History   ? Marital status:      Spouse name: None   ? Number of children: None   ? Years of education: None   ? Highest education level: None   Occupational History   ? None   Social Needs   ? Financial resource strain: None   ? Food insecurity     Worry: None     Inability: None   ? Transportation needs     Medical: None     Non-medical: None   Tobacco Use   ? Smoking status: Never Smoker   ? Smokeless tobacco: Never Used   Substance and Sexual Activity   ? Alcohol use: Yes     Alcohol/week: 2.0 standard drinks     Types: 2 Glasses of wine per week   ? Drug use: No   ? Sexual activity: None   Lifestyle   ? Physical activity     Days per week: None     Minutes per session: None   ? Stress: None   Relationships   ? Social connections     Talks on phone: None     Gets together: None     Attends Evangelical service: None     Active member of club or organization: None     Attends meetings of clubs or organizations: None     Relationship status: None   ? Intimate partner violence     Fear of current or ex partner: None     Emotionally abused: None     Physically abused: None     Forced sexual activity: None   Other Topics Concern   ? None   Social History Narrative   ? None       VITALS:  Patient Vitals for the past 24 hrs:   BP Temp Temp src Pulse Resp SpO2 Weight   06/09/21 1944 (!) 197/120 -- -- -- -- -- --   06/09/21 1745 164/79 -- -- 68 19 -- --   06/09/21 1730 177/81 -- -- 67 22 -- --   06/09/21 1700 177/86 -- -- -- -- -- --   06/09/21 1645 -- -- -- 67 22 -- --   06/09/21 1630 165/84 -- -- 67 16 -- --   06/09/21 1615 (!) 184/87 -- -- 64 17 -- --   06/09/21 1509 (!) 198/91 97.1  F (36.2  C)  Temporal 60 18 99 % 125 lb (56.7 kg)       PHYSICAL EXAM    Constitutional:  Alert, no distress  EYES: Conjunctivae clear. Pupils are 2 mm and sluggish. EOM's intact  HENT:  Atraumatic, normocephalic  Respiratory:  No respiratory distress, normal breath sounds  Cardiovascular:  Normal rate, normal rhythm, no murmurs  GI:  Soft, nondistended, nontender, no palpable masses, no rebound, no guarding  Musculoskeletal:  No edema.  Integument: Warm, Dry, No erythema, No rash.   Neurologic:  Alert & oriented x 3. CN 3-12 grossly intact. 5/5 bilateral , biceps, triceps, hip flexors, tibialis anterior, gastrocsoleus. Normal sensation with fine touch in the upper and lower extremities.   Psych: Affect normal, Mood normal.     LAB:  All pertinent labs reviewed and interpreted.  Results for orders placed or performed during the hospital encounter of 06/09/21   Comprehensive Metabolic Panel   Result Value Ref Range    Sodium 144 136 - 145 mmol/L    Potassium 4.1 3.5 - 5.0 mmol/L    Chloride 107 98 - 107 mmol/L    CO2 29 22 - 31 mmol/L    Anion Gap, Calculation 8 5 - 18 mmol/L    Glucose 117 70 - 125 mg/dL    BUN 12 8 - 28 mg/dL    Creatinine 0.74 0.60 - 1.10 mg/dL    GFR MDRD Af Amer >60 >60 mL/min/1.73m2    GFR MDRD Non Af Amer >60 >60 mL/min/1.73m2    Bilirubin, Total 0.8 0.0 - 1.0 mg/dL    Calcium 9.5 8.5 - 10.5 mg/dL    Protein, Total 7.2 6.0 - 8.0 g/dL    Albumin 4.3 3.5 - 5.0 g/dL    Alkaline Phosphatase 75 45 - 120 U/L    AST 21 0 - 40 U/L    ALT 9 0 - 45 U/L   Troponin I   Result Value Ref Range    Troponin I <0.01 0.00 - 0.29 ng/mL   Urinalysis   Result Value Ref Range    Color, UA Colorless Light Yellow, Yellow    Clarity, UA Clear Clear    Glucose, UA Negative Negative    Protein, UA Negative Negative    Bilirubin, UA Negative Negative    Urobilinogen, UA <2.0 mg/dL <2.0 mg/dL    pH, UA 7.0 5.0 - 8.0    Blood, UA Negative Negative    Ketones, UA 20 mg/dL Negative    Nitrite, UA Negative Negative    Leukocytes, UA  250 Mario/uL (!) Negative    Specific Gravity, UA 1.009 1.001 - 1.030    RBC, UA 1 <=2 hpf    WBC UA 6 (H) <=5 hpf    Bacteria, UA Few (!) None Seen    Squamous Epithel, UA 1 <=5 /HPF    Mucus, UA Present (!) None Seen lpf   HM1 (CBC with Diff)   Result Value Ref Range    WBC 7.5 4.0 - 11.0 thou/uL    RBC 4.77 3.80 - 5.40 mill/uL    Hemoglobin 13.3 12.0 - 16.0 g/dL    Hematocrit 44.0 35.0 - 47.0 %    MCV 92 80 - 100 fL    MCH 27.9 27.0 - 34.0 pg    MCHC 30.2 (L) 32.0 - 36.0 g/dL    RDW 17.8 (H) 11.0 - 14.5 %    Platelets 150 140 - 440 thou/uL    MPV 11.1 8.5 - 12.5 fL    Neutrophils % 83 (H) 50 - 70 %    Lymphocytes % 11 (L) 20 - 40 %    Monocytes % 5 2 - 10 %    Eosinophils % 0 0 - 6 %    Basophils % 1 0 - 2 %    Immature Granulocyte % 0 <=0 %    Neutrophils Absolute 6.2 2.0 - 7.7 thou/uL    Lymphocytes Absolute 0.9 0.8 - 4.4 thou/uL    Monocytes Absolute 0.3 0.0 - 0.9 thou/uL    Eosinophils Absolute 0.0 0.0 - 0.4 thou/uL    Basophils Absolute 0.1 0.0 - 0.2 thou/uL    Immature Granulocyte Absolute 0.0 <=0.0 thou/uL   POCT Glucose    Specimen: Capillary; Blood   Result Value Ref Range    Glucose 116 70 - 139 mg/dL       RADIOLOGY:  Reviewed all pertinent imaging. Please see official radiology report.  Mr Brain Without Contrast    Result Date: 6/9/2021  EXAM: MR BRAIN WO CONTRAST LOCATION: [facility] DATE/TIME: 6/9/2021 6:33 PM INDICATION: Dizziness, non-specific dizziness COMPARISON: None. TECHNIQUE: Routine multiplanar multisequence head MRI without intravenous contrast. FINDINGS: INTRACRANIAL CONTENTS: No restricted diffusion to suggest hyperacute, acute, or early subacute infarction. No mass, acute hemorrhage, or extra-axial fluid collections. Small old infarctions in the left cerebellar hemisphere and left thalamus. Enlarged perivascular space versus old lacunar type infarction in the right posterior limb internal capsule. Patchy nonspecific T2/FLAIR hyperintensities within the cerebral  white matter most consistent with mild to moderate chronic microvascular ischemic change.  Mild generalized cerebral atrophy. No hydrocephalus. Mild cerebellar atrophy. The major intracranial vascular flow voids are maintained. SELLA: No abnormality accounting for technique. OSSEOUS STRUCTURES/SOFT TISSUES: Normal marrow signal.  ORBITS: Prior bilateral cataract surgery. Visualized portions of the orbits are otherwise unremarkable. SINUSES/MASTOIDS: No paranasal sinus mucosal disease. No middle ear or mastoid effusion.     1.  No acute intracranial process. 2.  Small old infarctions in the left cerebellar hemisphere and left thalamus. 3.  Generalized brain atrophy and presumed microvascular ischemic changes as detailed above.      EKG Interpretation  6/9/2021 at 3:10 PM    Rhythm: sinus  Rate: 64 BPM  Axis: normal  Ectopy: PAC's  Conduction: normal  ST Segments: no acute change  T Waves: no acute change  Q Waves: none    Clinical Impression: sinus rhythm with PAC's. No ischemic changes.    I have independently reviewed and interpreted the patient's EKG with comments made as listed above.  Please see scanned image for full interpretation      PROCEDURES:   None      I, Yenifer Rogers, am serving as a scribe to document services personally performed by Ho Newman CNP. based on my observation and the provider's statements to me. I, Ho Newman CNP attest that Yenifer Rogers is acting in a scribe capacity, has observed my performance of the services and has documented them in accordance with my direction.    SOFIE Watters, CNP  Emergency Medicine  [facility] EMERGENCY ROOM  [address] [facility] [address] [facility] [address]  Dept: [phone]  Loc: [phone]           Ho Newman CNP  06/09/21 2004

## 2021-07-06 ENCOUNTER — COMMUNICATION - HEALTHEAST (OUTPATIENT)
Dept: FAMILY MEDICINE | Facility: CLINIC | Age: 86
End: 2021-07-06

## 2021-07-06 VITALS
DIASTOLIC BLOOD PRESSURE: 74 MMHG | SYSTOLIC BLOOD PRESSURE: 110 MMHG | HEART RATE: 80 BPM | WEIGHT: 125.5 LBS | BODY MASS INDEX: 25.3 KG/M2 | RESPIRATION RATE: 16 BRPM | OXYGEN SATURATION: 98 % | HEIGHT: 59 IN | TEMPERATURE: 98 F

## 2021-07-06 VITALS
BODY MASS INDEX: 25.25 KG/M2 | WEIGHT: 122.9 LBS | BODY MASS INDEX: 24.82 KG/M2 | BODY MASS INDEX: 25.11 KG/M2 | BODY MASS INDEX: 24.82 KG/M2 | BODY MASS INDEX: 25.25 KG/M2 | WEIGHT: 125 LBS | WEIGHT: 124.3 LBS | HEIGHT: 59 IN | HEIGHT: 59 IN

## 2021-07-06 VITALS — BODY MASS INDEX: 25.25 KG/M2 | WEIGHT: 125 LBS

## 2021-07-06 NOTE — TELEPHONE ENCOUNTER
Telephone Encounter by Melanie Lara CMA at 7/6/2021 12:50 PM     Author: Melanie Lara CMA Service: -- Author Type: Certified Medical Assistant    Filed: 7/6/2021 12:52 PM Encounter Date: 7/6/2021 Status: Signed    : Melanie Lara CMA (Certified Medical Assistant)       [facility] OT calling for verbal orders.     Order :occupation therapy Once/wk x 3 wks    Cognitive testing. Home safety training.       Phone [phone]- message okay.

## 2021-07-06 NOTE — TELEPHONE ENCOUNTER
Telephone Encounter by Shahla Gray at 7/6/2021  3:40 PM     Author: Shahla Gray Service: -- Author Type: --    Filed: 7/6/2021  3:42 PM Encounter Date: 7/6/2021 Status: Signed    : Shahla Gray       Forms Request  Name of form/paperwork:   Physician Order(s) - Order ID [id number]  Have you been seen for this request: N/A  Do we have the form: Yes- placed in Provider's Inbox (yellow folder)  When is form needed by: when done  How would you like the form returned: Fax:  [phone]  Patient Notified form requests are processed in 3-5 business days: N/A  Okay to leave a detailed message? N/A

## 2021-07-06 NOTE — TELEPHONE ENCOUNTER
Telephone Encounter by Melanie Lara CMA at 7/6/2021  1:03 PM     Author: Melanie Lara CMA Service: -- Author Type: Certified Medical Assistant    Filed: 7/6/2021  1:03 PM Encounter Date: 7/6/2021 Status: Signed    : Melanie Lara CMA (Certified Medical Assistant)       Home care notified.

## 2021-07-06 NOTE — TELEPHONE ENCOUNTER
Telephone Encounter by Ester Sigala NP at 7/6/2021  1:01 PM     Author: Ester Sigala NP Service: -- Author Type: Nurse Practitioner    Filed: 7/6/2021  1:02 PM Encounter Date: 7/6/2021 Status: Signed    : Ester Sigala NP (Nurse Practitioner)       Okay for requested OT and home safety training orders.

## 2021-07-08 ENCOUNTER — RECORDS - HEALTHEAST (OUTPATIENT)
Dept: ADMINISTRATIVE | Facility: OTHER | Age: 86
End: 2021-07-08

## 2021-07-08 NOTE — TELEPHONE ENCOUNTER
Telephone Encounter by Zena Bond CMA at 7/8/2021  2:09 PM     Author: Zena Bond CMA Service: -- Author Type: Certified Medical Assistant    Filed: 7/8/2021  2:10 PM Encounter Date: 7/6/2021 Status: Signed    : Zena Bond CMA (Certified Medical Assistant)       Faxed and sent to be scanned, copy made put in folder

## 2021-07-08 NOTE — TELEPHONE ENCOUNTER
Telephone Encounter by Ester Sigala NP at 7/8/2021  9:01 AM     Author: Ester Sigala NP Service: -- Author Type: Nurse Practitioner    Filed: 7/8/2021  9:02 AM Encounter Date: 7/6/2021 Status: Signed    : Ester Sigala NP (Nurse Practitioner)       Signed and ready to fax

## 2021-07-13 ENCOUNTER — RECORDS - HEALTHEAST (OUTPATIENT)
Dept: ADMINISTRATIVE | Facility: CLINIC | Age: 86
End: 2021-07-13

## 2021-07-14 PROBLEM — K62.5 RECTAL BLEED: Status: RESOLVED | Noted: 2018-05-31 | Resolved: 2021-06-02

## 2021-07-14 PROBLEM — I63.9 ACUTE CEREBROVASCULAR ACCIDENT (CVA) (H): Status: RESOLVED | Noted: 2020-06-08 | Resolved: 2021-06-02

## 2021-07-14 PROBLEM — I63.9 ACUTE CVA (CEREBROVASCULAR ACCIDENT) (H): Status: RESOLVED | Noted: 2020-06-08 | Resolved: 2021-06-02

## 2021-07-14 PROBLEM — K62.5 RECTAL BLEEDING: Status: RESOLVED | Noted: 2018-05-31 | Resolved: 2021-06-02

## 2021-07-19 ENCOUNTER — MEDICAL CORRESPONDENCE (OUTPATIENT)
Dept: HEALTH INFORMATION MANAGEMENT | Facility: CLINIC | Age: 86
End: 2021-07-19

## 2021-07-21 ENCOUNTER — RECORDS - HEALTHEAST (OUTPATIENT)
Dept: ADMINISTRATIVE | Facility: CLINIC | Age: 86
End: 2021-07-21

## 2021-08-10 LAB — PAP SMEAR - HIM PATIENT REPORTED: NORMAL

## 2021-09-29 ENCOUNTER — MEDICAL CORRESPONDENCE (OUTPATIENT)
Dept: HEALTH INFORMATION MANAGEMENT | Facility: CLINIC | Age: 86
End: 2021-09-29

## 2021-10-04 ENCOUNTER — MEDICAL CORRESPONDENCE (OUTPATIENT)
Dept: HEALTH INFORMATION MANAGEMENT | Facility: CLINIC | Age: 86
End: 2021-10-04
Payer: MEDICARE

## 2021-10-15 ENCOUNTER — OFFICE VISIT (OUTPATIENT)
Dept: FAMILY MEDICINE | Facility: CLINIC | Age: 86
End: 2021-10-15
Payer: MEDICARE

## 2021-10-15 VITALS
SYSTOLIC BLOOD PRESSURE: 110 MMHG | HEART RATE: 68 BPM | BODY MASS INDEX: 25.87 KG/M2 | WEIGHT: 128.1 LBS | RESPIRATION RATE: 16 BRPM | DIASTOLIC BLOOD PRESSURE: 62 MMHG

## 2021-10-15 DIAGNOSIS — F03.90 DEMENTIA WITHOUT BEHAVIORAL DISTURBANCE, UNSPECIFIED DEMENTIA TYPE: Primary | ICD-10-CM

## 2021-10-15 DIAGNOSIS — I10 ESSENTIAL HYPERTENSION: ICD-10-CM

## 2021-10-15 DIAGNOSIS — Z23 NEED FOR VACCINATION: ICD-10-CM

## 2021-10-15 DIAGNOSIS — E78.00 PURE HYPERCHOLESTEROLEMIA: ICD-10-CM

## 2021-10-15 PROCEDURE — 99214 OFFICE O/P EST MOD 30 MIN: CPT | Performed by: NURSE PRACTITIONER

## 2021-10-15 RX ORDER — AMLODIPINE BESYLATE 5 MG/1
5 TABLET ORAL DAILY
Qty: 90 TABLET | Refills: 3 | Status: SHIPPED | OUTPATIENT
Start: 2021-10-15 | End: 2022-02-22

## 2021-10-15 RX ORDER — AMLODIPINE BESYLATE 5 MG/1
5 TABLET ORAL DAILY
COMMUNITY
End: 2021-10-15

## 2021-10-15 NOTE — PROGRESS NOTES
{PROVIDER CHARTING PREFERENCE:640365}    Titus Barbour is a 89 year old who presents for the following health issues {ACCOMPANIED BY STATEMENT (Optional):971390}    HPI     Hyperlipidemia Follow-Up      Are you regularly taking any medication or supplement to lower your cholesterol?   { :322921}    Are you having muscle aches or other side effects that you think could be caused by your cholesterol lowering medication?  { :792360}      How many servings of fruits and vegetables do you eat daily?  { :600006}    On average, how many sweetened beverages do you drink each day (Examples: soda, juice, sweet tea, etc.  Do NOT count diet or artificially sweetened beverages)?   { 1-11:615413}    How many days per week do you exercise enough to make your heart beat faster? { :109054}    How many minutes a day do you exercise enough to make your heart beat faster? { :437035}    How many days per week do you miss taking your medication? {0-7 :553136}    {ACUTE SUPERLIST - extended history:700902}    Review of Systems   {ROS COMP (Optional):987518}      Objective    There were no vitals taken for this visit.  There is no height or weight on file to calculate BMI.  Physical Exam   {Exam List (Optional):947140}    {Diagnostic Test Results (Optional):898496}    {AMBULATORY ATTESTATION (Optional):589981}

## 2021-10-15 NOTE — PROGRESS NOTES
"    Assessment & Plan     Dementia without behavioral disturbance, unspecified dementia type (H)    Stable on Namenda twice daily  Managed by neurology Dr. Mckeon, reviewed notes from [facility]  Family involved in care  Living at [facility]  Family has no new concerns  No safety issues reported today  No recent falls, continue to use walker for stability.     Essential hypertension    - amLODIPine (NORVASC) 5 MG tablet  Dispense: 90 tablet; Refill: 3  Well controlled on current dose of Norvasc  Refill sent to [facility]    Pure hypercholesterolemia    On statin, stable per June results. Will recheck fasting levels in the spring    Need for vaccination    - REVIEW OF HEALTH MAINTENANCE PROTOCOL ORDERS  Check with insurance regarding shingles vaccine coverage.         25 minutes spent on the date of the encounter doing chart review, patient visit, documentation and discussion with family        BMI:   Estimated body mass index is 25.87 kg/m  as calculated from the following:    Height as of 6/21/21: 1.499 m (4' 11\").    Weight as of this encounter: 58.1 kg (128 lb 1.6 oz).   Weight management plan: Discussed healthy diet and exercise guidelines    See Patient Instructions    Return in about 6 months (around 4/15/2022) for Follow up, with me, in person, med check, fasting lab work.    Ester Sigala NP  [facility]    Titus Barbour is a 89 year old who presents for the following health issues  accompanied by her spouse and daughter:    HPI     Hyperlipidemia Follow-Up      Are you regularly taking any medication or supplement to lower your cholesterol?   Yes- Lovastatin 40 mg HS    Are you having muscle aches or other side effects that you think could be caused by your cholesterol lowering medication?  No     History of CVA    No history of CAD    Non smoker    Hypertension Follow-up      Do you check your blood pressure regularly outside of the clinic? No     Are you " following a low salt diet? Yes    Are your blood pressures ever more than 140 on the top number (systolic) OR more   than 90 on the bottom number (diastolic), for example 140/90? No   Denies radiation, diaphoresis, shortness of breath, syncope, nausea, palpitations, and associated with activity.   Using 4 wheeled walker for stability, history of falls and dizziness.   Non-smoker  ETOH: every Friday for fish hayes and Sunday's over at her son's house.         How many servings of fruits and vegetables do you eat daily?  0-1    On average, how many sweetened beverages do you drink each day (Examples: soda, juice, sweet tea, etc.  Do NOT count diet or artificially sweetened beverages)?   2    How many days per week do you exercise enough to make your heart beat faster? 3 or less    How many minutes a day do you exercise enough to make your heart beat faster? 10 - 19    How many days per week do you miss taking your medication? 0    Dementia:  Taking Namenda twice daily, managed by Dr. Mckeon neurology. Compliant with med use,  makes sure she takes her medications. She does repeat herself often and asks a lot of the same questions. No night time wandering. No reports of sundowning. Weight is stable. No prompting to eat, eats twice daily: late morning breakfast and dinner. No incontinence issues, wearing pad. No misplacing of items. Able to tell me the day, year, president and what city she is in. Is aware that it is late afternoon.         Review of Systems   CONSTITUTIONAL: NEGATIVE for fever, chills, change in weight  ENT/MOUTH: NEGATIVE for ear, mouth and throat problems  RESP: NEGATIVE for significant cough or SOB  CV: NEGATIVE for chest pain, palpitations or peripheral edema      Objective    /62 (BP Location: Right arm, Patient Position: Sitting, Cuff Size: Adult Regular)   Pulse 68   Resp 16   Wt 58.1 kg (128 lb 1.6 oz)   Breastfeeding No   BMI 25.87 kg/m    Body mass index is 25.87 kg/m .  Physical  Exam   GENERAL: healthy, alert and no distress  NECK: no adenopathy, no asymmetry, masses, or scars and thyroid normal to palpation  RESP: lungs clear to auscultation - no rales, rhonchi or wheezes  CV: regular rate and rhythm, normal S1 S2, no S3 or S4, grade 3/6 systolic murmur noted in the right upper sternal border, click or rub, no peripheral edema and peripheral pulses strong  SKIN: no suspicious lesions or rashes  NEURO: Normal strength and tone, mentation intact and speech normal, engaged in conversation, well groomed, good eye contact, pleasant demeanor.   PSYCH: affect normal/bright, good sense of humor

## 2021-10-18 ENCOUNTER — TELEPHONE (OUTPATIENT)
Dept: FAMILY MEDICINE | Facility: CLINIC | Age: 86
End: 2021-10-18

## 2021-10-18 NOTE — TELEPHONE ENCOUNTER
Forms Request  Name of form/paperwork: Physician Orders for Admission  Have you been seen for this request: N/A  Do we have the form: Yes - placed in Provider's Inbox (yellow folder)  When is form needed by: when done  How would you like the form returned:   Fax: [phone]  Patient Notified form requests are processed in 3-5 business days: N/A  Okay to leave a detailed message? N/A

## 2021-10-25 ENCOUNTER — MEDICAL CORRESPONDENCE (OUTPATIENT)
Dept: HEALTH INFORMATION MANAGEMENT | Facility: CLINIC | Age: 86
End: 2021-10-25
Payer: MEDICARE

## 2021-10-25 ENCOUNTER — TELEPHONE (OUTPATIENT)
Dept: FAMILY MEDICINE | Facility: CLINIC | Age: 86
End: 2021-10-25

## 2021-10-25 NOTE — TELEPHONE ENCOUNTER
Forms Request  Name of form/paperwork: [facility] RO FORM   Have you been seen for this request: N/A  Do we have the form: YES IN DR [facility] INBOX COVERING FOR SHABBIR ROGERS  When is form needed by: WHEN DON E  How would you like the form returned: FAX  Patient Notified form requests are processed in 3-5 business days: NO     Okay to leave a detailed message? NO

## 2021-10-27 ENCOUNTER — TELEPHONE (OUTPATIENT)
Dept: FAMILY MEDICINE | Facility: CLINIC | Age: 86
End: 2021-10-27

## 2021-10-27 NOTE — TELEPHONE ENCOUNTER
Reason for Call:  Home Health Care    Александр with [facility] called regarding (reason for call): verbal order     Orders are needed for this patient.     PT: delay of care 10/29/21  Pt Provider: Ester Sigala    Phone Number Homecare Nurse can be reached at: [phone]    Can we leave a detailed message on this number? YES    Phone number patient can be reached at:     Best Time:     Call taken on 10/27/2021 at 3:18 PM by Melanie Lara

## 2021-10-29 ENCOUNTER — TELEPHONE (OUTPATIENT)
Dept: FAMILY MEDICINE | Facility: CLINIC | Age: 86
End: 2021-10-29

## 2021-10-29 NOTE — TELEPHONE ENCOUNTER
Forms Request  Name of form/paperwork: [facility]  Have you been seen for this request: N/A  Do we have the form: yes, in providers inbox  When is form needed by: when done  How would you like the form returned: fax  Patient Notified form requests are processed in 3-5 business days: n/a    Okay to leave a detailed message? n/a

## 2021-11-01 ENCOUNTER — MEDICAL CORRESPONDENCE (OUTPATIENT)
Dept: HEALTH INFORMATION MANAGEMENT | Facility: CLINIC | Age: 86
End: 2021-11-01
Payer: MEDICARE

## 2021-11-22 ENCOUNTER — TELEPHONE (OUTPATIENT)
Dept: FAMILY MEDICINE | Facility: CLINIC | Age: 86
End: 2021-11-22
Payer: MEDICARE

## 2021-11-22 ENCOUNTER — DOCUMENTATION ONLY (OUTPATIENT)
Dept: OTHER | Facility: CLINIC | Age: 86
End: 2021-11-22
Payer: MEDICARE

## 2021-11-22 NOTE — TELEPHONE ENCOUNTER
Forms Request  Name of form/paperwork: [facility]  Have you been seen for this request: N/A  Do we have the form: yes, in providers inbox  When is form needed by: when done  How would you like the form returned: fax  Patient Notified form requests are processed in 3-5 business days: n/a    Okay to leave a detailed message? n/a

## 2021-11-23 ENCOUNTER — TELEPHONE (OUTPATIENT)
Dept: FAMILY MEDICINE | Facility: CLINIC | Age: 86
End: 2021-11-23
Payer: MEDICARE

## 2021-11-23 ENCOUNTER — MEDICAL CORRESPONDENCE (OUTPATIENT)
Dept: HEALTH INFORMATION MANAGEMENT | Facility: CLINIC | Age: 86
End: 2021-11-23
Payer: MEDICARE

## 2021-11-23 NOTE — TELEPHONE ENCOUNTER
Received home care order form in regards to a fall. PCP is out of the office until 11/29/2021, will route to covering provider.

## 2021-12-07 ENCOUNTER — TELEPHONE (OUTPATIENT)
Dept: FAMILY MEDICINE | Facility: CLINIC | Age: 86
End: 2021-12-07
Payer: MEDICARE

## 2021-12-07 DIAGNOSIS — I10 ESSENTIAL HYPERTENSION: ICD-10-CM

## 2021-12-07 RX ORDER — AMLODIPINE BESYLATE 5 MG/1
5 TABLET ORAL DAILY
Qty: 30 TABLET | Refills: 0 | Status: CANCELLED | OUTPATIENT
Start: 2021-12-07

## 2021-12-07 NOTE — TELEPHONE ENCOUNTER
Spoke with daughter, Daija.  Pt picked up Amiloride 5mg 9/24/21 from neurologist, Dr Mckeon. This should have been Amlodipine 5mg. Both these meds were sent to pharmacy that same day. Amiloride was supposed to be canceled in error however it was not.   F/u with Ester 10/15/21 bp was stable and told to f/u in 6 months. It's thought that patient was taking both HTN meds at the time of visit. Now, she's out of Amiloride and daughter wants to know if this should be refilled.  Also needs Amlodipine filled.    Refills were sent to [facility] however patient has moved to [facility] and rx will need to be changed to new address. Daughter asking for short term supply until they can get the mail order supply figured out.     Blood pressure 12/1/21 158/101 in the afternoon.    Today 12/7/21 bp 146/102 3pm. Asymptomatic.        Pharmacy: [facility] mail order  Local [facility]: [facility]

## 2021-12-07 NOTE — TELEPHONE ENCOUNTER
Spoke with Koko Betancourt. Pt should increased Amlodipine to 10mg daily and f/u Friday with BP readings.   Don't refill Amiloride as this was error on neuro.    Daughter notified. No refill needed at this time. Will do Friday if needed. She has enough at home to increase.

## 2021-12-07 NOTE — TELEPHONE ENCOUNTER
Contact First & Last Name if other than the patient involved: Pt  Main reason for the request: Med Question, was prescribed Amiloride HCL 5mg by neurologist and was advised to f/u with provider.  She is now out of the medication and they are wanting to know if there is supposed to be a refill and if she is supposed to keep taking them or if there is to be a change and she no longer needs to take them.  Expecting call back:YES  May leave message: YES  Please contact Patient and DONN Choe at [phone]  Best time to call back: Anytime after 1pm  [phone] (home), [phone] (work)  Alternate phone number: n/a  Irena Frausto

## 2022-01-12 VITALS — HEIGHT: 59 IN | WEIGHT: 124.3 LBS | BODY MASS INDEX: 25.06 KG/M2

## 2022-01-18 VITALS
DIASTOLIC BLOOD PRESSURE: 78 MMHG | SYSTOLIC BLOOD PRESSURE: 122 MMHG | DIASTOLIC BLOOD PRESSURE: 72 MMHG | SYSTOLIC BLOOD PRESSURE: 128 MMHG | TEMPERATURE: 98.6 F | OXYGEN SATURATION: 90 % | TEMPERATURE: 98.9 F

## 2022-01-18 VITALS — SYSTOLIC BLOOD PRESSURE: 100 MMHG | TEMPERATURE: 98.9 F | DIASTOLIC BLOOD PRESSURE: 68 MMHG

## 2022-01-18 VITALS
DIASTOLIC BLOOD PRESSURE: 72 MMHG | TEMPERATURE: 97.1 F | HEART RATE: 56 BPM | TEMPERATURE: 97.5 F | SYSTOLIC BLOOD PRESSURE: 124 MMHG | OXYGEN SATURATION: 97 % | SYSTOLIC BLOOD PRESSURE: 128 MMHG | DIASTOLIC BLOOD PRESSURE: 54 MMHG

## 2022-01-18 VITALS
SYSTOLIC BLOOD PRESSURE: 121 MMHG | OXYGEN SATURATION: 98 % | TEMPERATURE: 97.6 F | HEART RATE: 73 BPM | DIASTOLIC BLOOD PRESSURE: 79 MMHG

## 2022-01-18 VITALS
TEMPERATURE: 98.1 F | SYSTOLIC BLOOD PRESSURE: 98 MMHG | OXYGEN SATURATION: 100 % | DIASTOLIC BLOOD PRESSURE: 62 MMHG | HEART RATE: 72 BPM

## 2022-01-18 VITALS
OXYGEN SATURATION: 98 % | WEIGHT: 129 LBS | TEMPERATURE: 98.3 F | RESPIRATION RATE: 18 BRPM | TEMPERATURE: 98.6 F | HEART RATE: 101 BPM | SYSTOLIC BLOOD PRESSURE: 166 MMHG | HEART RATE: 75 BPM | SYSTOLIC BLOOD PRESSURE: 132 MMHG | DIASTOLIC BLOOD PRESSURE: 74 MMHG | DIASTOLIC BLOOD PRESSURE: 76 MMHG | BODY MASS INDEX: 26 KG/M2 | HEIGHT: 59 IN

## 2022-01-18 VITALS
SYSTOLIC BLOOD PRESSURE: 151 MMHG | HEIGHT: 58 IN | RESPIRATION RATE: 16 BRPM | OXYGEN SATURATION: 94 % | TEMPERATURE: 98 F | TEMPERATURE: 98 F | BODY MASS INDEX: 26.87 KG/M2 | DIASTOLIC BLOOD PRESSURE: 88 MMHG | SYSTOLIC BLOOD PRESSURE: 128 MMHG | HEART RATE: 63 BPM | TEMPERATURE: 98 F | WEIGHT: 128 LBS | DIASTOLIC BLOOD PRESSURE: 78 MMHG | DIASTOLIC BLOOD PRESSURE: 64 MMHG | HEART RATE: 71 BPM | SYSTOLIC BLOOD PRESSURE: 124 MMHG

## 2022-01-18 VITALS
BODY MASS INDEX: 26.09 KG/M2 | SYSTOLIC BLOOD PRESSURE: 122 MMHG | OXYGEN SATURATION: 100 % | DIASTOLIC BLOOD PRESSURE: 62 MMHG | HEART RATE: 79 BPM | WEIGHT: 129.19 LBS

## 2022-01-18 VITALS
OXYGEN SATURATION: 98 % | DIASTOLIC BLOOD PRESSURE: 67 MMHG | HEART RATE: 77 BPM | TEMPERATURE: 97.2 F | SYSTOLIC BLOOD PRESSURE: 122 MMHG

## 2022-01-18 VITALS — SYSTOLIC BLOOD PRESSURE: 124 MMHG | DIASTOLIC BLOOD PRESSURE: 78 MMHG | TEMPERATURE: 98.3 F

## 2022-01-18 VITALS — SYSTOLIC BLOOD PRESSURE: 185 MMHG | HEART RATE: 68 BPM | TEMPERATURE: 97.4 F | DIASTOLIC BLOOD PRESSURE: 110 MMHG

## 2022-01-18 VITALS — BODY MASS INDEX: 25.4 KG/M2 | HEIGHT: 59 IN | WEIGHT: 126 LBS

## 2022-01-18 VITALS
SYSTOLIC BLOOD PRESSURE: 112 MMHG | HEART RATE: 77 BPM | OXYGEN SATURATION: 98 % | DIASTOLIC BLOOD PRESSURE: 69 MMHG | TEMPERATURE: 97.3 F

## 2022-01-18 VITALS — SYSTOLIC BLOOD PRESSURE: 128 MMHG | DIASTOLIC BLOOD PRESSURE: 72 MMHG

## 2022-01-18 VITALS
SYSTOLIC BLOOD PRESSURE: 128 MMHG | HEART RATE: 56 BPM | DIASTOLIC BLOOD PRESSURE: 47 MMHG | TEMPERATURE: 97.9 F | OXYGEN SATURATION: 97 %

## 2022-02-22 DIAGNOSIS — I10 ESSENTIAL HYPERTENSION: ICD-10-CM

## 2022-02-22 RX ORDER — AMLODIPINE BESYLATE 5 MG/1
10 TABLET ORAL DAILY
Qty: 14 TABLET | Refills: 0 | Status: SHIPPED | OUTPATIENT
Start: 2022-02-22 | End: 2022-03-17

## 2022-02-22 RX ORDER — AMLODIPINE BESYLATE 5 MG/1
10 TABLET ORAL DAILY
Qty: 180 TABLET | Refills: 0 | Status: SHIPPED | OUTPATIENT
Start: 2022-02-22 | End: 2022-03-17

## 2022-02-22 NOTE — TELEPHONE ENCOUNTER
12/7/22 Amlodipine was increased to 10mg.     Needs new rx to support this.    Daughter will call back with supporting bp readings.

## 2022-02-22 NOTE — TELEPHONE ENCOUNTER
12/22 136/80  1/2 134/81  1/3 142/82  1/9 132/68  1/10 163/85      Refills: 1 wk to [facility] AND [facility].       future visit 3/17/22 pre op    Orders pending for 5mg two daily.

## 2022-03-01 ENCOUNTER — TRANSFERRED RECORDS (OUTPATIENT)
Dept: HEALTH INFORMATION MANAGEMENT | Facility: CLINIC | Age: 87
End: 2022-03-01
Payer: MEDICARE

## 2022-03-14 ENCOUNTER — HOSPITAL ENCOUNTER (OUTPATIENT)
Facility: AMBULATORY SURGERY CENTER | Age: 87
End: 2022-03-14
Attending: OBSTETRICS & GYNECOLOGY
Payer: MEDICARE

## 2022-03-14 DIAGNOSIS — Z11.59 ENCOUNTER FOR SCREENING FOR OTHER VIRAL DISEASES: Primary | ICD-10-CM

## 2022-03-17 ENCOUNTER — OFFICE VISIT (OUTPATIENT)
Dept: FAMILY MEDICINE | Facility: CLINIC | Age: 87
End: 2022-03-17
Payer: MEDICARE

## 2022-03-17 VITALS
WEIGHT: 137.2 LBS | DIASTOLIC BLOOD PRESSURE: 70 MMHG | HEART RATE: 70 BPM | BODY MASS INDEX: 28.8 KG/M2 | HEIGHT: 58 IN | SYSTOLIC BLOOD PRESSURE: 98 MMHG

## 2022-03-17 DIAGNOSIS — R93.89 THICKENED ENDOMETRIUM: ICD-10-CM

## 2022-03-17 DIAGNOSIS — Z01.818 PRE-OP EXAM: Primary | ICD-10-CM

## 2022-03-17 DIAGNOSIS — I10 ESSENTIAL HYPERTENSION: ICD-10-CM

## 2022-03-17 DIAGNOSIS — E78.00 PURE HYPERCHOLESTEROLEMIA: ICD-10-CM

## 2022-03-17 LAB
ANION GAP SERPL CALCULATED.3IONS-SCNC: 11 MMOL/L (ref 5–18)
BUN SERPL-MCNC: 20 MG/DL (ref 8–28)
CALCIUM SERPL-MCNC: 9.4 MG/DL (ref 8.5–10.5)
CHLORIDE BLD-SCNC: 109 MMOL/L (ref 98–107)
CO2 SERPL-SCNC: 25 MMOL/L (ref 22–31)
CREAT SERPL-MCNC: 0.81 MG/DL (ref 0.6–1.1)
GFR SERPL CREATININE-BSD FRML MDRD: 69 ML/MIN/1.73M2
GLUCOSE BLD-MCNC: 109 MG/DL (ref 70–125)
HGB BLD-MCNC: 12.9 G/DL (ref 11.7–15.7)
POTASSIUM BLD-SCNC: 4.2 MMOL/L (ref 3.5–5)
SODIUM SERPL-SCNC: 145 MMOL/L (ref 136–145)

## 2022-03-17 PROCEDURE — 85018 HEMOGLOBIN: CPT | Performed by: NURSE PRACTITIONER

## 2022-03-17 PROCEDURE — 80048 BASIC METABOLIC PNL TOTAL CA: CPT | Performed by: NURSE PRACTITIONER

## 2022-03-17 PROCEDURE — 36415 COLL VENOUS BLD VENIPUNCTURE: CPT | Performed by: NURSE PRACTITIONER

## 2022-03-17 PROCEDURE — 99214 OFFICE O/P EST MOD 30 MIN: CPT | Performed by: NURSE PRACTITIONER

## 2022-03-17 RX ORDER — LOVASTATIN 40 MG
40 TABLET ORAL AT BEDTIME
Qty: 90 TABLET | Refills: 1 | Status: SHIPPED | OUTPATIENT
Start: 2022-03-17 | End: 2022-09-23

## 2022-03-17 RX ORDER — AMLODIPINE BESYLATE 10 MG/1
10 TABLET ORAL DAILY
Qty: 90 TABLET | Refills: 3 | Status: SHIPPED | OUTPATIENT
Start: 2022-03-17 | End: 2023-02-22

## 2022-03-17 NOTE — PROGRESS NOTES
[facility]  [address],   [place] [address]  Phone: [phone]  Fax: [phone]  Primary Provider: Shabbir Sigala  Pre-op Performing Provider: SHABBIR SIGALA      PREOPERATIVE EVALUATION:  Today's date: 3/17/2022    Angle Agudelo is a 89 year old female who presents for a preoperative evaluation.    Surgical Information:   Surgery/Procedure: HYSTEROSCOPY, WITH DILATION AND CURETTAGE  Surgery Location: [facility]  Surgeon:Olive Biggs MD  Surgery Date: 3/31/2022  Time of Surgery: 09:30 am  Where patient plans to recover: At home with family  Fax number for surgical facility: Note does not need to be faxed, will be available electronically in Epic.    Type of Anesthesia Anticipated: to be determined    Assessment & Plan     The proposed surgical procedure is considered INTERMEDIATE risk.    Pre-op exam    - Basic metabolic panel  - Hemoglobin  - Basic metabolic panel  - Hemoglobin  She will return to the clinic to have her COVID testing done prior to her procedure.   Documentation reviewed from OBGYN clinic, Dr. Biggs    Thickened endometrium    - Basic metabolic panel  - Hemoglobin  - Basic metabolic panel  - Hemoglobin    Essential hypertension    - amLODIPine (NORVASC) 10 MG tablet  Dispense: 90 tablet; Refill: 3  Well controlled, refill sent    Pure hypercholesterolemia    - lovastatin (MEVACOR) 40 MG tablet  Dispense: 90 tablet; Refill: 1  Recheck fasting levels in 6 months at next appointment           Risks and Recommendations:  The patient has the following additional risks and recommendations for perioperative complications:   - No identified additional risk factors other than previously addressed    Medication Instructions:   - Calcium Channel Blockers: May be continued on the day of surgery.   - Alzheimer's medications: Continue without modification.    RECOMMENDATION:  APPROVAL GIVEN to proceed with  proposed procedure, without further diagnostic evaluation.    Review of external notes as documented above                   Subjective     HPI related to upcoming procedure: OBJOSELYNN Dr. Biggs has been monitoring her thickened endometrium for over 6 months now along with her postmenopausal bleeding and pessary maintenance for her prolapse. The thickening of there endometrium continues to be concerning so patient is here today with her daughter for her pre op exam for her upcoming hysteroscopy with D&C.  She denies any pain, she has no bleeding today, denies any low back pain or significant abdominal discomfort or bloating.    She does have a longstanding history of hypertension with hyperlipidemia, she is status post CVA back in 2021.  Her blood pressure is well controlled today on her current dose of amlodipine, she will be seeing me again in 6 months for her fasting lab work/cholesterol check.  She is taking a statin at this time.  She is a non-smoker. Denies radiation, diaphoresis, shortness of breath, dizziness, syncope, nausea, palpitations, and associated with activity.           Preop Questions 3/17/2022   1. Have you ever had a heart attack or stroke? YES - history of CVA 2021   2. Have you ever had surgery on your heart or blood vessels, such as a stent placement, a coronary artery bypass, or surgery on an artery in your head, neck, heart, or legs? No   3. Do you have chest pain with activity? No   4. Do you have a history of  heart failure? No   5. Do you currently have a cold, bronchitis or symptoms of other infection? No   6. Do you have a cough, shortness of breath, or wheezing? No   7. Do you or anyone in your family have previous history of blood clots? No   8. Do you or does anyone in your family have a serious bleeding problem such as prolonged bleeding following surgeries or cuts? No   9. Have you ever had problems with anemia or been told to take iron pills? No   10. Have you had any abnormal blood  loss such as black, tarry or bloody stools, or abnormal vaginal bleeding? No   11. Have you ever had a blood transfusion? No   12. Are you willing to have a blood transfusion if it is medically needed before, during, or after your surgery? Yes   13. Have you or any of your relatives ever had problems with anesthesia? No   14. Do you have sleep apnea, excessive snoring or daytime drowsiness? No   15. Do you have any artifical heart valves or other implanted medical devices like a pacemaker, defibrillator, or continuous glucose monitor? No   16. Do you have artificial joints? YES - bilateral shoulder replacements, 2011 and 2012   17. Are you allergic to latex? No     Health Care Directive:  Patient has a Health Care Directive on file      Preoperative Review of :   reviewed - no record of controlled substances prescribed.      Status of Chronic Conditions:  HYPERLIPIDEMIA - Patient has a long history of significant Hyperlipidemia requiring medication for treatment with recent good control. Patient reports no problems or side effects with the medication.     HYPERTENSION - Patient has longstanding history of HTN , currently denies any symptoms referable to elevated blood pressure. Specifically denies chest pain, palpitations, dyspnea, orthopnea, PND or peripheral edema. Blood pressure readings have been in normal range. Current medication regimen is as listed below. Patient denies any side effects of medication.       Review of Systems  CONSTITUTIONAL: NEGATIVE for fever, chills, change in weight  INTEGUMENTARY/SKIN: NEGATIVE for worrisome rashes, moles or lesions  EYES: NEGATIVE for vision changes or irritation  ENT/MOUTH: NEGATIVE for ear, mouth and throat problems  RESP: NEGATIVE for significant cough or SOB  CV: NEGATIVE for chest pain, palpitations or peripheral edema  GI: NEGATIVE for nausea, abdominal pain, heartburn, or change in bowel habits  : NEGATIVE for frequency, dysuria, or hematuria  NEURO:  NEGATIVE for weakness, dizziness or paresthesias  ENDOCRINE: NEGATIVE for temperature intolerance, skin/hair changes  HEME: NEGATIVE for bleeding problems  PSYCHIATRIC: NEGATIVE for changes in mood or affect    Patient Active Problem List    Diagnosis Date Noted     GI bleed 01/09/2021     Priority: Medium     History of stroke 10/21/2020     Priority: Medium     Memory impairment 07/22/2020     Priority: Medium     Myalgia and myositis, unspecified 07/22/2020     Priority: Medium     Created by Conversion       Spinal stenosis of lumbar region 07/22/2020     Priority: Medium     Primary hypercholesterolemia 07/22/2020     Priority: Medium     Created by Conversion       Benign neoplasm of colon 07/22/2020     Priority: Medium     Created by Conversion       Dementia without behavioral disturbance, unspecified dementia type (H) 07/22/2020     Priority: Medium     Disorder of bone and cartilage 07/22/2020     Priority: Medium     Created by Conversion    Replacement Utility updated for latest IMO load       Acute cerebrovascular accident (CVA) (H) 06/08/2020     Priority: Medium     GI bleeding 01/07/2020     Priority: Medium     Vagina bleeding 01/07/2020     Priority: Medium     Chronic bilateral low back pain without sciatica 08/12/2016     Priority: Medium     Backache 06/08/2015     Priority: Medium      Past Medical History:   Diagnosis Date     Acute cerebrovascular accident (CVA) (H) 6/8/2020     Benign neoplasm of colon      Dementia (H)      Disorder of bone and cartilage, unspecified      Pure hypercholesterolemia      Skin cancer     Not Melanoma, not sure what kind though     Unspecified arthropathy, shoulder region      Unspecified essential hypertension      Past Surgical History:   Procedure Laterality Date     APPENDECTOMY       BIOPSY CERVICAL, LOCAL EXCISION, SINGLE/MULTIPLE N/A 9/11/2018    Procedure: LOOP ELECTROSURGICAL EXCISION PROCEDURE, REMOVAL PESSARY;  Surgeon: Olive Biggs MD;   Location: [facility] Main OR;  Service:      BIOPSY VULVA N/A 5/14/2020    Procedure: VULVAR BIOPSY;  Surgeon: Olive Biggs MD;  Location: [facility] Main OR;  Service: Gynecology     CATARACT EXTRACTION EXTRACAPSULAR W/ INTRAOCULAR LENS IMPLANTATION Bilateral      CERVICAL BIOPSY N/A 5/14/2020    Procedure: PUNCH BIOPSY, CERVIX;  Surgeon: Olive Biggs MD;  Location: [facility] Main OR;  Service: Gynecology     CHOLECYSTECTOMY       COLONOSCOPY N/A 1/8/2020    Procedure: COLONOSCOPY;  Surgeon: Amanda Carson MD;  Location:  GI     HC DILATION/CURETTAGE DIAG/THER NON OB N/A 5/14/2020    Procedure: DILATION AND CURETTAGE, UTERUS;  Surgeon: Olive Biggs MD;  Location: [facility] OR;  Service: Gynecology     IR EXTREMITY ANGIOGRAM RIGHT  2/13/2018     OTHER SURGICAL HISTORY      [id number]     OTHER SURGICAL HISTORY      finger abscess     CA ESOPHAGOGASTRODUODENOSCOPY TRANSORAL DIAGNOSTIC N/A 6/16/2021    Procedure: ESOPHAGOGASTRODUODENOSCOPY (EGD);  Surgeon: Primitivo Lafleur MD;  Location: [facility] OR;  Service: Gastroenterology     SHOULDER SURGERY      bilateral shoulder     SIGMOIDOSCOPY FLEXIBLE N/A 6/3/2018    Procedure: SIGMOIDOSCOPY;  Surgeon: Dayana Joshi MD;  Location: [facility];  Service:      TONSILLECTOMY & ADENOIDECTOMY       TOTAL SHOULDER ARTHROPLASTY Bilateral R 7/12 L 9/11     US BIOPSY THYROID FINE NEEDLE ASPIRATION  8/7/2020     Current Outpatient Medications   Medication Sig Dispense Refill     acetaminophen (TYLENOL) 500 MG tablet Take 500-1,000 mg by mouth every 6 hours as needed for mild pain       amLODIPine (NORVASC) 5 MG tablet Take 2 tablets (10 mg) by mouth daily 14 tablet 0     aspirin (ASA) 81 MG chewable tablet Take 81 mg by mouth       lovastatin (MEVACOR) 40 MG tablet Take 40 mg by mouth       memantine (NAMENDA) 10 MG tablet TAKE 1 TABLET TWICE A  tablet 3     latanoprost (XALATAN) 0.005 % ophthalmic solution   "(Patient not taking: Reported on 3/17/2022)         Allergies   Allergen Reactions     Atorvastatin Muscle Pain (Myalgia)     Dextromethorphan Hives     Oxycodone Unknown     Pravastatin Muscle Pain (Myalgia)     Simvastatin Muscle Pain (Myalgia)     Codeine Rash        Social History     Tobacco Use     Smoking status: Never Smoker     Smokeless tobacco: Never Used   Substance Use Topics     Alcohol use: Yes     Alcohol/week: 2.0 standard drinks     Comment: social     Family History   Problem Relation Age of Onset     Heart Disease Father      Coronary Artery Disease Father      Cerebrovascular Disease Father      Coronary Artery Disease Brother      Coronary Artery Disease Brother      Dementia Sister      History   Drug Use No         Objective     BP 98/70 (BP Location: Left arm, Patient Position: Sitting, Cuff Size: Adult Regular)   Pulse 70   Ht 1.473 m (4' 10\")   Wt 62.2 kg (137 lb 3.2 oz)   BMI 28.67 kg/m      Physical Exam    GENERAL APPEARANCE: healthy, alert and no distress     EYES: EOMI, PERRL, pupils are pin point bilaterally     HENT: ear canals and TM's normal and nose and mouth without ulcers or lesions     NECK: no adenopathy, no asymmetry, masses, or scars and thyroid normal to palpation     RESP: lungs clear to auscultation - no rales, rhonchi or wheezes     CV: Irregular heart tones, rate controlled, normal S1 S2, no S3 or S4 and no murmur, click or rub     ABDOMEN:  soft, nontender, no HSM or masses and bowel sounds normal     MS: extremities normal- no gross deformities noted, no evidence of inflammation in joints, FROM in all extremities.     SKIN: no suspicious lesions or rashes, pink, warm and dry     NEURO: Normal strength and tone, sensory exam grossly normal, refers to daughter for answers due to her memory loss,  speech normal, unsteady gain noted when going from chair to exam table, she does ambulate using a 4 prong walker.      PSYCH: Alert, pleasant, affect normal/bright, well " groomed, engaged in conversation, good eye contact.      LYMPHATICS: No cervical adenopathy    Recent Labs   Lab Test 06/15/21  0857 06/15/21  0752 06/14/21  1803 06/14/21  1803 01/10/21  1108 01/10/21  1014 01/09/21  1645 01/09/21  1645   HGB  --  14.1 13.8  --    < > 11.7   < >  --    PLT  --  179 121*  --    < > 137*   < >  --    INR  --   --   --   --   --  1.09  --  1.08     --   --  144   < > 146*   < >  --    POTASSIUM 3.6  --   --  4.3   < > 3.9   < >  --    CR 0.74  --   --  0.69   < > 0.78   < >  --     < > = values in this interval not displayed.        Diagnostics:  Recent Results (from the past 168 hour(s))   Basic metabolic panel    Collection Time: 03/17/22 12:26 PM   Result Value Ref Range    Sodium 145 136 - 145 mmol/L    Potassium 4.2 3.5 - 5.0 mmol/L    Chloride 109 (H) 98 - 107 mmol/L    Carbon Dioxide (CO2) 25 22 - 31 mmol/L    Anion Gap 11 5 - 18 mmol/L    Urea Nitrogen 20 8 - 28 mg/dL    Creatinine 0.81 0.60 - 1.10 mg/dL    Calcium 9.4 8.5 - 10.5 mg/dL    Glucose 109 70 - 125 mg/dL    GFR Estimate 69 >60 mL/min/1.73m2   Hemoglobin    Collection Time: 03/17/22 12:26 PM   Result Value Ref Range    Hemoglobin 12.9 11.7 - 15.7 g/dL          Revised Cardiac Risk Index (RCRI):  The patient has the following serious cardiovascular risks for perioperative complications:   - Cerebrovascular Disease (TIA or CVA) = 1 point     RCRI Interpretation: 1 point: Class II (low risk - 0.9% complication rate)           Signed Electronically by: Ester Sigala NP  Copy of this evaluation report is provided to requesting physician.

## 2022-03-21 RX ORDER — ACETAMINOPHEN 325 MG/1
975 TABLET ORAL ONCE
Status: CANCELLED | OUTPATIENT
Start: 2022-03-31 | End: 2022-03-21

## 2022-03-28 ENCOUNTER — LAB (OUTPATIENT)
Dept: LAB | Facility: CLINIC | Age: 87
End: 2022-03-28
Attending: OBSTETRICS & GYNECOLOGY
Payer: MEDICARE

## 2022-03-28 VITALS — HEIGHT: 58 IN | WEIGHT: 137 LBS | BODY MASS INDEX: 28.76 KG/M2

## 2022-03-28 DIAGNOSIS — Z11.59 ENCOUNTER FOR SCREENING FOR OTHER VIRAL DISEASES: ICD-10-CM

## 2022-03-28 PROCEDURE — U0005 INFEC AGEN DETEC AMPLI PROBE: HCPCS

## 2022-03-28 PROCEDURE — U0003 INFECTIOUS AGENT DETECTION BY NUCLEIC ACID (DNA OR RNA); SEVERE ACUTE RESPIRATORY SYNDROME CORONAVIRUS 2 (SARS-COV-2) (CORONAVIRUS DISEASE [COVID-19]), AMPLIFIED PROBE TECHNIQUE, MAKING USE OF HIGH THROUGHPUT TECHNOLOGIES AS DESCRIBED BY CMS-2020-01-R: HCPCS

## 2022-03-29 ENCOUNTER — ANESTHESIA EVENT (OUTPATIENT)
Dept: SURGERY | Facility: HOSPITAL | Age: 87
End: 2022-03-29
Payer: MEDICARE

## 2022-03-29 LAB — SARS-COV-2 RNA RESP QL NAA+PROBE: NEGATIVE

## 2022-03-31 ENCOUNTER — HOSPITAL ENCOUNTER (OUTPATIENT)
Facility: HOSPITAL | Age: 87
Discharge: HOME | End: 2022-04-01
Attending: OBSTETRICS & GYNECOLOGY | Admitting: OBSTETRICS & GYNECOLOGY
Payer: MEDICARE

## 2022-03-31 ENCOUNTER — ANESTHESIA (OUTPATIENT)
Dept: SURGERY | Facility: HOSPITAL | Age: 87
End: 2022-03-31
Payer: MEDICARE

## 2022-03-31 PROBLEM — S37.69XA UTERINE PERFORATION: Status: ACTIVE | Noted: 2022-03-31

## 2022-03-31 LAB — HGB BLD-MCNC: 13.1 G/DL (ref 11.7–15.7)

## 2022-03-31 PROCEDURE — 250N000009 HC RX 250: Performed by: NURSE ANESTHETIST, CERTIFIED REGISTERED

## 2022-03-31 PROCEDURE — 85018 HEMOGLOBIN: CPT | Performed by: OBSTETRICS & GYNECOLOGY

## 2022-03-31 PROCEDURE — 250N000011 HC RX IP 250 OP 636: Performed by: ANESTHESIOLOGY

## 2022-03-31 PROCEDURE — 272N000001 HC OR GENERAL SUPPLY STERILE: Performed by: OBSTETRICS & GYNECOLOGY

## 2022-03-31 PROCEDURE — 710N000009 HC RECOVERY PHASE 1, LEVEL 1, PER MIN: Performed by: OBSTETRICS & GYNECOLOGY

## 2022-03-31 PROCEDURE — 250N000011 HC RX IP 250 OP 636: Performed by: NURSE ANESTHETIST, CERTIFIED REGISTERED

## 2022-03-31 PROCEDURE — 250N000013 HC RX MED GY IP 250 OP 250 PS 637: Performed by: PHYSICIAN ASSISTANT

## 2022-03-31 PROCEDURE — 370N000017 HC ANESTHESIA TECHNICAL FEE, PER MIN: Performed by: OBSTETRICS & GYNECOLOGY

## 2022-03-31 PROCEDURE — 36415 COLL VENOUS BLD VENIPUNCTURE: CPT | Performed by: OBSTETRICS & GYNECOLOGY

## 2022-03-31 PROCEDURE — 250N000009 HC RX 250: Performed by: OBSTETRICS & GYNECOLOGY

## 2022-03-31 PROCEDURE — 258N000003 HC RX IP 258 OP 636: Performed by: ANESTHESIOLOGY

## 2022-03-31 PROCEDURE — 250N000013 HC RX MED GY IP 250 OP 250 PS 637: Performed by: OBSTETRICS & GYNECOLOGY

## 2022-03-31 PROCEDURE — 999N000141 HC STATISTIC PRE-PROCEDURE NURSING ASSESSMENT: Performed by: OBSTETRICS & GYNECOLOGY

## 2022-03-31 PROCEDURE — 360N000076 HC SURGERY LEVEL 3, PER MIN: Performed by: OBSTETRICS & GYNECOLOGY

## 2022-03-31 RX ORDER — NALOXONE HYDROCHLORIDE 1 MG/ML
0.4 INJECTION INTRAMUSCULAR; INTRAVENOUS; SUBCUTANEOUS
Status: DISCONTINUED | OUTPATIENT
Start: 2022-03-31 | End: 2022-04-01 | Stop reason: HOSPADM

## 2022-03-31 RX ORDER — NALOXONE HYDROCHLORIDE 1 MG/ML
0.2 INJECTION INTRAMUSCULAR; INTRAVENOUS; SUBCUTANEOUS
Status: DISCONTINUED | OUTPATIENT
Start: 2022-03-31 | End: 2022-04-01 | Stop reason: HOSPADM

## 2022-03-31 RX ORDER — FENTANYL CITRATE 50 UG/ML
25 INJECTION, SOLUTION INTRAMUSCULAR; INTRAVENOUS
Status: DISCONTINUED | OUTPATIENT
Start: 2022-03-31 | End: 2022-03-31 | Stop reason: HOSPADM

## 2022-03-31 RX ORDER — SODIUM CHLORIDE, SODIUM LACTATE, POTASSIUM CHLORIDE, CALCIUM CHLORIDE 600; 310; 30; 20 MG/100ML; MG/100ML; MG/100ML; MG/100ML
INJECTION, SOLUTION INTRAVENOUS CONTINUOUS
Status: DISCONTINUED | OUTPATIENT
Start: 2022-03-31 | End: 2022-03-31 | Stop reason: HOSPADM

## 2022-03-31 RX ORDER — ONDANSETRON 2 MG/ML
4 INJECTION INTRAMUSCULAR; INTRAVENOUS EVERY 30 MIN PRN
Status: DISCONTINUED | OUTPATIENT
Start: 2022-03-31 | End: 2022-03-31 | Stop reason: HOSPADM

## 2022-03-31 RX ORDER — ONDANSETRON 4 MG/1
4 TABLET, ORALLY DISINTEGRATING ORAL EVERY 30 MIN PRN
Status: DISCONTINUED | OUTPATIENT
Start: 2022-03-31 | End: 2022-03-31 | Stop reason: HOSPADM

## 2022-03-31 RX ORDER — PROPOFOL 10 MG/ML
INJECTION, EMULSION INTRAVENOUS CONTINUOUS PRN
Status: DISCONTINUED | OUTPATIENT
Start: 2022-03-31 | End: 2022-03-31

## 2022-03-31 RX ORDER — PROCHLORPERAZINE MALEATE 5 MG
5 TABLET ORAL EVERY 6 HOURS PRN
Status: DISCONTINUED | OUTPATIENT
Start: 2022-03-31 | End: 2022-04-01 | Stop reason: HOSPADM

## 2022-03-31 RX ORDER — MAGNESIUM HYDROXIDE 1200 MG/15ML
LIQUID ORAL PRN
Status: DISCONTINUED | OUTPATIENT
Start: 2022-03-31 | End: 2022-03-31 | Stop reason: HOSPADM

## 2022-03-31 RX ORDER — OXYCODONE HYDROCHLORIDE 5 MG/1
5 TABLET ORAL EVERY 4 HOURS PRN
Status: DISCONTINUED | OUTPATIENT
Start: 2022-03-31 | End: 2022-03-31 | Stop reason: HOSPADM

## 2022-03-31 RX ORDER — ACETAMINOPHEN 325 MG/1
650 TABLET ORAL EVERY 6 HOURS
Status: DISCONTINUED | OUTPATIENT
Start: 2022-04-03 | End: 2022-04-01 | Stop reason: HOSPADM

## 2022-03-31 RX ORDER — LIDOCAINE HYDROCHLORIDE AND EPINEPHRINE 10; 10 MG/ML; UG/ML
INJECTION, SOLUTION INFILTRATION; PERINEURAL PRN
Status: DISCONTINUED | OUTPATIENT
Start: 2022-03-31 | End: 2022-03-31 | Stop reason: HOSPADM

## 2022-03-31 RX ORDER — BISACODYL 10 MG
10 SUPPOSITORY, RECTAL RECTAL DAILY PRN
Status: DISCONTINUED | OUTPATIENT
Start: 2022-03-31 | End: 2022-04-01 | Stop reason: HOSPADM

## 2022-03-31 RX ORDER — HYDROMORPHONE HYDROCHLORIDE 1 MG/ML
0.2 INJECTION, SOLUTION INTRAMUSCULAR; INTRAVENOUS; SUBCUTANEOUS EVERY 5 MIN PRN
Status: DISCONTINUED | OUTPATIENT
Start: 2022-03-31 | End: 2022-03-31 | Stop reason: HOSPADM

## 2022-03-31 RX ORDER — FENTANYL CITRATE 50 UG/ML
25 INJECTION, SOLUTION INTRAMUSCULAR; INTRAVENOUS EVERY 5 MIN PRN
Status: DISCONTINUED | OUTPATIENT
Start: 2022-03-31 | End: 2022-03-31 | Stop reason: HOSPADM

## 2022-03-31 RX ORDER — LIDOCAINE HYDROCHLORIDE 20 MG/ML
INJECTION, SOLUTION INFILTRATION; PERINEURAL PRN
Status: DISCONTINUED | OUTPATIENT
Start: 2022-03-31 | End: 2022-03-31

## 2022-03-31 RX ORDER — LIDOCAINE HYDROCHLORIDE 10 MG/ML
INJECTION, SOLUTION INFILTRATION; PERINEURAL PRN
Status: DISCONTINUED | OUTPATIENT
Start: 2022-03-31 | End: 2022-03-31 | Stop reason: HOSPADM

## 2022-03-31 RX ORDER — ACETAMINOPHEN 325 MG/1
975 TABLET ORAL EVERY 6 HOURS
Status: DISCONTINUED | OUTPATIENT
Start: 2022-03-31 | End: 2022-04-01 | Stop reason: HOSPADM

## 2022-03-31 RX ORDER — ACETAMINOPHEN 325 MG/1
325 TABLET ORAL EVERY 4 HOURS PRN
Status: DISCONTINUED | OUTPATIENT
Start: 2022-03-31 | End: 2022-04-01 | Stop reason: HOSPADM

## 2022-03-31 RX ORDER — ONDANSETRON 2 MG/ML
4 INJECTION INTRAMUSCULAR; INTRAVENOUS EVERY 6 HOURS PRN
Status: DISCONTINUED | OUTPATIENT
Start: 2022-03-31 | End: 2022-04-01 | Stop reason: HOSPADM

## 2022-03-31 RX ORDER — MEPERIDINE HYDROCHLORIDE 25 MG/ML
12.5 INJECTION INTRAMUSCULAR; INTRAVENOUS; SUBCUTANEOUS
Status: DISCONTINUED | OUTPATIENT
Start: 2022-03-31 | End: 2022-03-31 | Stop reason: HOSPADM

## 2022-03-31 RX ORDER — ACETAMINOPHEN 325 MG/1
975 TABLET ORAL ONCE
Status: COMPLETED | OUTPATIENT
Start: 2022-03-31 | End: 2022-03-31

## 2022-03-31 RX ORDER — LIDOCAINE 40 MG/G
CREAM TOPICAL
Status: DISCONTINUED | OUTPATIENT
Start: 2022-03-31 | End: 2022-03-31 | Stop reason: HOSPADM

## 2022-03-31 RX ORDER — ONDANSETRON 4 MG/1
4 TABLET, ORALLY DISINTEGRATING ORAL EVERY 6 HOURS PRN
Status: DISCONTINUED | OUTPATIENT
Start: 2022-03-31 | End: 2022-04-01 | Stop reason: HOSPADM

## 2022-03-31 RX ORDER — LIDOCAINE 40 MG/G
CREAM TOPICAL
Status: DISCONTINUED | OUTPATIENT
Start: 2022-03-31 | End: 2022-04-01 | Stop reason: HOSPADM

## 2022-03-31 RX ORDER — AMOXICILLIN 250 MG
1 CAPSULE ORAL 2 TIMES DAILY
Status: DISCONTINUED | OUTPATIENT
Start: 2022-03-31 | End: 2022-04-01 | Stop reason: HOSPADM

## 2022-03-31 RX ADMIN — DOCUSATE SODIUM 50 MG AND SENNOSIDES 8.6 MG 1 TABLET: 8.6; 5 TABLET, FILM COATED ORAL at 21:04

## 2022-03-31 RX ADMIN — ACETAMINOPHEN 975 MG: 325 TABLET ORAL at 18:08

## 2022-03-31 RX ADMIN — PROPOFOL 60 MCG/KG/MIN: 10 INJECTION, EMULSION INTRAVENOUS at 10:58

## 2022-03-31 RX ADMIN — ACETAMINOPHEN 975 MG: 325 TABLET ORAL at 23:51

## 2022-03-31 RX ADMIN — LIDOCAINE HYDROCHLORIDE 1 ML: 20 INJECTION, SOLUTION INFILTRATION; PERINEURAL at 10:58

## 2022-03-31 RX ADMIN — ACETAMINOPHEN 975 MG: 325 TABLET ORAL at 09:25

## 2022-03-31 RX ADMIN — SODIUM CHLORIDE, POTASSIUM CHLORIDE, SODIUM LACTATE AND CALCIUM CHLORIDE 100 ML: 600; 310; 30; 20 INJECTION, SOLUTION INTRAVENOUS at 09:15

## 2022-03-31 RX ADMIN — ONDANSETRON 4 MG: 2 INJECTION INTRAMUSCULAR; INTRAVENOUS at 11:58

## 2022-03-31 ASSESSMENT — ENCOUNTER SYMPTOMS: ORTHOPNEA: 1

## 2022-03-31 NOTE — PHARMACY-ADMISSION MEDICATION HISTORY
Pharmacy Note - Admission Medication History    Pertinent Provider Information: None   ______________________________________________________________________    Prior To Admission (PTA) med list completed and updated in EMR.       PTA Med List   Medication Sig Last Dose     amLODIPine (NORVASC) 10 MG tablet Take 1 tablet (10 mg) by mouth daily 3/31/2022 at 0730     aspirin (ASA) 81 MG chewable tablet Take 81 mg by mouth daily  Past Week at Unknown time     lovastatin (MEVACOR) 40 MG tablet Take 1 tablet (40 mg) by mouth At Bedtime 3/30/2022 at Unknown time     memantine (NAMENDA) 10 MG tablet TAKE 1 TABLET TWICE A DAY 3/31/2022 at Unknown time       Information source(s): [facility]    Method of interview communication: in-person    Patient was asked about OTC/herbal products specifically.  PTA med list reflects this.    Based on the pharmacist's assessment, the PTA med list information appears somewhat reliable:due to poor historian    Allergies were reviewed, assessed, and updated with the patient.      Patient does not use any multi-dose medications prior to admission.     Thank you for the opportunity to participate in the care of this patient.      Laura Santos [facility]     3/31/2022     1:23 PM

## 2022-03-31 NOTE — OP NOTE
PROCEDURE NOTE - HYSTEROSCOPY AND DILATION AND CURETTAGE     Name: Angle Agudelo   : 1932   MRN: [id number]     DATE OF SERVICE:  3/31/2022     PREOPERATIVE DIAGNOSIS:   Post menopausal bleeding  Thickened endometrium    POSTOPERATIVE DIAGNOSIS:   Post menopausal bleeding  Thickened endometrium    PROCEDURES:   Hysteroscopy    SURGEON: Olive Biggs MD     ASSISTANT: OR staff    ANESTHESIA:  mac    ESTIMATED BLOOD LOSS: 5 cc    HISTORY OF PRESENT ILLNESS:  This is a 89 year old female with post menopausal bleeding and thickened endometrium. She had a transvaginal ultrasound prior to which showed a thickened endometrium - 19 mm.  She was given informed consent for the above procedure. The risks, benefits and alternatives were discussed with her including but not exclusive to uterine perforation, bleeding and infection. She expressed understanding and wished to proceed.     PROCEDURE:  The patient was brought to the operating room and after induction of MAC anesthesia was prepped and draped in the dorsal lithotomy position. A time out was called and the patient and the procedure were verified.  A bimanual exam was done, indicating midplane. No other abnormalities were noted.     A sterile speculum was then placed. The anterior lip of the cervix was grasped with a single tooth tenaculum. The os was very stenotic. I was unable to place a 2 Dhara through the cervix. I then got lacrimal duct dilators. I was able to place a lacrimal duct dilator through the cervix. I was able to serially dilate up to a 6. I then placed an endometrial pipelle - I had scant return. I palpated the wall of the uterus and the sound was 6 cm. I tried to perform an EMB and it then sounded to 10 cm. I inserted the hysteroscope. It appeared I had created a false passage. There was a small perforation noted through the fundus. At this point I terminated the procedure. The instruments were removed and counts were correct x 2. She was  transferred to the recovery room in stable condition.     I spoke to Angle Hughes's daughter. Discussed the uterine perforation and discussed laparoscopy vs. Observation. I will observe overnight. I did discuss possibility of needing to go to the OR for Laparoscopy if Hgb is dropping or she is unstable.     CC: Ester Sigala Kathryn Burns Grande, MD

## 2022-03-31 NOTE — ANESTHESIA CARE TRANSFER NOTE
Patient: Angle Agudelo    Procedure: Procedure(s):  HYSTEROSCOPY       Diagnosis: Thickened endometrium [R93.89]  Diagnosis Additional Information: No value filed.    Anesthesia Type:   MAC     Note:    Oropharynx: oropharynx clear of all foreign objects  Level of Consciousness: awake  Oxygen Supplementation: face mask  Level of Supplemental Oxygen (L/min / FiO2): 8  Independent Airway: airway patency satisfactory and stable  Dentition: dentition unchanged      Patient transferred to: PACU    Handoff Report: Identifed the Patient, Identified the Reponsible Provider, Reviewed the pertinent medical history, Discussed the surgical course, Reviewed Intra-OP anesthesia mangement and issues during anesthesia, Set expectations for post-procedure period and Allowed opportunity for questions and acknowledgement of understanding      Vitals:  Vitals Value Taken Time   /92 03/31/22 1148   Temp 37.4  C (99.3  F) 03/31/22 1148   Pulse 73 03/31/22 1149   Resp 13 03/31/22 1149   SpO2 93 % 03/31/22 1149   Vitals shown include unvalidated device data.    Electronically Signed By: SOFIE Garcia CRNA  March 31, 2022  11:50 AM

## 2022-03-31 NOTE — ANESTHESIA POSTPROCEDURE EVALUATION
Patient: Angle Agudelo    Procedure: Procedure(s):  HYSTEROSCOPY       Anesthesia Type:  MAC    Note:  Disposition: Outpatient   Postop Pain Control: Uneventful            Sign Out: Well controlled pain   PONV: No   Neuro/Psych: Uneventful            Sign Out: Acceptable/Baseline neuro status   Airway/Respiratory: Uneventful            Sign Out: Acceptable/Baseline resp. status   CV/Hemodynamics: Uneventful            Sign Out: Acceptable CV status; No obvious hypovolemia; No obvious fluid overload   Other NRE: NONE   DID A NON-ROUTINE EVENT OCCUR? No           Last vitals:  Vitals Value Taken Time   /75 03/31/22 1315   Temp 37.1  C (98.7  F) 03/31/22 1300   Pulse 65 03/31/22 1337   Resp 18 03/31/22 1315   SpO2 86 % 03/31/22 1337   Vitals shown include unvalidated device data.    Electronically Signed By: J Carlos Orozco MD  March 31, 2022  1:58 PM

## 2022-03-31 NOTE — PLAN OF CARE
Problem: Plan of Care - These are the overarching goals to be used throughout the patient stay.    Goal: Plan of Care Review/Shift Note  Description: The Plan of Care Review/Shift note should be completed every shift.  The Outcome Evaluation is a brief statement about your assessment that the patient is improving, declining, or no change.  This information will be displayed automatically on your shift note.  Outcome: Ongoing, Progressing   Goal Outcome Evaluation:           Denies pain small amount of vaginal bleeding pad changed once  Daughter Peg at bedside. Denies pain or nausea.  No void yet. Alert forgetful instructed on call light.

## 2022-03-31 NOTE — ANESTHESIA PREPROCEDURE EVALUATION
Anesthesia Pre-Procedure Evaluation    Patient: Angle Agudelo   MRN: [id number] : 1932        Procedure : Procedure(s):  HYSTEROSCOPY DILATION AND CURETTAGE          Past Medical History:   Diagnosis Date     Acute cerebrovascular accident (CVA) (H) 2020     Benign neoplasm of colon      Chronic bilateral low back pain without sciatica      Dementia (H)      Disorder of bone and cartilage, unspecified      Dyspnea on exertion      GI bleed      Memory impairment      Orthopnea      Pure hypercholesterolemia      Skin cancer     Not Melanoma, not sure what kind though     Spinal stenosis of lumbar region      Thickened endometrium      Unspecified arthropathy, shoulder region      Unspecified essential hypertension      Walking troubles       Past Surgical History:   Procedure Laterality Date     APPENDECTOMY       BIOPSY CERVICAL, LOCAL EXCISION, SINGLE/MULTIPLE N/A 2018    Procedure: LOOP ELECTROSURGICAL EXCISION PROCEDURE, REMOVAL PESSARY;  Surgeon: Olive Biggs MD;  Location: [facility] Main OR;  Service:      BIOPSY VULVA N/A 2020    Procedure: VULVAR BIOPSY;  Surgeon: Olive Biggs MD;  Location: [facility] Main OR;  Service: Gynecology     CATARACT EXTRACTION EXTRACAPSULAR W/ INTRAOCULAR LENS IMPLANTATION Bilateral      CERVICAL BIOPSY N/A 2020    Procedure: PUNCH BIOPSY, CERVIX;  Surgeon: Olive Biggs MD;  Location: [facility] OR;  Service: Gynecology     CHOLECYSTECTOMY       COLONOSCOPY N/A 2020    Procedure: COLONOSCOPY;  Surgeon: Amanda Carson MD;  Location: [facility]     HC DILATION/CURETTAGE DIAG/THER NON OB N/A 2020    Procedure: DILATION AND CURETTAGE, UTERUS;  Surgeon: Olive Biggs MD;  Location: [facility] Main OR;  Service: Gynecology     IR EXTREMITY ANGIOGRAM RIGHT  2018     OTHER SURGICAL HISTORY      [id number]     OTHER SURGICAL HISTORY      finger abscess     LA ESOPHAGOGASTRODUODENOSCOPY TRANSORAL  DIAGNOSTIC N/A 6/16/2021    Procedure: ESOPHAGOGASTRODUODENOSCOPY (EGD);  Surgeon: Primitivo Lafleur MD;  Location: [facility] OR;  Service: Gastroenterology     SHOULDER SURGERY      bilateral shoulder     SIGMOIDOSCOPY FLEXIBLE N/A 6/3/2018    Procedure: SIGMOIDOSCOPY;  Surgeon: Dayana Joshi MD;  Location: [facility];  Service:      TONSILLECTOMY & ADENOIDECTOMY       TOTAL SHOULDER ARTHROPLASTY Bilateral R 7/12 L 9/11      BIOPSY THYROID FINE NEEDLE ASPIRATION  8/7/2020      Allergies   Allergen Reactions     Atorvastatin Muscle Pain (Myalgia)     Dextromethorphan Hives     Oxycodone Unknown     Pravastatin Muscle Pain (Myalgia)     Simvastatin Muscle Pain (Myalgia)     Codeine Rash      Social History     Tobacco Use     Smoking status: Never Smoker     Smokeless tobacco: Never Used   Substance Use Topics     Alcohol use: Yes     Alcohol/week: 2.0 standard drinks     Comment: social      Wt Readings from Last 1 Encounters:   03/31/22 62.6 kg (138 lb)        Anesthesia Evaluation   Pt has had prior anesthetic. Type: General and MAC.        ROS/MED HX  ENT/Pulmonary:  - neg pulmonary ROS     Neurologic:     (+) dementia, CVA, TIA,     Cardiovascular:     (+) hypertension-----DEVINE. orthopnea/PND,     METS/Exercise Tolerance:     Hematologic:       Musculoskeletal:   (+) arthritis,     GI/Hepatic:       Renal/Genitourinary:       Endo:     (+) Obesity,     Psychiatric/Substance Use:       Infectious Disease:       Malignancy:       Other:            Physical Exam    Airway  airway exam normal      Mallampati: II   TM distance: > 3 FB   Neck ROM: full   Mouth opening: > 3 cm    Respiratory Devices and Support         Dental  no notable dental history         Cardiovascular   cardiovascular exam normal       Rhythm and rate: regular and normal     Pulmonary   pulmonary exam normal        breath sounds clear to auscultation           OUTSIDE LABS:  CBC:   Lab Results   Component Value Date    WBC 7.8  06/15/2021    WBC 8.4 06/14/2021    HGB 12.9 03/17/2022    HGB 14.1 06/15/2021    HCT 45.6 06/15/2021    HCT 45.7 06/14/2021     06/15/2021     (L) 06/14/2021     BMP:   Lab Results   Component Value Date     03/17/2022     06/15/2021    POTASSIUM 4.2 03/17/2022    POTASSIUM 3.6 06/15/2021    CHLORIDE 109 (H) 03/17/2022    CHLORIDE 105 06/15/2021    CO2 25 03/17/2022    CO2 25 06/15/2021    BUN 20 03/17/2022    BUN 11 06/15/2021    CR 0.81 03/17/2022    CR 0.74 06/15/2021     03/17/2022     06/16/2021     COAGS:   Lab Results   Component Value Date    PTT 28 01/09/2021    INR 1.09 01/10/2021     POC: No results found for: BGM, HCG, HCGS  HEPATIC:   Lab Results   Component Value Date    ALBUMIN 4.3 06/14/2021    PROTTOTAL 7.3 06/14/2021    ALT 14 06/14/2021    AST 26 06/14/2021    ALKPHOS 73 06/14/2021    BILITOTAL 0.7 06/14/2021     OTHER:   Lab Results   Component Value Date    PH 7.36 02/13/2019    LACT 1.4 06/14/2021    A1C 5.6 04/14/2014    BUBBA 9.4 03/17/2022    MAG 2.8 (H) 06/15/2021    LIPASE 19 06/14/2021    TSH 0.51 06/14/2021       Anesthesia Plan    ASA Status:  3   NPO Status:  NPO Appropriate    Anesthesia Type: MAC.              Consents         - Extended Intubation/Ventilatory Support Discussed: No.         Postoperative Care    Pain management: Oral pain medications.   PONV prophylaxis: Ondansetron (or other 5HT-3), Dexamethasone or Solumedrol     Comments:                J Carlos Orozco MD

## 2022-04-01 VITALS
BODY MASS INDEX: 28.84 KG/M2 | RESPIRATION RATE: 18 BRPM | SYSTOLIC BLOOD PRESSURE: 101 MMHG | TEMPERATURE: 98.7 F | DIASTOLIC BLOOD PRESSURE: 53 MMHG | WEIGHT: 138 LBS | HEART RATE: 60 BPM | OXYGEN SATURATION: 94 %

## 2022-04-01 LAB — HGB BLD-MCNC: 12.9 G/DL (ref 11.7–15.7)

## 2022-04-01 PROCEDURE — 36415 COLL VENOUS BLD VENIPUNCTURE: CPT | Performed by: OBSTETRICS & GYNECOLOGY

## 2022-04-01 PROCEDURE — 85018 HEMOGLOBIN: CPT | Performed by: OBSTETRICS & GYNECOLOGY

## 2022-04-01 PROCEDURE — 250N000013 HC RX MED GY IP 250 OP 250 PS 637: Performed by: OBSTETRICS & GYNECOLOGY

## 2022-04-01 RX ADMIN — DOCUSATE SODIUM 50 MG AND SENNOSIDES 8.6 MG 1 TABLET: 8.6; 5 TABLET, FILM COATED ORAL at 08:14

## 2022-04-01 RX ADMIN — ACETAMINOPHEN 975 MG: 325 TABLET ORAL at 06:32

## 2022-04-01 RX ADMIN — ACETAMINOPHEN 975 MG: 325 TABLET ORAL at 12:44

## 2022-04-01 ASSESSMENT — ACTIVITIES OF DAILY LIVING (ADL): DEPENDENT_IADLS:: TRANSPORTATION

## 2022-04-01 NOTE — PLAN OF CARE
Problem: Bleeding (Surgery Nonspecified)  Goal: Absence of Bleeding  Outcome: Ongoing, Progressing   Goal Outcome Evaluation:  She has had scant amt of vaginal bleeding tonight.  Her pad was changed at 04.  Problem: Pain (Surgery Nonspecified)  Goal: Acceptable Pain Control  Outcome: Ongoing, Progressing   She has denied pain thru the night, but is taking scheduled tylenol.  Problem: Postoperative Urinary Retention (Surgery Nonspecified)  Goal: Effective Urinary Elimination  Outcome: Ongoing, Progressing   She has voided well.

## 2022-04-01 NOTE — PROGRESS NOTES
Progress Note    Doing well. No complaints. No pain. She is confused this morning - this is a baseline for her.     Temp:  [97.4  F (36.3  C)-99.3  F (37.4  C)] 98.7  F (37.1  C)  Pulse:  [59-88] 60  Resp:  [16-21] 18  BP: (101-171)/(53-92) 101/53  SpO2:  [90 %-97 %] 94 %    NAD, AAO x 3  Abdomen soft, non tender    Hgb: 12.9 --> 13.1 --> pending    A/P: 89 yo POD #1 s/p Hysteroscopy with uterine perforation  -- Hemoglobin stable yesterday. Will check this am. If stable OK to discharge home.     Olive Biggs MD  (P) [phone]

## 2022-04-01 NOTE — CONSULTS
Care Management Initial Consult    General Information  Assessment completed with: Patient,    Type of CM/SW Visit: Initial Assessment    Primary Care Provider verified and updated as needed: Yes   Readmission within the last 30 days: no previous admission in last 30 days         Advance Care Planning:    None on file/ pt refused documents        Communication Assessment  Patient's communication style: spoken language (English or Bilingual)    Hearing Difficulty or Deaf: no        Cognitive  Cognitive/Neuro/Behavioral: WDL  Level of Consciousness: alert     Orientation: oriented x 4  Mood/Behavior: calm, cooperative     Speech: fluent    Living Environment:   People in home: spouse     Current living Arrangements: assisted living  Name of Facility: [facility]   Able to return to prior arrangements: yes       Family/Social Support:  Care provided by: self  Provides care for: no one  Marital Status:   , Children          Description of Support System: Supportive, Involved    Support Assessment: Adequate family and caregiver support, Adequate social supports    Current Resources:   Patient receiving home care services:  No     Community Resources:    Equipment currently used at home:  walker  Supplies currently used at home:              Lifestyle & Psychosocial Needs:  Social Determinants of Health     Tobacco Use: Low Risk      Smoking Tobacco Use: Never Smoker     Smokeless Tobacco Use: Never Used   Alcohol Use: Not on file   Financial Resource Strain: Not on file   Food Insecurity: Not on file   Transportation Needs: Not on file   Physical Activity: Not on file   Stress: Not on file   Social Connections: Not on file   Intimate Partner Violence: Not on file   Depression: Not at risk     PHQ-2 Score: 0   Housing Stability: Not on file       Functional Status:  Prior to admission patient needed assistance:   Dependent ADLs:: Ambulation-walker  Dependent IADLs:: Transportation       Mental Health  Status:  Mental Health Status: No Current Concerns       Chemical Dependency Status:                Values/Beliefs:  Spiritual, Cultural Beliefs, Adventism Practices, Values that affect care:                 Additional Information:  Assessed with daughter and pt in room. Pt lives at [facility] and has support from family. Is independent with ADLs; and uses a walker, daughter will transport at discharge. No anticipated CM needs at this time will follow for recommendations, and final discharge plan.    Monica Ramires RN

## 2022-04-01 NOTE — PLAN OF CARE
Pt was alert and oriented this evening, but forgetful. Denies pain/nausea. Eating well. Scant-small amounts of bloody drainage on everardo pad. Hgb check at 1800 was 13.1. Up to bathroom with assist of 1 and a walker. Voiding without difficulty.

## 2022-04-01 NOTE — PLAN OF CARE
Pt denied pain today, small amount of sanguinous drainage on the pad. Pt up in the chair, ambulated to the bathroom a few times. Hgb recheck was 12.9, discharged to home. Daughter Daija with pt and took her home. Discharge instructions reviewed with pt and Daija, they had no further questions. No new discharge meds.    Goal Outcome Evaluation:

## 2022-04-01 NOTE — PLAN OF CARE
PRIMARY DIAGNOSIS: ACUTE PAIN  OUTPATIENT/OBSERVATION GOALS TO BE MET BEFORE DISCHARGE:  1. Pain Status: Pain free.    2. Return to near baseline physical activity: Yes    3. Cleared for discharge by consultants (if involved): No    Discharge Planner Nurse   Safe discharge environment identified: Yes  Barriers to discharge: Yes       Entered by: Keyla Courtney 04/01/2022 1:39 AM   Watching/monitoring bleeding thru the night  Please review provider order for any additional goals.   Nurse to notify provider when observation goals have been met and patient is ready for discharge.Goal Outcome Evaluation:

## 2022-07-26 ENCOUNTER — MEDICAL CORRESPONDENCE (OUTPATIENT)
Dept: HEALTH INFORMATION MANAGEMENT | Facility: CLINIC | Age: 87
End: 2022-07-26

## 2022-09-06 ENCOUNTER — TRANSFERRED RECORDS (OUTPATIENT)
Dept: HEALTH INFORMATION MANAGEMENT | Facility: CLINIC | Age: 87
End: 2022-09-06

## 2022-09-14 ENCOUNTER — MEDICAL CORRESPONDENCE (OUTPATIENT)
Dept: HEALTH INFORMATION MANAGEMENT | Facility: CLINIC | Age: 87
End: 2022-09-14

## 2022-09-21 ENCOUNTER — OFFICE VISIT (OUTPATIENT)
Dept: FAMILY MEDICINE | Facility: CLINIC | Age: 87
End: 2022-09-21
Payer: MEDICARE

## 2022-09-21 DIAGNOSIS — F02.818 DEMENTIA IN OTHER DISEASES CLASSIFIED ELSEWHERE WITH BEHAVIORAL DISTURBANCE: ICD-10-CM

## 2022-09-21 DIAGNOSIS — H61.23 BILATERAL IMPACTED CERUMEN: ICD-10-CM

## 2022-09-21 DIAGNOSIS — Z23 NEED FOR COVID-19 VACCINE: ICD-10-CM

## 2022-09-21 DIAGNOSIS — E78.00 PRIMARY HYPERCHOLESTEROLEMIA: ICD-10-CM

## 2022-09-21 DIAGNOSIS — R93.89 THICKENED ENDOMETRIUM: ICD-10-CM

## 2022-09-21 DIAGNOSIS — I10 ESSENTIAL HYPERTENSION: ICD-10-CM

## 2022-09-21 DIAGNOSIS — F03.90 DEMENTIA WITHOUT BEHAVIORAL DISTURBANCE, UNSPECIFIED DEMENTIA TYPE: ICD-10-CM

## 2022-09-21 DIAGNOSIS — K64.4 EXTERNAL HEMORRHOIDS: ICD-10-CM

## 2022-09-21 DIAGNOSIS — Z00.00 ENCOUNTER FOR MEDICARE ANNUAL WELLNESS EXAM: Primary | ICD-10-CM

## 2022-09-21 PROBLEM — Z86.73 HISTORY OF CVA (CEREBROVASCULAR ACCIDENT): Status: ACTIVE | Noted: 2020-06-08

## 2022-09-21 LAB
ALBUMIN SERPL BCG-MCNC: 4.5 G/DL (ref 3.5–5.2)
ALP SERPL-CCNC: 109 U/L (ref 35–104)
ALT SERPL W P-5'-P-CCNC: 17 U/L (ref 10–35)
ANION GAP SERPL CALCULATED.3IONS-SCNC: 12 MMOL/L (ref 7–15)
AST SERPL W P-5'-P-CCNC: 25 U/L (ref 10–35)
BILIRUB SERPL-MCNC: 0.4 MG/DL
BUN SERPL-MCNC: 14.3 MG/DL (ref 8–23)
CALCIUM SERPL-MCNC: 9.6 MG/DL (ref 8.2–9.6)
CHLORIDE SERPL-SCNC: 105 MMOL/L (ref 98–107)
CREAT SERPL-MCNC: 0.78 MG/DL (ref 0.51–0.95)
DEPRECATED HCO3 PLAS-SCNC: 27 MMOL/L (ref 22–29)
ERYTHROCYTE [DISTWIDTH] IN BLOOD BY AUTOMATED COUNT: 15.4 % (ref 10–15)
GFR SERPL CREATININE-BSD FRML MDRD: 72 ML/MIN/1.73M2
GLUCOSE SERPL-MCNC: 102 MG/DL (ref 70–99)
HCT VFR BLD AUTO: 43.4 % (ref 35–47)
HGB BLD-MCNC: 13.4 G/DL (ref 11.7–15.7)
MCH RBC QN AUTO: 29.3 PG (ref 26.5–33)
MCHC RBC AUTO-ENTMCNC: 30.9 G/DL (ref 31.5–36.5)
MCV RBC AUTO: 95 FL (ref 78–100)
PLATELET # BLD AUTO: 180 10E3/UL (ref 150–450)
POTASSIUM SERPL-SCNC: 4.4 MMOL/L (ref 3.4–5.3)
PROT SERPL-MCNC: 7.4 G/DL (ref 6.4–8.3)
RBC # BLD AUTO: 4.57 10E6/UL (ref 3.8–5.2)
SODIUM SERPL-SCNC: 144 MMOL/L (ref 136–145)
WBC # BLD AUTO: 9.4 10E3/UL (ref 4–11)

## 2022-09-21 PROCEDURE — 69210 REMOVE IMPACTED EAR WAX UNI: CPT | Performed by: NURSE PRACTITIONER

## 2022-09-21 PROCEDURE — 0124A COVID-19,PF,PFIZER BOOSTER BIVALENT: CPT | Performed by: NURSE PRACTITIONER

## 2022-09-21 PROCEDURE — 80053 COMPREHEN METABOLIC PANEL: CPT | Performed by: NURSE PRACTITIONER

## 2022-09-21 PROCEDURE — 85027 COMPLETE CBC AUTOMATED: CPT | Performed by: NURSE PRACTITIONER

## 2022-09-21 PROCEDURE — G0439 PPPS, SUBSEQ VISIT: HCPCS | Performed by: NURSE PRACTITIONER

## 2022-09-21 PROCEDURE — 36415 COLL VENOUS BLD VENIPUNCTURE: CPT | Performed by: NURSE PRACTITIONER

## 2022-09-21 PROCEDURE — 99213 OFFICE O/P EST LOW 20 MIN: CPT | Mod: 25 | Performed by: NURSE PRACTITIONER

## 2022-09-21 PROCEDURE — 91312 COVID-19,PF,PFIZER BOOSTER BIVALENT: CPT | Performed by: NURSE PRACTITIONER

## 2022-09-21 RX ORDER — HYDROCORTISONE 25 MG/G
CREAM TOPICAL 2 TIMES DAILY PRN
Qty: 30 G | Refills: 0 | Status: SHIPPED | OUTPATIENT
Start: 2022-09-21 | End: 2023-10-06

## 2022-09-21 ASSESSMENT — ENCOUNTER SYMPTOMS
NAUSEA: 0
ARTHRALGIAS: 0
HEADACHES: 0
HEMATOCHEZIA: 0
EYE PAIN: 0
SHORTNESS OF BREATH: 0
ABDOMINAL PAIN: 0
DIARRHEA: 1
PALPITATIONS: 0
CONSTIPATION: 0
JOINT SWELLING: 0
FREQUENCY: 0
SORE THROAT: 0
BREAST MASS: 0
NERVOUS/ANXIOUS: 0
WEAKNESS: 1
HEMATURIA: 0
MYALGIAS: 0
CHILLS: 0
FEVER: 0
COUGH: 0
PARESTHESIAS: 0
DYSURIA: 0
DIZZINESS: 0
HEARTBURN: 0

## 2022-09-21 ASSESSMENT — ACTIVITIES OF DAILY LIVING (ADL)
CURRENT_FUNCTION: TRANSPORTATION REQUIRES ASSISTANCE
CURRENT_FUNCTION: LAUNDRY REQUIRES ASSISTANCE
CURRENT_FUNCTION: BATHING REQUIRES ASSISTANCE

## 2022-09-21 ASSESSMENT — PAIN SCALES - GENERAL: PAINLEVEL: NO PAIN (0)

## 2022-09-21 NOTE — PATIENT INSTRUCTIONS
Updating lab work today.    New COVID-vaccine given today.    I sent a cream to the local [facility] that can be applied for the hemorrhoids.  This can be used for up to 2 weeks in a row.  If the hemorrhoids start to become painful or consistently bleeding, let me know.    The thyroid lump that was palpated by the dermatologist appears to be the same 1 that we biopsied 2 years ago.  For now, we can just leave this alone.  If starting to develop pain or swallowing difficulties, let me know.        Patient Education   Personalized Prevention Plan  You are due for the preventive services outlined below.  Your care team is available to assist you in scheduling these services.  If you have already completed any of these items, please share that information with your care team to update in your medical record.  Health Maintenance Due   Topic Date Due    Zoster (Shingles) Vaccine (2 of 3) 12/26/2012    Annual Wellness Visit  05/04/2017    COVID-19 Vaccine (5 - Booster for Moderna series) 07/14/2022    Flu Vaccine (1) 09/01/2022    ANNUAL REVIEW OF HM ORDERS  10/15/2022

## 2022-09-21 NOTE — PROGRESS NOTES
"SUBJECTIVE:   Angle is a 89 year old who presents for Preventive Visit.    Dermatology noted thyroid lump. Previous nodule biopsied.  Gynecology noted external hemorrhoids.  These do not bother the patient.  They do not bleed.  Hysteroscopy was attempted but aborted due to complications.  Family is not sure if they want to go through that again.    Patient has been advised of split billing requirements and indicates understanding: Yes  Are you in the first 12 months of your Medicare coverage?  No    Healthy Habits:     In general, how would you rate your overall health?  Good    Frequency of exercise:  None    Do you usually eat at least 4 servings of fruit and vegetables a day, include whole grains    & fiber and avoid regularly eating high fat or \"junk\" foods?  No    Taking medications regularly:  Yes    Medication side effects:  None    Ability to successfully perform activities of daily living:  Transportation requires assistance, bathing requires assistance and laundry requires assistance    Home Safety:  No safety concerns identified    Hearing Impairment:  Need to ask people to speak up or repeat themselves and difficulty understanding soft or whispered speech    In the past 6 months, have you been bothered by leaking of urine?  No    In general, how would you rate your overall mental or emotional health?  Good      PHQ-2 Total Score: 0    Additional concerns today:  Yes    Do you feel safe in your environment? Yes    Have you ever done Advance Care Planning? (For example, a Health Directive, POLST, or a discussion with a medical provider or your loved ones about your wishes): Yes, patient states has an Advance Care Planning document and will bring a copy to the clinic.    Fall risk  Fallen 2 or more times in the past year?: No  Any fall with injury in the past year?: No    Cognitive Screening   1) Repeat 3 items (Leader, Season, Table)    2) Clock draw: NORMAL  3) 3 item recall: Recalls NO objects   Results: " 0 items recalled: PROBABLE COGNITIVE IMPAIRMENT, **INFORM PROVIDER**    Mini-CogTM Copyright DOMINIQUE Castañeda. Licensed by the author for use in [facility]; reprinted with permission (robert@[facility]). All rights reserved.        Reviewed and updated as needed this visit by clinical staff   Tobacco  Allergies  Meds                Reviewed and updated as needed this visit by Provider                   Social History     Tobacco Use     Smoking status: Never Smoker     Smokeless tobacco: Never Used   Substance Use Topics     Alcohol use: Yes     Alcohol/week: 2.0 standard drinks     Comment: social     If you drink alcohol do you typically have >3 drinks per day or >7 drinks per week? No    Alcohol Use 9/21/2022   Prescreen: >3 drinks/day or >7 drinks/week? Not Applicable   Prescreen: >3 drinks/day or >7 drinks/week? -       Current providers sharing in care for this patient include:   Patient Care Team:  Ester Sigala NP as PCP - Nona Leon MD as Assigned Neuroscience Provider  Ester Sigala NP as Assigned PCP    The following health maintenance items are reviewed in Epic and correct as of today:  Health Maintenance   Topic Date Due     ZOSTER IMMUNIZATION (2 of 3) 12/26/2012     INFLUENZA VACCINE (1) 09/01/2022     ANNUAL REVIEW OF HM ORDERS  10/15/2022     MEDICARE ANNUAL WELLNESS VISIT  09/21/2023     FALL RISK ASSESSMENT  09/21/2023     DTAP/TDAP/TD IMMUNIZATION (2 - Td or Tdap) 08/12/2026     ADVANCE CARE PLANNING  09/21/2027     PHQ-2 (once per calendar year)  Completed     Pneumococcal Vaccine: 65+ Years  Completed     COVID-19 Vaccine  Completed     IPV IMMUNIZATION  Aged Out     MENINGITIS IMMUNIZATION  Aged Out     HEPATITIS B IMMUNIZATION  Aged Out     Pertinent mammograms are reviewed under the imaging tab.    Review of Systems   Constitutional: Negative for chills and fever.   HENT: Negative for congestion, ear pain, hearing loss and sore throat.    Eyes: Negative for  pain and visual disturbance.   Respiratory: Negative for cough and shortness of breath.    Cardiovascular: Negative for chest pain, palpitations and peripheral edema.   Gastrointestinal: Positive for diarrhea. Negative for abdominal pain, constipation, heartburn, hematochezia and nausea.   Breasts:  Negative for tenderness, breast mass and discharge.   Genitourinary: Positive for vaginal discharge. Negative for dysuria, frequency, hematuria, pelvic pain, urgency and vaginal bleeding.   Musculoskeletal: Negative for arthralgias, joint swelling and myalgias.   Skin: Negative for rash.   Neurological: Positive for weakness. Negative for dizziness, headaches and paresthesias.   Psychiatric/Behavioral: Negative for mood changes. The patient is not nervous/anxious.        OBJECTIVE:   /76   Pulse 55   Ht 1.524 m (5')   Wt 61.9 kg (136 lb 8 oz)   LMP  (LMP Unknown)   SpO2 (!) 83%   Breastfeeding No   BMI 26.66 kg/m   Estimated body mass index is 26.66 kg/m  as calculated from the following:    Height as of this encounter: 1.524 m (5').    Weight as of this encounter: 61.9 kg (136 lb 8 oz).  Physical Exam  GENERAL: healthy, alert and no distress  EYES: Eyes grossly normal to inspection, PERRL and conjunctivae and sclerae normal  HENT: Bilateral cerumen impaction.  NECK: no adenopathy, no asymmetry, masses, or scars and left palpable lump on thyroid of known nodule.  RESP: lungs clear to auscultation - no rales, rhonchi or wheezes  BREAST: Deferred  CV: regular rate and rhythm, normal S1 S2, no S3 or S4, 3/6 systolic murmur, click or rub, no peripheral edema and peripheral pulses strong  ABDOMEN: soft, nontender, no hepatosplenomegaly, no masses and bowel sounds normal  MS: no gross musculoskeletal defects noted, no edema  SKIN: no suspicious lesions or rashes  NEURO: Normal strength and tone, mentation intact and speech normal  PSYCH: Poor mentation with recent events.  Repeats herself often.    Diagnostic Test  Results:  Labs reviewed in Epic    ASSESSMENT / PLAN:       ICD-10-CM    1. Encounter for Medicare annual wellness exam  Z00.00 CBC with platelets     CBC with platelets   2. Dementia without behavioral disturbance, unspecified dementia type (H)  F03.90    3. Primary hypercholesterolemia  E78.00 Comprehensive metabolic panel (BMP + Alb, Alk Phos, ALT, AST, Total. Bili, TP)     Comprehensive metabolic panel (BMP + Alb, Alk Phos, ALT, AST, Total. Bili, TP)   4. Essential hypertension  I10    5. Thickened endometrium  R93.89    6. 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 (H)  F02.81    7. Need for COVID-19 vaccine  Z23 COVID-19,PF,PFIZER BOOSTER BIVALENT (12+YRS)   8. External hemorrhoids  K64.4 hydrocortisone, Perianal, (HYDROCORTISONE) 2.5 % cream   9. Bilateral impacted cerumen  H61.23 REMOVE IMPACTED CERUMEN     Update monitoring lab work.  Hydrocortisone cream sent for hemorrhoids.  Use up to 2 weeks and report back to me.  If becoming bothersome, let me know.  Lavage was attempted with cerumen impaction but unsuccessful.  I then used alligator forceps and removed a large amount of cerumen from both ears.  Well-tolerated.  COVID booster given.  She will get her flu shot at her assisted living.  Difficult hysteroscopy.  Right now family thinks that they would just like to leave things as they are without further follow-up.  This seems reasonable.  Palpable thyroid lump appears consistent with past thyroid nodularity that was biopsied.  Family would like no further intervention on this as well.  Continue monitoring memory problems/dementia.  Okay to continue Namenda for dementia    COUNSELING:  Reviewed preventive health counseling, as reflected in patient instructions    Estimated body mass index is 26.66 kg/m  as calculated from the following:    Height as of this encounter: 1.524 m (5').    Weight as of this encounter: 61.9 kg (136 lb 8 oz).        She reports that she has never  smoked. She has never used smokeless tobacco.      Appropriate preventive services were discussed with this patient, including applicable screening as appropriate for cardiovascular disease, diabetes, osteopenia/osteoporosis, and glaucoma.  As appropriate for age/gender, discussed screening for colorectal cancer, prostate cancer, breast cancer, and cervical cancer. Checklist reviewing preventive services available has been given to the patient.    Reviewed patients plan of care and provided an AVS. The Basic Care Plan (routine screening as documented in Health Maintenance) for Angle meets the Care Plan requirement. This Care Plan has been established and reviewed with the Patient.    Counseling Resources:  ATP IV Guidelines  Pooled Cohorts Equation Calculator  Breast Cancer Risk Calculator  Breast Cancer: Medication to Reduce Risk  FRAX Risk Assessment  ICSI Preventive Guidelines  Dietary Guidelines for Americans, 2010  USDA's MyPlate  ASA Prophylaxis  Lung CA Screening    Koko Betancourt NP  [facility]    Identified Health Risks:

## 2022-09-21 NOTE — LETTER
September 23, 2022      Angle Agudelo  [address]   [place] [address]        Dear ,    We are writing to inform you of your test results.    Labs are looking good.  Kidney function, liver, electrolytes, blood counts, and blood sugars are all acceptable.    Resulted Orders   Comprehensive metabolic panel (BMP + Alb, Alk Phos, ALT, AST, Total. Bili, TP)   Result Value Ref Range    Sodium 144 136 - 145 mmol/L    Potassium 4.4 3.4 - 5.3 mmol/L    Chloride 105 98 - 107 mmol/L    Carbon Dioxide (CO2) 27 22 - 29 mmol/L    Anion Gap 12 7 - 15 mmol/L    Urea Nitrogen 14.3 8.0 - 23.0 mg/dL    Creatinine 0.78 0.51 - 0.95 mg/dL    Calcium 9.6 8.2 - 9.6 mg/dL    Glucose 102 (H) 70 - 99 mg/dL    Alkaline Phosphatase 109 (H) 35 - 104 U/L    AST 25 10 - 35 U/L    ALT 17 10 - 35 U/L    Protein Total 7.4 6.4 - 8.3 g/dL    Albumin 4.5 3.5 - 5.2 g/dL    Bilirubin Total 0.4 <=1.2 mg/dL    GFR Estimate 72 >60 mL/min/1.73m2      Comment:      Effective December 21, 2021 eGFRcr in adults is calculated using the 2021 CKD-EPI creatinine equation which includes age and gender (Brett et al., NE, DOI: 10.1056/WWMEhv6407370)   CBC with platelets   Result Value Ref Range    WBC Count 9.4 4.0 - 11.0 10e3/uL    RBC Count 4.57 3.80 - 5.20 10e6/uL    Hemoglobin 13.4 11.7 - 15.7 g/dL    Hematocrit 43.4 35.0 - 47.0 %    MCV 95 78 - 100 fL    MCH 29.3 26.5 - 33.0 pg    MCHC 30.9 (L) 31.5 - 36.5 g/dL    RDW 15.4 (H) 10.0 - 15.0 %    Platelet Count 180 150 - 450 10e3/uL       If you have any questions or concerns, please call the clinic at the number listed above.       Sincerely,      Koko Betancourt NP

## 2022-09-22 DIAGNOSIS — E78.00 PURE HYPERCHOLESTEROLEMIA: ICD-10-CM

## 2022-09-23 VITALS
WEIGHT: 136.5 LBS | HEIGHT: 60 IN | BODY MASS INDEX: 26.8 KG/M2 | HEART RATE: 55 BPM | DIASTOLIC BLOOD PRESSURE: 76 MMHG | SYSTOLIC BLOOD PRESSURE: 118 MMHG

## 2022-09-23 RX ORDER — LOVASTATIN 40 MG
40 TABLET ORAL AT BEDTIME
Qty: 90 TABLET | Refills: 0 | Status: SHIPPED | OUTPATIENT
Start: 2022-09-23 | End: 2022-12-22

## 2022-09-23 NOTE — TELEPHONE ENCOUNTER
"Routing refill request to provider for review/approval because:  Labs not current:  LDL    Last Written Prescription Date:  3/17/22  Last Fill Quantity: 90,  # refills: 1   Last office visit provider:  9/21/22     Requested Prescriptions   Pending Prescriptions Disp Refills     lovastatin (MEVACOR) 40 MG tablet [Pharmacy Med Name: LOVASTATIN TABS 40MG] 90 tablet 3     Sig: TAKE 1 TABLET AT BEDTIME       Statins Protocol Failed - 9/23/2022 10:06 AM        Failed - LDL on file in past 12 months     Recent Labs   Lab Test 06/04/21  0955   LDL 78             Passed - No abnormal creatine kinase in past 12 months     No lab results found.             Passed - Recent (12 mo) or future (30 days) visit within the authorizing provider's specialty     Patient has had an office visit with the authorizing provider or a provider within the authorizing providers department within the previous 12 mos or has a future within next 30 days. See \"Patient Info\" tab in inbasket, or \"Choose Columns\" in Meds & Orders section of the refill encounter.              Passed - Medication is active on med list        Passed - Patient is age 18 or older        Passed - No active pregnancy on record        Passed - No positive pregnancy test in past 12 months             Esdras Zabala RN 09/23/22 10:07 AM  "

## 2022-10-10 ENCOUNTER — TELEPHONE (OUTPATIENT)
Dept: FAMILY MEDICINE | Facility: CLINIC | Age: 87
End: 2022-10-10

## 2022-10-10 NOTE — TELEPHONE ENCOUNTER
Forms Request  Name of form/paperwork: [facility]  Have you been seen for this request: N/A  Do we have the form: yes, in providers inbox  When is form needed by: when done  How would you like the form returned: fax  Patient Notified form requests are processed in 3-5 business days: n/a    Okay to leave a detailed message? n/a

## 2022-10-11 ENCOUNTER — MEDICAL CORRESPONDENCE (OUTPATIENT)
Dept: HEALTH INFORMATION MANAGEMENT | Facility: CLINIC | Age: 87
End: 2022-10-11

## 2022-10-19 ENCOUNTER — MEDICAL CORRESPONDENCE (OUTPATIENT)
Dept: HEALTH INFORMATION MANAGEMENT | Facility: CLINIC | Age: 87
End: 2022-10-19

## 2022-11-10 ENCOUNTER — MEDICAL CORRESPONDENCE (OUTPATIENT)
Dept: HEALTH INFORMATION MANAGEMENT | Facility: CLINIC | Age: 87
End: 2022-11-10

## 2022-12-21 DIAGNOSIS — E78.00 PURE HYPERCHOLESTEROLEMIA: ICD-10-CM

## 2022-12-22 RX ORDER — LOVASTATIN 40 MG
TABLET ORAL
Qty: 90 TABLET | Refills: 0 | Status: SHIPPED | OUTPATIENT
Start: 2022-12-22 | End: 2023-03-23

## 2022-12-22 NOTE — TELEPHONE ENCOUNTER
"Routing refill request to provider for review/approval because:  Labs not current:  LDL  Allergies    Last Written Prescription Date:  9/23/2022  Last Fill Quantity: 90,  # refills: 0   Last office visit provider:  9/21/2022     Requested Prescriptions   Pending Prescriptions Disp Refills     lovastatin (MEVACOR) 40 MG tablet [Pharmacy Med Name: LOVASTATIN TABS 40MG] 90 tablet 3     Sig: TAKE 1 TABLET AT BEDTIME       Statins Protocol Failed - 12/21/2022  1:53 AM        Failed - LDL on file in past 12 months     Recent Labs   Lab Test 06/04/21  0955   LDL 78             Passed - No abnormal creatine kinase in past 12 months     No lab results found.             Passed - Recent (12 mo) or future (30 days) visit within the authorizing provider's specialty     Patient has had an office visit with the authorizing provider or a provider within the authorizing providers department within the previous 12 mos or has a future within next 30 days. See \"Patient Info\" tab in inbasket, or \"Choose Columns\" in Meds & Orders section of the refill encounter.              Passed - Medication is active on med list        Passed - Patient is age 18 or older        Passed - No active pregnancy on record        Passed - No positive pregnancy test in past 12 months             Alysia Marquez RN 12/21/22 11:26 PM  "

## 2023-02-22 DIAGNOSIS — I10 ESSENTIAL HYPERTENSION: ICD-10-CM

## 2023-02-22 RX ORDER — AMLODIPINE BESYLATE 10 MG/1
TABLET ORAL
Qty: 90 TABLET | Refills: 1 | Status: SHIPPED | OUTPATIENT
Start: 2023-02-22 | End: 2023-08-31

## 2023-02-23 NOTE — TELEPHONE ENCOUNTER
"Last Written Prescription Date:  3/17/22  Last Fill Quantity: 90,  # refills: 3   Last office visit provider:  9/21/22     Requested Prescriptions   Pending Prescriptions Disp Refills     amLODIPine (NORVASC) 10 MG tablet [Pharmacy Med Name: AMLODIPINE BESYLATE TABS 10MG] 90 tablet 3     Sig: TAKE 1 TABLET DAILY       Calcium Channel Blockers Protocol  Passed - 2/22/2023  1:29 AM        Passed - Blood pressure under 140/90 in past 12 months     BP Readings from Last 3 Encounters:   09/21/22 118/76   04/01/22 101/53   03/17/22 98/70                 Passed - Recent (12 mo) or future (30 days) visit within the authorizing provider's specialty     Patient has had an office visit with the authorizing provider or a provider within the authorizing providers department within the previous 12 mos or has a future within next 30 days. See \"Patient Info\" tab in inbasket, or \"Choose Columns\" in Meds & Orders section of the refill encounter.              Passed - Medication is active on med list        Passed - Patient is age 18 or older        Passed - No active pregnancy on record        Passed - Normal serum creatinine on file in past 12 months     Recent Labs   Lab Test 09/21/22  1508   CR 0.78       Ok to refill medication if creatinine is low          Passed - No positive pregnancy test in past 12 months             Vandana Betts, RN 02/22/23 9:36 PM  "

## 2023-03-09 ENCOUNTER — TRANSFERRED RECORDS (OUTPATIENT)
Dept: HEALTH INFORMATION MANAGEMENT | Facility: CLINIC | Age: 88
End: 2023-03-09

## 2023-03-22 DIAGNOSIS — E78.00 PURE HYPERCHOLESTEROLEMIA: ICD-10-CM

## 2023-03-22 NOTE — TELEPHONE ENCOUNTER
"Routing refill request to provider for review/approval because:  Labs not current:  LDL    Last Written Prescription Date:  12/22/22  Last Fill Quantity: 90,  # refills: 0   Last office visit provider:  9/21/22     Requested Prescriptions   Pending Prescriptions Disp Refills     lovastatin (MEVACOR) 40 MG tablet [Pharmacy Med Name: LOVASTATIN TABS 40MG] 90 tablet 3     Sig: TAKE 1 TABLET AT BEDTIME       Statins Protocol Failed - 3/22/2023  2:19 AM        Failed - LDL on file in past 12 months     Recent Labs   Lab Test 06/04/21  0955   LDL 78             Passed - No abnormal creatine kinase in past 12 months     No lab results found.             Passed - Recent (12 mo) or future (30 days) visit within the authorizing provider's specialty     Patient has had an office visit with the authorizing provider or a provider within the authorizing providers department within the previous 12 mos or has a future within next 30 days. See \"Patient Info\" tab in inbasket, or \"Choose Columns\" in Meds & Orders section of the refill encounter.              Passed - Medication is active on med list        Passed - Patient is age 18 or older        Passed - No active pregnancy on record        Passed - No positive pregnancy test in past 12 months             Alysia Roland RN 03/22/23 3:20 PM  "

## 2023-03-23 RX ORDER — LOVASTATIN 40 MG
TABLET ORAL
Qty: 90 TABLET | Refills: 1 | Status: SHIPPED | OUTPATIENT
Start: 2023-03-23 | End: 2023-09-18

## 2023-05-03 ENCOUNTER — MEDICAL CORRESPONDENCE (OUTPATIENT)
Dept: HEALTH INFORMATION MANAGEMENT | Facility: CLINIC | Age: 88
End: 2023-05-03
Payer: MEDICARE

## 2023-05-26 ENCOUNTER — TELEPHONE (OUTPATIENT)
Dept: FAMILY MEDICINE | Facility: CLINIC | Age: 88
End: 2023-05-26
Payer: MEDICARE

## 2023-05-26 NOTE — TELEPHONE ENCOUNTER
Forms Request  Name of form/paperwork: [facility]  Have you been seen for this request: N/A  Do we have the form: yes, in Nathans inbox  When is form needed by: when done  How would you like the form returned: fax  Patient Notified form requests are processed in 3-5 business days: n/a    Okay to leave a detailed message? n/a

## 2023-05-30 ENCOUNTER — TELEPHONE (OUTPATIENT)
Dept: FAMILY MEDICINE | Facility: CLINIC | Age: 88
End: 2023-05-30
Payer: MEDICARE

## 2023-05-30 NOTE — TELEPHONE ENCOUNTER
Forms Request  Name of form/paperwork: [facility]  Have you been seen for this request: N/A  Do we have the form: yes, in Nathans inbox  When is form needed by: when done  How would you like the form returned: fax  Patient Notified form requests are processed in 3-5 business days: n/a    Okay to leave a detailed message? n/a

## 2023-08-30 DIAGNOSIS — I10 ESSENTIAL HYPERTENSION: ICD-10-CM

## 2023-08-31 RX ORDER — AMLODIPINE BESYLATE 10 MG/1
TABLET ORAL
Qty: 90 TABLET | Refills: 0 | Status: SHIPPED | OUTPATIENT
Start: 2023-08-31 | End: 2024-01-18

## 2023-08-31 NOTE — TELEPHONE ENCOUNTER
"Last Written Prescription Date:  2/22/2023  Last Fill Quantity: 90,  # refills: 1   Last office visit provider:  9/21/2022     Requested Prescriptions   Pending Prescriptions Disp Refills    amLODIPine (NORVASC) 10 MG tablet [Pharmacy Med Name: AMLODIPINE BESYLATE TABS 10MG] 90 tablet 3     Sig: TAKE 1 TABLET DAILY       Calcium Channel Blockers Protocol  Passed - 8/31/2023  7:59 AM        Passed - Blood pressure under 140/90 in past 12 months     BP Readings from Last 3 Encounters:   09/21/22 118/76   04/01/22 101/53   03/17/22 98/70                 Passed - Recent (12 mo) or future (30 days) visit within the authorizing provider's specialty     Patient has had an office visit with the authorizing provider or a provider within the authorizing providers department within the previous 12 mos or has a future within next 30 days. See \"Patient Info\" tab in inbasket, or \"Choose Columns\" in Meds & Orders section of the refill encounter.              Passed - Medication is active on med list        Passed - Patient is age 18 or older        Passed - No active pregnancy on record        Passed - Normal serum creatinine on file in past 12 months     Recent Labs   Lab Test 09/21/22  1508   CR 0.78       Ok to refill medication if creatinine is low          Passed - No positive pregnancy test in past 12 months             Denia Wray RN 08/31/23 8:00 AM  "

## 2023-10-02 ENCOUNTER — TELEPHONE (OUTPATIENT)
Dept: FAMILY MEDICINE | Facility: CLINIC | Age: 88
End: 2023-10-02
Payer: MEDICARE

## 2023-10-02 NOTE — TELEPHONE ENCOUNTER
Forms Request  Name of form/paperwork: [facility]   Have you been seen for this request:   Do we have the form: On Koko's desk  When is form needed by: ASAP  How would you like the form returned: Fax [phone]

## 2023-10-03 ENCOUNTER — TRANSFERRED RECORDS (OUTPATIENT)
Dept: HEALTH INFORMATION MANAGEMENT | Facility: CLINIC | Age: 88
End: 2023-10-03
Payer: MEDICARE

## 2023-10-03 ENCOUNTER — TELEPHONE (OUTPATIENT)
Dept: FAMILY MEDICINE | Facility: CLINIC | Age: 88
End: 2023-10-03
Payer: MEDICARE

## 2023-10-03 NOTE — TELEPHONE ENCOUNTER
Forms Request  Name of form/paperwork: [facility]  Have you been seen for this request:   Do we have the form: ON LINH'S DESK  When is form needed by: WHEN DONE  How would you like the form returned: [phone]  Patient Notified form requests are processed in 3-5 business days: YES    Okay to leave a detailed message?

## 2023-10-03 NOTE — TELEPHONE ENCOUNTER
Form prepped and placed in provider's to do basket for completion.      Patient would like to know if you have any samples of Anoro?  If you do, you do not need to call her, she will stop after work.     If you do not have any, please call and leave a message on her .  [phone]

## 2023-10-05 DIAGNOSIS — Z86.73 HISTORY OF CVA (CEREBROVASCULAR ACCIDENT): Primary | ICD-10-CM

## 2023-10-05 NOTE — TELEPHONE ENCOUNTER
Called for clarification- pt was transferred uint- reached out to them.     Left message to call back for: nurse or other staff  Information to relay to patient: please relay the message below.      Form stored in waiting for response bin

## 2023-10-05 NOTE — TELEPHONE ENCOUNTER
Received questions about the ibuprofen/diphenhydramine (Advil PM)      So patient came to me with this on her medication list already as historical.  I do not actually know how much/how often she has been taking it.  Have they been giving it to her at all?  I would have some concerns about the diphenhydramine at her age.  If they have any even really been giving it to her, then maybe we should just discontinue it.    Let me know what they say    Form given to Silvia for review.    Koko Betancourt, CNP

## 2023-10-05 NOTE — TELEPHONE ENCOUNTER
Patient Returning Call    Reason for call:  return call    Information relayed to patient: yes, see below. States that they would like Koko to sign off saying to discontinue the diphenhydramine, and have form faxed back    Patient has additional questions:  No

## 2023-10-06 ENCOUNTER — OFFICE VISIT (OUTPATIENT)
Dept: FAMILY MEDICINE | Facility: CLINIC | Age: 88
End: 2023-10-06
Payer: MEDICARE

## 2023-10-06 ENCOUNTER — MEDICAL CORRESPONDENCE (OUTPATIENT)
Dept: HEALTH INFORMATION MANAGEMENT | Facility: CLINIC | Age: 88
End: 2023-10-06

## 2023-10-06 VITALS
SYSTOLIC BLOOD PRESSURE: 110 MMHG | WEIGHT: 134.2 LBS | HEIGHT: 58 IN | TEMPERATURE: 97.7 F | HEART RATE: 79 BPM | RESPIRATION RATE: 16 BRPM | DIASTOLIC BLOOD PRESSURE: 70 MMHG | BODY MASS INDEX: 28.17 KG/M2 | OXYGEN SATURATION: 95 %

## 2023-10-06 DIAGNOSIS — E78.00 PRIMARY HYPERCHOLESTEROLEMIA: ICD-10-CM

## 2023-10-06 DIAGNOSIS — Z01.818 PREOP GENERAL PHYSICAL EXAM: Primary | ICD-10-CM

## 2023-10-06 DIAGNOSIS — E78.00 PURE HYPERCHOLESTEROLEMIA: ICD-10-CM

## 2023-10-06 DIAGNOSIS — S37.69XS UTERINE PERFORATION, SEQUELA: ICD-10-CM

## 2023-10-06 DIAGNOSIS — F02.818 DEMENTIA ASSOCIATED WITH OTHER UNDERLYING DISEASE WITH BEHAVIORAL DISTURBANCE (H): ICD-10-CM

## 2023-10-06 DIAGNOSIS — R93.89 THICKENED ENDOMETRIUM: ICD-10-CM

## 2023-10-06 DIAGNOSIS — I10 ESSENTIAL HYPERTENSION: ICD-10-CM

## 2023-10-06 DIAGNOSIS — Z86.73 HISTORY OF CVA (CEREBROVASCULAR ACCIDENT): ICD-10-CM

## 2023-10-06 LAB
ALBUMIN SERPL BCG-MCNC: 4.3 G/DL (ref 3.5–5.2)
ALP SERPL-CCNC: 101 U/L (ref 35–104)
ALT SERPL W P-5'-P-CCNC: 8 U/L (ref 0–50)
ANION GAP SERPL CALCULATED.3IONS-SCNC: 11 MMOL/L (ref 7–15)
AST SERPL W P-5'-P-CCNC: 22 U/L (ref 0–45)
BILIRUB SERPL-MCNC: 0.3 MG/DL
BUN SERPL-MCNC: 15.3 MG/DL (ref 8–23)
CALCIUM SERPL-MCNC: 9.5 MG/DL (ref 8.2–9.6)
CHLORIDE SERPL-SCNC: 106 MMOL/L (ref 98–107)
CREAT SERPL-MCNC: 0.81 MG/DL (ref 0.51–0.95)
DEPRECATED HCO3 PLAS-SCNC: 26 MMOL/L (ref 22–29)
EGFRCR SERPLBLD CKD-EPI 2021: 68 ML/MIN/1.73M2
ERYTHROCYTE [DISTWIDTH] IN BLOOD BY AUTOMATED COUNT: 15.4 % (ref 10–15)
GLUCOSE SERPL-MCNC: 114 MG/DL (ref 70–99)
HCT VFR BLD AUTO: 40.3 % (ref 35–47)
HGB BLD-MCNC: 12.4 G/DL (ref 11.7–15.7)
MCH RBC QN AUTO: 29.3 PG (ref 26.5–33)
MCHC RBC AUTO-ENTMCNC: 30.8 G/DL (ref 31.5–36.5)
MCV RBC AUTO: 95 FL (ref 78–100)
PLATELET # BLD AUTO: 186 10E3/UL (ref 150–450)
POTASSIUM SERPL-SCNC: 4.3 MMOL/L (ref 3.4–5.3)
PROT SERPL-MCNC: 7.1 G/DL (ref 6.4–8.3)
RBC # BLD AUTO: 4.23 10E6/UL (ref 3.8–5.2)
SODIUM SERPL-SCNC: 143 MMOL/L (ref 135–145)
WBC # BLD AUTO: 6.9 10E3/UL (ref 4–11)

## 2023-10-06 PROCEDURE — 99214 OFFICE O/P EST MOD 30 MIN: CPT | Mod: 25 | Performed by: NURSE PRACTITIONER

## 2023-10-06 PROCEDURE — 93005 ELECTROCARDIOGRAM TRACING: CPT | Performed by: NURSE PRACTITIONER

## 2023-10-06 PROCEDURE — 80053 COMPREHEN METABOLIC PANEL: CPT | Performed by: NURSE PRACTITIONER

## 2023-10-06 PROCEDURE — 85027 COMPLETE CBC AUTOMATED: CPT | Performed by: NURSE PRACTITIONER

## 2023-10-06 PROCEDURE — 36415 COLL VENOUS BLD VENIPUNCTURE: CPT | Performed by: NURSE PRACTITIONER

## 2023-10-06 RX ORDER — ASPIRIN 81 MG
TABLET,CHEWABLE ORAL
Qty: 30 TABLET | Refills: 11 | Status: ON HOLD | OUTPATIENT
Start: 2023-10-06 | End: 2024-01-21

## 2023-10-06 ASSESSMENT — PAIN SCALES - GENERAL: PAINLEVEL: NO PAIN (0)

## 2023-10-06 NOTE — PROGRESS NOTES
[facility]  [address],   [place]  [facility] [address]  Phone: [phone]  Fax: [phone]  Primary Provider: Linh Betancourt  Pre-op Performing Provider: LINH BETANCOURT      PREOPERATIVE EVALUATION:  Today's date: 10/6/2023    Marielos is a 89 year old female who presents for a preoperative evaluation.      10/6/2023     2:10 PM   Additional Questions   Roomed by Silvia Steele CMA       Surgical Information:  Surgery/Procedure: HYSTEROSCOPY DILATION AND CURETTAGE WITH ULTRASOUND GUIDANCE   Surgery Location: [facility] Main OR   Surgeon: Olive Biggs MD   Surgery Date: 10/12/2023   Time of Surgery: 12:30 PM   Where patient plans to recover: At home with family  Fax number for surgical facility: Note does not need to be faxed, will be available electronically in Epic.    Assessment & Plan     The proposed surgical procedure is considered INTERMEDIATE risk.    Preop general physical exam  Medically optimized for surgery.  Will send list of recommendations to assisted living so that they are aware about medication adjustments leading up to surgery date.    - EKG 12-lead, tracing only  - Comprehensive metabolic panel (BMP + Alb, Alk Phos, ALT, AST, Total. Bili, TP); Future  - CBC with platelets; Future  - Comprehensive metabolic panel (BMP + Alb, Alk Phos, ALT, AST, Total. Bili, TP)  - CBC with platelets    Thickened endometrium  Planned hysteroscopy.  Sounds like there will also be possible treatment for rectal prolapse in the near future.    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  Continues with Namenda.    History of CVA (cerebrovascular accident)      Uterine perforation, sequela  Occurred after last hysteroscopy.  Well-healed.    Essential hypertension  Well-controlled    Primary hypercholesterolemia  Continues with statin therapy            - No identified additional risk  factors other than previously addressed    Hold 81 mg aspirin prior to surgery.  Okay to take amlodipine and Namenda morning of surgery      RECOMMENDATION:  APPROVAL GIVEN to proceed with proposed procedure, without further diagnostic evaluation.    Reviewed OB/GYN note x1    Subjective       HPI related to upcoming procedure: Patient had a history of postmenopausal bleeding and imaging showing thickened endometrium.  Hysteroscopy was performed last year but had uterine perforation.  Patient is now also having rectal prolapse.  It sounds like plan is to reattempt hysteroscopy which will help them make a plan of what to do with her prolapse.        10/6/2023     2:08 PM   Preop Questions   1. Have you ever had a heart attack or stroke? No   2. Have you ever had surgery on your heart or blood vessels, such as a stent placement, a coronary artery bypass, or surgery on an artery in your head, neck, heart, or legs? No   3. Do you have chest pain with activity? No   4. Do you have a history of  heart failure? No   5. Do you currently have a cold, bronchitis or symptoms of other infection? No   6. Do you have a cough, shortness of breath, or wheezing? No   7. Do you or anyone in your family have previous history of blood clots? No   8. Do you or does anyone in your family have a serious bleeding problem such as prolonged bleeding following surgeries or cuts? No   9. Have you ever had problems with anemia or been told to take iron pills? No   10. Have you had any abnormal blood loss such as black, tarry or bloody stools, or abnormal vaginal bleeding? No   11. Have you ever had a blood transfusion? No   12. Are you willing to have a blood transfusion if it is medically needed before, during, or after your surgery? Yes   13. Have you or any of your relatives ever had problems with anesthesia? No   14. Do you have sleep apnea, excessive snoring or daytime drowsiness? No   15. Do you have any artifical heart valves or other  implanted medical devices like a pacemaker, defibrillator, or continuous glucose monitor? No   16. Do you have artificial joints? YES -bilateral total shoulder   17. Are you allergic to latex? No       Health Care Directive:  Patient has a Health Care Directive on file    Preoperative Review of :   reviewed - no record of controlled substances prescribed.    Status of Chronic Conditions:  See problem list for active medical problems.  Problems all longstanding and stable, except as noted/documented.  See ROS for pertinent symptoms related to these conditions.    Review of Systems  CONSTITUTIONAL: NEGATIVE for fever, chills, change in weight  INTEGUMENTARY/SKIN: NEGATIVE for worrisome rashes, moles or lesions  EYES: NEGATIVE for vision changes or irritation  ENT/MOUTH: NEGATIVE for ear, mouth and throat problems  RESP: NEGATIVE for significant cough or SOB  CV: NEGATIVE for chest pain, palpitations or peripheral edema  GI: NEGATIVE for nausea, abdominal pain, heartburn, or change in bowel habits  : NEGATIVE for frequency, dysuria, or hematuria  MUSCULOSKELETAL: NEGATIVE for significant arthralgias or myalgia  NEURO: NEGATIVE for weakness, dizziness or paresthesias  ENDOCRINE: NEGATIVE for temperature intolerance, skin/hair changes  HEME: NEGATIVE for bleeding problems  PSYCHIATRIC: NEGATIVE for changes in mood or affect    Patient Active Problem List    Diagnosis Date Noted    Uterine perforation 03/31/2022     Priority: Medium    Thickened endometrium 03/17/2022     Priority: Medium    Essential hypertension 03/17/2022     Priority: Medium    GI bleed 01/09/2021     Priority: Medium    History of stroke 10/21/2020     Priority: Medium    Memory impairment 07/22/2020     Priority: Medium    Myalgia and myositis, unspecified 07/22/2020     Priority: Medium     Created by Conversion      Spinal stenosis of lumbar region 07/22/2020     Priority: Medium    Primary hypercholesterolemia 07/22/2020     Priority:  Medium     Created by Conversion      Benign neoplasm of colon 07/22/2020     Priority: Medium     Created by Conversion      Disorder of bone and cartilage 07/22/2020     Priority: Medium     Created by Conversion    Replacement Utility updated for latest IMO load      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      Priority: Medium    History of CVA (cerebrovascular accident) 06/08/2020     Priority: Medium    GI bleeding 01/07/2020     Priority: Medium    Vagina bleeding 01/07/2020     Priority: Medium    Chronic bilateral low back pain without sciatica 08/12/2016     Priority: Medium    Backache 06/08/2015     Priority: Medium      Past Medical History:   Diagnosis Date    Acute cerebrovascular accident (CVA) (H) 06/08/2020    Benign neoplasm of colon     Chronic bilateral low back pain without sciatica     Dementia (H)     Disorder of bone and cartilage, unspecified     Dyspnea on exertion     GI bleed     Memory impairment     Orthopnea     Pure hypercholesterolemia     Skin cancer     Not Melanoma, not sure what kind though    Spinal stenosis of lumbar region     Thickened endometrium     Unspecified arthropathy, shoulder region     Unspecified essential hypertension     Walking troubles      Past Surgical History:   Procedure Laterality Date    APPENDECTOMY      BIOPSY CERVICAL, LOCAL EXCISION, SINGLE/MULTIPLE N/A 9/11/2018    Procedure: LOOP ELECTROSURGICAL EXCISION PROCEDURE, REMOVAL PESSARY;  Surgeon: Olive Biggs MD;  Location: [facility] OR;  Service:     BIOPSY VULVA N/A 5/14/2020    Procedure: VULVAR BIOPSY;  Surgeon: Olive Biggs MD;  Location: [facility] Main OR;  Service: Gynecology    CATARACT EXTRACTION EXTRACAPSULAR W/ INTRAOCULAR LENS IMPLANTATION Bilateral     CERVICAL BIOPSY N/A 5/14/2020    Procedure: PUNCH BIOPSY, CERVIX;  Surgeon: Olive Biggs MD;  Location: [facility] Main OR;  Service: Gynecology    CHOLECYSTECTOMY       COLONOSCOPY N/A 1/8/2020    Procedure: COLONOSCOPY;  Surgeon: Amanda Carson MD;  Location: [facility]    DILATION AND CURETTAGE, OPERATIVE HYSTEROSCOPY, COMBINED N/A 3/31/2022    Procedure: HYSTEROSCOPY;  Surgeon: Olive Biggs MD;  Location: [facility] DILATION/CURETTAGE DIAG/THER NON OB N/A 5/14/2020    Procedure: DILATION AND CURETTAGE, UTERUS;  Surgeon: Olive Biggs MD;  Location: [facility];  Service: Gynecology    IR EXTREMITY ANGIOGRAM RIGHT  2/13/2018    OTHER SURGICAL HISTORY      [id number]    OTHER SURGICAL HISTORY      finger abscess    LA ESOPHAGOGASTRODUODENOSCOPY TRANSORAL DIAGNOSTIC N/A 6/16/2021    Procedure: ESOPHAGOGASTRODUODENOSCOPY (EGD);  Surgeon: Primitivo Lafleur MD;  Location: [facility];  Service: Gastroenterology    SHOULDER SURGERY      bilateral shoulder    SIGMOIDOSCOPY FLEXIBLE N/A 6/3/2018    Procedure: SIGMOIDOSCOPY;  Surgeon: Dayana Joshi MD;  Location: [facility];  Service:     TONSILLECTOMY & ADENOIDECTOMY      TOTAL SHOULDER ARTHROPLASTY Bilateral R 7/12 L 9/11     BIOPSY THYROID FINE NEEDLE ASPIRATION  8/7/2020     Current Outpatient Medications   Medication Sig Dispense Refill    amLODIPine (NORVASC) 10 MG tablet TAKE 1 TABLET DAILY 90 tablet 0    aspirin (ASA) 81 MG chewable tablet Take 81 mg by mouth daily       lovastatin (MEVACOR) 40 MG tablet TAKE 1 TABLET AT BEDTIME 90 tablet 0    memantine (NAMENDA) 10 MG tablet TAKE 1 TABLET TWICE A  tablet 3    hydrocortisone, Perianal, (HYDROCORTISONE) 2.5 % cream Place rectally 2 times daily as needed for hemorrhoids (Patient not taking: Reported on 10/6/2023) 30 g 0    Ibuprofen-diphenhydrAMINE Cit (ADVIL PM PO)          Allergies   Allergen Reactions    Atorvastatin Muscle Pain (Myalgia)    Dextromethorphan Hives    Oxycodone Unknown    Pravastatin Muscle Pain (Myalgia)    Simvastatin Muscle Pain (Myalgia)    Codeine Rash        Social History     Tobacco Use  "   Smoking status: Never    Smokeless tobacco: Never   Substance Use Topics    Alcohol use: Yes     Alcohol/week: 2.0 standard drinks of alcohol     Comment: social     Family History   Problem Relation Age of Onset    Heart Disease Father     Coronary Artery Disease Father     Cerebrovascular Disease Father     Coronary Artery Disease Brother     Coronary Artery Disease Brother     Dementia Sister      History   Drug Use No         Objective     /70 (BP Location: Left arm, Patient Position: Sitting, Cuff Size: Adult Regular)   Pulse 79   Temp 97.7  F (36.5  C) (Oral)   Resp 16   Ht 1.461 m (4' 9.5\")   Wt 60.9 kg (134 lb 3.2 oz)   LMP  (LMP Unknown)   SpO2 95%   BMI 28.54 kg/m      Physical Exam    GENERAL APPEARANCE: healthy, alert and no distress     EYES: EOMI, PERRL     HENT: ear canals and TM's normal and nose and mouth without ulcers or lesions     NECK: no adenopathy, no asymmetry, masses, or scars and thyroid normal to palpation     RESP: lungs clear to auscultation - no rales, rhonchi or wheezes     CV: regular rates and rhythm, normal S1 S2, no S3 or S4 and no murmur, click or rub     ABDOMEN:  soft, nontender, no HSM or masses and bowel sounds normal     MS: extremities normal- no gross deformities noted, no evidence of inflammation in joints, FROM in all extremities.     SKIN: no suspicious lesions or rashes     NEURO: Normal strength and tone, sensory exam grossly normal, mentation intact and speech normal     PSYCH: mentation appears normal. and affect normal/bright     LYMPHATICS: No cervical adenopathy    Recent Labs   Lab Test 09/21/22  1508 04/01/22  0900 03/31/22  1757 03/17/22  1226   HGB 13.4 12.9   < > 12.9     --   --   --      --   --  145   POTASSIUM 4.4  --   --  4.2   CR 0.78  --   --  0.81    < > = values in this interval not displayed.        Diagnostics:  Labs pending at this time.  Results will be reviewed when available.     ECG results from 10/06/23   EKG " 12-lead, tracing only     Value    Systolic Blood Pressure     Diastolic Blood Pressure     Ventricular Rate 69    Atrial Rate 69    WV Interval 152    QRS Duration 70        QTc 409    P Axis 24    R AXIS -27    T Axis 0    Interpretation ECG      Sinus rhythm with Premature atrial complexes  Otherwise normal ECG  When compared with ECG of 14-JUN-2021 18:41,  Premature atrial complexes are now Present  Nonspecific T wave abnormality no longer evident in Anterior leads           Revised Cardiac Risk Index (RCRI):  The patient has the following serious cardiovascular risks for perioperative complications:   - Cerebrovascular Disease (TIA or CVA) = 1 point     RCRI Interpretation: 1 point: Class II (low risk - 0.9% complication rate)         Signed Electronically by: Koko Betancourt NP  Copy of this evaluation report is provided to requesting physician.

## 2023-10-06 NOTE — PATIENT INSTRUCTIONS
I will get your paperwork faxed to your surgeon.    Follow your surgeon's directions regarding eating and drinking prior to surgery.     Medications you can take the morning of surgery include any inhalers and: namenda and amlodipine    No advil, ibuprofen, aleve, naproxen, or aspirin a week before surgery. Tylenol is ok.    Stop all supplements a week before surgery.    Your surgeon will manage any complications after your procedure.

## 2023-10-06 NOTE — LETTER
October 9, 2023      Marielos Agudelo  [address]   [facility] [address]        Dear ,    We are writing to inform you of your test results.    Kidneys, liver, electrolytes, blood sugars, and blood counts all look fine     Koko Betancourt, CNP     Resulted Orders   Comprehensive metabolic panel (BMP + Alb, Alk Phos, ALT, AST, Total. Bili, TP)   Result Value Ref Range    Sodium 143 135 - 145 mmol/L      Comment:      Reference intervals for this test were updated on 09/26/2023 to more accurately reflect our healthy population. There may be differences in the flagging of prior results with similar values performed with this method. Interpretation of those prior results can be made in the context of the updated reference intervals.     Potassium 4.3 3.4 - 5.3 mmol/L    Carbon Dioxide (CO2) 26 22 - 29 mmol/L    Anion Gap 11 7 - 15 mmol/L    Urea Nitrogen 15.3 8.0 - 23.0 mg/dL    Creatinine 0.81 0.51 - 0.95 mg/dL    GFR Estimate 68 >60 mL/min/1.73m2    Calcium 9.5 8.2 - 9.6 mg/dL    Chloride 106 98 - 107 mmol/L    Glucose 114 (H) 70 - 99 mg/dL    Alkaline Phosphatase 101 35 - 104 U/L    AST 22 0 - 45 U/L      Comment:      Reference intervals for this test were updated on 6/12/2023 to more accurately reflect our healthy population. There may be differences in the flagging of prior results with similar values performed with this method. Interpretation of those prior results can be made in the context of the updated reference intervals.    ALT 8 0 - 50 U/L      Comment:      Reference intervals for this test were updated on 6/12/2023 to more accurately reflect our healthy population. There may be differences in the flagging of prior results with similar values performed with this method. Interpretation of those prior results can be made in the context of the updated reference intervals.      Protein Total 7.1 6.4 - 8.3 g/dL    Albumin 4.3 3.5 - 5.2 g/dL    Bilirubin Total 0.3 <=1.2 mg/dL   CBC with  platelets   Result Value Ref Range    WBC Count 6.9 4.0 - 11.0 10e3/uL    RBC Count 4.23 3.80 - 5.20 10e6/uL    Hemoglobin 12.4 11.7 - 15.7 g/dL    Hematocrit 40.3 35.0 - 47.0 %    MCV 95 78 - 100 fL    MCH 29.3 26.5 - 33.0 pg    MCHC 30.8 (L) 31.5 - 36.5 g/dL    RDW 15.4 (H) 10.0 - 15.0 %    Platelet Count 186 150 - 450 10e3/uL       If you have any questions or concerns, please call the clinic at the number listed above.       Sincerely,      Koko Betancourt NP

## 2023-10-06 NOTE — H&P (VIEW-ONLY)
[facility]  [address],   [place]  [facility] [address]  Phone: [phone]  Fax: [phone]  Primary Provider: Linh Betancourt  Pre-op Performing Provider: LINH BETANCOURT      PREOPERATIVE EVALUATION:  Today's date: 10/6/2023    Marielos is a 89 year old female who presents for a preoperative evaluation.      10/6/2023     2:10 PM   Additional Questions   Roomed by Silvia Steele CMA       Surgical Information:  Surgery/Procedure: HYSTEROSCOPY DILATION AND CURETTAGE WITH ULTRASOUND GUIDANCE   Surgery Location: [facility] OR   Surgeon: Olive Biggs MD   Surgery Date: 10/12/2023   Time of Surgery: 12:30 PM   Where patient plans to recover: At home with family  Fax number for surgical facility: Note does not need to be faxed, will be available electronically in Epic.    Assessment & Plan     The proposed surgical procedure is considered INTERMEDIATE risk.    Preop general physical exam  Medically optimized for surgery.  Will send list of recommendations to assisted living so that they are aware about medication adjustments leading up to surgery date.    - EKG 12-lead, tracing only  - Comprehensive metabolic panel (BMP + Alb, Alk Phos, ALT, AST, Total. Bili, TP); Future  - CBC with platelets; Future  - Comprehensive metabolic panel (BMP + Alb, Alk Phos, ALT, AST, Total. Bili, TP)  - CBC with platelets    Thickened endometrium  Planned hysteroscopy.  Sounds like there will also be possible treatment for rectal prolapse in the near future.    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  Continues with Namenda.    History of CVA (cerebrovascular accident)      Uterine perforation, sequela  Occurred after last hysteroscopy.  Well-healed.    Essential hypertension  Well-controlled    Primary hypercholesterolemia  Continues with statin therapy            - No identified additional risk  factors other than previously addressed    Hold 81 mg aspirin prior to surgery.  Okay to take amlodipine and Namenda morning of surgery      RECOMMENDATION:  APPROVAL GIVEN to proceed with proposed procedure, without further diagnostic evaluation.    Reviewed OB/GYN note x1    Subjective       HPI related to upcoming procedure: Patient had a history of postmenopausal bleeding and imaging showing thickened endometrium.  Hysteroscopy was performed last year but had uterine perforation.  Patient is now also having rectal prolapse.  It sounds like plan is to reattempt hysteroscopy which will help them make a plan of what to do with her prolapse.        10/6/2023     2:08 PM   Preop Questions   1. Have you ever had a heart attack or stroke? No   2. Have you ever had surgery on your heart or blood vessels, such as a stent placement, a coronary artery bypass, or surgery on an artery in your head, neck, heart, or legs? No   3. Do you have chest pain with activity? No   4. Do you have a history of  heart failure? No   5. Do you currently have a cold, bronchitis or symptoms of other infection? No   6. Do you have a cough, shortness of breath, or wheezing? No   7. Do you or anyone in your family have previous history of blood clots? No   8. Do you or does anyone in your family have a serious bleeding problem such as prolonged bleeding following surgeries or cuts? No   9. Have you ever had problems with anemia or been told to take iron pills? No   10. Have you had any abnormal blood loss such as black, tarry or bloody stools, or abnormal vaginal bleeding? No   11. Have you ever had a blood transfusion? No   12. Are you willing to have a blood transfusion if it is medically needed before, during, or after your surgery? Yes   13. Have you or any of your relatives ever had problems with anesthesia? No   14. Do you have sleep apnea, excessive snoring or daytime drowsiness? No   15. Do you have any artifical heart valves or other  implanted medical devices like a pacemaker, defibrillator, or continuous glucose monitor? No   16. Do you have artificial joints? YES -bilateral total shoulder   17. Are you allergic to latex? No       Health Care Directive:  Patient has a Health Care Directive on file    Preoperative Review of :   reviewed - no record of controlled substances prescribed.    Status of Chronic Conditions:  See problem list for active medical problems.  Problems all longstanding and stable, except as noted/documented.  See ROS for pertinent symptoms related to these conditions.    Review of Systems  CONSTITUTIONAL: NEGATIVE for fever, chills, change in weight  INTEGUMENTARY/SKIN: NEGATIVE for worrisome rashes, moles or lesions  EYES: NEGATIVE for vision changes or irritation  ENT/MOUTH: NEGATIVE for ear, mouth and throat problems  RESP: NEGATIVE for significant cough or SOB  CV: NEGATIVE for chest pain, palpitations or peripheral edema  GI: NEGATIVE for nausea, abdominal pain, heartburn, or change in bowel habits  : NEGATIVE for frequency, dysuria, or hematuria  MUSCULOSKELETAL: NEGATIVE for significant arthralgias or myalgia  NEURO: NEGATIVE for weakness, dizziness or paresthesias  ENDOCRINE: NEGATIVE for temperature intolerance, skin/hair changes  HEME: NEGATIVE for bleeding problems  PSYCHIATRIC: NEGATIVE for changes in mood or affect    Patient Active Problem List    Diagnosis Date Noted    Uterine perforation 03/31/2022     Priority: Medium    Thickened endometrium 03/17/2022     Priority: Medium    Essential hypertension 03/17/2022     Priority: Medium    GI bleed 01/09/2021     Priority: Medium    History of stroke 10/21/2020     Priority: Medium    Memory impairment 07/22/2020     Priority: Medium    Myalgia and myositis, unspecified 07/22/2020     Priority: Medium     Created by Conversion      Spinal stenosis of lumbar region 07/22/2020     Priority: Medium    Primary hypercholesterolemia 07/22/2020     Priority:  Medium     Created by Conversion      Benign neoplasm of colon 07/22/2020     Priority: Medium     Created by Conversion      Disorder of bone and cartilage 07/22/2020     Priority: Medium     Created by Conversion    Replacement Utility updated for latest IMO load      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      Priority: Medium    History of CVA (cerebrovascular accident) 06/08/2020     Priority: Medium    GI bleeding 01/07/2020     Priority: Medium    Vagina bleeding 01/07/2020     Priority: Medium    Chronic bilateral low back pain without sciatica 08/12/2016     Priority: Medium    Backache 06/08/2015     Priority: Medium      Past Medical History:   Diagnosis Date    Acute cerebrovascular accident (CVA) (H) 06/08/2020    Benign neoplasm of colon     Chronic bilateral low back pain without sciatica     Dementia (H)     Disorder of bone and cartilage, unspecified     Dyspnea on exertion     GI bleed     Memory impairment     Orthopnea     Pure hypercholesterolemia     Skin cancer     Not Melanoma, not sure what kind though    Spinal stenosis of lumbar region     Thickened endometrium     Unspecified arthropathy, shoulder region     Unspecified essential hypertension     Walking troubles      Past Surgical History:   Procedure Laterality Date    APPENDECTOMY      BIOPSY CERVICAL, LOCAL EXCISION, SINGLE/MULTIPLE N/A 9/11/2018    Procedure: LOOP ELECTROSURGICAL EXCISION PROCEDURE, REMOVAL PESSARY;  Surgeon: Olive Biggs MD;  Location: [facility] OR;  Service:     BIOPSY VULVA N/A 5/14/2020    Procedure: VULVAR BIOPSY;  Surgeon: Olive Biggs MD;  Location: [facility] OR;  Service: Gynecology    CATARACT EXTRACTION EXTRACAPSULAR W/ INTRAOCULAR LENS IMPLANTATION Bilateral     CERVICAL BIOPSY N/A 5/14/2020    Procedure: PUNCH BIOPSY, CERVIX;  Surgeon: Olive Biggs MD;  Location: [facility] OR;  Service: Gynecology    CHOLECYSTECTOMY       COLONOSCOPY N/A 1/8/2020    Procedure: COLONOSCOPY;  Surgeon: Amanda Carson MD;  Location: [facility]    DILATION AND CURETTAGE, OPERATIVE HYSTEROSCOPY, COMBINED N/A 3/31/2022    Procedure: HYSTEROSCOPY;  Surgeon: Olive Biggs MD;  Location: [facility] DILATION/CURETTAGE DIAG/THER NON OB N/A 5/14/2020    Procedure: DILATION AND CURETTAGE, UTERUS;  Surgeon: Olive Biggs MD;  Location: [facility];  Service: Gynecology    IR EXTREMITY ANGIOGRAM RIGHT  2/13/2018    OTHER SURGICAL HISTORY      [id number]    OTHER SURGICAL HISTORY      finger abscess    TN ESOPHAGOGASTRODUODENOSCOPY TRANSORAL DIAGNOSTIC N/A 6/16/2021    Procedure: ESOPHAGOGASTRODUODENOSCOPY (EGD);  Surgeon: Primitivo Lafleur MD;  [place] [facility]  Service: Gastroenterology    SHOULDER SURGERY      bilateral shoulder    SIGMOIDOSCOPY FLEXIBLE N/A 6/3/2018    Procedure: SIGMOIDOSCOPY;  Surgeon: Dayana Joshi MD;  [facility];  Service:     TONSILLECTOMY & ADENOIDECTOMY      TOTAL SHOULDER ARTHROPLASTY Bilateral R 7/12 L 9/11     BIOPSY THYROID FINE NEEDLE ASPIRATION  8/7/2020     Current Outpatient Medications   Medication Sig Dispense Refill    amLODIPine (NORVASC) 10 MG tablet TAKE 1 TABLET DAILY 90 tablet 0    aspirin (ASA) 81 MG chewable tablet Take 81 mg by mouth daily       lovastatin (MEVACOR) 40 MG tablet TAKE 1 TABLET AT BEDTIME 90 tablet 0    memantine (NAMENDA) 10 MG tablet TAKE 1 TABLET TWICE A  tablet 3    hydrocortisone, Perianal, (HYDROCORTISONE) 2.5 % cream Place rectally 2 times daily as needed for hemorrhoids (Patient not taking: Reported on 10/6/2023) 30 g 0    Ibuprofen-diphenhydrAMINE Cit (ADVIL PM PO)          Allergies   Allergen Reactions    Atorvastatin Muscle Pain (Myalgia)    Dextromethorphan Hives    Oxycodone Unknown    Pravastatin Muscle Pain (Myalgia)    Simvastatin Muscle Pain (Myalgia)    Codeine Rash        Social History     Tobacco Use  "   Smoking status: Never    Smokeless tobacco: Never   Substance Use Topics    Alcohol use: Yes     Alcohol/week: 2.0 standard drinks of alcohol     Comment: social     Family History   Problem Relation Age of Onset    Heart Disease Father     Coronary Artery Disease Father     Cerebrovascular Disease Father     Coronary Artery Disease Brother     Coronary Artery Disease Brother     Dementia Sister      History   Drug Use No         Objective     /70 (BP Location: Left arm, Patient Position: Sitting, Cuff Size: Adult Regular)   Pulse 79   Temp 97.7  F (36.5  C) (Oral)   Resp 16   Ht 1.461 m (4' 9.5\")   Wt 60.9 kg (134 lb 3.2 oz)   LMP  (LMP Unknown)   SpO2 95%   BMI 28.54 kg/m      Physical Exam    GENERAL APPEARANCE: healthy, alert and no distress     EYES: EOMI, PERRL     HENT: ear canals and TM's normal and nose and mouth without ulcers or lesions     NECK: no adenopathy, no asymmetry, masses, or scars and thyroid normal to palpation     RESP: lungs clear to auscultation - no rales, rhonchi or wheezes     CV: regular rates and rhythm, normal S1 S2, no S3 or S4 and no murmur, click or rub     ABDOMEN:  soft, nontender, no HSM or masses and bowel sounds normal     MS: extremities normal- no gross deformities noted, no evidence of inflammation in joints, FROM in all extremities.     SKIN: no suspicious lesions or rashes     NEURO: Normal strength and tone, sensory exam grossly normal, mentation intact and speech normal     PSYCH: mentation appears normal. and affect normal/bright     LYMPHATICS: No cervical adenopathy    Recent Labs   Lab Test 09/21/22  1508 04/01/22  0900 03/31/22  1757 03/17/22  1226   HGB 13.4 12.9   < > 12.9     --   --   --      --   --  145   POTASSIUM 4.4  --   --  4.2   CR 0.78  --   --  0.81    < > = values in this interval not displayed.        Diagnostics:  Labs pending at this time.  Results will be reviewed when available.     ECG results from 10/06/23   EKG " 12-lead, tracing only     Value    Systolic Blood Pressure     Diastolic Blood Pressure     Ventricular Rate 69    Atrial Rate 69    TX Interval 152    QRS Duration 70        QTc 409    P Axis 24    R AXIS -27    T Axis 0    Interpretation ECG      Sinus rhythm with Premature atrial complexes  Otherwise normal ECG  When compared with ECG of 14-JUN-2021 18:41,  Premature atrial complexes are now Present  Nonspecific T wave abnormality no longer evident in Anterior leads           Revised Cardiac Risk Index (RCRI):  The patient has the following serious cardiovascular risks for perioperative complications:   - Cerebrovascular Disease (TIA or CVA) = 1 point     RCRI Interpretation: 1 point: Class II (low risk - 0.9% complication rate)         Signed Electronically by: Koko Betancourt NP  Copy of this evaluation report is provided to requesting physician.

## 2023-10-07 LAB
ATRIAL RATE - MUSE: 69 BPM
DIASTOLIC BLOOD PRESSURE - MUSE: NORMAL MMHG
INTERPRETATION ECG - MUSE: NORMAL
P AXIS - MUSE: 24 DEGREES
PR INTERVAL - MUSE: 152 MS
QRS DURATION - MUSE: 70 MS
QT - MUSE: 382 MS
QTC - MUSE: 409 MS
R AXIS - MUSE: -27 DEGREES
SYSTOLIC BLOOD PRESSURE - MUSE: NORMAL MMHG
T AXIS - MUSE: 0 DEGREES
VENTRICULAR RATE- MUSE: 69 BPM

## 2023-10-07 PROCEDURE — 93010 ELECTROCARDIOGRAM REPORT: CPT | Mod: OFF | Performed by: NURSE PRACTITIONER

## 2023-10-12 ENCOUNTER — HOSPITAL ENCOUNTER (OUTPATIENT)
Facility: CLINIC | Age: 88
Discharge: HOME | End: 2023-10-12
Attending: OBSTETRICS & GYNECOLOGY | Admitting: OBSTETRICS & GYNECOLOGY
Payer: MEDICARE

## 2023-10-12 ENCOUNTER — HOSPITAL ENCOUNTER (OUTPATIENT)
Dept: ULTRASOUND IMAGING | Facility: CLINIC | Age: 88
Discharge: HOME | End: 2023-10-12
Attending: OBSTETRICS & GYNECOLOGY | Admitting: OBSTETRICS & GYNECOLOGY
Payer: MEDICARE

## 2023-10-12 ENCOUNTER — ANESTHESIA (OUTPATIENT)
Dept: SURGERY | Facility: CLINIC | Age: 88
End: 2023-10-12
Payer: MEDICARE

## 2023-10-12 ENCOUNTER — ANESTHESIA EVENT (OUTPATIENT)
Dept: SURGERY | Facility: CLINIC | Age: 88
End: 2023-10-12
Payer: MEDICARE

## 2023-10-12 VITALS
TEMPERATURE: 98.2 F | RESPIRATION RATE: 16 BRPM | SYSTOLIC BLOOD PRESSURE: 105 MMHG | HEIGHT: 57 IN | OXYGEN SATURATION: 95 % | DIASTOLIC BLOOD PRESSURE: 66 MMHG | BODY MASS INDEX: 28.92 KG/M2 | WEIGHT: 134.04 LBS | HEART RATE: 77 BPM

## 2023-10-12 DIAGNOSIS — N95.0 POSTMENOPAUSAL BLEEDING: ICD-10-CM

## 2023-10-12 DIAGNOSIS — N93.9 VAGINA BLEEDING: Primary | ICD-10-CM

## 2023-10-12 LAB
GRAM STAIN RESULT: NORMAL
GRAM STAIN RESULT: NORMAL
HGB BLD-MCNC: 11.8 G/DL (ref 11.7–15.7)

## 2023-10-12 PROCEDURE — 250N000011 HC RX IP 250 OP 636: Performed by: NURSE ANESTHETIST, CERTIFIED REGISTERED

## 2023-10-12 PROCEDURE — 999N000141 HC STATISTIC PRE-PROCEDURE NURSING ASSESSMENT: Performed by: OBSTETRICS & GYNECOLOGY

## 2023-10-12 PROCEDURE — 370N000017 HC ANESTHESIA TECHNICAL FEE, PER MIN: Performed by: OBSTETRICS & GYNECOLOGY

## 2023-10-12 PROCEDURE — 88305 TISSUE EXAM BY PATHOLOGIST: CPT | Mod: TC | Performed by: OBSTETRICS & GYNECOLOGY

## 2023-10-12 PROCEDURE — 87077 CULTURE AEROBIC IDENTIFY: CPT | Performed by: OBSTETRICS & GYNECOLOGY

## 2023-10-12 PROCEDURE — 36415 COLL VENOUS BLD VENIPUNCTURE: CPT | Performed by: PHYSICIAN ASSISTANT

## 2023-10-12 PROCEDURE — 250N000013 HC RX MED GY IP 250 OP 250 PS 637: Performed by: PHYSICIAN ASSISTANT

## 2023-10-12 PROCEDURE — 250N000009 HC RX 250: Performed by: NURSE ANESTHETIST, CERTIFIED REGISTERED

## 2023-10-12 PROCEDURE — 87077 CULTURE AEROBIC IDENTIFY: CPT | Mod: 59 | Performed by: OBSTETRICS & GYNECOLOGY

## 2023-10-12 PROCEDURE — 250N000009 HC RX 250: Performed by: OBSTETRICS & GYNECOLOGY

## 2023-10-12 PROCEDURE — 999N000063 US INTRAOPERATIVE

## 2023-10-12 PROCEDURE — 710N000012 HC RECOVERY PHASE 2, PER MINUTE: Performed by: OBSTETRICS & GYNECOLOGY

## 2023-10-12 PROCEDURE — 272N000001 HC OR GENERAL SUPPLY STERILE: Performed by: OBSTETRICS & GYNECOLOGY

## 2023-10-12 PROCEDURE — 87205 SMEAR GRAM STAIN: CPT | Performed by: OBSTETRICS & GYNECOLOGY

## 2023-10-12 PROCEDURE — 258N000003 HC RX IP 258 OP 636: Performed by: STUDENT IN AN ORGANIZED HEALTH CARE EDUCATION/TRAINING PROGRAM

## 2023-10-12 PROCEDURE — 85018 HEMOGLOBIN: CPT | Performed by: PHYSICIAN ASSISTANT

## 2023-10-12 PROCEDURE — 360N000077 HC SURGERY LEVEL 4, PER MIN: Performed by: OBSTETRICS & GYNECOLOGY

## 2023-10-12 RX ORDER — PROPOFOL 10 MG/ML
INJECTION, EMULSION INTRAVENOUS PRN
Status: DISCONTINUED | OUTPATIENT
Start: 2023-10-12 | End: 2023-10-12

## 2023-10-12 RX ORDER — ONDANSETRON 2 MG/ML
4 INJECTION INTRAMUSCULAR; INTRAVENOUS EVERY 30 MIN PRN
Status: DISCONTINUED | OUTPATIENT
Start: 2023-10-12 | End: 2023-10-12 | Stop reason: HOSPADM

## 2023-10-12 RX ORDER — PROPOFOL 10 MG/ML
INJECTION, EMULSION INTRAVENOUS CONTINUOUS PRN
Status: DISCONTINUED | OUTPATIENT
Start: 2023-10-12 | End: 2023-10-12

## 2023-10-12 RX ORDER — IBUPROFEN 600 MG/1
600 TABLET, FILM COATED ORAL EVERY 6 HOURS PRN
Qty: 30 TABLET | Refills: 0 | Status: SHIPPED | OUTPATIENT
Start: 2023-10-12

## 2023-10-12 RX ORDER — DEXAMETHASONE SODIUM PHOSPHATE 4 MG/ML
INJECTION, SOLUTION INTRA-ARTICULAR; INTRALESIONAL; INTRAMUSCULAR; INTRAVENOUS; SOFT TISSUE PRN
Status: DISCONTINUED | OUTPATIENT
Start: 2023-10-12 | End: 2023-10-12

## 2023-10-12 RX ORDER — ONDANSETRON 2 MG/ML
INJECTION INTRAMUSCULAR; INTRAVENOUS PRN
Status: DISCONTINUED | OUTPATIENT
Start: 2023-10-12 | End: 2023-10-12

## 2023-10-12 RX ORDER — HYDROMORPHONE HCL IN WATER/PF 6 MG/30 ML
0.2 PATIENT CONTROLLED ANALGESIA SYRINGE INTRAVENOUS EVERY 5 MIN PRN
Status: DISCONTINUED | OUTPATIENT
Start: 2023-10-12 | End: 2023-10-12 | Stop reason: HOSPADM

## 2023-10-12 RX ORDER — FENTANYL CITRATE 50 UG/ML
25 INJECTION, SOLUTION INTRAMUSCULAR; INTRAVENOUS EVERY 5 MIN PRN
Status: DISCONTINUED | OUTPATIENT
Start: 2023-10-12 | End: 2023-10-12 | Stop reason: HOSPADM

## 2023-10-12 RX ORDER — ACETAMINOPHEN 325 MG/1
975 TABLET ORAL ONCE
Status: DISCONTINUED | OUTPATIENT
Start: 2023-10-12 | End: 2023-10-12 | Stop reason: HOSPADM

## 2023-10-12 RX ORDER — LIDOCAINE 40 MG/G
CREAM TOPICAL
Status: DISCONTINUED | OUTPATIENT
Start: 2023-10-12 | End: 2023-10-12 | Stop reason: HOSPADM

## 2023-10-12 RX ORDER — LIDOCAINE HYDROCHLORIDE 10 MG/ML
INJECTION, SOLUTION INFILTRATION; PERINEURAL PRN
Status: DISCONTINUED | OUTPATIENT
Start: 2023-10-12 | End: 2023-10-12 | Stop reason: HOSPADM

## 2023-10-12 RX ORDER — HYDROMORPHONE HCL IN WATER/PF 6 MG/30 ML
0.4 PATIENT CONTROLLED ANALGESIA SYRINGE INTRAVENOUS EVERY 5 MIN PRN
Status: DISCONTINUED | OUTPATIENT
Start: 2023-10-12 | End: 2023-10-12 | Stop reason: HOSPADM

## 2023-10-12 RX ORDER — ONDANSETRON 4 MG/1
4 TABLET, ORALLY DISINTEGRATING ORAL EVERY 30 MIN PRN
Status: DISCONTINUED | OUTPATIENT
Start: 2023-10-12 | End: 2023-10-12 | Stop reason: HOSPADM

## 2023-10-12 RX ORDER — ONDANSETRON 4 MG/1
4 TABLET, ORALLY DISINTEGRATING ORAL EVERY 8 HOURS PRN
Qty: 4 TABLET | Refills: 0 | Status: SHIPPED | OUTPATIENT
Start: 2023-10-12

## 2023-10-12 RX ORDER — FENTANYL CITRATE 50 UG/ML
INJECTION, SOLUTION INTRAMUSCULAR; INTRAVENOUS PRN
Status: DISCONTINUED | OUTPATIENT
Start: 2023-10-12 | End: 2023-10-12

## 2023-10-12 RX ORDER — FENTANYL CITRATE 50 UG/ML
50 INJECTION, SOLUTION INTRAMUSCULAR; INTRAVENOUS EVERY 5 MIN PRN
Status: DISCONTINUED | OUTPATIENT
Start: 2023-10-12 | End: 2023-10-12 | Stop reason: HOSPADM

## 2023-10-12 RX ORDER — SODIUM CHLORIDE, SODIUM LACTATE, POTASSIUM CHLORIDE, CALCIUM CHLORIDE 600; 310; 30; 20 MG/100ML; MG/100ML; MG/100ML; MG/100ML
INJECTION, SOLUTION INTRAVENOUS CONTINUOUS
Status: DISCONTINUED | OUTPATIENT
Start: 2023-10-12 | End: 2023-10-12 | Stop reason: HOSPADM

## 2023-10-12 RX ORDER — ACETAMINOPHEN 325 MG/1
975 TABLET ORAL EVERY 6 HOURS PRN
Qty: 50 TABLET | Refills: 0 | Status: SHIPPED | OUTPATIENT
Start: 2023-10-12 | End: 2024-02-06

## 2023-10-12 RX ORDER — LIDOCAINE HYDROCHLORIDE 10 MG/ML
INJECTION, SOLUTION EPIDURAL; INFILTRATION; INTRACAUDAL; PERINEURAL
Status: DISCONTINUED
Start: 2023-10-12 | End: 2023-10-12 | Stop reason: HOSPADM

## 2023-10-12 RX ORDER — ACETAMINOPHEN 325 MG/1
975 TABLET ORAL ONCE
Status: COMPLETED | OUTPATIENT
Start: 2023-10-12 | End: 2023-10-12

## 2023-10-12 RX ORDER — MISOPROSTOL 200 MCG
200 TABLET ORAL ONCE
COMMUNITY
Start: 2023-10-10 | End: 2023-12-20

## 2023-10-12 RX ORDER — LIDOCAINE HYDROCHLORIDE 10 MG/ML
INJECTION, SOLUTION INFILTRATION; PERINEURAL PRN
Status: DISCONTINUED | OUTPATIENT
Start: 2023-10-12 | End: 2023-10-12

## 2023-10-12 RX ORDER — GLYCOPYRROLATE 0.2 MG/ML
INJECTION, SOLUTION INTRAMUSCULAR; INTRAVENOUS PRN
Status: DISCONTINUED | OUTPATIENT
Start: 2023-10-12 | End: 2023-10-12

## 2023-10-12 RX ADMIN — ONDANSETRON 4 MG: 2 INJECTION INTRAMUSCULAR; INTRAVENOUS at 12:39

## 2023-10-12 RX ADMIN — SODIUM CHLORIDE, POTASSIUM CHLORIDE, SODIUM LACTATE AND CALCIUM CHLORIDE 1000 ML: 600; 310; 30; 20 INJECTION, SOLUTION INTRAVENOUS at 11:54

## 2023-10-12 RX ADMIN — PROPOFOL 75 MCG/KG/MIN: 10 INJECTION, EMULSION INTRAVENOUS at 12:39

## 2023-10-12 RX ADMIN — PROPOFOL 30 MG: 10 INJECTION, EMULSION INTRAVENOUS at 12:39

## 2023-10-12 RX ADMIN — DEXAMETHASONE SODIUM PHOSPHATE 4 MG: 4 INJECTION, SOLUTION INTRA-ARTICULAR; INTRALESIONAL; INTRAMUSCULAR; INTRAVENOUS; SOFT TISSUE at 12:39

## 2023-10-12 RX ADMIN — ACETAMINOPHEN 975 MG: 325 TABLET ORAL at 11:54

## 2023-10-12 RX ADMIN — FENTANYL CITRATE 50 MCG: 50 INJECTION INTRAMUSCULAR; INTRAVENOUS at 12:39

## 2023-10-12 RX ADMIN — GLYCOPYRROLATE 0.2 MG: 0.2 INJECTION INTRAMUSCULAR; INTRAVENOUS at 12:54

## 2023-10-12 RX ADMIN — LIDOCAINE HYDROCHLORIDE 2 ML: 10 INJECTION, SOLUTION INFILTRATION; PERINEURAL at 12:39

## 2023-10-12 ASSESSMENT — ENCOUNTER SYMPTOMS: ORTHOPNEA: 1

## 2023-10-12 ASSESSMENT — ACTIVITIES OF DAILY LIVING (ADL)
ADLS_ACUITY_SCORE: 35
ADLS_ACUITY_SCORE: 33

## 2023-10-12 NOTE — ANESTHESIA CARE TRANSFER NOTE
Patient: Angle Agudelo    Procedure: Procedure(s):  HYSTEROSCOPY DILATION AND CURETTAGE AND POLYPECTOMY WITH ULTRASOUND GUIDANCE       Diagnosis: Postmenopausal bleeding [N95.0]  Diagnosis Additional Information: No value filed.    Anesthesia Type:   MAC     Note:    Oropharynx: oropharynx clear of all foreign objects  Level of Consciousness: awake      Independent Airway: airway patency satisfactory and stable  Dentition: dentition unchanged  Vital Signs Stable: post-procedure vital signs reviewed and stable  Report to RN Given: handoff report given  Patient transferred to: PACU    Handoff Report: Identifed the Patient, Identified the Reponsible Provider, Reviewed the pertinent medical history, Discussed the surgical course, Reviewed Intra-OP anesthesia mangement and issues during anesthesia, Set expectations for post-procedure period and Allowed opportunity for questions and acknowledgement of understanding      Vitals:  Vitals Value Taken Time   /66 10/12/23 1400   Temp 36.8  C (98.2  F) 10/12/23 1400   Pulse 78 10/12/23 1408   Resp 16 10/12/23 1400   SpO2 91 % 10/12/23 1409   Vitals shown include unfiled device data.    Electronically Signed By: SOFIE STEEL CRNA  October 12, 2023  4:33 PM

## 2023-10-12 NOTE — DISCHARGE INSTRUCTIONS
Sedation: Care Instructions  Overview  Sedation is the use of medicine to help you feel relaxed and comfortable during a procedure. The medicine is usually given in a vein (by IV). It may be used with numbing medicines.  There are different levels of sedation. They range from being awake but relaxed to being completely unconscious. Which level you have will depend on the procedure and your needs. You will be watched closely by a doctor or nurse during sedation.  Common side effects from sedation include:  Feeling sleepy or tired. (Your doctors and nurses will make sure you aren't too sleepy to go home.)  Feeling dizzy or unsteady.  Follow-up care is a key part of your treatment and safety. Be sure to make and go to all appointments, and call your doctor if you are having problems. It's also a good idea to know your test results and keep a list of the medicines you take.  How can you care for yourself at home?  Activity    Don't do anything that requires attention to detail until you recover. This includes going to work or school, making important decisions, and signing any legal documents. It takes time for the medicine effects to completely wear off.     For at least 24 hours, do not drive or operate any machinery.     When you get home, make sure to rest until the anesthesia has worn off. Some people will feel drowsy or dizzy for up to a few hours after leaving the hospital.     Take your time and walk slowly. Sudden changes in position may cause nausea.     Rest when you feel tired. Getting enough sleep will help you recover.     If you have sleep apnea and you have a CPAP machine, be sure to use it.   Diet    Don't drink alcohol for 24 hours.     You can eat your normal diet, unless your doctor gives you other instructions. If your stomach is upset, try clear liquids and bland, low-fat foods like plain toast or rice.     Drink plenty of fluids (unless your doctor tells you not to).   When should you call for  "help?   Call 911 anytime you think you may need emergency care. For example, call if:    You have trouble breathing.     You passed out (lost consciousness).   Call your doctor now or seek immediate medical care if:    You have nausea or vomiting that gets worse or won't stop.     You have a fever.     You have a new or worse headache.     The medicine is not wearing off and you can't think clearly.   Watch closely for changes in your health, and be sure to contact your doctor if:    You do not get better as expected.   Where can you learn more?  Go to https://www.[employer].net/patiented  Enter G817 in the search box to learn more about \"Sedation: Care Instructions.\"  Current as of: October 20, 2022               Content Version: 13.7    3039-1958 [employer].   Care instructions adapted under license by your healthcare professional. If you have questions about a medical condition or this instruction, always ask your healthcare professional. [employer] disclaims any warranty or liability for your use of this information.      "

## 2023-10-12 NOTE — PHARMACY-ADMISSION MEDICATION HISTORY
Pharmacist Admission Medication History    Admission medication history is complete. The information provided in this note is only as accurate as the sources available at the time of the update.    Information Source(s): Family member via in-person    Pertinent Information: n/a    Changes made to PTA medication list:  Added: Cytotec x1  Deleted: None  Changed: Namenda frequency     Medication Affordability:  Not including over the counter (OTC) medications, was there a time in the past 3 months when you did not take your medications as prescribed because of cost?: No    Allergies reviewed with patient and updates made in EHR: yes    Medication History Completed By: Hedy Mackenzie [facility] 10/12/2023 12:05 PM    PTA Med List   Medication Sig Last Dose    amLODIPine (NORVASC) 10 MG tablet TAKE 1 TABLET DAILY 10/12/2023 at am    ASPIRIN LOW DOSE 81 MG chewable tablet CHEW & SWALLOW 1 TABLET ORALLY DAILY 10/5/2023    CYTOTEC 200 MCG tablet Take 200 mcg by mouth once 10/12/2023 at am    lovastatin (MEVACOR) 40 MG tablet TAKE 1 TABLET AT BEDTIME 10/11/2023 at hs    memantine (NAMENDA) 10 MG tablet TAKE 1 TABLET TWICE A DAY (Patient taking differently: Take 10 mg by mouth 2 times daily) 10/12/2023 at am

## 2023-10-12 NOTE — ANESTHESIA PREPROCEDURE EVALUATION
Anesthesia Pre-Procedure Evaluation    Patient: Angle Agudelo   MRN: [id number] : 1932        Procedure : Procedure(s):  HYSTEROSCOPY DILATION AND CURETTAGE WITH ULTRASOUND GUIDANCE          Past Medical History:   Diagnosis Date     Acute cerebrovascular accident (CVA) (H) 2020     Benign neoplasm of colon      Chronic bilateral low back pain without sciatica      Dementia (H)      Disorder of bone and cartilage, unspecified      Dyspnea on exertion      GI bleed      Memory impairment      Orthopnea      Pure hypercholesterolemia      Skin cancer     Not Melanoma, not sure what kind though     Spinal stenosis of lumbar region      Thickened endometrium      Unspecified arthropathy, shoulder region      Unspecified essential hypertension      Walking troubles       Past Surgical History:   Procedure Laterality Date     APPENDECTOMY       BIOPSY CERVICAL, LOCAL EXCISION, SINGLE/MULTIPLE N/A 2018    Procedure: LOOP ELECTROSURGICAL EXCISION PROCEDURE, REMOVAL PESSARY;  Surgeon: Olive Biggs MD;  Location: [facility] OR;  Service:      BIOPSY VULVA N/A 2020    Procedure: VULVAR BIOPSY;  Surgeon: Olive Biggs MD;  Location: [facility] OR;  Service: Gynecology     CATARACT EXTRACTION EXTRACAPSULAR W/ INTRAOCULAR LENS IMPLANTATION Bilateral      CERVICAL BIOPSY N/A 2020    Procedure: PUNCH BIOPSY, CERVIX;  Surgeon: Olive Biggs MD;  Location: [facility] OR;  Service: Gynecology     CHOLECYSTECTOMY       COLONOSCOPY N/A 2020    Procedure: COLONOSCOPY;  Surgeon: Amanda Carson MD;  Location:  GI     DILATION AND CURETTAGE, OPERATIVE HYSTEROSCOPY, COMBINED N/A 3/31/2022    Procedure: HYSTEROSCOPY;  Surgeon: Olive Biggs MD;  Location: [facility] DILATION/CURETTAGE DIAG/THER NON OB N/A 2020    Procedure: DILATION AND CURETTAGE, UTERUS;  Surgeon: Olive Biggs MD;  Location: [facility];   Service: Gynecology     IR EXTREMITY ANGIOGRAM RIGHT  2/13/2018     OTHER SURGICAL HISTORY      [id number]     OTHER SURGICAL HISTORY      finger abscess     [place] ESOPHAGOGASTRODUODENOSCOPY TRANSORAL DIAGNOSTIC N/A 6/16/2021    Procedure: ESOPHAGOGASTRODUODENOSCOPY (EGD);  Surgeon: Primitivo Lafleur MD;  Location: [facility] OR;  Service: Gastroenterology     SHOULDER SURGERY      bilateral shoulder     SIGMOIDOSCOPY FLEXIBLE N/A 6/3/2018    Procedure: SIGMOIDOSCOPY;  Surgeon: Dayana Joshi MD;  Location: [facility] GI;  Service:      TONSILLECTOMY & ADENOIDECTOMY       TOTAL SHOULDER ARTHROPLASTY Bilateral R 7/12 L 9/11     US BIOPSY THYROID FINE NEEDLE ASPIRATION  8/7/2020      Allergies   Allergen Reactions     Atorvastatin Muscle Pain (Myalgia)     Dextromethorphan Hives     Oxycodone Unknown     Pravastatin Muscle Pain (Myalgia)     Simvastatin Muscle Pain (Myalgia)     Codeine Rash      Social History     Tobacco Use     Smoking status: Never     Smokeless tobacco: Never   Substance Use Topics     Alcohol use: Yes     Alcohol/week: 2.0 standard drinks of alcohol     Comment: social      Wt Readings from Last 1 Encounters:   10/06/23 60.9 kg (134 lb 3.2 oz)        Anesthesia Evaluation            ROS/MED HX  ENT/Pulmonary:       Neurologic:     (+)          CVA,                      Cardiovascular:     (+)  hypertension- -   -  - -            orthopnea/PND,                          METS/Exercise Tolerance:     Hematologic:       Musculoskeletal:       GI/Hepatic:     (+) GERD,                   Renal/Genitourinary:       Endo:       Psychiatric/Substance Use:       Infectious Disease:       Malignancy:       Other:            Physical Exam    Airway        Mallampati: II   TM distance: > 3 FB   Neck ROM: full     Respiratory Devices and Support         Dental           Cardiovascular             Pulmonary               OUTSIDE LABS:  CBC:   Lab Results   Component Value Date    WBC 6.9 10/06/2023    WBC  "9.4 09/21/2022    HGB 12.4 10/06/2023    HGB 13.4 09/21/2022    HCT 40.3 10/06/2023    HCT 43.4 09/21/2022     10/06/2023     09/21/2022     BMP:   Lab Results   Component Value Date     10/06/2023     09/21/2022    POTASSIUM 4.3 10/06/2023    POTASSIUM 4.4 09/21/2022    CHLORIDE 106 10/06/2023    CHLORIDE 105 09/21/2022    CO2 26 10/06/2023    CO2 27 09/21/2022    BUN 15.3 10/06/2023    BUN 14.3 09/21/2022    CR 0.81 10/06/2023    CR 0.78 09/21/2022     (H) 10/06/2023     (H) 09/21/2022     COAGS:   Lab Results   Component Value Date    PTT 28 01/09/2021    INR 1.09 01/10/2021     POC: No results found for: \"BGM\", \"HCG\", \"HCGS\"  HEPATIC:   Lab Results   Component Value Date    ALBUMIN 4.3 10/06/2023    PROTTOTAL 7.1 10/06/2023    ALT 8 10/06/2023    AST 22 10/06/2023    ALKPHOS 101 10/06/2023    BILITOTAL 0.3 10/06/2023     OTHER:   Lab Results   Component Value Date    PH 7.36 02/13/2019    LACT 1.4 06/14/2021    A1C 5.6 04/14/2014    BUBBA 9.5 10/06/2023    MAG 2.8 (H) 06/15/2021    LIPASE 19 06/14/2021    TSH 0.51 06/14/2021       Anesthesia Plan    ASA Status:  3       Anesthesia Type: MAC.   Induction: Propofol.   Maintenance: TIVA.        Consents            Postoperative Care       PONV prophylaxis: Ondansetron (or other 5HT-3), Dexamethasone or Solumedrol     Comments:              Willy Xiong, DO  "

## 2023-10-12 NOTE — INTERVAL H&P NOTE
"H+P Update     Doing well. No changes in history.      /75 (Cuff Size: Adult Regular)   Temp 97.6  F (36.4  C) (Temporal)   Resp 16   Ht 1.46 m (4' 9.48\")   Wt 60.8 kg (134 lb 0.6 oz)   LMP  (LMP Unknown)   SpO2 95%   BMI 28.52 kg/m        NAD, AAO x 3  Abdomen soft, non tender    A/P: 89 year old with Postmenopausal bleeding [N95.0] and thickened endometrium here for surgical management  -- Patient here with her daughter Daija  -- Met with patient and discussed the risks of surgery including bleeding, infection, damage to pelvic organs and need for further surgery. We have discussed possibility of uterine perforation and also possibility that I am unable to sample the endometrium.   -- Questions answered  -- Consent signed  -- Abdomen marked  -- To OR for Procedure(s):  HYSTEROSCOPY DILATION AND CURETTAGE WITH ULTRASOUND GUIDANCE     Olive Biggs MD  (P) [phone]      "

## 2023-10-12 NOTE — BRIEF OP NOTE
[facility]    Brief Operative Note    Pre-operative diagnosis:   Postmenopausal bleeding [N95.0]  Thickened endometrium    Post-operative diagnosis   Postmenopausal bleeding  Thickened endometrium    Procedure: HYSTEROSCOPY DILATION AND CURETTAGE AND POLYPECTOMY WITH ULTRASOUND GUIDANCE, N/A - Vagina    Surgeon: Surgeon(s) and Role:     * Olive Biggs MD - Primary    Anesthesia: MAC     Estimated Blood Loss: 15 cc    Drains:  None    Specimens:   ID Type Source Tests Collected by Time Destination   1 : endometrial polyp Polyp Endometrium SURGICAL PATHOLOGY EXAM Olive Biggs MD 10/12/2023  1:10 PM    A : culture of uterine fluid Swab Uterus ANAEROBIC BACTERIAL CULTURE ROUTINE, GRAM STAIN, AEROBIC BACTERIAL CULTURE ROUTINE Olive Biggs MD 10/12/2023  1:06 PM      Findings:     Stenotic cervix  Pyometrium  Endometrial polyp  Normal tubal ostia bilaterally    Complications: None.    Implants: * No implants in log *    Olive Biggs MD, FACOG  (P) [phone]

## 2023-10-12 NOTE — ANESTHESIA CARE TRANSFER NOTE
Patient: Angle Agudelo    Procedure: Procedure(s):  HYSTEROSCOPY DILATION AND CURETTAGE AND POLYPECTOMY WITH ULTRASOUND GUIDANCE       Diagnosis: Postmenopausal bleeding [N95.0]  Diagnosis Additional Information: No value filed.    Anesthesia Type:   MAC     Note:    Oropharynx: oropharynx clear of all foreign objects  Level of Consciousness: awake  Oxygen Supplementation: face mask    Independent Airway: airway patency satisfactory and stable  Dentition: dentition unchanged  Vital Signs Stable: post-procedure vital signs reviewed and stable  Report to RN Given: handoff report given  Patient transferred to: Phase II    Handoff Report: Identifed the Patient, Identified the Reponsible Provider, Reviewed the pertinent medical history, Discussed the surgical course, Reviewed Intra-OP anesthesia mangement and issues during anesthesia, Set expectations for post-procedure period and Allowed opportunity for questions and acknowledgement of understanding  Vitals:  Vitals Value Taken Time   /66 10/12/23 1400   Temp 36.8  C (98.2  F) 10/12/23 1400   Pulse 78 10/12/23 1408   Resp 16 10/12/23 1400   SpO2 91 % 10/12/23 1409   Vitals shown include unfiled device data.    Electronically Signed By: Willy Xiong DO  October 12, 2023  2:57 PM   patient with unwitnessed fall/syncopal episode. hx of metastatic cancer. arrives altered, unable to answer questions appropriately. dr. ellis called to bedside for eval.

## 2023-10-12 NOTE — OP NOTE
PROCEDURE NOTE - HYSTEROSCOPY AND DILATION AND CURETTAGE     Name: Angle Agudelo   : 1932   MRN: [id number]     DATE OF SERVICE:  10/12/2023     PREOPERATIVE DIAGNOSIS:   Postmenopausal bleeding  Thickened endometrium    POSTOPERATIVE DIAGNOSIS:   Postmenopausal bleeding  Thickened endometrium    PROCEDURES:   Hysteroscopy  Dilatation and curettage  Polypectomy    SURGEON: Olive Biggs MD     ASSISTANT: Dalia Blum    ANESTHESIA:  mac    ESTIMATED BLOOD LOSS: 15 ml    FINDINGS:  Stenotic cervix  Pyometrium  Endometrial polyp  Normal tubal ostia bilaterally  Stage III cystocele  Stage II uterine prolapse  Rectal prolapse    HISTORY OF PRESENT ILLNESS:  This is a 89 year old female with history of postmenopausal bleeding.  She had a transvaginal ultrasound prior to which showed fluid in the endometrial canal and a thickened endometrium.  She was given informed consent for the above procedure. The risks, benefits and alternatives were discussed with her including but not exclusive to uterine perforation, bleeding and infection. She expressed understanding and wished to proceed.     PROCEDURE:  The patient was brought to the operating room and after induction of MAC anesthesia was prepped and draped in the dorsal lithotomy position.     A sterile speculum was then placed. The anterior lip of the cervix was grasped with a single tooth tenaculum.  The cervix was injected with lidocaine.  Lacrimal duct dilator was used.  Under ultrasound guidance I placed the lacrimal duct dilator into the cervix.  There was drainage of purulent fluid as I opened up the cervix.  An aerobic and anaerobic culture was sent of the fluid medium.  I was then able to place a 2 Hegar into the cervix and visually watch the dilator go into the uterine cavity.  I serially dilated the cervix up to a 7 Hegar dilator.  The hysteroscope was introduced without difficulty. The cavity of the uterus had an endometrial polyp on the anterior  wall.  The MyoSure device was then used to resect the polyp.  The polyp was completely resected under direct visualization.  All 4 quadrants were then sampled with the MyoSure device.  Normal tubal ostia were visualized bilaterally.  The hysteroscope was removed. All quadrants were sampled, and the tissue was submitted for pathologic exam.  There was a little bleeding from the cervix.  It was not a laceration and it was bleeding from where the cervix had been dilated.  Estefany was placed.  I monitored at this point good hemostasis was noted. Instruments were removed from vagina. No active bleeding was noted. Sponge and needle counts were correct X 2. She was taken to recovery in stable condition.    Olive Biggs MD, FACOG  (P) [phone]    CC:   Koko Betancourt Dr., MD

## 2023-10-12 NOTE — ANESTHESIA POSTPROCEDURE EVALUATION
Patient: Angle Agudelo    Procedure: Procedure(s):  HYSTEROSCOPY DILATION AND CURETTAGE AND POLYPECTOMY WITH ULTRASOUND GUIDANCE       Anesthesia Type:  MAC    Note:  Disposition: Outpatient   Postop Pain Control: Uneventful            Sign Out: Well controlled pain   PONV: No   Neuro/Psych: Uneventful            Sign Out: Acceptable/Baseline neuro status   Airway/Respiratory: Uneventful            Sign Out: Acceptable/Baseline resp. status   CV/Hemodynamics: Uneventful            Sign Out: Acceptable CV status; No obvious hypovolemia; No obvious fluid overload   Other NRE: NONE   DID A NON-ROUTINE EVENT OCCUR? No    Event details/Postop Comments:  Patient recovering comfortably.        Last vitals:  Vitals Value Taken Time   /66 10/12/23 1400   Temp 36.8  C (98.2  F) 10/12/23 1400   Pulse 78 10/12/23 1408   Resp 16 10/12/23 1400   SpO2 91 % 10/12/23 1409   Vitals shown include unfiled device data.    Electronically Signed By: Willy Xiong DO  October 12, 2023  2:56 PM

## 2023-10-13 LAB
PATH REPORT.COMMENTS IMP SPEC: NORMAL
PATH REPORT.COMMENTS IMP SPEC: NORMAL
PATH REPORT.FINAL DX SPEC: NORMAL
PATH REPORT.GROSS SPEC: NORMAL
PATH REPORT.MICROSCOPIC SPEC OTHER STN: NORMAL
PATH REPORT.RELEVANT HX SPEC: NORMAL
PHOTO IMAGE: NORMAL

## 2023-10-13 PROCEDURE — 88305 TISSUE EXAM BY PATHOLOGIST: CPT | Mod: 26 | Performed by: PATHOLOGY

## 2023-10-16 ENCOUNTER — MEDICAL CORRESPONDENCE (OUTPATIENT)
Dept: HEALTH INFORMATION MANAGEMENT | Facility: CLINIC | Age: 88
End: 2023-10-16
Payer: MEDICARE

## 2023-10-16 LAB
BACTERIA FLD CULT: ABNORMAL
BACTERIA FLD CULT: ABNORMAL

## 2023-10-17 ENCOUNTER — TELEPHONE (OUTPATIENT)
Dept: FAMILY MEDICINE | Facility: CLINIC | Age: 88
End: 2023-10-17
Payer: MEDICARE

## 2023-10-17 NOTE — TELEPHONE ENCOUNTER
Forms Request  Name of form/paperwork: [facility]  Have you been seen for this request:   Do we have the form: on Koko's desk  When is form needed by: when done  How would you like the form returned: fax [phone]  Patient Notified form requests are processed in 3-5 business days: yes    Okay to leave a detailed message?

## 2023-10-20 LAB
BACTERIA FLD CULT: ABNORMAL
BACTERIA FLD CULT: ABNORMAL

## 2023-10-25 ENCOUNTER — TELEPHONE (OUTPATIENT)
Dept: FAMILY MEDICINE | Facility: CLINIC | Age: 88
End: 2023-10-25
Payer: MEDICARE

## 2023-10-25 NOTE — TELEPHONE ENCOUNTER
Forms Request  Name of form/paperwork: [facility] [employer]  Have you been seen for this request:   Do we have the form: ON LINH'S DESK  When is form needed by: WHEN DONE  How would you like the form returned: [phone]  Patient Notified form requests are processed in 3-5 business days: YES    Okay to leave a detailed message?

## 2023-10-26 ENCOUNTER — MEDICAL CORRESPONDENCE (OUTPATIENT)
Dept: HEALTH INFORMATION MANAGEMENT | Facility: CLINIC | Age: 88
End: 2023-10-26
Payer: MEDICARE

## 2023-10-26 ENCOUNTER — TELEPHONE (OUTPATIENT)
Dept: FAMILY MEDICINE | Facility: CLINIC | Age: 88
End: 2023-10-26
Payer: MEDICARE

## 2023-10-26 NOTE — TELEPHONE ENCOUNTER
Forms Request  Name of form/paperwork: [facility]   Have you been seen for this request:   Do we have the form: Mo mendez  When is form needed by: when completed  How would you like the form returned: fax

## 2023-10-27 ENCOUNTER — MEDICAL CORRESPONDENCE (OUTPATIENT)
Dept: HEALTH INFORMATION MANAGEMENT | Facility: CLINIC | Age: 88
End: 2023-10-27
Payer: MEDICARE

## 2023-11-29 RX ORDER — CEFAZOLIN SODIUM/WATER 2 G/20 ML
2 SYRINGE (ML) INTRAVENOUS SEE ADMIN INSTRUCTIONS
Status: CANCELLED | OUTPATIENT
Start: 2024-01-05

## 2023-11-29 RX ORDER — ACETAMINOPHEN 325 MG/1
975 TABLET ORAL ONCE
Status: CANCELLED | OUTPATIENT
Start: 2024-01-05 | End: 2023-11-29

## 2023-11-29 RX ORDER — CEFAZOLIN SODIUM/WATER 2 G/20 ML
2 SYRINGE (ML) INTRAVENOUS
Status: CANCELLED | OUTPATIENT
Start: 2024-01-05

## 2023-12-04 ENCOUNTER — TELEPHONE (OUTPATIENT)
Dept: FAMILY MEDICINE | Facility: CLINIC | Age: 88
End: 2023-12-04

## 2023-12-04 NOTE — TELEPHONE ENCOUNTER
Forms Request  Name of form/paperwork: [facility]  Have you been seen for this request:   Do we have the form: ON LINH'S DESK  When is form needed by: WHEN DONE  How would you like the form returned: [phone]  Patient Notified form requests are processed in 3-5 business days: YES    Okay to leave a detailed message?

## 2023-12-05 ENCOUNTER — MEDICAL CORRESPONDENCE (OUTPATIENT)
Dept: HEALTH INFORMATION MANAGEMENT | Facility: CLINIC | Age: 88
End: 2023-12-05
Payer: MEDICARE

## 2023-12-07 ENCOUNTER — NURSE TRIAGE (OUTPATIENT)
Dept: FAMILY MEDICINE | Facility: CLINIC | Age: 88
End: 2023-12-07
Payer: MEDICARE

## 2023-12-07 ENCOUNTER — TELEPHONE (OUTPATIENT)
Dept: FAMILY MEDICINE | Facility: CLINIC | Age: 88
End: 2023-12-07
Payer: MEDICARE

## 2023-12-07 DIAGNOSIS — U07.1 INFECTION DUE TO 2019 NOVEL CORONAVIRUS: Primary | ICD-10-CM

## 2023-12-07 NOTE — TELEPHONE ENCOUNTER
Writer called nurse at the assisted living facility for patient and relayed the following after huddling with PCP:     Paxlovid will be prescribed for patient.    Norvasc will need to be cut in half when taking Paxlovid.  The Lovastatin must be stopped and restarted 5 days after the completion of the Paxlovid.     The nurse verbalized understanding.    VIPUL EspinosaN RN  [facility]

## 2023-12-07 NOTE — TELEPHONE ENCOUNTER
New Medication Request    What medication are you requesting?: Paxlovid    Reason for medication request: patient has covid    Have you taken this medication before?: nurse was unsure    Patient offered an appointment? No    Preferred Pharmacy:    [address] [address]  Phone: [phone] Fax: [phone]      Okay to leave a detailed message?: Yes at Other phone number:      Requesting a faxed signature from the provider to start Paxlovid  Fax: [phone]

## 2023-12-07 NOTE — TELEPHONE ENCOUNTER
Forms Request  Name of form/paperwork: [facility]  Have you been seen for this request:   Do we have the form: Koko Betancourt's desk  When is form needed by: .When completed  How would you like the form returned: Fax

## 2023-12-07 NOTE — TELEPHONE ENCOUNTER
Nurse Triage SBAR    Is this a 2nd Level Triage? YES, LICENSED PRACTITIONER REVIEW IS REQUIRED    Situation: Patient tested positive for COVID last night.    Background: Patient's  tested positive, they live together in assisted living.  Patient was tested last night due to exposure - now has a cough today.    Assessment: Patient is afebrile, eating and drinking well. Does have a cough and no other symptoms at this time.  Lives in assisted living facility.    Protocol Recommended Disposition:   Discuss With PCP And Callback By Nurse Within 1 Hour    Recommendation: Patient does take Norvasc and Lovastatin, so would need to stop those when taking the medication if Paxlovid is prescribed, which is why RN unable to send to pharmacy.  Please advise if could like Paxlovid ordered.       ÓSCAR Espinosa RN  [facility]      Routed to provider    Does the patient meet one of the following criteria for ADS visit consideration? 16+ years old, with an [facility] PCP     TIP  Providers, please consider if this condition is appropriate for management at one of our Acute and Diagnostic Services sites.     If patient is a good candidate, please use dotphrase <dot>triageresponse and select Refer to ADS to document.    Reason for Disposition   HIGH RISK patient (e.g., weak immune system, age > 64 years, obesity with BMI of 30 or higher, pregnant, chronic lung disease or other chronic medical condition) and COVID symptoms (e.g., cough, fever)  (Exceptions: Already seen by doctor or NP/PA and no new or worsening symptoms.)    Additional Information   Negative: SEVERE difficulty breathing (e.g., struggling for each breath, speaks in single words)   Negative: Difficult to awaken or acting confused (e.g., disoriented, slurred speech)   Negative: Bluish (or gray) lips or face now   Negative: Shock suspected (e.g., cold/pale/clammy skin, too weak to stand, low BP, rapid pulse)   Negative: Sounds like a  "life-threatening emergency to the triager   Negative: Diagnosed or suspected COVID-19 and symptoms lasting 3 or more weeks   Negative: COVID-19 exposure and no symptoms   Negative: COVID-19 vaccine reaction suspected (e.g., fever, headache, muscle aches) occurring 1 to 3 days after getting vaccine   Negative: COVID-19 vaccine, questions about   Negative: Lives with someone known to have influenza (flu test positive) and flu-like symptoms (e.g., cough, runny nose, sore throat, SOB; with or without fever)   Negative: Possible COVID-19 symptoms and triager concerned about severity of symptoms or other causes   Negative: COVID-19 and breastfeeding, questions about   Negative: SEVERE or constant chest pain or pressure  (Exception: Mild central chest pain, present only when coughing.)   Negative: MODERATE difficulty breathing (e.g., speaks in phrases, SOB even at rest, pulse 100-120)   Negative: Headache and stiff neck (can't touch chin to chest)   Negative: Oxygen level (e.g., pulse oximetry) 90% or lower   Negative: Chest pain or pressure  (Exception: MILD central chest pain, present only when coughing.)   Negative: Drinking very little and dehydration suspected (e.g., no urine > 12 hours, very dry mouth, very lightheaded)   Negative: Patient sounds very sick or weak to the triager   Negative: MILD difficulty breathing (e.g., minimal/no SOB at rest, SOB with walking, pulse <100)   Negative: Fever > 103 F (39.4 C)   Negative: Fever > 101 F (38.3 C) and over 60 years of age   Negative: Fever > 100.0 F (37.8 C) and bedridden (e.g., CVA, chronic illness, recovering from surgery)    Answer Assessment - Initial Assessment Questions  1. COVID-19 DIAGNOSIS: \"How do you know that you have COVID?\" (e.g., positive lab test or self-test, diagnosed by doctor or NP/PA, symptoms after exposure).      Yesterday positive  2. COVID-19 EXPOSURE: \"Was there any known exposure to COVID before the symptoms began?\" CDC Definition of close " "contact: within 6 feet (2 meters) for a total of 15 minutes or more over a 24-hour period.         3. ONSET: \"When did the COVID-19 symptoms start?\"       Cough today  4. WORST SYMPTOM: \"What is your worst symptom?\" (e.g., cough, fever, shortness of breath, muscle aches)      cough  5. COUGH: \"Do you have a cough?\" If Yes, ask: \"How bad is the cough?\"        Coughing when aid was there.  6. FEVER: \"Do you have a fever?\" If Yes, ask: \"What is your temperature, how was it measured, and when did it start?\"      Unsure - eating well  7. RESPIRATORY STATUS: \"Describe your breathing?\" (e.g., normal; shortness of breath, wheezing, unable to speak)       no  8. BETTER-SAME-WORSE: \"Are you getting better, staying the same or getting worse compared to yesterday?\"  If getting worse, ask, \"In what way?\"      Just cough no   9. OTHER SYMPTOMS: \"Do you have any other symptoms?\"  (e.g., chills, fatigue, headache, loss of smell or taste, muscle pain, sore throat)      No   10. HIGH RISK DISEASE: \"Do you have any chronic medical problems?\" (e.g., asthma, heart or lung disease, weak immune system, obesity, etc.)        age  11. VACCINE: \"Have you had the COVID-19 vaccine?\" If Yes, ask: \"Which one, how many shots, when did you get it?\"        yes  12. PREGNANCY: \"Is there any chance you are pregnant?\" \"When was your last menstrual period?\"        no  13. O2 SATURATION MONITOR:  \"Do you use an oxygen saturation monitor (pulse oximeter) at home?\" If Yes, ask \"What is your reading (oxygen level) today?\" \"What is your usual oxygen saturation reading?\" (e.g., 95%)        unsure    Protocols used: Coronavirus (COVID-19) Diagnosed or [id number]    "

## 2023-12-15 ENCOUNTER — TELEPHONE (OUTPATIENT)
Dept: FAMILY MEDICINE | Facility: CLINIC | Age: 88
End: 2023-12-15
Payer: MEDICARE

## 2023-12-15 NOTE — TELEPHONE ENCOUNTER
Forms Request  Name of form/paperwork: [facility]   Have you been seen for this request:   Do we have the form: Koko Betancourt's desk  When is form needed by: .When completed  How would you like the form returned: FAX

## 2023-12-19 ENCOUNTER — MEDICAL CORRESPONDENCE (OUTPATIENT)
Dept: HEALTH INFORMATION MANAGEMENT | Facility: CLINIC | Age: 88
End: 2023-12-19

## 2023-12-19 ENCOUNTER — OFFICE VISIT (OUTPATIENT)
Dept: FAMILY MEDICINE | Facility: CLINIC | Age: 88
End: 2023-12-19
Payer: MEDICARE

## 2023-12-19 VITALS
HEART RATE: 99 BPM | OXYGEN SATURATION: 94 % | SYSTOLIC BLOOD PRESSURE: 94 MMHG | DIASTOLIC BLOOD PRESSURE: 66 MMHG | RESPIRATION RATE: 16 BRPM | TEMPERATURE: 97.9 F | WEIGHT: 139 LBS | HEIGHT: 58 IN | BODY MASS INDEX: 29.18 KG/M2

## 2023-12-19 DIAGNOSIS — K62.3 RECTAL PROLAPSE: ICD-10-CM

## 2023-12-19 DIAGNOSIS — Z01.818 PREOP GENERAL PHYSICAL EXAM: Primary | ICD-10-CM

## 2023-12-19 DIAGNOSIS — N81.9 FEMALE GENITAL PROLAPSE, UNSPECIFIED TYPE: ICD-10-CM

## 2023-12-19 DIAGNOSIS — H61.23 BILATERAL IMPACTED CERUMEN: ICD-10-CM

## 2023-12-19 DIAGNOSIS — R06.09 DOE (DYSPNEA ON EXERTION): ICD-10-CM

## 2023-12-19 DIAGNOSIS — M79.89 SWELLING OF BOTH LOWER EXTREMITIES: ICD-10-CM

## 2023-12-19 PROCEDURE — 69210 REMOVE IMPACTED EAR WAX UNI: CPT | Mod: RT | Performed by: NURSE PRACTITIONER

## 2023-12-19 PROCEDURE — 69209 REMOVE IMPACTED EAR WAX UNI: CPT | Mod: LT | Performed by: NURSE PRACTITIONER

## 2023-12-19 PROCEDURE — 83880 ASSAY OF NATRIURETIC PEPTIDE: CPT | Performed by: NURSE PRACTITIONER

## 2023-12-19 PROCEDURE — 36415 COLL VENOUS BLD VENIPUNCTURE: CPT | Performed by: NURSE PRACTITIONER

## 2023-12-19 PROCEDURE — 99214 OFFICE O/P EST MOD 30 MIN: CPT | Mod: 25 | Performed by: NURSE PRACTITIONER

## 2023-12-19 RX ORDER — RESPIRATORY SYNCYTIAL VIRUS VACCINE 120MCG/0.5
0.5 KIT INTRAMUSCULAR ONCE
Qty: 1 EACH | Refills: 0 | Status: CANCELLED | OUTPATIENT
Start: 2023-12-19 | End: 2023-12-19

## 2023-12-19 ASSESSMENT — PAIN SCALES - GENERAL: PAINLEVEL: NO PAIN (0)

## 2023-12-19 NOTE — PATIENT INSTRUCTIONS
We will call these instructions to [facility]    I will get your paperwork faxed to your surgeon.    Follow your surgeon's directions regarding eating and drinking prior to surgery.     Medications you can take the morning of surgery include: amlodipine    No advil, ibuprofen, aleve, naproxen, or aspirin a week before surgery. Tylenol is ok.    Stop all supplements a week before surgery.    Your surgeon will manage any complications after your procedure.    Will call you when the blood tests come back.  If no evidence of heart failure then the first treatment would be compression socks that can be purchased over-the-counter along with elevating legs.  There is medicine that also may be helpful but would require additional lab monitoring and would cause more bathroom trips.

## 2023-12-19 NOTE — PROGRESS NOTES
[facility]  [address],   [place]  [facility] [address]  Phone: [phone]  Fax: [phone]  Primary Provider: Linh Betancourt  Pre-op Performing Provider: LINH BETANCOURT    PREOPERATIVE EVALUATION:  Today's date: 12/19/2023    Marielos is a 89 year old, presenting for the following:  Pre-Op Exam        12/19/2023     1:34 PM   Additional Questions   Roomed by Silvia Steele CMA       Surgical Information:  Surgery/Procedure: PERINEAL RECTOSIGMOIDECTOMY, COLPOCLEISIS   Surgery Location: [facility] [place]   Surgeon: Olive Biggs MD, Curt Gruber MD, Shannon Haines MD    Surgery Date: 1/5/2024   Time of Surgery: 9:30 AM   Where patient plans to recover: At home with family  Fax number for surgical facility: Note does not need to be faxed, will be available electronically in Epic.    Assessment & Plan     The proposed surgical procedure is considered INTERMEDIATE risk.    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  Alligator forceps then utilized to remove cerumen impaction.  - REMOVE IMPACTED CERUMEN    Swelling of both lower extremities  Likely due to venous insufficiency and inactivity.  Rule out heart failure with BNP    - BNP-N terminal pro; Future  - BNP-N terminal pro                - No identified additional risk factors other than previously addressed      Additional Medication Instructions:  No NSAIDs or supplements a week prior to surgery.  Okay to take amlodipine morning of surgery with small sip of water.    RECOMMENDATION:  APPROVAL GIVEN to proceed with proposed procedure, without further diagnostic evaluation.    Total time spent was 38 minutes including  reviewing records prior to arrival, consultation, placing orders, education, and reviewing the plan of care on the date of service.      Subjective       HPI related to upcoming procedure: Patient has been dealing with pelvic floor organ prolapse and was given recommendations for surgical correction.    For the past week family has noticed worsening puffiness in the ankles and feet.  Patient was recently diagnosed with COVID and has been stuck in her apartment and has not been moving nearly as much.  No history of heart failure    Worsening puffiness int he ankles and feet for the past week. Not terribly active.  Had a positive covid test in early December.  When moving is now very stiff and sore.          12/19/2023     1:45 PM   Preop Questions   1. Have you ever had a heart attack or stroke? No   2. Have you ever had surgery on your heart or blood vessels, such as a stent placement, a coronary artery bypass, or surgery on an artery in your head, neck, heart, or legs? No   3. Do you have chest pain with activity? No   4. Do you have a history of  heart failure? No   5. Do you currently have a cold, bronchitis or symptoms of other infection? No   6. Do you have a cough, shortness of breath, or wheezing? No   7. Do you or anyone in your family have previous history of blood clots? No   8. Do you or does anyone in your family have a serious bleeding problem such as prolonged bleeding following surgeries or cuts? No   9. Have you ever had problems with anemia or been told to take iron pills? No   10. Have you had any abnormal blood loss such as black, tarry or bloody stools, or abnormal vaginal bleeding? No   11. Have you ever had a blood transfusion? No   12. Are you willing to have a blood transfusion if it is medically needed before, during, or after your surgery? Yes   13. Have you or any of your relatives ever had problems with anesthesia? No   14. Do you have sleep apnea, excessive snoring or daytime drowsiness?  No   15. Do you have any artifical heart valves or other implanted medical devices like a pacemaker, defibrillator, or continuous glucose monitor? No   16. Do you have artificial joints? Yes-bilateral total shoulder   17. Are you allergic to latex? No     Health Care Directive:  Patient has a Health Care Directive on file      Preoperative Review of :   reviewed - no record of controlled substances prescribed.    Status of Chronic Conditions:  See problem list for active medical problems.  Problems all longstanding and stable, except as noted/documented.  See ROS for pertinent symptoms related to these conditions.    Review of Systems  CONSTITUTIONAL: NEGATIVE for fever, chills, change in weight  INTEGUMENTARY/SKIN: NEGATIVE for worrisome rashes, moles or lesions  EYES: NEGATIVE for vision changes or irritation  ENT/MOUTH: NEGATIVE for ear, mouth and throat problems  RESP: NEGATIVE for significant cough or SOB  CV: NEGATIVE for chest pain, palpitations or peripheral edema  GI: NEGATIVE for nausea, abdominal pain, heartburn, or change in bowel habits  : NEGATIVE for frequency, dysuria, or hematuria  MUSCULOSKELETAL: NEGATIVE for significant arthralgias or myalgia  NEURO: NEGATIVE for weakness, dizziness or paresthesias  ENDOCRINE: NEGATIVE for temperature intolerance, skin/hair changes  HEME: NEGATIVE for bleeding problems  PSYCHIATRIC: NEGATIVE for changes in mood or affect    Patient Active Problem List    Diagnosis Date Noted    Infection due to 2019 novel coronavirus 12/07/2023     Priority: Medium    Uterine perforation 03/31/2022     Priority: Medium    Thickened endometrium 03/17/2022     Priority: Medium    Essential hypertension 03/17/2022     Priority: Medium    GI bleed 01/09/2021     Priority: Medium    Memory impairment 07/22/2020     Priority: Medium    Myalgia and myositis, unspecified 07/22/2020     Priority: Medium     Created by Conversion      Spinal stenosis of lumbar region 07/22/2020      Priority: Medium    Primary hypercholesterolemia 07/22/2020     Priority: Medium     Created by Conversion      Benign neoplasm of colon 07/22/2020     Priority: Medium     Created by Conversion      Disorder of bone and cartilage 07/22/2020     Priority: Medium     Created by Conversion    Replacement Utility updated for latest IMO load      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      Priority: Medium    History of CVA (cerebrovascular accident) 06/08/2020     Priority: Medium    GI bleeding 01/07/2020     Priority: Medium    Vagina bleeding 01/07/2020     Priority: Medium    Chronic bilateral low back pain without sciatica 08/12/2016     Priority: Medium    Backache 06/08/2015     Priority: Medium      Past Medical History:   Diagnosis Date    Acute cerebrovascular accident (CVA) (H) 06/08/2020    Benign neoplasm of colon     Chronic bilateral low back pain without sciatica     Dementia (H)     Disorder of bone and cartilage, unspecified     Dyspnea on exertion     GI bleed     Memory impairment     Orthopnea     Pure hypercholesterolemia     Skin cancer     Not Melanoma, not sure what kind though    Spinal stenosis of lumbar region     Thickened endometrium     Unspecified arthropathy, shoulder region     Unspecified essential hypertension     Walking troubles      Past Surgical History:   Procedure Laterality Date    APPENDECTOMY      BIOPSY CERVICAL, LOCAL EXCISION, SINGLE/MULTIPLE N/A 9/11/2018    Procedure: LOOP ELECTROSURGICAL EXCISION PROCEDURE, REMOVAL PESSARY;  Surgeon: Olive Biggs MD;  Location: [facility] OR;  Service:     BIOPSY VULVA N/A 5/14/2020    Procedure: VULVAR BIOPSY;  Surgeon: Olive Biggs MD;  Location: [facility] OR;  Service: Gynecology    CATARACT EXTRACTION EXTRACAPSULAR W/ INTRAOCULAR LENS IMPLANTATION Bilateral     CERVICAL BIOPSY N/A 5/14/2020    Procedure: PUNCH BIOPSY, CERVIX;  Surgeon: Olive Biggs MD;   Location: [facility] OR;  Service: Gynecology    CERVICAL POLYPECTOMY N/A 10/12/2023    Procedure: AND POLYPECTOMY WITH ULTRASOUND GUIDANCE;  Surgeon: Olive Biggs MD;  Location: [facility] OR    CHOLECYSTECTOMY      COLONOSCOPY N/A 1/8/2020    Procedure: COLONOSCOPY;  Surgeon: Amanda Carson MD;  Location: [facility]    DILATION AND CURETTAGE, HYSTEROSCOPY WITH ULTRASOUND GUIDANCE N/A 10/12/2023    Procedure: HYSTEROSCOPY DILATION AND CURETTAGE;  Surgeon: Olive Biggs MD;  Location: [facility]    DILATION AND CURETTAGE, OPERATIVE HYSTEROSCOPY, COMBINED N/A 3/31/2022    Procedure: HYSTEROSCOPY;  Surgeon: Olive Biggs MD;  Location: [facility]    HC DILATION/CURETTAGE DIAG/THER NON OB N/A 5/14/2020    Procedure: DILATION AND CURETTAGE, UTERUS;  Surgeon: Olive Biggs MD;  Location: [facility] OR;  Service: Gynecology    IR EXTREMITY ANGIOGRAM RIGHT  2/13/2018    OTHER SURGICAL HISTORY      [id number]    OTHER SURGICAL HISTORY      finger abscess    NH ESOPHAGOGASTRODUODENOSCOPY TRANSORAL DIAGNOSTIC N/A 6/16/2021    Procedure: ESOPHAGOGASTRODUODENOSCOPY (EGD);  Surgeon: Primitivo Lafleur MD;  Location: [facility] OR;  Service: Gastroenterology    SHOULDER SURGERY      bilateral shoulder    SIGMOIDOSCOPY FLEXIBLE N/A 6/3/2018    Procedure: SIGMOIDOSCOPY;  Surgeon: Dayana Joshi MD;  Location: [facility];  Service:     TONSILLECTOMY & ADENOIDECTOMY      TOTAL SHOULDER ARTHROPLASTY Bilateral R 7/12 L 9/11     BIOPSY THYROID FINE NEEDLE ASPIRATION  8/7/2020     Current Outpatient Medications   Medication Sig Dispense Refill    acetaminophen (TYLENOL) 325 MG tablet Take 3 tablets (975 mg) by mouth every 6 hours as needed for mild pain 50 tablet 0    amLODIPine (NORVASC) 10 MG tablet TAKE 1 TABLET DAILY 90 tablet 0    ASPIRIN LOW DOSE 81 MG chewable tablet CHEW & SWALLOW 1 TABLET ORALLY DAILY 30 tablet 11    ibuprofen (ADVIL/MOTRIN) 600  "MG tablet Take 1 tablet (600 mg) by mouth every 6 hours as needed for moderate pain 30 tablet 0    lovastatin (MEVACOR) 40 MG tablet TAKE 1 TABLET AT BEDTIME 90 tablet 0    memantine (NAMENDA) 10 MG tablet TAKE 1 TABLET TWICE A DAY (Patient taking differently: Take 10 mg by mouth 2 times daily) 180 tablet 3    ondansetron (ZOFRAN ODT) 4 MG ODT tab Take 1 tablet (4 mg) by mouth every 8 hours as needed for nausea 4 tablet 0       Allergies   Allergen Reactions    Atorvastatin Muscle Pain (Myalgia)    Dextromethorphan Hives    Oxycodone Unknown    Pravastatin Muscle Pain (Myalgia)    Simvastatin Muscle Pain (Myalgia)    Codeine Rash        Social History     Tobacco Use    Smoking status: Never    Smokeless tobacco: Never   Substance Use Topics    Alcohol use: Yes     Alcohol/week: 2.0 standard drinks of alcohol     Comment: social     Family History   Problem Relation Age of Onset    Heart Disease Father     Coronary Artery Disease Father     Cerebrovascular Disease Father     Coronary Artery Disease Brother     Coronary Artery Disease Brother     Dementia Sister      History   Drug Use No         Objective     BP 94/66 (BP Location: Right arm, Patient Position: Sitting, Cuff Size: Adult Regular)   Pulse 99   Temp 97.9  F (36.6  C) (Oral)   Resp 16   Ht 1.473 m (4' 10\")   Wt 63 kg (139 lb)   LMP  (LMP Unknown)   SpO2 94%   BMI 29.05 kg/m      Physical Exam    GENERAL APPEARANCE: healthy, alert and no distress     EYES: EOMI, PERRL     HENT: Bilateral cerumen impaction. TM's normal and nose and mouth without ulcers or lesions     NECK: no adenopathy, no asymmetry, masses, or scars and thyroid normal to palpation     RESP: lungs clear to auscultation - no rales, rhonchi or wheezes     CV: regular rates and rhythm, normal S1 S2, no S3 or S4 and no murmur, click or rub     ABDOMEN:  soft, nontender, no HSM or masses and bowel sounds normal     MS: extremities normal- no gross deformities noted, no evidence of " "inflammation in joints, FROM in all extremities.     SKIN: no suspicious lesions or rashes     NEURO: Normal strength and tone, sensory exam grossly normal, mentation intact and speech normal     PSYCH: Known dementia. affect normal/bright     LYMPHATICS: No cervical adenopathy    Recent Labs   Lab Test 10/12/23  1230 10/06/23  1458 09/21/22  1508   HGB 11.8 12.4 13.4   PLT  --  186 180   NA  --  143 144   POTASSIUM  --  4.3 4.4   CR  --  0.81 0.78        Diagnostics:  Lab Results   Component Value Date    WBC 6.9 10/06/2023    WBC 5.2 01/13/2021     Lab Results   Component Value Date    RBC 4.23 10/06/2023    RBC 3.57 01/13/2021     Lab Results   Component Value Date    HGB 11.8 10/12/2023    HGB 11.1 01/13/2021     Lab Results   Component Value Date    HCT 40.3 10/06/2023    HCT 37.3 01/13/2021     No components found for: \"MCT\"  Lab Results   Component Value Date    MCV 95 10/06/2023     01/13/2021     Lab Results   Component Value Date    MCH 29.3 10/06/2023    MCH 31.1 01/13/2021     Lab Results   Component Value Date    MCHC 30.8 10/06/2023    MCHC 29.8 01/13/2021     Lab Results   Component Value Date    RDW 15.4 10/06/2023    RDW 15.2 01/13/2021     Lab Results   Component Value Date     10/06/2023     01/13/2021     Last Comprehensive Metabolic Panel:  Sodium   Date Value Ref Range Status   10/06/2023 143 135 - 145 mmol/L Final     Comment:     Reference intervals for this test were updated on 09/26/2023 to more accurately reflect our healthy population. There may be differences in the flagging of prior results with similar values performed with this method. Interpretation of those prior results can be made in the context of the updated reference intervals.    01/13/2021 142 133 - 144 mmol/L Final     Potassium   Date Value Ref Range Status   10/06/2023 4.3 3.4 - 5.3 mmol/L Final   03/17/2022 4.2 3.5 - 5.0 mmol/L Final   01/13/2021 4.2 3.4 - 5.3 mmol/L Final     Chloride   Date Value " Ref Range Status   10/06/2023 106 98 - 107 mmol/L Final   03/17/2022 109 (H) 98 - 107 mmol/L Final   01/13/2021 110 (H) 94 - 109 mmol/L Final     Carbon Dioxide   Date Value Ref Range Status   01/13/2021 26 20 - 32 mmol/L Final     Carbon Dioxide (CO2)   Date Value Ref Range Status   10/06/2023 26 22 - 29 mmol/L Final   03/17/2022 25 22 - 31 mmol/L Final     Anion Gap   Date Value Ref Range Status   10/06/2023 11 7 - 15 mmol/L Final   03/17/2022 11 5 - 18 mmol/L Final   01/13/2021 6 3 - 14 mmol/L Final     Glucose   Date Value Ref Range Status   10/06/2023 114 (H) 70 - 99 mg/dL Final   03/17/2022 109 70 - 125 mg/dL Final   01/13/2021 82 70 - 99 mg/dL Final     Urea Nitrogen   Date Value Ref Range Status   10/06/2023 15.3 8.0 - 23.0 mg/dL Final   03/17/2022 20 8 - 28 mg/dL Final   01/13/2021 9 7 - 30 mg/dL Final     Creatinine   Date Value Ref Range Status   10/06/2023 0.81 0.51 - 0.95 mg/dL Final   01/13/2021 0.75 0.52 - 1.04 mg/dL Final     GFR Estimate   Date Value Ref Range Status   10/06/2023 68 >60 mL/min/1.73m2 Final   06/15/2021 >60 >60 mL/min/1.73m2 Final   01/13/2021 70 >60 mL/min/[1.73_m2] Final     Comment:     Non  GFR Calc  Starting 12/18/2018, serum creatinine based estimated GFR (eGFR) will be   calculated using the Chronic Kidney Disease Epidemiology Collaboration   (CKD-EPI) equation.       Calcium   Date Value Ref Range Status   10/06/2023 9.5 8.2 - 9.6 mg/dL Final   01/13/2021 8.9 8.5 - 10.1 mg/dL Final     Bilirubin Total   Date Value Ref Range Status   10/06/2023 0.3 <=1.2 mg/dL Final   01/10/2021 0.6 0.2 - 1.3 mg/dL Final     Alkaline Phosphatase   Date Value Ref Range Status   10/06/2023 101 35 - 104 U/L Final   01/10/2021 66 40 - 150 U/L Final     ALT   Date Value Ref Range Status   10/06/2023 8 0 - 50 U/L Final     Comment:     Reference intervals for this test were updated on 6/12/2023 to more accurately reflect our healthy population. There may be differences in the  flagging of prior results with similar values performed with this method. Interpretation of those prior results can be made in the context of the updated reference intervals.     01/10/2021 13 0 - 50 U/L Final     AST   Date Value Ref Range Status   10/06/2023 22 0 - 45 U/L Final     Comment:     Reference intervals for this test were updated on 6/12/2023 to more accurately reflect our healthy population. There may be differences in the flagging of prior results with similar values performed with this method. Interpretation of those prior results can be made in the context of the updated reference intervals.   01/10/2021 11 0 - 45 U/L Final     [id number]    No EKG this visit, completed in the last 90 days.    ECG results from 10/06/23   EKG 12-lead, tracing only     Value    Systolic Blood Pressure     Diastolic Blood Pressure     Ventricular Rate 69    Atrial Rate 69    NE Interval 152    QRS Duration 70        QTc 409    P Axis 24    R AXIS -27    T Axis 0    Interpretation ECG      Sinus rhythm with Premature atrial complexes  Otherwise normal ECG  When compared with ECG of 14-JUN-2021 18:41,  Premature atrial complexes are now Present  Nonspecific T wave abnormality no longer evident in Anterior leads  Confirmed by CARA HERNANDEZ MD LOC:[facility] ([id number]) on 10/7/2023 2:07:09 PM           Revised Cardiac Risk Index (RCRI):  The patient has the following serious cardiovascular risks for perioperative complications:   - Cerebrovascular Disease (TIA or CVA) = 1 point     RCRI Interpretation: 1 point: Class II (low risk - 0.9% complication rate)         Signed Electronically by: Koko Betancourt NP  Copy of this evaluation report is provided to requesting physician.

## 2023-12-19 NOTE — H&P (VIEW-ONLY)
[facility]  [address],   [place]  [facility] [address]  Phone: [phone]  Fax: [phone]  Primary Provider: Linh Betancourt  Pre-op Performing Provider: LINH BETANCOURT    PREOPERATIVE EVALUATION:  Today's date: 12/19/2023    Marielos is a 89 year old, presenting for the following:  Pre-Op Exam        12/19/2023     1:34 PM   Additional Questions   Roomed by Silvia Steele CMA       Surgical Information:  Surgery/Procedure: PERINEAL RECTOSIGMOIDECTOMY, COLPOCLEISIS   Surgery Location: [facility]   Surgeon: Olive Biggs MD, Curt Gruber MD, Shannon Haines MD    Surgery Date: 1/5/2024   Time of Surgery: 9:30 AM   Where patient plans to recover: At home with family  Fax number for surgical facility: Note does not need to be faxed, will be available electronically in Epic.    Assessment & Plan     The proposed surgical procedure is considered INTERMEDIATE risk.    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  Alligator forceps then utilized to remove cerumen impaction.  - REMOVE IMPACTED CERUMEN    Swelling of both lower extremities  Likely due to venous insufficiency and inactivity.  Rule out heart failure with BNP    - BNP-N terminal pro; Future  - BNP-N terminal pro                - No identified additional risk factors other than previously addressed      Additional Medication Instructions:  No NSAIDs or supplements a week prior to surgery.  Okay to take amlodipine morning of surgery with small sip of water.    RECOMMENDATION:  APPROVAL GIVEN to proceed with proposed procedure, without further diagnostic evaluation.    Total time spent was 38 minutes including  reviewing records prior to arrival, consultation, placing orders, education, and reviewing the plan of care on the date of service.      Subjective       HPI related to upcoming procedure: Patient has been dealing with pelvic floor organ prolapse and was given recommendations for surgical correction.    For the past week family has noticed worsening puffiness in the ankles and feet.  Patient was recently diagnosed with COVID and has been stuck in her apartment and has not been moving nearly as much.  No history of heart failure    Worsening puffiness int he ankles and feet for the past week. Not terribly active.  Had a positive covid test in early December.  When moving is now very stiff and sore.          12/19/2023     1:45 PM   Preop Questions   1. Have you ever had a heart attack or stroke? No   2. Have you ever had surgery on your heart or blood vessels, such as a stent placement, a coronary artery bypass, or surgery on an artery in your head, neck, heart, or legs? No   3. Do you have chest pain with activity? No   4. Do you have a history of  heart failure? No   5. Do you currently have a cold, bronchitis or symptoms of other infection? No   6. Do you have a cough, shortness of breath, or wheezing? No   7. Do you or anyone in your family have previous history of blood clots? No   8. Do you or does anyone in your family have a serious bleeding problem such as prolonged bleeding following surgeries or cuts? No   9. Have you ever had problems with anemia or been told to take iron pills? No   10. Have you had any abnormal blood loss such as black, tarry or bloody stools, or abnormal vaginal bleeding? No   11. Have you ever had a blood transfusion? No   12. Are you willing to have a blood transfusion if it is medically needed before, during, or after your surgery? Yes   13. Have you or any of your relatives ever had problems with anesthesia? No   14. Do you have sleep apnea, excessive snoring or daytime drowsiness?  No   15. Do you have any artifical heart valves or other implanted medical devices like a pacemaker, defibrillator, or continuous glucose monitor? No   16. Do you have artificial joints? Yes-bilateral total shoulder   17. Are you allergic to latex? No     Health Care Directive:  Patient has a Health Care Directive on file      Preoperative Review of :   reviewed - no record of controlled substances prescribed.    Status of Chronic Conditions:  See problem list for active medical problems.  Problems all longstanding and stable, except as noted/documented.  See ROS for pertinent symptoms related to these conditions.    Review of Systems  CONSTITUTIONAL: NEGATIVE for fever, chills, change in weight  INTEGUMENTARY/SKIN: NEGATIVE for worrisome rashes, moles or lesions  EYES: NEGATIVE for vision changes or irritation  ENT/MOUTH: NEGATIVE for ear, mouth and throat problems  RESP: NEGATIVE for significant cough or SOB  CV: NEGATIVE for chest pain, palpitations or peripheral edema  GI: NEGATIVE for nausea, abdominal pain, heartburn, or change in bowel habits  : NEGATIVE for frequency, dysuria, or hematuria  MUSCULOSKELETAL: NEGATIVE for significant arthralgias or myalgia  NEURO: NEGATIVE for weakness, dizziness or paresthesias  ENDOCRINE: NEGATIVE for temperature intolerance, skin/hair changes  HEME: NEGATIVE for bleeding problems  PSYCHIATRIC: NEGATIVE for changes in mood or affect    Patient Active Problem List    Diagnosis Date Noted    Infection due to 2019 novel coronavirus 12/07/2023     Priority: Medium    Uterine perforation 03/31/2022     Priority: Medium    Thickened endometrium 03/17/2022     Priority: Medium    Essential hypertension 03/17/2022     Priority: Medium    GI bleed 01/09/2021     Priority: Medium    Memory impairment 07/22/2020     Priority: Medium    Myalgia and myositis, unspecified 07/22/2020     Priority: Medium     Created by Conversion      Spinal stenosis of lumbar region 07/22/2020      Priority: Medium    Primary hypercholesterolemia 07/22/2020     Priority: Medium     Created by Conversion      Benign neoplasm of colon 07/22/2020     Priority: Medium     Created by Conversion      Disorder of bone and cartilage 07/22/2020     Priority: Medium     Created by Conversion    Replacement Utility updated for latest IMO load      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      Priority: Medium    History of CVA (cerebrovascular accident) 06/08/2020     Priority: Medium    GI bleeding 01/07/2020     Priority: Medium    Vagina bleeding 01/07/2020     Priority: Medium    Chronic bilateral low back pain without sciatica 08/12/2016     Priority: Medium    Backache 06/08/2015     Priority: Medium      Past Medical History:   Diagnosis Date    Acute cerebrovascular accident (CVA) (H) 06/08/2020    Benign neoplasm of colon     Chronic bilateral low back pain without sciatica     Dementia (H)     Disorder of bone and cartilage, unspecified     Dyspnea on exertion     GI bleed     Memory impairment     Orthopnea     Pure hypercholesterolemia     Skin cancer     Not Melanoma, not sure what kind though    Spinal stenosis of lumbar region     Thickened endometrium     Unspecified arthropathy, shoulder region     Unspecified essential hypertension     Walking troubles      Past Surgical History:   Procedure Laterality Date    APPENDECTOMY      BIOPSY CERVICAL, LOCAL EXCISION, SINGLE/MULTIPLE N/A 9/11/2018    Procedure: LOOP ELECTROSURGICAL EXCISION PROCEDURE, REMOVAL PESSARY;  Surgeon: Olive Biggs MD;  Location: [facility] OR;  Service:     BIOPSY VULVA N/A 5/14/2020    Procedure: VULVAR BIOPSY;  Surgeon: Olive Biggs MD;  Location: [facility] OR;  Service: Gynecology    CATARACT EXTRACTION EXTRACAPSULAR W/ INTRAOCULAR LENS IMPLANTATION Bilateral     CERVICAL BIOPSY N/A 5/14/2020    Procedure: PUNCH BIOPSY, CERVIX;  Surgeon: Olive Biggs MD;   Location: [facility] OR;  Service: Gynecology    CERVICAL POLYPECTOMY N/A 10/12/2023    Procedure: AND POLYPECTOMY WITH ULTRASOUND GUIDANCE;  Surgeon: Olive Biggs MD;  Location: [facility] OR    CHOLECYSTECTOMY      COLONOSCOPY N/A 1/8/2020    Procedure: COLONOSCOPY;  Surgeon: Amanda Carson MD;  Location: [facility]    DILATION AND CURETTAGE, HYSTEROSCOPY WITH ULTRASOUND GUIDANCE N/A 10/12/2023    Procedure: HYSTEROSCOPY DILATION AND CURETTAGE;  Surgeon: Olive Biggs MD;  Location: [facility]    DILATION AND CURETTAGE, OPERATIVE HYSTEROSCOPY, COMBINED N/A 3/31/2022    Procedure: HYSTEROSCOPY;  Surgeon: Olive Biggs MD;  Location: [facility]    HC DILATION/CURETTAGE DIAG/THER NON OB N/A 5/14/2020    Procedure: DILATION AND CURETTAGE, UTERUS;  Surgeon: Olive Biggs MD;  Location: [facility] OR;  Service: Gynecology    IR EXTREMITY ANGIOGRAM RIGHT  2/13/2018    OTHER SURGICAL HISTORY      [id number]    OTHER SURGICAL HISTORY      finger abscess    WA ESOPHAGOGASTRODUODENOSCOPY TRANSORAL DIAGNOSTIC N/A 6/16/2021    Procedure: ESOPHAGOGASTRODUODENOSCOPY (EGD);  Surgeon: Primitivo Lafleur MD;  Location: [facility] OR;  Service: Gastroenterology    SHOULDER SURGERY      bilateral shoulder    SIGMOIDOSCOPY FLEXIBLE N/A 6/3/2018    Procedure: SIGMOIDOSCOPY;  Surgeon: Dayana Joshi MD;  Location: [facility];  Service:     TONSILLECTOMY & ADENOIDECTOMY      TOTAL SHOULDER ARTHROPLASTY Bilateral R 7/12 L 9/11     BIOPSY THYROID FINE NEEDLE ASPIRATION  8/7/2020     Current Outpatient Medications   Medication Sig Dispense Refill    acetaminophen (TYLENOL) 325 MG tablet Take 3 tablets (975 mg) by mouth every 6 hours as needed for mild pain 50 tablet 0    amLODIPine (NORVASC) 10 MG tablet TAKE 1 TABLET DAILY 90 tablet 0    ASPIRIN LOW DOSE 81 MG chewable tablet CHEW & SWALLOW 1 TABLET ORALLY DAILY 30 tablet 11    ibuprofen (ADVIL/MOTRIN) 600  "MG tablet Take 1 tablet (600 mg) by mouth every 6 hours as needed for moderate pain 30 tablet 0    lovastatin (MEVACOR) 40 MG tablet TAKE 1 TABLET AT BEDTIME 90 tablet 0    memantine (NAMENDA) 10 MG tablet TAKE 1 TABLET TWICE A DAY (Patient taking differently: Take 10 mg by mouth 2 times daily) 180 tablet 3    ondansetron (ZOFRAN ODT) 4 MG ODT tab Take 1 tablet (4 mg) by mouth every 8 hours as needed for nausea 4 tablet 0       Allergies   Allergen Reactions    Atorvastatin Muscle Pain (Myalgia)    Dextromethorphan Hives    Oxycodone Unknown    Pravastatin Muscle Pain (Myalgia)    Simvastatin Muscle Pain (Myalgia)    Codeine Rash        Social History     Tobacco Use    Smoking status: Never    Smokeless tobacco: Never   Substance Use Topics    Alcohol use: Yes     Alcohol/week: 2.0 standard drinks of alcohol     Comment: social     Family History   Problem Relation Age of Onset    Heart Disease Father     Coronary Artery Disease Father     Cerebrovascular Disease Father     Coronary Artery Disease Brother     Coronary Artery Disease Brother     Dementia Sister      History   Drug Use No         Objective     BP 94/66 (BP Location: Right arm, Patient Position: Sitting, Cuff Size: Adult Regular)   Pulse 99   Temp 97.9  F (36.6  C) (Oral)   Resp 16   Ht 1.473 m (4' 10\")   Wt 63 kg (139 lb)   LMP  (LMP Unknown)   SpO2 94%   BMI 29.05 kg/m      Physical Exam    GENERAL APPEARANCE: healthy, alert and no distress     EYES: EOMI, PERRL     HENT: Bilateral cerumen impaction. TM's normal and nose and mouth without ulcers or lesions     NECK: no adenopathy, no asymmetry, masses, or scars and thyroid normal to palpation     RESP: lungs clear to auscultation - no rales, rhonchi or wheezes     CV: regular rates and rhythm, normal S1 S2, no S3 or S4 and no murmur, click or rub     ABDOMEN:  soft, nontender, no HSM or masses and bowel sounds normal     MS: extremities normal- no gross deformities noted, no evidence of " "inflammation in joints, FROM in all extremities.     SKIN: no suspicious lesions or rashes     NEURO: Normal strength and tone, sensory exam grossly normal, mentation intact and speech normal     PSYCH: Known dementia. affect normal/bright     LYMPHATICS: No cervical adenopathy    Recent Labs   Lab Test 10/12/23  1230 10/06/23  1458 09/21/22  1508   HGB 11.8 12.4 13.4   PLT  --  186 180   NA  --  143 144   POTASSIUM  --  4.3 4.4   CR  --  0.81 0.78        Diagnostics:  Lab Results   Component Value Date    WBC 6.9 10/06/2023    WBC 5.2 01/13/2021     Lab Results   Component Value Date    RBC 4.23 10/06/2023    RBC 3.57 01/13/2021     Lab Results   Component Value Date    HGB 11.8 10/12/2023    HGB 11.1 01/13/2021     Lab Results   Component Value Date    HCT 40.3 10/06/2023    HCT 37.3 01/13/2021     No components found for: \"MCT\"  Lab Results   Component Value Date    MCV 95 10/06/2023     01/13/2021     Lab Results   Component Value Date    MCH 29.3 10/06/2023    MCH 31.1 01/13/2021     Lab Results   Component Value Date    MCHC 30.8 10/06/2023    MCHC 29.8 01/13/2021     Lab Results   Component Value Date    RDW 15.4 10/06/2023    RDW 15.2 01/13/2021     Lab Results   Component Value Date     10/06/2023     01/13/2021     Last Comprehensive Metabolic Panel:  Sodium   Date Value Ref Range Status   10/06/2023 143 135 - 145 mmol/L Final     Comment:     Reference intervals for this test were updated on 09/26/2023 to more accurately reflect our healthy population. There may be differences in the flagging of prior results with similar values performed with this method. Interpretation of those prior results can be made in the context of the updated reference intervals.    01/13/2021 142 133 - 144 mmol/L Final     Potassium   Date Value Ref Range Status   10/06/2023 4.3 3.4 - 5.3 mmol/L Final   03/17/2022 4.2 3.5 - 5.0 mmol/L Final   01/13/2021 4.2 3.4 - 5.3 mmol/L Final     Chloride   Date Value " Ref Range Status   10/06/2023 106 98 - 107 mmol/L Final   03/17/2022 109 (H) 98 - 107 mmol/L Final   01/13/2021 110 (H) 94 - 109 mmol/L Final     Carbon Dioxide   Date Value Ref Range Status   01/13/2021 26 20 - 32 mmol/L Final     Carbon Dioxide (CO2)   Date Value Ref Range Status   10/06/2023 26 22 - 29 mmol/L Final   03/17/2022 25 22 - 31 mmol/L Final     Anion Gap   Date Value Ref Range Status   10/06/2023 11 7 - 15 mmol/L Final   03/17/2022 11 5 - 18 mmol/L Final   01/13/2021 6 3 - 14 mmol/L Final     Glucose   Date Value Ref Range Status   10/06/2023 114 (H) 70 - 99 mg/dL Final   03/17/2022 109 70 - 125 mg/dL Final   01/13/2021 82 70 - 99 mg/dL Final     Urea Nitrogen   Date Value Ref Range Status   10/06/2023 15.3 8.0 - 23.0 mg/dL Final   03/17/2022 20 8 - 28 mg/dL Final   01/13/2021 9 7 - 30 mg/dL Final     Creatinine   Date Value Ref Range Status   10/06/2023 0.81 0.51 - 0.95 mg/dL Final   01/13/2021 0.75 0.52 - 1.04 mg/dL Final     GFR Estimate   Date Value Ref Range Status   10/06/2023 68 >60 mL/min/1.73m2 Final   06/15/2021 >60 >60 mL/min/1.73m2 Final   01/13/2021 70 >60 mL/min/[1.73_m2] Final     Comment:     Non  GFR Calc  Starting 12/18/2018, serum creatinine based estimated GFR (eGFR) will be   calculated using the Chronic Kidney Disease Epidemiology Collaboration   (CKD-EPI) equation.       Calcium   Date Value Ref Range Status   10/06/2023 9.5 8.2 - 9.6 mg/dL Final   01/13/2021 8.9 8.5 - 10.1 mg/dL Final     Bilirubin Total   Date Value Ref Range Status   10/06/2023 0.3 <=1.2 mg/dL Final   01/10/2021 0.6 0.2 - 1.3 mg/dL Final     Alkaline Phosphatase   Date Value Ref Range Status   10/06/2023 101 35 - 104 U/L Final   01/10/2021 66 40 - 150 U/L Final     ALT   Date Value Ref Range Status   10/06/2023 8 0 - 50 U/L Final     Comment:     Reference intervals for this test were updated on 6/12/2023 to more accurately reflect our healthy population. There may be differences in the  flagging of prior results with similar values performed with this method. Interpretation of those prior results can be made in the context of the updated reference intervals.     01/10/2021 13 0 - 50 U/L Final     AST   Date Value Ref Range Status   10/06/2023 22 0 - 45 U/L Final     Comment:     Reference intervals for this test were updated on 6/12/2023 to more accurately reflect our healthy population. There may be differences in the flagging of prior results with similar values performed with this method. Interpretation of those prior results can be made in the context of the updated reference intervals.   01/10/2021 11 0 - 45 U/L Final     BNP-424    No EKG this visit, completed in the last 90 days.    ECG results from 10/06/23   EKG 12-lead, tracing only     Value    Systolic Blood Pressure     Diastolic Blood Pressure     Ventricular Rate 69    Atrial Rate 69    OH Interval 152    QRS Duration 70        QTc 409    P Axis 24    R AXIS -27    T Axis 0    Interpretation ECG      Sinus rhythm with Premature atrial complexes  Otherwise normal ECG  When compared with ECG of 14-JUN-2021 18:41,  Premature atrial complexes are now Present  Nonspecific T wave abnormality no longer evident in Anterior leads  Confirmed by CARA HERNANDEZ MD LOC:[facility] ([id number]) on 10/7/2023 2:07:09 PM           Revised Cardiac Risk Index (RCRI):  The patient has the following serious cardiovascular risks for perioperative complications:   - Cerebrovascular Disease (TIA or CVA) = 1 point     RCRI Interpretation: 1 point: Class II (low risk - 0.9% complication rate)         Signed Electronically by: Koko Betancourt NP  Copy of this evaluation report is provided to requesting physician.

## 2023-12-20 LAB — NT-PROBNP SERPL-MCNC: 424 PG/ML (ref 0–1800)

## 2023-12-29 ENCOUNTER — MEDICAL CORRESPONDENCE (OUTPATIENT)
Dept: HEALTH INFORMATION MANAGEMENT | Facility: CLINIC | Age: 88
End: 2023-12-29
Payer: MEDICARE

## 2023-12-29 ENCOUNTER — TELEPHONE (OUTPATIENT)
Dept: FAMILY MEDICINE | Facility: CLINIC | Age: 88
End: 2023-12-29
Payer: MEDICARE

## 2023-12-29 NOTE — TELEPHONE ENCOUNTER
Forms Request  Name of form/paperwork: [facility]  Have you been seen for this request:   Do we have the form: Koko Betancourt's desk  When is form needed by: .When completed  How would you like the form returned: fax

## 2024-01-02 ENCOUNTER — MEDICAL CORRESPONDENCE (OUTPATIENT)
Dept: HEALTH INFORMATION MANAGEMENT | Facility: CLINIC | Age: 89
End: 2024-01-02
Payer: MEDICARE

## 2024-01-03 DIAGNOSIS — K64.4 EXTERNAL HEMORRHOIDS: Primary | ICD-10-CM

## 2024-01-04 ENCOUNTER — MEDICAL CORRESPONDENCE (OUTPATIENT)
Dept: HEALTH INFORMATION MANAGEMENT | Facility: CLINIC | Age: 89
End: 2024-01-04
Payer: MEDICARE

## 2024-01-05 ENCOUNTER — ANESTHESIA EVENT (OUTPATIENT)
Dept: SURGERY | Facility: HOSPITAL | Age: 89
DRG: 349 | End: 2024-01-05
Payer: MEDICARE

## 2024-01-05 ENCOUNTER — ANESTHESIA (OUTPATIENT)
Dept: SURGERY | Facility: HOSPITAL | Age: 89
DRG: 349 | End: 2024-01-05
Payer: MEDICARE

## 2024-01-05 ENCOUNTER — HOSPITAL ENCOUNTER (INPATIENT)
Facility: HOSPITAL | Age: 89
LOS: 1 days | Discharge: NURSING, REHAB OR LONG-TERM CARE | DRG: 349 | End: 2024-01-06
Attending: OBSTETRICS & GYNECOLOGY | Admitting: COLON & RECTAL SURGERY
Payer: MEDICARE

## 2024-01-05 DIAGNOSIS — K62.3 RECTAL PROLAPSE: Primary | ICD-10-CM

## 2024-01-05 LAB
ABO/RH(D): NORMAL
ANTIBODY SCREEN: NEGATIVE
CREAT SERPL-MCNC: 0.72 MG/DL (ref 0.51–0.95)
EGFRCR SERPLBLD CKD-EPI 2021: 78 ML/MIN/1.73M2
GLUCOSE BLDC GLUCOMTR-MCNC: 96 MG/DL (ref 70–99)
HGB BLD-MCNC: 12 G/DL (ref 11.7–15.7)
HOLD SPECIMEN: NORMAL
MAGNESIUM SERPL-MCNC: 2 MG/DL (ref 1.7–2.3)
POTASSIUM SERPL-SCNC: 4.4 MMOL/L (ref 3.4–5.3)
SPECIMEN EXPIRATION DATE: NORMAL

## 2024-01-05 PROCEDURE — 85018 HEMOGLOBIN: CPT | Performed by: PHYSICIAN ASSISTANT

## 2024-01-05 PROCEDURE — 36415 COLL VENOUS BLD VENIPUNCTURE: CPT | Performed by: PHYSICIAN ASSISTANT

## 2024-01-05 PROCEDURE — 250N000009 HC RX 250: Performed by: NURSE ANESTHETIST, CERTIFIED REGISTERED

## 2024-01-05 PROCEDURE — 84132 ASSAY OF SERUM POTASSIUM: CPT | Performed by: STUDENT IN AN ORGANIZED HEALTH CARE EDUCATION/TRAINING PROGRAM

## 2024-01-05 PROCEDURE — 258N000003 HC RX IP 258 OP 636: Performed by: ANESTHESIOLOGY

## 2024-01-05 PROCEDURE — 250N000013 HC RX MED GY IP 250 OP 250 PS 637

## 2024-01-05 PROCEDURE — 250N000011 HC RX IP 250 OP 636: Performed by: ANESTHESIOLOGY

## 2024-01-05 PROCEDURE — 258N000003 HC RX IP 258 OP 636: Performed by: NURSE ANESTHETIST, CERTIFIED REGISTERED

## 2024-01-05 PROCEDURE — 0HQ9XZZ REPAIR PERINEUM SKIN, EXTERNAL APPROACH: ICD-10-PCS | Performed by: OBSTETRICS & GYNECOLOGY

## 2024-01-05 PROCEDURE — 83735 ASSAY OF MAGNESIUM: CPT | Performed by: STUDENT IN AN ORGANIZED HEALTH CARE EDUCATION/TRAINING PROGRAM

## 2024-01-05 PROCEDURE — 0DBP7ZZ EXCISION OF RECTUM, VIA NATURAL OR ARTIFICIAL OPENING: ICD-10-PCS | Performed by: COLON & RECTAL SURGERY

## 2024-01-05 PROCEDURE — 370N000017 HC ANESTHESIA TECHNICAL FEE, PER MIN: Performed by: OBSTETRICS & GYNECOLOGY

## 2024-01-05 PROCEDURE — 250N000013 HC RX MED GY IP 250 OP 250 PS 637: Performed by: STUDENT IN AN ORGANIZED HEALTH CARE EDUCATION/TRAINING PROGRAM

## 2024-01-05 PROCEDURE — 120N000001 HC R&B MED SURG/OB

## 2024-01-05 PROCEDURE — 0ULG7ZZ OCCLUSION OF VAGINA, VIA NATURAL OR ARTIFICIAL OPENING: ICD-10-PCS | Performed by: OBSTETRICS & GYNECOLOGY

## 2024-01-05 PROCEDURE — 250N000011 HC RX IP 250 OP 636: Performed by: NURSE ANESTHETIST, CERTIFIED REGISTERED

## 2024-01-05 PROCEDURE — 250N000009 HC RX 250: Performed by: OBSTETRICS & GYNECOLOGY

## 2024-01-05 PROCEDURE — 272N000001 HC OR GENERAL SUPPLY STERILE: Performed by: OBSTETRICS & GYNECOLOGY

## 2024-01-05 PROCEDURE — 250N000011 HC RX IP 250 OP 636

## 2024-01-05 PROCEDURE — 93010 ELECTROCARDIOGRAM REPORT: CPT | Mod: HIP | Performed by: INTERNAL MEDICINE

## 2024-01-05 PROCEDURE — 250N000013 HC RX MED GY IP 250 OP 250 PS 637: Performed by: COLON & RECTAL SURGERY

## 2024-01-05 PROCEDURE — 258N000003 HC RX IP 258 OP 636

## 2024-01-05 PROCEDURE — 88307 TISSUE EXAM BY PATHOLOGIST: CPT | Mod: TC | Performed by: OBSTETRICS & GYNECOLOGY

## 2024-01-05 PROCEDURE — 82565 ASSAY OF CREATININE: CPT

## 2024-01-05 PROCEDURE — 250N000011 HC RX IP 250 OP 636: Performed by: COLON & RECTAL SURGERY

## 2024-01-05 PROCEDURE — 86900 BLOOD TYPING SEROLOGIC ABO: CPT

## 2024-01-05 PROCEDURE — 36415 COLL VENOUS BLD VENIPUNCTURE: CPT

## 2024-01-05 PROCEDURE — 360N000077 HC SURGERY LEVEL 4, PER MIN: Performed by: OBSTETRICS & GYNECOLOGY

## 2024-01-05 PROCEDURE — 93005 ELECTROCARDIOGRAM TRACING: CPT

## 2024-01-05 PROCEDURE — 250N000025 HC SEVOFLURANE, PER MIN: Performed by: OBSTETRICS & GYNECOLOGY

## 2024-01-05 PROCEDURE — 999N000141 HC STATISTIC PRE-PROCEDURE NURSING ASSESSMENT: Performed by: OBSTETRICS & GYNECOLOGY

## 2024-01-05 PROCEDURE — 272N000004 HC RX 272: Performed by: OBSTETRICS & GYNECOLOGY

## 2024-01-05 PROCEDURE — 99222 1ST HOSP IP/OBS MODERATE 55: CPT | Performed by: STUDENT IN AN ORGANIZED HEALTH CARE EDUCATION/TRAINING PROGRAM

## 2024-01-05 PROCEDURE — 710N000009 HC RECOVERY PHASE 1, LEVEL 1, PER MIN: Performed by: OBSTETRICS & GYNECOLOGY

## 2024-01-05 RX ORDER — ONDANSETRON 2 MG/ML
4 INJECTION INTRAMUSCULAR; INTRAVENOUS ONCE
Status: COMPLETED | OUTPATIENT
Start: 2024-01-05 | End: 2024-01-05

## 2024-01-05 RX ORDER — HYDROMORPHONE HCL IN WATER/PF 6 MG/30 ML
0.1 PATIENT CONTROLLED ANALGESIA SYRINGE INTRAVENOUS
Status: DISCONTINUED | OUTPATIENT
Start: 2024-01-05 | End: 2024-01-06 | Stop reason: HOSPADM

## 2024-01-05 RX ORDER — ACETAMINOPHEN 325 MG/1
975 TABLET ORAL ONCE
Status: COMPLETED | OUTPATIENT
Start: 2024-01-05 | End: 2024-01-05

## 2024-01-05 RX ORDER — ACETAMINOPHEN 325 MG/1
975 TABLET ORAL EVERY 8 HOURS
Status: DISCONTINUED | OUTPATIENT
Start: 2024-01-05 | End: 2024-01-06 | Stop reason: HOSPADM

## 2024-01-05 RX ORDER — FENTANYL CITRATE 50 UG/ML
50 INJECTION, SOLUTION INTRAMUSCULAR; INTRAVENOUS EVERY 5 MIN PRN
Status: DISCONTINUED | OUTPATIENT
Start: 2024-01-05 | End: 2024-01-05 | Stop reason: HOSPADM

## 2024-01-05 RX ORDER — MEMANTINE HYDROCHLORIDE 10 MG/1
10 TABLET ORAL 2 TIMES DAILY
Status: DISCONTINUED | OUTPATIENT
Start: 2024-01-05 | End: 2024-01-06 | Stop reason: HOSPADM

## 2024-01-05 RX ORDER — POLYETHYLENE GLYCOL 3350 17 G/17G
17 POWDER, FOR SOLUTION ORAL DAILY
Status: DISCONTINUED | OUTPATIENT
Start: 2024-01-06 | End: 2024-01-06 | Stop reason: HOSPADM

## 2024-01-05 RX ORDER — HEPARIN SODIUM 5000 [USP'U]/.5ML
5000 INJECTION, SOLUTION INTRAVENOUS; SUBCUTANEOUS
Status: COMPLETED | OUTPATIENT
Start: 2024-01-05 | End: 2024-01-05

## 2024-01-05 RX ORDER — CEFAZOLIN SODIUM/WATER 2 G/20 ML
2 SYRINGE (ML) INTRAVENOUS SEE ADMIN INSTRUCTIONS
Status: DISCONTINUED | OUTPATIENT
Start: 2024-01-05 | End: 2024-01-05 | Stop reason: HOSPADM

## 2024-01-05 RX ORDER — LIDOCAINE HYDROCHLORIDE AND EPINEPHRINE 10; 10 MG/ML; UG/ML
INJECTION, SOLUTION INFILTRATION; PERINEURAL PRN
Status: DISCONTINUED | OUTPATIENT
Start: 2024-01-05 | End: 2024-01-05 | Stop reason: HOSPADM

## 2024-01-05 RX ORDER — ONDANSETRON 2 MG/ML
4 INJECTION INTRAMUSCULAR; INTRAVENOUS EVERY 6 HOURS PRN
Status: DISCONTINUED | OUTPATIENT
Start: 2024-01-05 | End: 2024-01-06 | Stop reason: HOSPADM

## 2024-01-05 RX ORDER — HYDROCORTISONE 25 MG/G
CREAM TOPICAL
Qty: 30 G | Refills: 11 | Status: SHIPPED | OUTPATIENT
Start: 2024-01-05 | End: 2024-01-06

## 2024-01-05 RX ORDER — SODIUM CHLORIDE, SODIUM LACTATE, POTASSIUM CHLORIDE, CALCIUM CHLORIDE 600; 310; 30; 20 MG/100ML; MG/100ML; MG/100ML; MG/100ML
INJECTION, SOLUTION INTRAVENOUS CONTINUOUS
Status: DISCONTINUED | OUTPATIENT
Start: 2024-01-05 | End: 2024-01-05

## 2024-01-05 RX ORDER — ACETAMINOPHEN 325 MG/1
975 TABLET ORAL EVERY 6 HOURS PRN
Status: DISCONTINUED | OUTPATIENT
Start: 2024-01-05 | End: 2024-01-06 | Stop reason: HOSPADM

## 2024-01-05 RX ORDER — MAGNESIUM HYDROXIDE 1200 MG/15ML
LIQUID ORAL PRN
Status: DISCONTINUED | OUTPATIENT
Start: 2024-01-05 | End: 2024-01-06 | Stop reason: HOSPADM

## 2024-01-05 RX ORDER — ENOXAPARIN SODIUM 100 MG/ML
40 INJECTION SUBCUTANEOUS EVERY 24 HOURS
Status: DISCONTINUED | OUTPATIENT
Start: 2024-01-06 | End: 2024-01-06 | Stop reason: HOSPADM

## 2024-01-05 RX ORDER — ONDANSETRON 4 MG/1
4 TABLET, ORALLY DISINTEGRATING ORAL EVERY 8 HOURS PRN
Status: DISCONTINUED | OUTPATIENT
Start: 2024-01-05 | End: 2024-01-06 | Stop reason: HOSPADM

## 2024-01-05 RX ORDER — CALCIUM CARBONATE 500 MG/1
500 TABLET, CHEWABLE ORAL DAILY PRN
Status: DISCONTINUED | OUTPATIENT
Start: 2024-01-05 | End: 2024-01-06 | Stop reason: HOSPADM

## 2024-01-05 RX ORDER — PROPOFOL 10 MG/ML
INJECTION, EMULSION INTRAVENOUS PRN
Status: DISCONTINUED | OUTPATIENT
Start: 2024-01-05 | End: 2024-01-05

## 2024-01-05 RX ORDER — FENTANYL CITRATE 50 UG/ML
25 INJECTION, SOLUTION INTRAMUSCULAR; INTRAVENOUS EVERY 5 MIN PRN
Status: DISCONTINUED | OUTPATIENT
Start: 2024-01-05 | End: 2024-01-05 | Stop reason: HOSPADM

## 2024-01-05 RX ORDER — NALOXONE HYDROCHLORIDE 0.4 MG/ML
0.2 INJECTION, SOLUTION INTRAMUSCULAR; INTRAVENOUS; SUBCUTANEOUS
Status: DISCONTINUED | OUTPATIENT
Start: 2024-01-05 | End: 2024-01-06 | Stop reason: HOSPADM

## 2024-01-05 RX ORDER — SODIUM CHLORIDE, SODIUM LACTATE, POTASSIUM CHLORIDE, CALCIUM CHLORIDE 600; 310; 30; 20 MG/100ML; MG/100ML; MG/100ML; MG/100ML
INJECTION, SOLUTION INTRAVENOUS CONTINUOUS
Status: DISCONTINUED | OUTPATIENT
Start: 2024-01-05 | End: 2024-01-05 | Stop reason: HOSPADM

## 2024-01-05 RX ORDER — AMLODIPINE BESYLATE 5 MG/1
10 TABLET ORAL DAILY
Status: DISCONTINUED | OUTPATIENT
Start: 2024-01-06 | End: 2024-01-06 | Stop reason: HOSPADM

## 2024-01-05 RX ORDER — ONDANSETRON 4 MG/1
4 TABLET, ORALLY DISINTEGRATING ORAL EVERY 30 MIN PRN
Status: DISCONTINUED | OUTPATIENT
Start: 2024-01-05 | End: 2024-01-05 | Stop reason: HOSPADM

## 2024-01-05 RX ORDER — GABAPENTIN 100 MG/1
100 CAPSULE ORAL AT BEDTIME
Status: DISCONTINUED | OUTPATIENT
Start: 2024-01-05 | End: 2024-01-06 | Stop reason: HOSPADM

## 2024-01-05 RX ORDER — CEFAZOLIN SODIUM/WATER 2 G/20 ML
2 SYRINGE (ML) INTRAVENOUS
Status: COMPLETED | OUTPATIENT
Start: 2024-01-05 | End: 2024-01-05

## 2024-01-05 RX ORDER — LOVASTATIN 20 MG
40 TABLET ORAL AT BEDTIME
Status: DISCONTINUED | OUTPATIENT
Start: 2024-01-05 | End: 2024-01-06 | Stop reason: HOSPADM

## 2024-01-05 RX ORDER — METRONIDAZOLE 500 MG/100ML
500 INJECTION, SOLUTION INTRAVENOUS
Status: COMPLETED | OUTPATIENT
Start: 2024-01-05 | End: 2024-01-05

## 2024-01-05 RX ORDER — NALOXONE HYDROCHLORIDE 0.4 MG/ML
0.4 INJECTION, SOLUTION INTRAMUSCULAR; INTRAVENOUS; SUBCUTANEOUS
Status: DISCONTINUED | OUTPATIENT
Start: 2024-01-05 | End: 2024-01-06 | Stop reason: HOSPADM

## 2024-01-05 RX ORDER — LIDOCAINE 40 MG/G
CREAM TOPICAL
Status: DISCONTINUED | OUTPATIENT
Start: 2024-01-05 | End: 2024-01-05 | Stop reason: HOSPADM

## 2024-01-05 RX ORDER — BUPIVACAINE HYDROCHLORIDE 2.5 MG/ML
INJECTION, SOLUTION INFILTRATION; PERINEURAL PRN
Status: DISCONTINUED | OUTPATIENT
Start: 2024-01-05 | End: 2024-01-05 | Stop reason: HOSPADM

## 2024-01-05 RX ORDER — HYDROMORPHONE HCL IN WATER/PF 6 MG/30 ML
0.2 PATIENT CONTROLLED ANALGESIA SYRINGE INTRAVENOUS
Status: DISCONTINUED | OUTPATIENT
Start: 2024-01-05 | End: 2024-01-06 | Stop reason: HOSPADM

## 2024-01-05 RX ORDER — LANOLIN ALCOHOL/MO/W.PET/CERES
3 CREAM (GRAM) TOPICAL
Status: DISCONTINUED | OUTPATIENT
Start: 2024-01-05 | End: 2024-01-06 | Stop reason: HOSPADM

## 2024-01-05 RX ORDER — ONDANSETRON 4 MG/1
4 TABLET, ORALLY DISINTEGRATING ORAL EVERY 6 HOURS PRN
Status: DISCONTINUED | OUTPATIENT
Start: 2024-01-05 | End: 2024-01-06 | Stop reason: HOSPADM

## 2024-01-05 RX ORDER — HYDROMORPHONE HYDROCHLORIDE 1 MG/ML
0.2 INJECTION, SOLUTION INTRAMUSCULAR; INTRAVENOUS; SUBCUTANEOUS EVERY 5 MIN PRN
Status: DISCONTINUED | OUTPATIENT
Start: 2024-01-05 | End: 2024-01-05 | Stop reason: HOSPADM

## 2024-01-05 RX ORDER — ASPIRIN 81 MG/1
81 TABLET, CHEWABLE ORAL DAILY
Status: DISCONTINUED | OUTPATIENT
Start: 2024-01-06 | End: 2024-01-06 | Stop reason: HOSPADM

## 2024-01-05 RX ORDER — DEXAMETHASONE SODIUM PHOSPHATE 10 MG/ML
INJECTION, SOLUTION INTRAMUSCULAR; INTRAVENOUS PRN
Status: DISCONTINUED | OUTPATIENT
Start: 2024-01-05 | End: 2024-01-05

## 2024-01-05 RX ORDER — ONDANSETRON 2 MG/ML
INJECTION INTRAMUSCULAR; INTRAVENOUS PRN
Status: DISCONTINUED | OUTPATIENT
Start: 2024-01-05 | End: 2024-01-05

## 2024-01-05 RX ORDER — ACETAMINOPHEN 325 MG/1
650 TABLET ORAL EVERY 4 HOURS PRN
Status: DISCONTINUED | OUTPATIENT
Start: 2024-01-08 | End: 2024-01-06 | Stop reason: HOSPADM

## 2024-01-05 RX ORDER — FENTANYL CITRATE 50 UG/ML
INJECTION, SOLUTION INTRAMUSCULAR; INTRAVENOUS PRN
Status: DISCONTINUED | OUTPATIENT
Start: 2024-01-05 | End: 2024-01-05

## 2024-01-05 RX ORDER — ONDANSETRON 2 MG/ML
4 INJECTION INTRAMUSCULAR; INTRAVENOUS EVERY 30 MIN PRN
Status: DISCONTINUED | OUTPATIENT
Start: 2024-01-05 | End: 2024-01-05 | Stop reason: HOSPADM

## 2024-01-05 RX ORDER — LIDOCAINE 40 MG/G
CREAM TOPICAL
Status: DISCONTINUED | OUTPATIENT
Start: 2024-01-05 | End: 2024-01-06 | Stop reason: HOSPADM

## 2024-01-05 RX ADMIN — MEMANTINE 10 MG: 10 TABLET ORAL at 21:28

## 2024-01-05 RX ADMIN — Medication 2 G: at 10:11

## 2024-01-05 RX ADMIN — SODIUM CHLORIDE, POTASSIUM CHLORIDE, SODIUM LACTATE AND CALCIUM CHLORIDE: 600; 310; 30; 20 INJECTION, SOLUTION INTRAVENOUS at 08:25

## 2024-01-05 RX ADMIN — SODIUM CHLORIDE, POTASSIUM CHLORIDE, SODIUM LACTATE AND CALCIUM CHLORIDE: 600; 310; 30; 20 INJECTION, SOLUTION INTRAVENOUS at 14:48

## 2024-01-05 RX ADMIN — PHENYLEPHRINE HYDROCHLORIDE 100 MCG: 10 INJECTION INTRAVENOUS at 10:11

## 2024-01-05 RX ADMIN — PHENYLEPHRINE HYDROCHLORIDE 100 MCG: 10 INJECTION INTRAVENOUS at 12:04

## 2024-01-05 RX ADMIN — GABAPENTIN 100 MG: 100 CAPSULE ORAL at 21:27

## 2024-01-05 RX ADMIN — ACETAMINOPHEN 975 MG: 325 TABLET ORAL at 08:17

## 2024-01-05 RX ADMIN — PHENYLEPHRINE HYDROCHLORIDE 100 MCG: 10 INJECTION INTRAVENOUS at 10:48

## 2024-01-05 RX ADMIN — ONDANSETRON 4 MG: 2 INJECTION INTRAMUSCULAR; INTRAVENOUS at 12:56

## 2024-01-05 RX ADMIN — FENTANYL CITRATE 25 MCG: 50 INJECTION, SOLUTION INTRAMUSCULAR; INTRAVENOUS at 14:01

## 2024-01-05 RX ADMIN — PHENYLEPHRINE HYDROCHLORIDE 100 MCG: 10 INJECTION INTRAVENOUS at 10:27

## 2024-01-05 RX ADMIN — ONDANSETRON 4 MG: 2 INJECTION INTRAMUSCULAR; INTRAVENOUS at 08:15

## 2024-01-05 RX ADMIN — DEXAMETHASONE SODIUM PHOSPHATE 10 MG: 10 INJECTION, SOLUTION INTRAMUSCULAR; INTRAVENOUS at 12:56

## 2024-01-05 RX ADMIN — PHENYLEPHRINE HYDROCHLORIDE 100 MCG: 10 INJECTION INTRAVENOUS at 10:31

## 2024-01-05 RX ADMIN — PHENYLEPHRINE HYDROCHLORIDE 100 MCG: 10 INJECTION INTRAVENOUS at 10:40

## 2024-01-05 RX ADMIN — FENTANYL CITRATE 25 MCG: 50 INJECTION INTRAMUSCULAR; INTRAVENOUS at 09:56

## 2024-01-05 RX ADMIN — SUGAMMADEX 200 MG: 100 INJECTION, SOLUTION INTRAVENOUS at 12:56

## 2024-01-05 RX ADMIN — SODIUM CHLORIDE, POTASSIUM CHLORIDE, SODIUM LACTATE AND CALCIUM CHLORIDE: 600; 310; 30; 20 INJECTION, SOLUTION INTRAVENOUS at 15:31

## 2024-01-05 RX ADMIN — LOVASTATIN 40 MG: 20 TABLET ORAL at 21:28

## 2024-01-05 RX ADMIN — PHENYLEPHRINE HYDROCHLORIDE 0.3 MCG/KG/MIN: 10 INJECTION INTRAVENOUS at 10:48

## 2024-01-05 RX ADMIN — FENTANYL CITRATE 25 MCG: 50 INJECTION INTRAMUSCULAR; INTRAVENOUS at 11:58

## 2024-01-05 RX ADMIN — SODIUM PHOSPHATE, DIBASIC AND SODIUM PHOSPHATE, MONOBASIC 2 ENEMA: 7; 19 ENEMA RECTAL at 08:34

## 2024-01-05 RX ADMIN — PROPOFOL 100 MG: 10 INJECTION, EMULSION INTRAVENOUS at 10:13

## 2024-01-05 RX ADMIN — METRONIDAZOLE 500 MG: 500 INJECTION, SOLUTION INTRAVENOUS at 08:24

## 2024-01-05 RX ADMIN — HEPARIN SODIUM 5000 UNITS: 10000 INJECTION, SOLUTION INTRAVENOUS; SUBCUTANEOUS at 09:00

## 2024-01-05 RX ADMIN — SODIUM CHLORIDE, POTASSIUM CHLORIDE, SODIUM LACTATE AND CALCIUM CHLORIDE: 600; 310; 30; 20 INJECTION, SOLUTION INTRAVENOUS at 10:35

## 2024-01-05 RX ADMIN — ROCURONIUM BROMIDE 10 MG: 50 INJECTION, SOLUTION INTRAVENOUS at 12:01

## 2024-01-05 RX ADMIN — FENTANYL CITRATE 50 MCG: 50 INJECTION INTRAMUSCULAR; INTRAVENOUS at 11:55

## 2024-01-05 ASSESSMENT — ACTIVITIES OF DAILY LIVING (ADL)
TOILETING_MANAGEMENT: BRIEF
HEARING_DIFFICULTY_OR_DEAF: NO
TOILETING: 1-->ASSISTANCE (EQUIPMENT/PERSON) NEEDED
ADLS_ACUITY_SCORE: 25
DIFFICULTY_EATING/SWALLOWING: NO
DRESSING/BATHING_DIFFICULTY: YES
TOILETING_ASSISTANCE: TOILETING DIFFICULTY, ASSISTANCE 1 PERSON
ADLS_ACUITY_SCORE: 25
ADLS_ACUITY_SCORE: 33
CONCENTRATING,_REMEMBERING_OR_MAKING_DECISIONS_DIFFICULTY: YES
TOILETING_ISSUES: YES
ADLS_ACUITY_SCORE: 25
WALKING_OR_CLIMBING_STAIRS_DIFFICULTY: YES
DOING_ERRANDS_INDEPENDENTLY_DIFFICULTY: NO
EQUIPMENT_CURRENTLY_USED_AT_HOME: WALKER, ROLLING
WALKING_OR_CLIMBING_STAIRS: STAIR CLIMBING DIFFICULTY, DEPENDENT
WEAR_GLASSES_OR_BLIND: NO
DRESSING/BATHING: BATHING DIFFICULTY, ASSISTANCE 1 PERSON
ADLS_ACUITY_SCORE: 33
ADLS_ACUITY_SCORE: 35
TOILETING: 1-->ASSISTANCE (EQUIPMENT/PERSON) NEEDED (NOT DEVELOPMENTALLY APPROPRIATE)
ADLS_ACUITY_SCORE: 31
FALL_HISTORY_WITHIN_LAST_SIX_MONTHS: NO
DIFFICULTY_COMMUNICATING: NO
ADLS_ACUITY_SCORE: 29
CHANGE_IN_FUNCTIONAL_STATUS_SINCE_ONSET_OF_CURRENT_ILLNESS/INJURY: NO

## 2024-01-05 NOTE — INTERVAL H&P NOTE
H+P Update     Doing well. No changes in history.      BP 98/67 (BP Location: Right arm)   Pulse 69   Temp 98  F (36.7  C) (Oral)   Resp 16   Wt 62.1 kg (137 lb)   LMP  (LMP Unknown)   SpO2 96%   BMI 28.63 kg/m        NAD, AAO x 3  Abdomen soft, non tender    A/P: 89 year old with Female genital prolapse, unspecified [N81.9]  Rectal prolapse [K62.3] here for surgical management  -- Patient here with her daughter  -- Met with patient. We previously discussed the risks of surgery including bleeding, infection, damage to pelvic organs and need for further surgery.   -- Questions answered  -- Consent signed  -- To OR for Procedure(s):  COLPOCLEISIS  PERINEAL RECTOSIGMOIDECTOMY  PERINEORRAPHY     Olive Biggs MD  (P) [phone]

## 2024-01-05 NOTE — OP NOTE
Operative note    Preoperative diagnosis:  Female genital prolapse  Rectal prolapse    Postoperative diagnosis:   Female genital prolapse  Rectal prolapse    Procedure:  [place] partial colpocleisis  Perineorrhaphy    Surgeon:  Olive Gruber    Assist:  Dalia Blum     Estimated blood loss: 50 ml    Drains: Graves catheter    Specimens: None    Findings:  Stage III cystocele  Uterine prolapse   Wide genital hiatus      Statement of medical necessity:  89-year-old female with significant pelvic organ prolapse.  She had previously been managed with a pessary however the pessary continued to fall out so she elected for surgical management.    Description operation:  The patient was taken back to the OR where satisfactory general endotracheal anesthesia was established.  She was placed in dorsolithotomy and prepped and draped in the normal sterile fashion.  A timeout was performed.    A Graves catheter was placed in the bladder.  The cervix was grasped anterior and posteriorly with Allis clamps.  The vaginal mucosa was then injected with lidocaine with epinephrine.  Marking pen was then used to alfredo rectangular areas on the vaginal epithelium both anteriorly and posteriorly.  A scalpel was then used to sharply dissect the vaginal epithelium from the anterior and posterior vaginal walls.  The anterior and posterior vaginal walls were then sutured together with interrupted sutures of 0 Vicryl suture.  The corners were identified and tagged.  Next the cut edges of the anterior and posterior vaginal walls were sutured together with interrupted sutures of 0 Vicryl.  The superior and inferior margin of the rectangles were then sewn together with interrupted sutures of 0 Vicryl.  The perineal skin was closed with interrupted sutures of 0 Vicryl.    The procedure was complete at this point.  There is no surgical or anesthetic complications.  Counts were correct at the end of the case x 2.  Dr. Haines then assumed  care of the patient.  Please see her dictation for further case details.    Olive Biggs MD, FACOG  P) [phone]

## 2024-01-05 NOTE — OP NOTE
DATE OF SERVICE: 1/5/2024      PREOPERATIVE DIAGNOSIS:  Full thickness rectal prolapse.     POSTOPERATIVE DIAGNOSIS:  Full thickness rectal prolapse.     PROCEDURE:  1. Perineal rectosigmoidectomy 2. Partial colpocleisis and perineorrhaphy by Dr. Biggs (Separate dictation)     SURGEON:  Shannon Haines MD     ASSISTANT:  Maco Augustine MD, [facility] colorectal fellow  Elvis Martin MD, [facility] colorectal fellow    ANESTHESIA:  General.     ESTIMATED BLOOD LOSS:  10mL     SPECIMENS:  Rectum     COMPLICATIONS:  None.     FINDINGS:  Full-thickness rectal prolapse.     INDICATIONS FOR PROCEDURE:  The patient is 89 year old female  who  presented with full-thickness rectal prolapse as well as vaginal prolapse.  After further workup and evaluation, she was deemed to be an adequate surgical candidate. Surgical options were  discussed with a combination surgery with gynecology as well.  Informed consent was obtained.     DESCRIPTION OF PROCEDURE:  The patient was taken to the operating room and placed  under general endotracheal anesthesia.  A Graves catheter was inserted by the  operating room nurse and then she was placed in the prone jackknife position on the  operating room table.  Her buttocks were taped apart and all pressure points were  padded.  The perineal area was prepped and draped in the usual sterile fashion.  We  performed a timeout per protocol.      We injected 20ml of 0.25% Marcaine with epinephrine as a perineal block. We then proceeded with digital rectal examination. We then used Allis clamps to get the rectal prolapse to come out.  About 7 cm of extra rectal prolapse came out.  We marked with needle tip  cautery 1 cm from the dentate line proximally. Using the needle tip  cautery, I dissected down through the tissues in a full-thickness fashion. The [place] retractor was placed to aid with visualization.  We were able to come completely through the outer layer and find the peritoneal  coat.  We continued to dissect first with needle tip cautery and then with the LigaSure Device to completely transect the outer layer of rectum.  We entered the peritoneal cavity and reached inside to see if there was any  redundant loop of sigmoid colon that would also come out.  We identified a nice transection point without undue tension in the proximal rectum but also not leaving too much redundancy.  We transected shelter through with the cautery and placed cardinal marking stitches in the  posterior, left lateral and right lateral positions with 2-0 Vicryl  Suture. The rest of the rectum was transected and an anterior marking stitch was placed.     These stay sutures were tied down. We used 2-0 Vicryl sutures to close any remaining gaps between the cardinal stitches.  All the mucosa appeared  pink and healthy.  We released the cardinal marking stitches and the lonestar retractor and the anastomosis went back into the anus.  I performed another digital rectal exam confirming the  anastomosis was patent and there was no undue tension on the anastomosis. Fibrillar and fluffs were applied.  All sponge, needle and instrument counts were  correct x2 at the conclusion of the case.  The patient tolerated the procedure well  and was transferred to the recovery room in stable condition.     Shannon Haines MD, MS, FACS, FASCRS  [facility]  Office: [phone]

## 2024-01-05 NOTE — ANESTHESIA POSTPROCEDURE EVALUATION
Patient: Angle Agudelo    Procedure: Procedure(s):  COLPOCLEISIS  PERINEAL RECTOSIGMOIDECTOMY  PERINEORRAPHY       Anesthesia Type:  General    Note:  Disposition: Admission   Postop Pain Control: Uneventful            Sign Out: Well controlled pain   PONV: No   Neuro/Psych: Uneventful            Sign Out: Acceptable/Baseline neuro status   Airway/Respiratory: Uneventful            Sign Out: Acceptable/Baseline resp. status   CV/Hemodynamics: Uneventful            Sign Out: Acceptable CV status; No obvious hypovolemia; No obvious fluid overload   Other NRE: NONE   DID A NON-ROUTINE EVENT OCCUR? No           Last vitals:  Vitals Value Taken Time   /61 01/05/24 1415   Temp 36.7  C (98.1  F) 01/05/24 1314   Pulse 59 01/05/24 1423   Resp 12 01/05/24 1423   SpO2 98 % 01/05/24 1423   Vitals shown include unfiled device data.    Electronically Signed By: Kole Morocho MD  January 5, 2024  2:25 PM

## 2024-01-05 NOTE — CONSULTS
"[facility]  Consult Note - Hospitalist Service  Date of Admission:  1/5/2024  Consult Requested by: Shannon Haines MD  Reason for Consult: medical management    Assessment & Plan   Angle Agudelo is a 89 year old female who underwent LeFort partial colpocleisis and perineorrhaphy on 1/5/24 due to 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 She has a medical history of dementia, HTN, HLD and history of CVA in 2020. Hospital medicine was consulted for medical management of chronic conditions in the everardo-operative period.     Prolapse s/p colpocleisis and perineorrhaphy  -patient without any acute complaints  -pain regimen and diet per surgery  -will discontinue IVF as patient on a liquid diet, encourage PO intake   -everardo-operative activity per surgery     Dementia  -will continue home namenda  -delirium precautions added  -can consider PRN low dose Seroquel for agitation, will obtain EKG to double check QTc    HTN  HLD  -continue home amlodipine and statin     Hx CVA  -PTA aspirin, statin          Clinically Significant Risk Factors Present on Admission                # Drug Induced Platelet Defect: home medication list includes an antiplatelet medication   # Hypertension: Noted on problem list   # Dementia: noted on problem list    # Overweight: Estimated body mass index is 29.49 kg/m  as calculated from the following:    Height as of this encounter: 1.473 m (4' 10\").    Weight as of this encounter: 64 kg (141 lb 1.5 oz).              Sanchez Acevedo DO  Hospitalist Service  Securely message with [employer] (more info)  Text page via [employer] Paging/Directory   ______________________________________________________________________    Chief Complaint   Vaginal and rectal prolapse    History is obtained from the patient    History of Present Illness   Angle Agudelo is a 89 year old female who underwent LeFort partial colpocleisis and perineorrhaphy on 1/5/24 due to female genital prolapse and " rectal prolapse. She has a medical history of dementia, HTN, HLD and history of CVA in 2020. Hospital medicine was consulted for medical management of chronic conditions in the everardo-operative period.     Upon encounter, patient is pain free and states she feels great. Family is present at bedside. Patient states she doesn't remember the surgery which is baseline per family. Denies any acute complaints. Has not had a bowel movement, fox in place without discomfort. Patient and family denies significant cardiac history, diabetes, pulmonary history.       Past Medical History    Past Medical History:   Diagnosis Date    Acute cerebrovascular accident (CVA) (H) 06/08/2020    Benign neoplasm of colon     Chronic bilateral low back pain without sciatica     Chronic bilateral low back pain without sciatica 08/12/2016    Dementia (H)     Disorder of bone and cartilage, unspecified     Dyspnea on exertion     Female genital prolapse     GI bleed     Memory impairment     Orthopnea     Pure hypercholesterolemia     Rectal prolapse     Skin cancer     Not Melanoma, not sure what kind though    Spinal stenosis of lumbar region     Swelling of both lower extremities     Thickened endometrium     Unspecified arthropathy, shoulder region     Unspecified essential hypertension     Walking troubles        Past Surgical History   Past Surgical History:   Procedure Laterality Date    APPENDECTOMY      BIOPSY CERVICAL, LOCAL EXCISION, SINGLE/MULTIPLE N/A 09/11/2018    Procedure: LOOP ELECTROSURGICAL EXCISION PROCEDURE, REMOVAL PESSARY;  Surgeon: Olive Biggs MD;  Location: [facility] OR;  Service:     BIOPSY VULVA N/A 05/14/2020    Procedure: VULVAR BIOPSY;  Surgeon: Olive Biggs MD;  Location: [facility] OR;  Service: Gynecology    CATARACT EXTRACTION EXTRACAPSULAR W/ INTRAOCULAR LENS IMPLANTATION Bilateral     CERVICAL BIOPSY N/A 05/14/2020    Procedure: PUNCH BIOPSY, CERVIX;  Surgeon: Olive Biggs  MD Conrad;  Location: [facility];  Service: Gynecology    CERVICAL POLYPECTOMY N/A 10/12/2023    Procedure: AND POLYPECTOMY WITH ULTRASOUND GUIDANCE;  Surgeon: Olive Biggs MD;  Location: [facility] OR    CHOLECYSTECTOMY      COLONOSCOPY N/A 01/08/2020    Procedure: COLONOSCOPY;  Surgeon: Amanda Carson MD;  Location: [facility] GI    DILATION AND CURETTAGE, HYSTEROSCOPY WITH ULTRASOUND GUIDANCE N/A 10/12/2023    Procedure: HYSTEROSCOPY DILATION AND CURETTAGE;  Surgeon: Olive Biggs MD;  Location: [facility]    DILATION AND CURETTAGE, OPERATIVE HYSTEROSCOPY, COMBINED N/A 03/31/2022    Procedure: HYSTEROSCOPY;  Surgeon: Olive Biggs MD;  Location: [facility]    HC DILATION/CURETTAGE DIAG/THER NON OB N/A 05/14/2020    Procedure: DILATION AND CURETTAGE, UTERUS;  Surgeon: Olive Biggs MD;  Location: [facility];  Service: Gynecology    IR EXTREMITY ANGIOGRAM RIGHT  02/13/2018    ORTHOPEDIC SURGERY      OTHER SURGICAL HISTORY      [id number]    OTHER SURGICAL HISTORY      finger abscess    MA ESOPHAGOGASTRODUODENOSCOPY TRANSORAL DIAGNOSTIC N/A 06/16/2021    Procedure: ESOPHAGOGASTRODUODENOSCOPY (EGD);  Surgeon: Primitivo Lafleur MD;  Location: [facility];  Service: Gastroenterology    SHOULDER SURGERY      bilateral shoulder    SIGMOIDOSCOPY FLEXIBLE N/A 06/03/2018    Procedure: SIGMOIDOSCOPY;  Surgeon: Dayana Joshi MD;  Location: [facility];  Service:     TONSILLECTOMY & ADENOIDECTOMY      TOTAL SHOULDER ARTHROPLASTY Bilateral R 7/12 L 9/11     BIOPSY THYROID FINE NEEDLE ASPIRATION  08/07/2020       Medications   I have reviewed this patient's current medications       Social History   I have reviewed this patient's social history and updated it with pertinent information if needed.  Social History     Tobacco Use    Smoking status: Never    Smokeless tobacco: Never   Vaping Use    Vaping Use: Never used   Substance Use Topics     Alcohol use: Yes     Alcohol/week: 2.0 standard drinks of alcohol     Comment: social    Drug use: No         Family History   I have reviewed this patient's family history and updated it with pertinent information if needed.  Family History   Problem Relation Age of Onset    Heart Disease Father     Coronary Artery Disease Father     Cerebrovascular Disease Father     Coronary Artery Disease Brother     Coronary Artery Disease Brother     Dementia Sister          Allergies   Allergies   Allergen Reactions    Atorvastatin Muscle Pain (Myalgia)    Dextromethorphan Hives    Oxycodone Unknown    Pravastatin Muscle Pain (Myalgia)    Simvastatin Muscle Pain (Myalgia)    Codeine Rash        Physical Exam   Vital Signs: Temp: 97.6  F (36.4  C) Temp src: Oral BP: 132/79 Pulse: 72   Resp: 14 SpO2: 94 % O2 Device: Nasal cannula Oxygen Delivery: 2 LPM  Weight: 141 lbs 1.51 oz    General Appearance: Awake, cooperative, non-toxic  Respiratory: CTAB with good effort  Cardiovascular: RRR  GI: No pain  Skin: No rashes  Other: Alert to self    Medical Decision Making       70 MINUTES SPENT BY ME on the date of service doing chart review, history, exam, documentation & further activities per the note.      Data     I have personally reviewed the following data over the past 24 hrs:    N/A  \   12.0   / N/A     N/A N/A N/A /  96   N/A N/A 0.72 \       Imaging results reviewed over the past 24 hrs:   No results found for this or any previous visit (from the past 24 hour(s)).

## 2024-01-05 NOTE — BRIEF OP NOTE
[facility]    Brief Operative Note    Pre-operative diagnosis: Female genital prolapse, unspecified [N81.9]  Rectal prolapse [K62.3]  Post-operative diagnosis Same as pre-operative diagnosis    Procedure: COLPOCLEISIS, N/A - Vagina  PERINEAL RECTOSIGMOIDECTOMY, N/A - Perineum  PERINEORRAPHY, N/A - Vagina    Surgeon: Surgeon(s) and Role:  Panel 1:     * Olive Biggs MD - Primary     * Curt Gruber MD - Assisting  Panel 2:     * Shannon Haines MD - Primary     * Elvis Martin MD - Assisting     * Maco Augustine MD - Fellow - Assisting  Anesthesia: Choice   Estimated Blood Loss: 20cc    Drains: None  Specimens:   ID Type Source Tests Collected by Time Destination   1 : RECTUM Tissue Rectum SURGICAL PATHOLOGY EXAM Olive Biggs MD 1/5/2024 12:45 PM      Findings:   About 5cm of full thickness rectal prolapse. Moderate amount of fatty tissue in the rectovaginal septum.  Complications: None.  Implants: * No implants in log *        Elvis Martin MD, MS  Fellow, Colon & Rectal Surgery  [facility]  01/05/2024  1:11 PM

## 2024-01-05 NOTE — ANESTHESIA PREPROCEDURE EVALUATION
Anesthesia Pre-Procedure Evaluation    Patient: Angle Agudelo   MRN: [id number] : 1932        Procedure : Procedure(s):  COLPOCLEISIS  PERINEAL RECTOSIGMOIDECTOMY          Past Medical History:   Diagnosis Date    Acute cerebrovascular accident (CVA) (H) 2020    Benign neoplasm of colon     Chronic bilateral low back pain without sciatica     Chronic bilateral low back pain without sciatica 2016    Dementia (H)     Disorder of bone and cartilage, unspecified     Dyspnea on exertion     Female genital prolapse     GI bleed     Memory impairment     Orthopnea     Pure hypercholesterolemia     Rectal prolapse     Skin cancer     Not Melanoma, not sure what kind though    Spinal stenosis of lumbar region     Swelling of both lower extremities     Thickened endometrium     Unspecified arthropathy, shoulder region     Unspecified essential hypertension     Walking troubles       Past Surgical History:   Procedure Laterality Date    APPENDECTOMY      BIOPSY CERVICAL, LOCAL EXCISION, SINGLE/MULTIPLE N/A 2018    Procedure: LOOP ELECTROSURGICAL EXCISION PROCEDURE, REMOVAL PESSARY;  Surgeon: Olive Biggs MD;  Location: [facility];  Service:     BIOPSY VULVA N/A 2020    Procedure: VULVAR BIOPSY;  Surgeon: Olive Biggs MD;  Location: [facility] OR;  Service: Gynecology    CATARACT EXTRACTION EXTRACAPSULAR W/ INTRAOCULAR LENS IMPLANTATION Bilateral     CERVICAL BIOPSY N/A 2020    Procedure: PUNCH BIOPSY, CERVIX;  Surgeon: Olive Biggs MD;  Location: [facility] OR;  Service: Gynecology    CERVICAL POLYPECTOMY N/A 10/12/2023    Procedure: AND POLYPECTOMY WITH ULTRASOUND GUIDANCE;  Surgeon: Olive Biggs MD;  Location: [facility] OR    CHOLECYSTECTOMY      COLONOSCOPY N/A 2020    Procedure: COLONOSCOPY;  Surgeon: Amanda Carson MD;  [facility]    DILATION AND CURETTAGE, HYSTEROSCOPY WITH ULTRASOUND GUIDANCE N/A  10/12/2023    Procedure: HYSTEROSCOPY DILATION AND CURETTAGE;  Surgeon: Olive Biggs MD;  Location: [facility]    DILATION AND CURETTAGE, OPERATIVE HYSTEROSCOPY, COMBINED N/A 03/31/2022    Procedure: HYSTEROSCOPY;  Surgeon: Olive Biggs MD;  [facility]     DILATION/CURETTAGE DIAG/THER NON OB N/A 05/14/2020    Procedure: DILATION AND CURETTAGE, UTERUS;  Surgeon: Olive Biggs MD;  Location: [facility] OR;  Service: Gynecology    IR EXTREMITY ANGIOGRAM RIGHT  02/13/2018    ORTHOPEDIC SURGERY      OTHER SURGICAL HISTORY      [id number]    OTHER SURGICAL HISTORY      finger abscess    SC ESOPHAGOGASTRODUODENOSCOPY TRANSORAL DIAGNOSTIC N/A 06/16/2021    Procedure: ESOPHAGOGASTRODUODENOSCOPY (EGD);  Surgeon: Primitivo Lafleur MD;  Location: [facility] OR;  Service: Gastroenterology    SHOULDER SURGERY      bilateral shoulder    SIGMOIDOSCOPY FLEXIBLE N/A 06/03/2018    Procedure: SIGMOIDOSCOPY;  Surgeon: Dayana Joshi MD;  Location: [facility] GI;  Service:     TONSILLECTOMY & ADENOIDECTOMY      TOTAL SHOULDER ARTHROPLASTY Bilateral R 7/12 L 9/11    US BIOPSY THYROID FINE NEEDLE ASPIRATION  08/07/2020      Allergies   Allergen Reactions    Atorvastatin Muscle Pain (Myalgia)    Dextromethorphan Hives    Oxycodone Unknown    Pravastatin Muscle Pain (Myalgia)    Simvastatin Muscle Pain (Myalgia)    Codeine Rash      Social History     Tobacco Use    Smoking status: Never    Smokeless tobacco: Never   Substance Use Topics    Alcohol use: Yes     Alcohol/week: 2.0 standard drinks of alcohol     Comment: social      Wt Readings from Last 1 Encounters:   01/05/24 62.1 kg (137 lb)        Anesthesia Evaluation   Pt has had prior anesthetic.     No history of anesthetic complications       ROS/MED HX  ENT/Pulmonary:  - neg pulmonary ROS     Neurologic:     (+)   dementia,       CVA,  without deficits,                    Cardiovascular:     (+) Dyslipidemia  "hypertension- -   -  - -           DEVINE.                           METS/Exercise Tolerance:     Hematologic:  - neg hematologic  ROS     Musculoskeletal:       GI/Hepatic:  - neg GI/hepatic ROS     Renal/Genitourinary:  - neg Renal ROS     Endo:  - neg endo ROS     Psychiatric/Substance Use:  - neg psychiatric ROS     Infectious Disease:  - neg infectious disease ROS     Malignancy:  - neg malignancy ROS     Other:  - neg other ROS          Physical Exam    Airway  airway exam normal      Mallampati: II   TM distance: > 3 FB   Neck ROM: full   Mouth opening: > 3 cm    Respiratory Devices and Support         Dental  no notable dental history     (+) Minor Abnormalities - some fillings, tiny chips      Cardiovascular   cardiovascular exam normal       Rhythm and rate: regular and normal   (+) murmur       Pulmonary   pulmonary exam normal        breath sounds clear to auscultation           OUTSIDE LABS:  CBC:   Lab Results   Component Value Date    WBC 6.9 10/06/2023    WBC 9.4 09/21/2022    HGB 12.0 01/05/2024    HGB 11.8 10/12/2023    HCT 40.3 10/06/2023    HCT 43.4 09/21/2022     10/06/2023     09/21/2022     BMP:   Lab Results   Component Value Date     10/06/2023     09/21/2022    POTASSIUM 4.3 10/06/2023    POTASSIUM 4.4 09/21/2022    CHLORIDE 106 10/06/2023    CHLORIDE 105 09/21/2022    CO2 26 10/06/2023    CO2 27 09/21/2022    BUN 15.3 10/06/2023    BUN 14.3 09/21/2022    CR 0.81 10/06/2023    CR 0.78 09/21/2022    GLC 96 01/05/2024     (H) 10/06/2023     COAGS:   Lab Results   Component Value Date    PTT 28 01/09/2021    INR 1.09 01/10/2021     POC: No results found for: \"BGM\", \"HCG\", \"HCGS\"  HEPATIC:   Lab Results   Component Value Date    ALBUMIN 4.3 10/06/2023    PROTTOTAL 7.1 10/06/2023    ALT 8 10/06/2023    AST 22 10/06/2023    ALKPHOS 101 10/06/2023    BILITOTAL 0.3 10/06/2023     OTHER:   Lab Results   Component Value Date    PH 7.36 02/13/2019    LACT 1.4 06/14/2021 " "   A1C 5.6 04/14/2014    BUBBA 9.5 10/06/2023    MAG 2.8 (H) 06/15/2021    LIPASE 19 06/14/2021    TSH 0.51 06/14/2021       Anesthesia Plan    ASA Status:  3    NPO Status:  NPO Appropriate    Anesthesia Type: General.     - Airway: ETT   Induction: Intravenous, Propofol.   Maintenance: Balanced.   Techniques and Equipment:     - Airway: Video-Laryngoscope       Consents    Anesthesia Plan(s) and associated risks, benefits, and realistic alternatives discussed. Questions answered and patient/representative(s) expressed understanding.     - Discussed:     - Discussed with:  Patient, Healthcare Power of  (Discussed with daughter, whom signs consent)            Postoperative Care    Pain management: IV analgesics, Oral pain medications, Multi-modal analgesia.   PONV prophylaxis: Ondansetron (or other 5HT-3), Dexamethasone or Solumedrol, Droperidol or Haldol     Comments:               Kole Morocho MD    I have reviewed the pertinent notes and labs in the chart from the past 30 days and (re)examined the patient.  Any updates or changes from those notes are reflected in this note.             # Drug Induced Platelet Defect: home medication list includes an antiplatelet medication  # Overweight: Estimated body mass index is 28.63 kg/m  as calculated from the following:    Height as of 12/19/23: 1.473 m (4' 10\").    Weight as of this encounter: 62.1 kg (137 lb).      "

## 2024-01-05 NOTE — PHARMACY-ADMISSION MEDICATION HISTORY
Pharmacist Admission Medication History    Admission medication history is complete. The information provided in this note is only as accurate as the sources available at the time of the update.    Information Source(s): Family member, Clinic records, and Facility ([facility] medication list/MAR via in-person    Pertinent Information: Used AVS provided by family, only medication instructed to take this morning was amlodipine. Tried to call [facility] x 3 and have not received MAR from facility.     Allergies reviewed with patient and updates made in EHR: yes    Medication History Completed By: SHARONDA STOUT RPH 1/5/2024 9:31 AM    PTA Med List   Medication Sig Last Dose    acetaminophen (TYLENOL) 325 MG tablet Take 3 tablets (975 mg) by mouth every 6 hours as needed for mild pain Past Month at prn    amLODIPine (NORVASC) 10 MG tablet TAKE 1 TABLET DAILY 1/5/2024 at am    ASPIRIN LOW DOSE 81 MG chewable tablet CHEW & SWALLOW 1 TABLET ORALLY DAILY 12/30/2022    ibuprofen (ADVIL/MOTRIN) 600 MG tablet Take 1 tablet (600 mg) by mouth every 6 hours as needed for moderate pain Unknown at prn    lovastatin (MEVACOR) 40 MG tablet TAKE 1 TABLET AT BEDTIME 1/4/2024 at hs    memantine (NAMENDA) 10 MG tablet TAKE 1 TABLET TWICE A DAY 1/4/2024 at pm    ondansetron (ZOFRAN ODT) 4 MG ODT tab Take 1 tablet (4 mg) by mouth every 8 hours as needed for nausea Unknown at prn

## 2024-01-05 NOTE — BRIEF OP NOTE
[facility]    Brief Operative Note    Pre-operative diagnosis:   Female genital prolapse, unspecified [N81.9]  Rectal prolapse [K62.3]    Post-operative diagnosis   Female genital prolapse, unspecified [N81.9]  Rectal prolapse [K62.3]    Procedure: COLPOCLEISIS, N/A - Vagina  PERINEAL RECTOSIGMOIDECTOMY, N/A - Perineum  PERINEORRAPHY, N/A - Vagina    Surgeon: Surgeon(s) and Role:  Panel 1:     * Olive Biggs MD - Primary     * Curt Gruber MD - Assisting  Panel 2:     * Shannon Haines MD - Primary     * Elvis Martin MD - Assisting     * Maco Augustine MD - Fellow - Assisting    Anesthesia: Choice     Estimated Blood Loss: 50 cc    Drains:  None    Specimens: * No specimens in log *    Findings:    Large cystocele  Uterine prolapse   Wide genital hiatus     Complications: None.    Implants: * No implants in log *    Olive Biggs MD, FACOG  P) [phone]

## 2024-01-05 NOTE — INTERVAL H&P NOTE
"I have reviewed the surgical (or preoperative) H&P that is linked to this encounter, and examined the patient. There are no significant changes    Clinical Conditions Present on Arrival:  Clinically Significant Risk Factors Present on Admission                 # Drug Induced Platelet Defect: home medication list includes an antiplatelet medication  # Overweight: Estimated body mass index is 28.63 kg/m  as calculated from the following:    Height as of 12/19/23: 1.473 m (4' 10\").    Weight as of this encounter: 62.1 kg (137 lb).       "

## 2024-01-05 NOTE — ANESTHESIA PROCEDURE NOTES
Airway       Patient location during procedure: OR       Procedure Start/Stop Times: 1/5/2024 10:13 AM and 1/5/2024 10:15 AM  Staff -        CRNA: Iris Dubose APRN CRNA       Performed By: CRNA  Consent for Airway        Urgency: elective  Indications and Patient Condition       Indications for airway management: everardo-procedural and airway protection       Induction type:intravenous       Mask difficulty assessment: 1 - vent by mask    Final Airway Details       Final airway type: endotracheal airway       Successful airway: ETT - single  Endotracheal Airway Details        ETT size (mm): 7.0       Cuffed: yes       Successful intubation technique: video laryngoscopy       VL Blade Size: MAC 3       Grade View of Cords: 2       Adjucts: stylet       Position: Center       Measured from: lips       Secured at (cm): 20       Bite block used: None    Post intubation assessment        Placement verified by: capnometry and equal breath sounds        Number of attempts at approach: 1       Number of other approaches attempted: 0       Secured with: commercial tube brumfield and tape       Ease of procedure: easy       Dentition: Intact and Unchanged    Medication(s) Administered   Medication Administration Time: 1/5/2024 10:13 AM

## 2024-01-05 NOTE — ANESTHESIA CARE TRANSFER NOTE
Patient: Angle Agudelo    Procedure: Procedure(s):  COLPOCLEISIS  PERINEAL RECTOSIGMOIDECTOMY  PERINEORRAPHY       Diagnosis: Female genital prolapse, unspecified [N81.9]  Rectal prolapse [K62.3]  Diagnosis Additional Information: No value filed.    Anesthesia Type:   General     Note:    Oropharynx: oropharynx clear of all foreign objects and spontaneously breathing  Level of Consciousness: awake and drowsy  Oxygen Supplementation: face mask  Level of Supplemental Oxygen (L/min / FiO2): 8  Independent Airway: airway patency satisfactory and stable  Dentition: dentition unchanged  Vital Signs Stable: post-procedure vital signs reviewed and stable  Report to RN Given: handoff report given  Patient transferred to: PACU    Handoff Report: Identifed the Patient, Identified the Reponsible Provider, Reviewed the pertinent medical history, Discussed the surgical course, Reviewed Intra-OP anesthesia mangement and issues during anesthesia, Set expectations for post-procedure period and Allowed opportunity for questions and acknowledgement of understanding      Vitals:  Vitals Value Taken Time   /66 01/05/24 1315   Temp 36.7  C (98.1  F) 01/05/24 1314   Pulse 80 01/05/24 1315   Resp 16 01/05/24 1315   SpO2 97 % 01/05/24 1315   Vitals shown include unfiled device data.    Electronically Signed By: SOFIE Rose CRNA  January 5, 2024  1:17 PM

## 2024-01-06 ENCOUNTER — APPOINTMENT (OUTPATIENT)
Dept: OCCUPATIONAL THERAPY | Facility: HOSPITAL | Age: 89
DRG: 349 | End: 2024-01-06
Payer: MEDICARE

## 2024-01-06 ENCOUNTER — APPOINTMENT (OUTPATIENT)
Dept: PHYSICAL THERAPY | Facility: HOSPITAL | Age: 89
DRG: 349 | End: 2024-01-06
Payer: MEDICARE

## 2024-01-06 VITALS
HEART RATE: 71 BPM | TEMPERATURE: 98.5 F | SYSTOLIC BLOOD PRESSURE: 114 MMHG | OXYGEN SATURATION: 96 % | RESPIRATION RATE: 16 BRPM | BODY MASS INDEX: 29.62 KG/M2 | HEIGHT: 58 IN | DIASTOLIC BLOOD PRESSURE: 58 MMHG | WEIGHT: 141.09 LBS

## 2024-01-06 LAB
ANION GAP SERPL CALCULATED.3IONS-SCNC: 6 MMOL/L (ref 7–15)
ATRIAL RATE - MUSE: 77 BPM
BUN SERPL-MCNC: 7.6 MG/DL (ref 8–23)
CALCIUM SERPL-MCNC: 9.1 MG/DL (ref 8.2–9.6)
CHLORIDE SERPL-SCNC: 106 MMOL/L (ref 98–107)
CREAT SERPL-MCNC: 0.62 MG/DL (ref 0.51–0.95)
DEPRECATED HCO3 PLAS-SCNC: 29 MMOL/L (ref 22–29)
DIASTOLIC BLOOD PRESSURE - MUSE: NORMAL MMHG
EGFRCR SERPLBLD CKD-EPI 2021: 83 ML/MIN/1.73M2
ERYTHROCYTE [DISTWIDTH] IN BLOOD BY AUTOMATED COUNT: 15.9 % (ref 10–15)
GLUCOSE SERPL-MCNC: 153 MG/DL (ref 70–99)
HCT VFR BLD AUTO: 37.4 % (ref 35–47)
HGB BLD-MCNC: 11.1 G/DL (ref 11.7–15.7)
INTERPRETATION ECG - MUSE: NORMAL
MAGNESIUM SERPL-MCNC: 2.1 MG/DL (ref 1.7–2.3)
MCH RBC QN AUTO: 26.7 PG (ref 26.5–33)
MCHC RBC AUTO-ENTMCNC: 29.7 G/DL (ref 31.5–36.5)
MCV RBC AUTO: 90 FL (ref 78–100)
P AXIS - MUSE: 36 DEGREES
PLATELET # BLD AUTO: 207 10E3/UL (ref 150–450)
POTASSIUM SERPL-SCNC: 4.1 MMOL/L (ref 3.4–5.3)
PR INTERVAL - MUSE: 162 MS
QRS DURATION - MUSE: 66 MS
QT - MUSE: 396 MS
QTC - MUSE: 448 MS
R AXIS - MUSE: -23 DEGREES
RBC # BLD AUTO: 4.15 10E6/UL (ref 3.8–5.2)
SODIUM SERPL-SCNC: 141 MMOL/L (ref 135–145)
SYSTOLIC BLOOD PRESSURE - MUSE: NORMAL MMHG
T AXIS - MUSE: 8 DEGREES
VENTRICULAR RATE- MUSE: 77 BPM
WBC # BLD AUTO: 19.3 10E3/UL (ref 4–11)

## 2024-01-06 PROCEDURE — 36415 COLL VENOUS BLD VENIPUNCTURE: CPT

## 2024-01-06 PROCEDURE — 97161 PT EVAL LOW COMPLEX 20 MIN: CPT | Mod: GP

## 2024-01-06 PROCEDURE — 97535 SELF CARE MNGMENT TRAINING: CPT | Mod: GO

## 2024-01-06 PROCEDURE — 83735 ASSAY OF MAGNESIUM: CPT | Performed by: STUDENT IN AN ORGANIZED HEALTH CARE EDUCATION/TRAINING PROGRAM

## 2024-01-06 PROCEDURE — 80048 BASIC METABOLIC PNL TOTAL CA: CPT

## 2024-01-06 PROCEDURE — 97116 GAIT TRAINING THERAPY: CPT | Mod: GP

## 2024-01-06 PROCEDURE — 250N000013 HC RX MED GY IP 250 OP 250 PS 637: Performed by: STUDENT IN AN ORGANIZED HEALTH CARE EDUCATION/TRAINING PROGRAM

## 2024-01-06 PROCEDURE — 250N000013 HC RX MED GY IP 250 OP 250 PS 637

## 2024-01-06 PROCEDURE — 85027 COMPLETE CBC AUTOMATED: CPT

## 2024-01-06 PROCEDURE — 250N000011 HC RX IP 250 OP 636

## 2024-01-06 PROCEDURE — 99232 SBSQ HOSP IP/OBS MODERATE 35: CPT | Performed by: STUDENT IN AN ORGANIZED HEALTH CARE EDUCATION/TRAINING PROGRAM

## 2024-01-06 PROCEDURE — 97166 OT EVAL MOD COMPLEX 45 MIN: CPT | Mod: GO

## 2024-01-06 RX ORDER — POLYETHYLENE GLYCOL 3350 17 G/17G
17 POWDER, FOR SOLUTION ORAL DAILY PRN
Qty: 510 G | Refills: 0 | Status: SHIPPED | OUTPATIENT
Start: 2024-01-06

## 2024-01-06 RX ORDER — TRAMADOL HYDROCHLORIDE 50 MG/1
25 TABLET ORAL EVERY 6 HOURS PRN
Qty: 8 TABLET | Refills: 0 | Status: SHIPPED | OUTPATIENT
Start: 2024-01-06 | End: 2024-09-11

## 2024-01-06 RX ADMIN — MEMANTINE 10 MG: 10 TABLET ORAL at 08:38

## 2024-01-06 RX ADMIN — ASPIRIN 81 MG CHEWABLE TABLET 81 MG: 81 TABLET CHEWABLE at 08:38

## 2024-01-06 RX ADMIN — POLYETHYLENE GLYCOL 3350 17 G: 17 POWDER, FOR SOLUTION ORAL at 08:38

## 2024-01-06 RX ADMIN — AMLODIPINE BESYLATE 10 MG: 5 TABLET ORAL at 08:38

## 2024-01-06 RX ADMIN — ENOXAPARIN SODIUM 40 MG: 40 INJECTION SUBCUTANEOUS at 10:00

## 2024-01-06 RX ADMIN — ACETAMINOPHEN 975 MG: 325 TABLET ORAL at 08:38

## 2024-01-06 ASSESSMENT — ACTIVITIES OF DAILY LIVING (ADL)
ADLS_ACUITY_SCORE: 35
ADLS_ACUITY_SCORE: 35
ADLS_ACUITY_SCORE: 39
ADLS_ACUITY_SCORE: 39
ADLS_ACUITY_SCORE: 35
ADLS_ACUITY_SCORE: 35
DEPENDENT_IADLS:: CLEANING;COOKING;LAUNDRY;SHOPPING;MEAL PREPARATION;MEDICATION MANAGEMENT;MONEY MANAGEMENT;TRANSPORTATION
ADLS_ACUITY_SCORE: 35

## 2024-01-06 NOTE — PROGRESS NOTES
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 She has a medical history of dementia, HTN, HLD and history of CVA in 2020. Hospital medicine was consulted for medical management of chronic conditions in the everardo-operative period.      Prolapse s/p colpocleisis and perineorrhaphy  -patient without any acute complaints  -pain regimen and diet per surgery  -will discontinue IVF as patient on a liquid diet, encourage PO intake   -everardo-operative activity per surgery   -anticipate leukocytosis is secondary to surgery, CTM for s/s of infection   -no objections to discharge once cleared from a surgical standpoint     Dementia  -will continue home namenda  -continue delirium precautions  -can consider PRN low dose Seroquel for agitation, previously obtained EKG and Qtc is 450     HTN  HLD  -continue home amlodipine and statin      Hx CVA  -PTA aspirin, statin           Diet: Low Fiber Diet  Diet    DVT Prophylaxis: Lovenox  Graves Catheter: Not present  Lines: None     Cardiac Monitoring: None  Code Status: Full Code      Clinically Significant Risk Factors                  # Hypertension: Noted on problem list     # Dementia: noted on problem list    # Overweight: Estimated body mass index is 29.49 kg/m  as calculated from the following:    Height as of this encounter: 1.473 m (4' 10\").    Weight as of this encounter: 64 kg (141 lb 1.5 oz)., PRESENT ON ADMISSION            Disposition Plan     Expected Discharge Date: 01/07/2024                    Sanchez Acevedo DO  Hospitalist Service  [facility]  Securely message with [employer] (more info)  Text page via shoply Paging/Directory "   ______________________________________________________________________    Interval History   Doing well this morning, resting comfortably. Questions why she had surgery. Denies pain, chills, fever.     Physical Exam   Vital Signs: Temp: 98.5  F (36.9  C) Temp src: Oral BP: 114/58 Pulse: 71   Resp: 16 SpO2: 96 % O2 Device: None (Room air) Oxygen Delivery: 2 LPM  Weight: 141 lbs 1.51 oz    General Appearance:  Awake, cooperative, non-toxic  Respiratory: CTAB with good effort  Cardiovascular: RRR  GI: No pain  Skin: No rashes  Other:  Alert to self    Medical Decision Making       40 MINUTES SPENT BY ME on the date of service doing chart review, history, exam, documentation & further activities per the note.      Data     I have personally reviewed the following data over the past 24 hrs:    19.3 (H)  \   11.1 (L)   / 207     141 106 7.6 (L) /  153 (H)   4.1 29 0.62 \       Imaging results reviewed over the past 24 hrs:   No results found for this or any previous visit (from the past 24 hour(s)).

## 2024-01-06 NOTE — PLAN OF CARE
POD0  Arrived on unit at start of vaibhav shift accompanied by dtr and granddtr.  Sleepy at first but now awake, tolerating clear liquids. Denies pain.    Problem: Adult Inpatient Plan of Care  Goal: Absence of Hospital-Acquired Illness or Injury  Outcome: Progressing  Intervention: Identify and Manage Fall Risk  Recent Flowsheet Documentation  Taken 1/5/2024 1530 by Vic Pritchett RN  Safety Promotion/Fall Prevention:   activity supervised   clutter free environment maintained   lighting adjusted  Intervention: Prevent Skin Injury  Recent Flowsheet Documentation  Taken 1/5/2024 1845 by Vic Pritchett RN  Body Position: supine  Taken 1/5/2024 1530 by Vic Pritchett RN  Body Position: log-rolled  Intervention: Prevent and Manage VTE (Venous Thromboembolism) Risk  Recent Flowsheet Documentation  Taken 1/5/2024 1530 by Vic Pritchett RN  VTE Prevention/Management: SCDs (sequential compression devices) off  Intervention: Prevent Infection  Recent Flowsheet Documentation  Taken 1/5/2024 1530 by Vic Pritchett RN  Infection Prevention: hand hygiene promoted     Problem: Risk for Delirium  Goal: Optimal Coping  Outcome: Progressing

## 2024-01-06 NOTE — DISCHARGE SUMMARY
[facility] Discharge Summary      Angle Agudelo MRN# [id number]   Age: 89 year old YOB: 1932     Date of Admission:  1/5/2024  Date of Discharge::  1/6/2024  Admitting Physician:  Shannon Haines MD  Discharge Physician:  Konrad Gant MD     PCP:  Koko Betancourt    Disposition: Patient discharged from [facility] to her assisted living facility in stable condition.        Primary Diagnosis:   Rectal prolapse  Uterine prolapse         Discharge Medications:     Current Discharge Medication List        START taking these medications    Details   polyethylene glycol (MIRALAX) 17 GM/Dose powder Take 17 g by mouth daily as needed for constipation  Qty: 510 g, Refills: 0    Associated Diagnoses: Rectal prolapse      traMADol (ULTRAM) 50 MG tablet Take 0.5 tablets (25 mg) by mouth every 6 hours as needed (for severe pain not relieved by tylenol)  Qty: 8 tablet, Refills: 0    Associated Diagnoses: Rectal prolapse           CONTINUE these medications which have NOT CHANGED    Details   acetaminophen (TYLENOL) 325 MG tablet Take 3 tablets (975 mg) by mouth every 6 hours as needed for mild pain  Qty: 50 tablet, Refills: 0    Associated Diagnoses: Vagina bleeding      amLODIPine (NORVASC) 10 MG tablet TAKE 1 TABLET DAILY  Qty: 90 tablet, Refills: 0    Associated Diagnoses: Essential hypertension      ASPIRIN LOW DOSE 81 MG chewable tablet CHEW & SWALLOW 1 TABLET ORALLY DAILY  Qty: 30 tablet, Refills: 11    Comments: Please send refills for cycle fill. Thank you!  Associated Diagnoses: History of CVA (cerebrovascular accident)      ibuprofen (ADVIL/MOTRIN) 600 MG tablet Take 1 tablet (600 mg) by mouth every 6 hours as needed for moderate pain  Qty: 30 tablet, Refills: 0    Associated Diagnoses: Vagina bleeding      lovastatin (MEVACOR) 40 MG tablet TAKE 1 TABLET AT BEDTIME  Qty: 90 tablet, Refills: 0    Associated Diagnoses: Pure hypercholesterolemia      memantine (NAMENDA) 10 MG  tablet TAKE 1 TABLET TWICE A DAY  Qty: 180 tablet, Refills: 3    Associated Diagnoses: Dementia without behavioral disturbance, unspecified dementia type      ondansetron (ZOFRAN ODT) 4 MG ODT tab Take 1 tablet (4 mg) by mouth every 8 hours as needed for nausea  Qty: 4 tablet, Refills: 0    Associated Diagnoses: Vagina bleeding                    Follow Up, Special Instructions:     Discharge diet: Low fiber diet   Discharge activity: No lifting more than 10 pounds for 6 weeks. Ok to shower and let water run over your surgical site. Pat dry with towel. DO NOT INSERT ANYTHING IN THE RECTUM, INCLUDING MEDICATIONS.   Discharge follow-up: Follow up with Dr. Shannon Haines in the office in 2-3 weeks as scheduled.  Follow up with Dr. Olive Biggs in the office as scheduled.   Wound care: You have sutures in both your perineum and your rectum that will dissolve on their own, no need to remove. No special wound care is needed.              Procedures:     Procedure(s): Altemeier perineal rectosigmoidectomy  Colpocleisis with perineorrhaphy               Consultations:   Gynecology          [facility] Summary:     Patient is a 89 year old woman who underwent the above operation on 1/5 by Dr. Shannon Haines and Dr. Olive Biggs.   There were no immediate complications during this procedure.    Please refer to the full operative summary for details.  The patient's hospital course was unremarkable.  She reported no pain and took only Tylenol for pain relief.  Her Graves catheter was removed on POD 1 and she urinated independently.  She tolerated a low fiber diet and had a bowel movement prior to discharge.  She recovered as anticipated and experienced no post-operative complications.  She was discharged home on POD 1 after being cleared by both PT and OT.        Attestation:  I have reviewed today's vital signs, notes, medications, labs and imaging.    Patient discussed with attending Dr. Konrad Gant.    Elvis Martin MD,  MS  Fellow, Colon & Rectal Surgery  [facility]  01/06/2024  6:00 PM    Colorectal Surgery can be reached at [phone] at all times. Between 5pm and 7am you will be connected to the on-call physician                  ADDENDUM:  Length of stay: 1 days  Indicate Y or N for the following:  UTI  No  C diff  No  PNA  No  SSI No  DVT No  PE  No  CVA No  MI No  Enterocutaneous fistula  No  Peripheral nerve injury  No  Abscess (not adjacent to anastomosis)  No  Leak No    Treated with:   Antibiotics N/A   Drain  N/A   Reoperation  N/A  Death within 30 days No  Reintubation  No  Reoperation  No   Procedure n/a

## 2024-01-06 NOTE — PROGRESS NOTES
01/06/24 0950   Appointment Info   Signing Clinician's Name / Credentials (PT) Arlyn Rivero,PT   Living Environment   People in Home spouse   Current Living Arrangements assisted living   Home Accessibility no concerns  (elevator)   Self-Care   Equipment Currently Used at Home walker, rolling;other (see comments)  (4WW)   Fall history within last six months no   Activity/Exercise/Self-Care Comment pt repoerted she does not normally use FWW for walking, but family reports she uses FWW all times for walking   General Information   Onset of Illness/Injury or Date of Surgery 01/05/24   Referring Physician Silvia Burris MD   Patient/Family Therapy Goals Statement (PT) none stated   Pertinent History of Current Problem (include personal factors and/or comorbidities that impact the POC) Angle Agudelo is a 89 year old female who underwent LeFort partial colpocleisis and perineorrhaphy on 1/5/24 due to 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 She has a medical history of dementia, HTN, HLD and history of CVA in 2020.   General Observations pt in bed, agreeable to PT   Cognition   Affect/Mental Status (Cognition) confused   Orientation Status (Cognition) person   Follows Commands (Cognition) follows one-step commands;verbal cues/prompting required   Pain Assessment   Patient Currently in Pain No   Range of Motion (ROM)   ROM Comment BLE WFL   Strength (Manual Muscle Testing)   Strength Comments BLE WFl   Bed Mobility   Assistive Device (Bed Mobility) bed rails;other (see comments)  (HOB elevated)   Comment, (Bed Mobility) supine<> sit minAx1- family reports pt usually crawls into bed   Transfers   Comment, (Transfers) sit<>stand CGA w/ FWW   Gait/Stairs (Locomotion)   [place]per Level (Gait) contact guard   Assistive Device (Gait) walker, front-wheeled   Distance in Feet (Gait) 25   Pattern (Gait) step-through   Comment, (Gait/Stairs) no LOB w/ FWW   Clinical Impression   Criteria for Skilled  Therapeutic Intervention Yes, treatment indicated   PT Diagnosis (PT) decreased functional mobility   Influenced by the following impairments s/p surgery   Functional limitations due to impairments transfers/ gait   Clinical Presentation (PT Evaluation Complexity) stable   Clinical Presentation Rationale presents s medically diagnosed   Clinical Decision Making (Complexity) low complexity   Planned Therapy Interventions (PT) gait training;transfer training   Risk & Benefits of therapy have been explained evaluation/treatment results reviewed   PT Total Evaluation Time   PT Eval, Low Complexity Minutes (91304) 13   Physical Therapy Goals   PT Frequency Daily   PT Predicted Duration/Target Date for Goal Attainment 01/10/24   PT Goals Bed Mobility;Transfers;Gait   PT: Bed Mobility Modified independent;Supine to/from sit   PT: Transfers Supervision/stand-by assist;Sit to/from stand;Bed to/from chair;Assistive device   PT: Gait Supervision/stand-by assist;150 feet  (4WW)   Interventions   Interventions Quick Adds Gait Training   Gait Training   Gait Training Minutes (24444) 9   Symptoms Noted During/After Treatment (Gait Training) fatigue   Treatment Detail/Skilled Intervention cues to keep FWW closer to her as she walks, pt fatigued after walk   Distance in Feet 125'   [place] Level (Gait Training) contact guard   Physical Assistance Level (Gait Training) 1 person assist   Assistive Device (Gait Training) rolling walker   Gait Analysis Deviations decreased marquez;decreased weight-shifting ability   PT Discharge Planning   PT Plan bring 4WW for transfers and gait   PT Discharge Recommendation (DC Rec) home with assist;home with home care physical therapy   PT Rationale for DC Rec pt CGA/minAx1 for bed mob, transfers and gait   PT Brief overview of current status pt CGA/minAx1 for bed mob, transfers and gait   PT Equipment Needed at Discharge other (see comments)  (pt has 4WW at home)   Total Session Time   Timed  Code Treatment Minutes 9   Total Session Time (sum of timed and untimed services) 22

## 2024-01-06 NOTE — PROGRESS NOTES
Care Management Discharge Note    Discharge Date: 01/06/2024       Discharge Disposition: Assisted Living    Discharge Services: None    Discharge DME: None    Discharge Transportation: family or friend will provide    Private pay costs discussed: Not applicable    Does the patient's insurance plan have a 3 day qualifying hospital stay waiver?  No    PAS Confirmation Code: NA  Patient/family educated on Medicare website which has current facility and service quality ratings:      Education Provided on the Discharge Plan: Yes  Persons Notified of Discharge Plans: per care team  Patient/Family in Agreement with the Plan: yes    Handoff Referral Completed: Yes    Additional Information:  Patient discharging back to [facility].  CM sent over discharge order.  Family to transport.      Maria G Pascal RN

## 2024-01-06 NOTE — PROGRESS NOTES
"POSTPARTUM DAY #1/Colpocleisis and Rectosigmoidectomy with perineorrhaphy    Subjective:  The patient feels well. The patient has no emotional concerns. Pain is well controlled with current medications. Believes she has sat up, currently lying down, but states she wants to get up and go to [employer].     Not passing gas, but has eaten. Graves with clear urine noted.       Objective   The patient has a blood pressure which is within the normal range. Urinary output is adequate.     Exam:   /62 (BP Location: Left arm)   Pulse 72   Temp 98.6  F (37  C) (Oral)   Resp 16   Ht 1.473 m (4' 10\")   Wt 64 kg (141 lb 1.5 oz)   LMP  (LMP Unknown)   SpO2 95%   BMI 29.49 kg/m    General: NAD, alert with rousing.  NOT alert and oriented.  Confused as to where she is and why she is here.  Abdomen: soft, NT   Ext: no pain     LAB:  Lab Results   Component Value Date    HGB 11.1 (L) 01/06/2024         I/Os    Intake/Output Summary (Last 24 hours) at 1/6/2024 0822  Last data filed at 1/6/2024 0421  Gross per 24 hour   Intake 2100 ml   Output 1800 ml   Net 300 ml         Impression:   Normal postop course, POD #1    Plan:  - DC catheter   - Encourage ambulation with assistance until assured stable- will place PT consult   - Continue current care        Silvia Burris MD      "

## 2024-01-06 NOTE — PROGRESS NOTES
COLON & RECTAL SURGERY  PROGRESS NOTE    01/06/2024  Post-op Day # 1    SUBJECTIVE:  Feels well this morning. States she has no pain. Tolerated clear liquids and has no nausea. Passing flatus and having Bms.    OBJECTIVE:  Temp:  [97.1  F (36.2  C)-98.7  F (37.1  C)] 98.6  F (37  C)  Pulse:  [59-88] 72  Resp:  [12-22] 16  BP: (104-139)/(57-79) 104/62  SpO2:  [90 %-99 %] 95 %    Intake/Output Summary (Last 24 hours) at 1/6/2024 1044  Last data filed at 1/6/2024 0421  Gross per 24 hour   Intake 700 ml   Output 1800 ml   Net -1100 ml       GENERAL:  Awake, alert, no acute distress, lying in bed comfortably  HEAD: Normocephalic atraumatic  SCLERA: Anicteric  EXTREMITIES: Warm and well perfused  ABDOMEN:  Soft, non tender, non-distended  PERINEUM: on external exam, perineal and rectal sutures are both intact, no drainage or bleeding    LABS:  Lab Results   Component Value Date    WBC 19.3 01/06/2024    WBC 5.2 01/13/2021     Lab Results   Component Value Date    HGB 11.1 01/06/2024    HGB 11.1 01/13/2021     Lab Results   Component Value Date    HCT 37.4 01/06/2024    HCT 37.3 01/13/2021     Lab Results   Component Value Date     01/06/2024     01/13/2021     Last Basic Metabolic Panel:  Lab Results   Component Value Date     01/06/2024     01/13/2021      Lab Results   Component Value Date    POTASSIUM 4.1 01/06/2024    POTASSIUM 4.2 03/17/2022    POTASSIUM 4.2 01/13/2021     Lab Results   Component Value Date    CHLORIDE 106 01/06/2024    CHLORIDE 109 03/17/2022    CHLORIDE 110 01/13/2021     Lab Results   Component Value Date    BUBBA 9.1 01/06/2024    BUBBA 8.9 01/13/2021     Lab Results   Component Value Date    CO2 29 01/06/2024    CO2 25 03/17/2022    CO2 26 01/13/2021     Lab Results   Component Value Date    BUN 7.6 01/06/2024    BUN 20 03/17/2022    BUN 9 01/13/2021     Lab Results   Component Value Date    CR 0.62 01/06/2024    CR 0.75 01/13/2021     Lab Results   Component Value Date      01/06/2024    GLC 96 01/05/2024     03/17/2022    GLC 82 01/13/2021       ASSESSMENT/PLAN: 89yoF with h/o dementia, rectal prolapse, and uterine prolapse, now POD1 s/p Altemeier perineal rectosigmoidectomy with colorectal surgery (Dr. Shannon Haines) and colpocleisis with perineorrhaphy with gynecology (Dr. Olive Biggs/Dr. Curt Gruber).    She is doing well postoperatively, with no pain, passing flatus and having Bms, tolerating clear liquid diet.    - advance to low fiber diet  - remove Graves catheter  - nothing per rectum  - OOB, ambulation  - possible discharge home this afternoon vs tomorrow pending tolerance of diet, trial of void    Pt d/w Dr. Konrad Gant.    Elvis Martin MD, MS  Fellow, Colon & Rectal Surgery  [facility]  01/06/2024  10:50 AM    Colorectal Surgery can be reached at [phone] at all times. Between 5pm and 7am you will be connected to the on-call physician

## 2024-01-06 NOTE — CONSULTS
Care Management Initial Consult    General Information  Assessment completed with: Patient, Family, Daughters and patient  Type of CM/SW Visit: Initial Assessment    Primary Care Provider verified and updated as needed: Yes   Readmission within the last 30 days: no previous admission in last 30 days      Reason for Consult: discharge planning  Advance Care Planning: Advance Care Planning Reviewed: present on chart          Communication Assessment  Patient's communication style: spoken language (English or Bilingual)    Hearing Difficulty or Deaf: no   Wear Glasses or Blind: no    Cognitive  Cognitive/Neuro/Behavioral: .WDL except, level of consciousness, orientation  Level of Consciousness: confused     Orientation: disoriented to, place, time, situation             Living Environment:   People in home: spouse     Current living Arrangements: assisted living  Name of Facility: [facility]   Able to return to prior arrangements: yes       Family/Social Support:  Care provided by: other (see comments) (facility staff)  Provides care for: no one  Marital Status:   , Children, Facility resident(s)/Staff  Deejay       Description of Support System: Supportive, Involved         Current Resources:   Patient receiving home care services: No     Community Resources: None  Equipment currently used at home: walker, rolling  Supplies currently used at home: Other (per [facility])    Employment/Financial:  Employment Status: retired        Financial Concerns: none   Referral to Financial Worker: No       Does the patient's insurance plan have a 3 day qualifying hospital stay waiver?  No    Lifestyle & Psychosocial Needs:  Social Determinants of Health     Food Insecurity: Low Risk  (12/19/2023)    Food Insecurity     Within the past 12 months, did you worry that your food would run out before you got money to buy more?: No     Within the past 12 months, did the food you bought just not last and you didn t have money  to get more?: No   Depression: Not at risk (10/6/2023)    PHQ-2     PHQ-2 Score: 0   Housing Stability: Low Risk  (12/19/2023)    Housing Stability     Do you have housing? : Yes     Are you worried about losing your housing?: No   Tobacco Use: Low Risk  (1/5/2024)    Patient History     Smoking Tobacco Use: Never     Smokeless Tobacco Use: Never     Passive Exposure: Not on file   Financial Resource Strain: Low Risk  (12/19/2023)    Financial Resource Strain     Within the past 12 months, have you or your family members you live with been unable to get utilities (heat, electricity) when it was really needed?: No   Alcohol Use: Not on file   Transportation Needs: Low Risk  (12/19/2023)    Transportation Needs     Within the past 12 months, has lack of transportation kept you from medical appointments, getting your medicines, non-medical meetings or appointments, work, or from getting things that you need?: No   Physical Activity: Not on file   Interpersonal Safety: Low Risk  (10/6/2023)    Interpersonal Safety     Do you feel physically and emotionally safe where you currently live?: Yes     Within the past 12 months, have you been hit, slapped, kicked or otherwise physically hurt by someone?: No     Within the past 12 months, have you been humiliated or emotionally abused in other ways by your partner or ex-partner?: No   Stress: Not on file   Social Connections: Not on file       Functional Status:  Prior to admission patient needed assistance:   Dependent ADLs:: Ambulation-walker, Bathing, Dressing, Grooming, Transfers, Toileting  Dependent IADLs:: Cleaning, Cooking, Laundry, Shopping, Meal Preparation, Medication Management, Money Management, Transportation                    Additional Information:  CM met with patient and patient's two daughter's in patient's room.  Introduced self and identified role.  Patient resides at [facility] with her .  Facility staff care for patient's I/ADLs.  Patient  uses walker for ambulation and presses the call light when needing assistance from facility staff.  Patient feeds herself.  No home care. Plan to return to [facility].  Family transport.       Maria G Pascal RN

## 2024-01-06 NOTE — PROGRESS NOTES
01/06/24 1101   Appointment Info   Signing Clinician's Name / Credentials (OT) Angle Reyes,OTR/L   Living Environment   People in Home spouse   Current Living Arrangements assisted living  (family reports pt in skilled care section of facility,)   Transportation Anticipated family or friend will provide   Living Environment Comments lives w/ spouse, spouse uses power chair,   Self-Care   Current Activity Tolerance good   Equipment Currently Used at Home walker, rolling   Fall history within last six months no   Instrumental Activities of Daily Living (IADL)   IADL Comments pt receives A w/ all ADLs from staff, pt able to use call light at [facility], family reports pt sits in recliner all day and does not attempt to get up on own,uses 4WW   General Information   Onset of Illness/Injury or Date of Surgery 01/05/24   Patient/Family Therapy Goal Statement (OT) none stated   Additional Occupational Profile Info/Pertinent History of Current Problem Procedure: Procedure(s):  COLPOCLEISIS  PERINEAL RECTOSIGMOIDECTOMY  PERINEORRAPHY   Existing Precautions/Restrictions fall   Limitations/Impairments safety/cognitive   Cognitive Status Examination   Cognitive Status Comments impaired at baseline   Visual Perception   Visual Impairment/Limitations   (nto assessed)   Range of Motion Comprehensive   General Range of Motion no range of motion deficits identified   Strength Comprehensive (MMT)   General Manual Muscle Testing (MMT) Assessment no strength deficits identified   Coordination   Upper Extremity Coordination No deficits were identified   Bed Mobility   Bed Mobility supine-sit;sit-supine   Supine-Sit [place] (Bed Mobility) contact guard   Sit-Supine [place] (Bed Mobility) contact guard   Transfers   Transfers toilet transfer;bed-chair transfer   Transfer Skill: Bed to Chair/Chair to Bed   Bed-Chair [place] (Transfers) minimum assist (75% patient effort)   Toilet Transfer   Type (Toilet Transfer)  sit-stand;stand-sit   [place] Level (Toilet Transfer) minimum assist (75% patient effort)   Balance   Balance Assessment standing dynamic balance   Balance Comments fair to good w/ FWW   Activities of Daily Living   BADL Assessment/Intervention grooming   Grooming Assessment/Training   [place] Level (Grooming) minimum assist (75% patient effort)   Clinical Impression   Criteria for Skilled Therapeutic Interventions Met (OT) Yes, treatment indicated   OT Diagnosis decreased ADLs   OT Problem List-Impairments impacting ADL mobility;cognition;strength   Assessment of Occupational Performance 1-3 Performance Deficits   Identified Performance Deficits trsfs, g/h, bed mobility   Planned Therapy Interventions (OT) ADL retraining;transfer training   Clinical Decision Making Complexity (OT) detailed assessment/moderate complexity   Risk & Benefits of therapy have been explained evaluation/treatment results reviewed;care plan/treatment goals reviewed;participants included;patient   OT Total Evaluation Time   OT Eval, Moderate Complexity Minutes (90623) 8   OT Goals   Therapy Frequency (OT) Daily   OT Predicted Duration/Target Date for Goal Attainment 01/13/24   OT Goals Hygiene/Grooming;Bed Mobility;Transfers   OT: Hygiene/Grooming supervision/stand-by assist;while standing   OT: Bed Mobility Supervision/stand-by assist   OT: Transfer with assistive device  (CGA)   Interventions   Interventions Quick Adds Self-Care/Home Management   Self-Care/Home Management   Self-Care/Home Mgmt/ADL, Compensatory, Meal Prep Minutes (51524) 10   Symptoms Noted During/After Treatment (Meal Preparation/Planning Training) none   Treatment Detail/Skilled Intervention pt demo supine>sit w/ SBA w/ increased effort, cues for scooting forward, trsf bed>chair>toilet w/ FWW w/ Camila and cues for hand placement, pt forgetful and needing step by step directions, demo sit>supine w/ min A, rolled to left w/ tactile cues and Camila   [place]  Level (Grooming Training) minimum assist (75% patient effort)   Assistance (Grooming Training) set-up required;supervision;verbal cues   OT Discharge Planning   OT Plan g/h, trsfs, bed mobility   OT Discharge Recommendation (DC Rec) home with assist   OT Rationale for DC Rec Ax1 for ADLs and mobility, anticipate will be ready to d/c back to [facility]/skilled facililty with continued A for all ADLs when medically stable,   OT Brief overview of current status min  A trsfs, min A g/h, min A sit>supine   Total Session Time   Timed Code Treatment Minutes 10   Total Session Time (sum of timed and untimed services) 18

## 2024-01-06 NOTE — PLAN OF CARE
Goal Outcome Evaluation:      Plan of Care Reviewed With: patient    Overall Patient Progress: improvingOverall Patient Progress: improving    Outcome Evaluation: VSS. Denies pain. Scant drainage from rectum/vaginal area. Sat at edge of bed with assist without issue, weak. Room air overnight. Disoriented to place, time, situation. Remembers her surgery when mentioned.    Charlie Terrell RN

## 2024-01-07 NOTE — PLAN OF CARE
Patient discharged today accompanied with her 2 daughters who took her back to her assisted living facility. Packet was given to patient daughters to bring along with 2 hard copy prescriptions for tramadol and miralax. Answered questions and concerns patient and family had. Transported patient to daughter's car.     Silvia Snider RN  [phone]

## 2024-01-07 NOTE — PLAN OF CARE
Occupational Therapy Discharge Summary    Reason for therapy discharge:    Discharged to home.    Progress towards therapy goal(s). See goals on Care Plan in [facility] electronic health record for goal details.  Goals not met.  Barriers to achieving goals:   discharge on same date as initial evaluation.    Therapy recommendation(s):    No further therapy is recommended.

## 2024-01-08 ENCOUNTER — PATIENT OUTREACH (OUTPATIENT)
Dept: CARE COORDINATION | Facility: CLINIC | Age: 89
End: 2024-01-08
Payer: MEDICARE

## 2024-01-08 NOTE — PROGRESS NOTES
Clinic Care Coordination Contact  Patient was hospitalized at [facility] from 1/5/24 to 1/6/24 related to rectal prolapse. She she was discharged to [facility] where staff assist her with her care needs. With placement in an Assisted Living Facility, patient is not a candidate for Care Coordination through her primary care clinic. Writer will close the referral.

## 2024-01-10 ENCOUNTER — DOCUMENTATION ONLY (OUTPATIENT)
Dept: OTHER | Facility: CLINIC | Age: 89
End: 2024-01-10
Payer: MEDICARE

## 2024-01-11 PROCEDURE — 88307 TISSUE EXAM BY PATHOLOGIST: CPT | Mod: 26 | Performed by: PATHOLOGY

## 2024-01-16 ENCOUNTER — HOSPITAL ENCOUNTER (INPATIENT)
Facility: HOSPITAL | Age: 89
LOS: 1 days | Discharge: NURSING, REHAB OR LONG-TERM CARE | DRG: 378 | End: 2024-01-21
Attending: STUDENT IN AN ORGANIZED HEALTH CARE EDUCATION/TRAINING PROGRAM | Admitting: STUDENT IN AN ORGANIZED HEALTH CARE EDUCATION/TRAINING PROGRAM
Payer: MEDICARE

## 2024-01-16 ENCOUNTER — MEDICAL CORRESPONDENCE (OUTPATIENT)
Dept: HEALTH INFORMATION MANAGEMENT | Facility: CLINIC | Age: 89
End: 2024-01-16
Payer: MEDICARE

## 2024-01-16 DIAGNOSIS — T81.40XA POSTOPERATIVE INFECTION, INITIAL ENCOUNTER: Primary | ICD-10-CM

## 2024-01-16 DIAGNOSIS — Z86.73 HISTORY OF CVA (CEREBROVASCULAR ACCIDENT): ICD-10-CM

## 2024-01-16 DIAGNOSIS — K62.5 RECTAL BLEEDING: ICD-10-CM

## 2024-01-16 PROCEDURE — 96360 HYDRATION IV INFUSION INIT: CPT | Mod: 59

## 2024-01-16 PROCEDURE — 99285 EMERGENCY DEPT VISIT HI MDM: CPT | Mod: 25

## 2024-01-17 ENCOUNTER — APPOINTMENT (OUTPATIENT)
Dept: CT IMAGING | Facility: HOSPITAL | Age: 89
DRG: 378 | End: 2024-01-17
Attending: STUDENT IN AN ORGANIZED HEALTH CARE EDUCATION/TRAINING PROGRAM
Payer: MEDICARE

## 2024-01-17 ENCOUNTER — APPOINTMENT (OUTPATIENT)
Dept: RADIOLOGY | Facility: HOSPITAL | Age: 89
DRG: 378 | End: 2024-01-17
Attending: STUDENT IN AN ORGANIZED HEALTH CARE EDUCATION/TRAINING PROGRAM
Payer: MEDICARE

## 2024-01-17 DIAGNOSIS — I10 ESSENTIAL HYPERTENSION: ICD-10-CM

## 2024-01-17 PROBLEM — K22.6 MALLORY-WEISS TEAR: Status: ACTIVE | Noted: 2021-06-16

## 2024-01-17 PROBLEM — M19.019 ARTHROPATHY OF SHOULDER REGION: Status: ACTIVE | Noted: 2024-01-17

## 2024-01-17 PROBLEM — B97.7 HUMAN PAPILLOMA VIRUS INFECTION: Status: ACTIVE | Noted: 2021-08-05

## 2024-01-17 PROBLEM — R11.10 VOMITING AND DIARRHEA: Status: ACTIVE | Noted: 2021-06-14

## 2024-01-17 PROBLEM — N81.10 FEMALE CYSTOCELE: Status: ACTIVE | Noted: 2020-09-22

## 2024-01-17 PROBLEM — K44.9 HIATAL HERNIA: Status: ACTIVE | Noted: 2021-06-16

## 2024-01-17 PROBLEM — R19.7 DIARRHEA: Status: ACTIVE | Noted: 2021-06-14

## 2024-01-17 PROBLEM — K62.5 RECTAL BLEEDING: Status: ACTIVE | Noted: 2024-01-17

## 2024-01-17 PROBLEM — R19.7 VOMITING AND DIARRHEA: Status: ACTIVE | Noted: 2021-06-14

## 2024-01-17 PROBLEM — N85.8: Status: ACTIVE | Noted: 2021-06-21

## 2024-01-17 PROBLEM — R32 URINARY INCONTINENCE: Status: ACTIVE | Noted: 2024-01-17

## 2024-01-17 PROBLEM — F03.90 DEMENTIA (H): Status: ACTIVE | Noted: 2024-01-17

## 2024-01-17 PROBLEM — I63.9 CEREBROVASCULAR ACCIDENT (H): Status: ACTIVE | Noted: 2020-06-08

## 2024-01-17 PROBLEM — K57.31 DIVERTICULOSIS OF LARGE INTESTINE WITH HEMORRHAGE: Status: ACTIVE | Noted: 2024-01-17

## 2024-01-17 PROBLEM — E66.9 OBESITY: Status: ACTIVE | Noted: 2024-01-17

## 2024-01-17 PROBLEM — L57.0 ACTINIC KERATOSIS: Status: ACTIVE | Noted: 2024-01-17

## 2024-01-17 PROBLEM — G72.9 MYOPATHY: Status: ACTIVE | Noted: 2024-01-17

## 2024-01-17 PROBLEM — R73.09 ABNORMAL GLUCOSE: Status: ACTIVE | Noted: 2024-01-17

## 2024-01-17 PROBLEM — K52.9 GASTROENTERITIS: Status: ACTIVE | Noted: 2021-06-14

## 2024-01-17 PROBLEM — E78.00 HYPERCHOLESTEROLEMIA: Status: ACTIVE | Noted: 2024-01-17

## 2024-01-17 LAB
ABO/RH(D): NORMAL
ALBUMIN SERPL BCG-MCNC: 3.7 G/DL (ref 3.5–5.2)
ALBUMIN UR-MCNC: NEGATIVE MG/DL
ALP SERPL-CCNC: 98 U/L (ref 40–150)
ALT SERPL W P-5'-P-CCNC: 5 U/L (ref 0–50)
ANION GAP SERPL CALCULATED.3IONS-SCNC: 13 MMOL/L (ref 7–15)
ANION GAP SERPL CALCULATED.3IONS-SCNC: 5 MMOL/L (ref 7–15)
ANTIBODY SCREEN: NEGATIVE
APPEARANCE UR: CLEAR
APTT PPP: 25 SECONDS (ref 22–38)
AST SERPL W P-5'-P-CCNC: 17 U/L (ref 0–45)
BACTERIA #/AREA URNS HPF: ABNORMAL /HPF
BASOPHILS # BLD AUTO: 0.1 10E3/UL (ref 0–0.2)
BASOPHILS NFR BLD AUTO: 0 %
BILIRUB DIRECT SERPL-MCNC: <0.2 MG/DL (ref 0–0.3)
BILIRUB SERPL-MCNC: 0.4 MG/DL
BILIRUB UR QL STRIP: NEGATIVE
BUN SERPL-MCNC: 12 MG/DL (ref 8–23)
BUN SERPL-MCNC: 19 MG/DL (ref 8–23)
CALCIUM SERPL-MCNC: 8.5 MG/DL (ref 8.2–9.6)
CALCIUM SERPL-MCNC: 8.8 MG/DL (ref 8.2–9.6)
CHLORIDE SERPL-SCNC: 102 MMOL/L (ref 98–107)
CHLORIDE SERPL-SCNC: 108 MMOL/L (ref 98–107)
COLOR UR AUTO: ABNORMAL
CREAT SERPL-MCNC: 0.76 MG/DL (ref 0.51–0.95)
CREAT SERPL-MCNC: 1.04 MG/DL (ref 0.51–0.95)
DEPRECATED HCO3 PLAS-SCNC: 23 MMOL/L (ref 22–29)
DEPRECATED HCO3 PLAS-SCNC: 29 MMOL/L (ref 22–29)
EGFRCR SERPLBLD CKD-EPI 2021: 50 ML/MIN/1.73M2
EGFRCR SERPLBLD CKD-EPI 2021: 73 ML/MIN/1.73M2
EOSINOPHIL # BLD AUTO: 0.1 10E3/UL (ref 0–0.7)
EOSINOPHIL NFR BLD AUTO: 1 %
ERYTHROCYTE [DISTWIDTH] IN BLOOD BY AUTOMATED COUNT: 17 % (ref 10–15)
ERYTHROCYTE [DISTWIDTH] IN BLOOD BY AUTOMATED COUNT: 17.2 % (ref 10–15)
GLUCOSE BLDC GLUCOMTR-MCNC: 89 MG/DL (ref 70–99)
GLUCOSE SERPL-MCNC: 209 MG/DL (ref 70–99)
GLUCOSE SERPL-MCNC: 91 MG/DL (ref 70–99)
GLUCOSE UR STRIP-MCNC: NEGATIVE MG/DL
HCT VFR BLD AUTO: 27.8 % (ref 35–47)
HCT VFR BLD AUTO: 34.9 % (ref 35–47)
HGB BLD-MCNC: 10.7 G/DL (ref 11.7–15.7)
HGB BLD-MCNC: 8.5 G/DL (ref 11.7–15.7)
HGB BLD-MCNC: 8.8 G/DL (ref 11.7–15.7)
HGB UR QL STRIP: ABNORMAL
HOLD SPECIMEN: NORMAL
IMM GRANULOCYTES # BLD: 0.2 10E3/UL
IMM GRANULOCYTES NFR BLD: 1 %
INR PPP: 1.07 (ref 0.85–1.15)
KETONES UR STRIP-MCNC: NEGATIVE MG/DL
LACTATE SERPL-SCNC: 1.5 MMOL/L (ref 0.7–2)
LEUKOCYTE ESTERASE UR QL STRIP: ABNORMAL
LYMPHOCYTES # BLD AUTO: 1.6 10E3/UL (ref 0.8–5.3)
LYMPHOCYTES NFR BLD AUTO: 6 %
MCH RBC QN AUTO: 27.3 PG (ref 26.5–33)
MCH RBC QN AUTO: 27.4 PG (ref 26.5–33)
MCHC RBC AUTO-ENTMCNC: 30.6 G/DL (ref 31.5–36.5)
MCHC RBC AUTO-ENTMCNC: 30.7 G/DL (ref 31.5–36.5)
MCV RBC AUTO: 89 FL (ref 78–100)
MCV RBC AUTO: 90 FL (ref 78–100)
MONOCYTES # BLD AUTO: 1.6 10E3/UL (ref 0–1.3)
MONOCYTES NFR BLD AUTO: 6 %
MUCOUS THREADS #/AREA URNS LPF: PRESENT /LPF
NEUTROPHILS # BLD AUTO: 23.7 10E3/UL (ref 1.6–8.3)
NEUTROPHILS NFR BLD AUTO: 86 %
NITRATE UR QL: NEGATIVE
NRBC # BLD AUTO: 0 10E3/UL
NRBC BLD AUTO-RTO: 0 /100
PH UR STRIP: 6 [PH] (ref 5–7)
PLATELET # BLD AUTO: 192 10E3/UL (ref 150–450)
PLATELET # BLD AUTO: 265 10E3/UL (ref 150–450)
POTASSIUM SERPL-SCNC: 4 MMOL/L (ref 3.4–5.3)
POTASSIUM SERPL-SCNC: 4.4 MMOL/L (ref 3.4–5.3)
PROT SERPL-MCNC: 6.6 G/DL (ref 6.4–8.3)
RBC # BLD AUTO: 3.11 10E6/UL (ref 3.8–5.2)
RBC # BLD AUTO: 3.9 10E6/UL (ref 3.8–5.2)
RBC URINE: 27 /HPF
SODIUM SERPL-SCNC: 138 MMOL/L (ref 135–145)
SODIUM SERPL-SCNC: 142 MMOL/L (ref 135–145)
SP GR UR STRIP: 1.01 (ref 1–1.03)
SPECIMEN EXPIRATION DATE: NORMAL
SQUAMOUS EPITHELIAL: 1 /HPF
UROBILINOGEN UR STRIP-MCNC: <2 MG/DL
WBC # BLD AUTO: 14.5 10E3/UL (ref 4–11)
WBC # BLD AUTO: 27.4 10E3/UL (ref 4–11)
WBC URINE: 7 /HPF

## 2024-01-17 PROCEDURE — 85027 COMPLETE CBC AUTOMATED: CPT | Performed by: STUDENT IN AN ORGANIZED HEALTH CARE EDUCATION/TRAINING PROGRAM

## 2024-01-17 PROCEDURE — 258N000003 HC RX IP 258 OP 636: Performed by: INTERNAL MEDICINE

## 2024-01-17 PROCEDURE — 83605 ASSAY OF LACTIC ACID: CPT | Performed by: STUDENT IN AN ORGANIZED HEALTH CARE EDUCATION/TRAINING PROGRAM

## 2024-01-17 PROCEDURE — 82962 GLUCOSE BLOOD TEST: CPT

## 2024-01-17 PROCEDURE — 96376 TX/PRO/DX INJ SAME DRUG ADON: CPT

## 2024-01-17 PROCEDURE — 82248 BILIRUBIN DIRECT: CPT | Performed by: STUDENT IN AN ORGANIZED HEALTH CARE EDUCATION/TRAINING PROGRAM

## 2024-01-17 PROCEDURE — G0378 HOSPITAL OBSERVATION PER HR: HCPCS

## 2024-01-17 PROCEDURE — 258N000003 HC RX IP 258 OP 636: Performed by: STUDENT IN AN ORGANIZED HEALTH CARE EDUCATION/TRAINING PROGRAM

## 2024-01-17 PROCEDURE — 99223 1ST HOSP IP/OBS HIGH 75: CPT | Mod: AI | Performed by: STUDENT IN AN ORGANIZED HEALTH CARE EDUCATION/TRAINING PROGRAM

## 2024-01-17 PROCEDURE — 71045 X-RAY EXAM CHEST 1 VIEW: CPT

## 2024-01-17 PROCEDURE — 74177 CT ABD & PELVIS W/CONTRAST: CPT | Mod: MG

## 2024-01-17 PROCEDURE — 86900 BLOOD TYPING SEROLOGIC ABO: CPT | Performed by: STUDENT IN AN ORGANIZED HEALTH CARE EDUCATION/TRAINING PROGRAM

## 2024-01-17 PROCEDURE — 80048 BASIC METABOLIC PNL TOTAL CA: CPT | Performed by: STUDENT IN AN ORGANIZED HEALTH CARE EDUCATION/TRAINING PROGRAM

## 2024-01-17 PROCEDURE — 250N000011 HC RX IP 250 OP 636: Performed by: STUDENT IN AN ORGANIZED HEALTH CARE EDUCATION/TRAINING PROGRAM

## 2024-01-17 PROCEDURE — 87040 BLOOD CULTURE FOR BACTERIA: CPT | Performed by: STUDENT IN AN ORGANIZED HEALTH CARE EDUCATION/TRAINING PROGRAM

## 2024-01-17 PROCEDURE — 36415 COLL VENOUS BLD VENIPUNCTURE: CPT | Performed by: STUDENT IN AN ORGANIZED HEALTH CARE EDUCATION/TRAINING PROGRAM

## 2024-01-17 PROCEDURE — 82310 ASSAY OF CALCIUM: CPT | Performed by: STUDENT IN AN ORGANIZED HEALTH CARE EDUCATION/TRAINING PROGRAM

## 2024-01-17 PROCEDURE — 85730 THROMBOPLASTIN TIME PARTIAL: CPT | Performed by: STUDENT IN AN ORGANIZED HEALTH CARE EDUCATION/TRAINING PROGRAM

## 2024-01-17 PROCEDURE — 96365 THER/PROPH/DIAG IV INF INIT: CPT

## 2024-01-17 PROCEDURE — 85018 HEMOGLOBIN: CPT | Performed by: STUDENT IN AN ORGANIZED HEALTH CARE EDUCATION/TRAINING PROGRAM

## 2024-01-17 PROCEDURE — 96361 HYDRATE IV INFUSION ADD-ON: CPT

## 2024-01-17 PROCEDURE — 81001 URINALYSIS AUTO W/SCOPE: CPT | Performed by: STUDENT IN AN ORGANIZED HEALTH CARE EDUCATION/TRAINING PROGRAM

## 2024-01-17 PROCEDURE — 85025 COMPLETE CBC W/AUTO DIFF WBC: CPT | Performed by: STUDENT IN AN ORGANIZED HEALTH CARE EDUCATION/TRAINING PROGRAM

## 2024-01-17 PROCEDURE — 250N000013 HC RX MED GY IP 250 OP 250 PS 637: Performed by: STUDENT IN AN ORGANIZED HEALTH CARE EDUCATION/TRAINING PROGRAM

## 2024-01-17 PROCEDURE — 250N000011 HC RX IP 250 OP 636: Performed by: INTERNAL MEDICINE

## 2024-01-17 PROCEDURE — 99207 PR NO BILLABLE SERVICE THIS VISIT: CPT | Performed by: INTERNAL MEDICINE

## 2024-01-17 PROCEDURE — 85610 PROTHROMBIN TIME: CPT | Performed by: STUDENT IN AN ORGANIZED HEALTH CARE EDUCATION/TRAINING PROGRAM

## 2024-01-17 RX ORDER — HYDROCORTISONE 2.5 %
CREAM (GRAM) TOPICAL 2 TIMES DAILY PRN
COMMUNITY
End: 2024-09-11

## 2024-01-17 RX ORDER — ONDANSETRON 4 MG/1
4 TABLET, ORALLY DISINTEGRATING ORAL EVERY 6 HOURS PRN
Status: DISCONTINUED | OUTPATIENT
Start: 2024-01-17 | End: 2024-01-21 | Stop reason: HOSPADM

## 2024-01-17 RX ORDER — PIPERACILLIN SODIUM, TAZOBACTAM SODIUM 3; .375 G/15ML; G/15ML
3.38 INJECTION, POWDER, LYOPHILIZED, FOR SOLUTION INTRAVENOUS ONCE
Status: COMPLETED | OUTPATIENT
Start: 2024-01-17 | End: 2024-01-17

## 2024-01-17 RX ORDER — IOPAMIDOL 755 MG/ML
75 INJECTION, SOLUTION INTRAVASCULAR ONCE
Status: COMPLETED | OUTPATIENT
Start: 2024-01-17 | End: 2024-01-17

## 2024-01-17 RX ORDER — PIPERACILLIN SODIUM, TAZOBACTAM SODIUM 3; .375 G/15ML; G/15ML
3.38 INJECTION, POWDER, LYOPHILIZED, FOR SOLUTION INTRAVENOUS EVERY 8 HOURS
Status: DISCONTINUED | OUTPATIENT
Start: 2024-01-17 | End: 2024-01-17

## 2024-01-17 RX ORDER — PROCHLORPERAZINE MALEATE 5 MG
5 TABLET ORAL EVERY 6 HOURS PRN
Status: DISCONTINUED | OUTPATIENT
Start: 2024-01-17 | End: 2024-01-21 | Stop reason: HOSPADM

## 2024-01-17 RX ORDER — LOVASTATIN 40 MG
40 TABLET ORAL DAILY
COMMUNITY
End: 2024-02-28

## 2024-01-17 RX ORDER — ACETAMINOPHEN 325 MG/1
650 TABLET ORAL EVERY 4 HOURS PRN
Status: DISCONTINUED | OUTPATIENT
Start: 2024-01-17 | End: 2024-01-21 | Stop reason: HOSPADM

## 2024-01-17 RX ORDER — BISACODYL 10 MG
10 SUPPOSITORY, RECTAL RECTAL DAILY PRN
Status: DISCONTINUED | OUTPATIENT
Start: 2024-01-17 | End: 2024-01-17

## 2024-01-17 RX ORDER — POLYETHYLENE GLYCOL 3350 17 G/17G
17 POWDER, FOR SOLUTION ORAL 2 TIMES DAILY PRN
Status: DISCONTINUED | OUTPATIENT
Start: 2024-01-17 | End: 2024-01-21 | Stop reason: HOSPADM

## 2024-01-17 RX ORDER — SODIUM CHLORIDE 9 MG/ML
INJECTION, SOLUTION INTRAVENOUS CONTINUOUS
Status: ACTIVE | OUTPATIENT
Start: 2024-01-17 | End: 2024-01-18

## 2024-01-17 RX ORDER — MEMANTINE HYDROCHLORIDE 10 MG/1
10 TABLET ORAL 2 TIMES DAILY
COMMUNITY
End: 2024-04-16

## 2024-01-17 RX ORDER — ACETAMINOPHEN 650 MG/1
650 SUPPOSITORY RECTAL EVERY 4 HOURS PRN
Status: DISCONTINUED | OUTPATIENT
Start: 2024-01-17 | End: 2024-01-21 | Stop reason: HOSPADM

## 2024-01-17 RX ORDER — ONDANSETRON 2 MG/ML
4 INJECTION INTRAMUSCULAR; INTRAVENOUS EVERY 6 HOURS PRN
Status: DISCONTINUED | OUTPATIENT
Start: 2024-01-17 | End: 2024-01-21 | Stop reason: HOSPADM

## 2024-01-17 RX ORDER — PIPERACILLIN SODIUM, TAZOBACTAM SODIUM 3; .375 G/15ML; G/15ML
3.38 INJECTION, POWDER, LYOPHILIZED, FOR SOLUTION INTRAVENOUS EVERY 8 HOURS
Status: DISCONTINUED | OUTPATIENT
Start: 2024-01-17 | End: 2024-01-21

## 2024-01-17 RX ORDER — PROCHLORPERAZINE 25 MG
12.5 SUPPOSITORY, RECTAL RECTAL EVERY 12 HOURS PRN
Status: DISCONTINUED | OUTPATIENT
Start: 2024-01-17 | End: 2024-01-21 | Stop reason: HOSPADM

## 2024-01-17 RX ORDER — MEMANTINE HYDROCHLORIDE 10 MG/1
10 TABLET ORAL 2 TIMES DAILY
Status: DISCONTINUED | OUTPATIENT
Start: 2024-01-17 | End: 2024-01-21 | Stop reason: HOSPADM

## 2024-01-17 RX ADMIN — PIPERACILLIN AND TAZOBACTAM 3.38 G: 3; .375 INJECTION, POWDER, FOR SOLUTION INTRAVENOUS at 14:39

## 2024-01-17 RX ADMIN — MEMANTINE 10 MG: 10 TABLET ORAL at 09:38

## 2024-01-17 RX ADMIN — SODIUM CHLORIDE: 9 INJECTION, SOLUTION INTRAVENOUS at 10:47

## 2024-01-17 RX ADMIN — MEMANTINE 10 MG: 10 TABLET ORAL at 20:15

## 2024-01-17 RX ADMIN — PIPERACILLIN AND TAZOBACTAM 3.38 G: 3; .375 INJECTION, POWDER, FOR SOLUTION INTRAVENOUS at 20:15

## 2024-01-17 RX ADMIN — SODIUM CHLORIDE, POTASSIUM CHLORIDE, SODIUM LACTATE AND CALCIUM CHLORIDE 500 ML: 600; 310; 30; 20 INJECTION, SOLUTION INTRAVENOUS at 00:43

## 2024-01-17 RX ADMIN — IOPAMIDOL 75 ML: 755 INJECTION, SOLUTION INTRAVENOUS at 00:51

## 2024-01-17 RX ADMIN — SODIUM CHLORIDE, POTASSIUM CHLORIDE, SODIUM LACTATE AND CALCIUM CHLORIDE 500 ML: 600; 310; 30; 20 INJECTION, SOLUTION INTRAVENOUS at 04:36

## 2024-01-17 ASSESSMENT — ACTIVITIES OF DAILY LIVING (ADL)
ADLS_ACUITY_SCORE: 47
ADLS_ACUITY_SCORE: 47
ADLS_ACUITY_SCORE: 35
ADLS_ACUITY_SCORE: 47
ADLS_ACUITY_SCORE: 35
ADLS_ACUITY_SCORE: 47
DEPENDENT_IADLS:: CLEANING;COOKING;LAUNDRY;SHOPPING;MEAL PREPARATION;MEDICATION MANAGEMENT;MONEY MANAGEMENT;TRANSPORTATION
ADLS_ACUITY_SCORE: 35
ADLS_ACUITY_SCORE: 35
ADLS_ACUITY_SCORE: 47
ADLS_ACUITY_SCORE: 35

## 2024-01-17 NOTE — PROGRESS NOTES
"PRIMARY DIAGNOSIS: \"GENERIC\" NURSING  OUTPATIENT/OBSERVATION GOALS TO BE MET BEFORE DISCHARGE:  ADLs back to baseline: Yes    Activity and level of assistance: Up with standby assistance.    Pain status: Pain free.    Return to near baseline physical activity: Yes     Discharge Planner Nurse   Safe discharge environment identified: Yes  Barriers to discharge: Yes       Entered by: Panchito Agudelo RN 01/17/2024 9:48 AM     Please review provider order for any additional goals.   Nurse to notify provider when observation goals have been met and patient is ready for discharge.  "

## 2024-01-17 NOTE — CONSULTS
"Care Management Initial Consult    General Information  Assessment completed with: Patient, Children, Marielos and daughter Daija  Type of CM/SW Visit: Initial Assessment    Primary Care Provider verified and updated as needed: Yes   Readmission within the last 30 days: previous discharge plan unsuccessful (1/5/24 - 1/6/24)   Return Category: Exacerbation of disease  Reason for Consult: discharge planning  Advance Care Planning: Advance Care Planning Reviewed: present on chart          Communication Assessment  Patient's communication style: spoken language (English or Bilingual)             Cognitive  Cognitive/Neuro/Behavioral: \"patient has a dementia diagnosis\"                     Living Environment:   People in home: facility resident     Current living Arrangements: assisted living  Name of Facility: [facility]   Able to return to prior arrangements: yes       Family/Social Support:  Care provided by: other (see comments), child(jean pierre), self (AL staff)  Provides care for: no one, unable/limited ability to care for self     Facility resident(s)/Staff, Children          Description of Support System: Supportive, Involved    Support Assessment: Adequate family and caregiver support, Adequate social supports    Current Resources:   Patient receiving home care services: No     Community Resources: Skilled Nursing Facility ([facility])  Equipment currently used at home: walker, rolling (4WW)  Supplies currently used at home: Incontinence Supplies, Other (\"glasses\")    Employment/Financial:  Employment Status: retired     Employment/ Comments: \"Her  was in the . She uses his  insurance as her secondary insurance\".  Financial Concerns: none   Referral to Financial Worker: No       Does the patient's insurance plan have a 3 day qualifying hospital stay waiver?  Yes     Which insurance plan 3 day waiver is available? Alternative insurance waiver    Will the " waiver be used for post-acute placement? Undetermined at this time      Functional Status:  Prior to admission patient needed assistance:   Dependent ADLs:: Ambulation-walker, Bathing, Dressing, Grooming, Incontinence, Toileting  Dependent IADLs:: Cleaning, Cooking, Laundry, Shopping, Meal Preparation, Medication Management, Money Management, Transportation  Assesssment of Functional Status: At functional baseline    Mental Health Status:          Chemical Dependency Status:                Values/Beliefs:  Spiritual, Cultural Beliefs, Mosque Practices, Values that affect care:                 Additional Information:  Marielos lives at [facility]. She gets a high level of assistance with most ADLs and all IADLs. She uses a 4WW for mobility.    Likely able to return to Assisted Living at discharge.     Daughter Daija to transport at discharge.    CM to follow for medical progression of care, discharge recommendations, and final discharge plan.    Lianna Vargas RN

## 2024-01-17 NOTE — PROGRESS NOTES
"PRIMARY DIAGNOSIS: \"GENERIC\" NURSING  OUTPATIENT/OBSERVATION GOALS TO BE MET BEFORE DISCHARGE:  ADLs back to baseline: No    Activity and level of assistance: Up with maximum assistance. Consider SW and/or PT evaluation.     Pain status: Pain free.    Return to near baseline physical activity: Yes     Discharge Planner Nurse   Safe discharge environment identified: Yes  Barriers to discharge: Yes       Entered by: Panchito Agudelo RN 01/17/2024 4:20 PM     Please review provider order for any additional goals.   Nurse to notify provider when observation goals have been met and patient is ready for discharge.  "

## 2024-01-17 NOTE — PLAN OF CARE
Patient alert and oriented x2, disoriented to situation and time. VSS, comfortable in hospital bed with alarm on. Colorectal to see today, room air, reg diet, up to bedside commode for bathroom with assist of 1. Slight blood in stool with BM, Q4 vitals and Q12 hemoglobin check.     Yenifer Walden RN     Improved

## 2024-01-17 NOTE — CONSULTS
[facility]   Consultation      Place of Service: [facility] ED  Reason for Consultation: Rectal bleeding s/p Altemeir   Consult Requested by: Dr. Alysia Pillai    History of Present Illness: Angle Agudelo is a 89 y/o female with history of dementia and rectal and pelvic organ prolapse who recently underwent an Altemeier (perineal rectosigmoidectomy) by Dr. Haines and partial colpocleisis and perineorrhaphy by Dr. Biggs on 1/5/24. Her postoperative course was uncomplicated and she returned to her assisted living facility. She presented to the [facility] ER overnight for rectal bleeding. She was tachycardic to 105 and hypertensive to 150/80 on presentation, now normalized, and she remains afebrile. Labs notable for Hgb 10.7 from 11.1 post-op, WBC 27.4. Cr mildly elevated from baseline at 1.04. CT scan showed postsurgical changes with several foci of perirectal fat stranding and free gas, postsurgical vs phlegmonous changes. No fluid collection.     She is a poor historian due to her dementia. She denies any abdominal pain. She reports a small amount of rectal discomfort. She is not sure when the bleeding started or how much she has had.       Pertinent Labs:   Lab Results: personally reviewed   Lab Results   Component Value Date     01/17/2024     01/06/2024     10/06/2023     01/13/2021     01/12/2021     01/11/2021    CO2 23 01/17/2024    CO2 29 01/06/2024    CO2 26 10/06/2023    CO2 25 03/17/2022    CO2 25 06/15/2021    CO2 27 06/14/2021    CO2 26 01/13/2021    CO2 29 01/12/2021    CO2 27 01/11/2021    BUN 19.0 01/17/2024    BUN 7.6 01/06/2024    BUN 15.3 10/06/2023    BUN 20 03/17/2022    BUN 11 06/15/2021    BUN 12 06/14/2021    BUN 9 01/13/2021    BUN 11 01/12/2021    BUN 17 01/11/2021     Lab Results   Component Value Date    WBC 27.4 01/17/2024    WBC 19.3 01/06/2024    WBC 6.9 10/06/2023    WBC 5.2 01/13/2021    WBC 5.5 01/12/2021    WBC 6.3  01/11/2021    HGB 10.7 01/17/2024    HGB 11.1 01/06/2024    HGB 12.0 01/05/2024    HGB 11.1 01/13/2021    HGB 12.6 01/12/2021    HGB 10.8 01/12/2021    HCT 34.9 01/17/2024    HCT 37.4 01/06/2024    HCT 40.3 10/06/2023    HCT 37.3 01/13/2021    HCT 34.5 01/12/2021    HCT 34.0 01/11/2021    MCV 90 01/17/2024    MCV 90 01/06/2024    MCV 95 10/06/2023     01/13/2021    MCV 99 01/12/2021    MCV 99 01/11/2021     01/17/2024     01/06/2024     10/06/2023     01/13/2021     01/12/2021     01/11/2021       Pertinent Radiology:    EXAM: CT ABDOMEN PELVIS W CONTRAST  LOCATION: [facility]  DATE: 1/17/2024     INDICATION: rectal bleeding, LLQ pain, s p perineorrhapy and rectosigmoidectomy on 1 5 24  COMPARISON: CT abdomen/pelvis with contrast 06/14/2021  TECHNIQUE: CT scan of the abdomen and pelvis was performed following injection of IV contrast. Multiplanar reformats were obtained. Dose reduction techniques were used.  CONTRAST: ISOVUE 370 75ML     FINDINGS:   LOWER CHEST: Mitral annular calcifications. No focal pulmonary consolidation or pleural effusion.     HEPATOBILIARY: Hepatic steatosis. Cholecystectomy. Reservoir effect of the bile ducts.     PANCREAS: Normal.     SPLEEN: Normal.     ADRENAL GLANDS: Normal.     KIDNEYS/BLADDER: Small renal cysts which require no dedicated follow-up. No hydronephrosis. Unremarkable urinary bladder.     BOWEL: Postsurgical changes of perineorrhaphy and rectosigmoidectomy. Several foci of perirectal fat stranding and free gas, possibly postsurgical. Phlegmonous changes could also have this appearance. No definite evidence of well-defined drainable fluid   collection. Of note the lower most anorectum and portions of the perineum are excluded by collimation. Colonic diverticulosis. Moderate colonic stool. No bowel obstruction.     LYMPH NODES: Normal.     VASCULATURE: Tortuous abdominal aorta. 3.7 cm infrarenal  abdominal aortic aneurysm, stable. Diffuse atherosclerotic calcifications of the aorta and its branches.     PELVIC ORGANS: Fluid distended endometrial canal.     MUSCULOSKELETAL: Multilevel degenerative changes of the thoracic and lumbosacral spine. Scoliosis. No acute osseous abnormality or suspicious bony lesion.                                                                      IMPRESSION:   1.  Postsurgical changes of perineorrhaphy and rectosigmoidectomy. Several foci of perirectal fat stranding and free gas, possibly postsurgical. Phlegmonous changes could also have this appearance. No definite evidence of well-defined drainable fluid   collection. If there is concern for bowel perforation a CT with rectal contrast could be performed. Of note the lower most anorectum and portions of the perineum are excluded by collimation.  2.  Fluid distended endometrial canal. Consider pelvic ultrasound for further evaluation which may be performed on a nonemergent outpatient basis.      Past Medical History:   Diagnosis Date    Acute cerebrovascular accident (CVA) (H) 06/08/2020    Benign neoplasm of colon     Chronic bilateral low back pain without sciatica     Chronic bilateral low back pain without sciatica 08/12/2016    Dementia (H)     Disorder of bone and cartilage, unspecified     Dyspnea on exertion     Female genital prolapse     Gastroenteritis 6/14/2021    GI bleed     Hiatal hernia 6/16/2021    Memory impairment     Orthopnea     Pure hypercholesterolemia     Rectal prolapse     Skin cancer     Not Melanoma, not sure what kind though    Spinal stenosis of lumbar region     Swelling of both lower extremities     Thickened endometrium     Unspecified arthropathy, shoulder region     Unspecified essential hypertension     Walking troubles        Past Surgical History:   Procedure Laterality Date    APPENDECTOMY      BIOPSY CERVICAL, LOCAL EXCISION, SINGLE/MULTIPLE N/A 09/11/2018    Procedure: LOOP  ELECTROSURGICAL EXCISION PROCEDURE, REMOVAL PESSARY;  Surgeon: Olive Biggs MD;  Location: [facility] OR;  Service:     BIOPSY VULVA N/A 05/14/2020    Procedure: VULVAR BIOPSY;  Surgeon: Olive Biggs MD;  Location: [facility] OR;  Service: Gynecology    CATARACT EXTRACTION EXTRACAPSULAR W/ INTRAOCULAR LENS IMPLANTATION Bilateral     CERVICAL BIOPSY N/A 05/14/2020    Procedure: PUNCH BIOPSY, CERVIX;  Surgeon: Olive Biggs MD;  Location: [facility] OR;  Service: Gynecology    CERVICAL POLYPECTOMY N/A 10/12/2023    Procedure: AND POLYPECTOMY WITH ULTRASOUND GUIDANCE;  Surgeon: Olive Biggs MD;  Location: [facility]    CHOLECYSTECTOMY      COLONOSCOPY N/A 01/08/2020    Procedure: COLONOSCOPY;  Surgeon: Amanda Carson MD;  Location: [facility]    DILATION AND CURETTAGE, HYSTEROSCOPY WITH ULTRASOUND GUIDANCE N/A 10/12/2023    Procedure: HYSTEROSCOPY DILATION AND CURETTAGE;  Surgeon: Olive Biggs MD;  Location: [facility]    DILATION AND CURETTAGE, OPERATIVE HYSTEROSCOPY, COMBINED N/A 03/31/2022    Procedure: HYSTEROSCOPY;  Surgeon: Olive Biggs MD;  Location: [facility] DILATION/CURETTAGE DIAG/THER NON OB N/A 05/14/2020    Procedure: DILATION AND CURETTAGE, UTERUS;  Surgeon: Olive Biggs MD;  Location: [facility];  Service: Gynecology    IR EXTREMITY ANGIOGRAM RIGHT  02/13/2018    JACQUIEORTMELI COLPOCLEISIS PROCEDURE (VAGINAL OBLITERATION) N/A 1/5/2024    Procedure: COLPOCLEISIS;  Surgeon: Olive Biggs MD;  [place] [facility] [place]    ORTHOPEDIC SURGERY      OTHER SURGICAL HISTORY      [id number]    OTHER SURGICAL HISTORY      finger abscess    PERINEORRHAPHY N/A 1/5/2024    Procedure: PERINEORRAPHY;  Surgeon: Olive Biggs MD;  [place] [facility] [place]    [place] ESOPHAGOGASTRODUODENOSCOPY TRANSORAL DIAGNOSTIC N/A 06/16/2021    Procedure: ESOPHAGOGASTRODUODENOSCOPY (EGD);  Surgeon: Miquel  Primitivo HAYES MD;  Location: [facility] OR;  Service: Gastroenterology    RECTOSIGMOIDECTOMY PERINEAL N/A 1/5/2024    Procedure: PERINEAL RECTOSIGMOIDECTOMY;  Surgeon: Shannon Haines MD;  Location: [facility] [place]    SHOULDER SURGERY      bilateral shoulder    SIGMOIDOSCOPY FLEXIBLE N/A 06/03/2018    Procedure: SIGMOIDOSCOPY;  Surgeon: Dayana Joshi MD;  Location: [facility];  Service:     TONSILLECTOMY & ADENOIDECTOMY      TOTAL SHOULDER ARTHROPLASTY Bilateral R 7/12 L 9/11     BIOPSY THYROID FINE NEEDLE ASPIRATION  08/07/2020       Family History   Problem Relation Age of Onset    Heart Disease Father     Coronary Artery Disease Father     Cerebrovascular Disease Father     Coronary Artery Disease Brother     Coronary Artery Disease Brother     Dementia Sister        Social History     Socioeconomic History    Marital status:      Spouse name: Not on file    Number of children: Not on file    Years of education: Not on file    Highest education level: Not on file   Occupational History    Not on file   Tobacco Use    Smoking status: Never    Smokeless tobacco: Never   Vaping Use    Vaping Use: Never used   Substance and Sexual Activity    Alcohol use: Yes     Alcohol/week: 2.0 standard drinks of alcohol     Comment: social    Drug use: No    Sexual activity: Not on file   Other Topics Concern    Parent/sibling w/ CABG, MI or angioplasty before 65F 55M? Not Asked   Social History Narrative    Not on file     Social Determinants of Health     Financial Resource Strain: Low Risk  (12/19/2023)    Financial Resource Strain     Within the past 12 months, have you or your family members you live with been unable to get utilities (heat, electricity) when it was really needed?: No   Food Insecurity: Low Risk  (12/19/2023)    Food Insecurity     Within the past 12 months, did you worry that your food would run out before you got money to buy more?: No     Within the past 12 months, did the food  you bought just not last and you didn t have money to get more?: No   Transportation Needs: Low Risk  (12/19/2023)    Transportation Needs     Within the past 12 months, has lack of transportation kept you from medical appointments, getting your medicines, non-medical meetings or appointments, work, or from getting things that you need?: No   Physical Activity: Not on file   Stress: Not on file   Social Connections: Not on file   Interpersonal Safety: Low Risk  (10/6/2023)    Interpersonal Safety     Do you feel physically and emotionally safe where you currently live?: Yes     Within the past 12 months, have you been hit, slapped, kicked or otherwise physically hurt by someone?: No     Within the past 12 months, have you been humiliated or emotionally abused in other ways by your partner or ex-partner?: No   Housing Stability: Low Risk  (12/19/2023)    Housing Stability     Do you have housing? : Yes     Are you worried about losing your housing?: No       Current Facility-Administered Medications   Medication    acetaminophen (TYLENOL) tablet 650 mg    Or    acetaminophen (TYLENOL) Suppository 650 mg    bisacodyl (DULCOLAX) suppository 10 mg    memantine (NAMENDA) tablet 10 mg    ondansetron (ZOFRAN ODT) ODT tab 4 mg    Or    ondansetron (ZOFRAN) injection 4 mg    polyethylene glycol (MIRALAX) Packet 17 g    prochlorperazine (COMPAZINE) injection 5 mg    Or    prochlorperazine (COMPAZINE) tablet 5 mg    Or    prochlorperazine (COMPAZINE) suppository 12.5 mg     Current Outpatient Medications   Medication    acetaminophen (TYLENOL) 325 MG tablet    amLODIPine (NORVASC) 10 MG tablet    ASPIRIN LOW DOSE 81 MG chewable tablet    hydrocortisone 2.5 % cream    ibuprofen (ADVIL/MOTRIN) 600 MG tablet    lovastatin (MEVACOR) 40 MG tablet    memantine (NAMENDA) 10 MG tablet    ondansetron (ZOFRAN ODT) 4 MG ODT tab    polyethylene glycol (MIRALAX) 17 GM/Dose powder    traMADol (ULTRAM) 50 MG tablet     Allergies:   Allergies  "  Allergen Reactions    Atorvastatin Muscle Pain (Myalgia)    Dextromethorphan Hives    Oxycodone Unknown    Pravastatin Muscle Pain (Myalgia)    Simvastatin Muscle Pain (Myalgia)    Codeine Rash       Review of Systems:   \"A full 10 point review of systems was taken and is negative aside from what is noted above in the HPI.\"       Intake/Output past 24 hrs:    Intake/Output Summary (Last 24 hours) at 1/17/2024 0915  Last data filed at 1/17/2024 0609  Gross per 24 hour   Intake 1000 ml   Output 300 ml   Net 700 ml       Physical Exam:  Temp:  [98.4  F (36.9  C)] 98.4  F (36.9  C)  Pulse:  [] 76  Resp:  [19-20] 19  BP: (101-150)/(52-86) 110/57  SpO2:  [95 %-99 %] 98 %    General: NAD, alert, cooperative  Head: normocephalic, without abnormality / atraumatic  Respiratory: non-labored breathing  Abdomen: soft, non-tender  Perianal/Rectal: This is a chaperoned exam. There is some soft stool in her brief with dark red blood within the stool. No melena. On digital rectal exam, sutures at the anastomosis feel intact circumferentially. Patient has mild discomfort with SUDHIR at the anterior aspect. External hemorrhoids generally enlarged, no thrombosis. No rectal prolapse.   Skin: no rashes or lesions  Musculoskeletal: moves all four extremities  Psychological: alert. Oriented to person and place. Poor recall  Neurological: cranial nerves grossly intact    Assessment/Plan: Angle Agudelo is a 89 y/o female with history of dementia and rectal and pelvic organ prolapse who recently underwent an Altemeier (perineal rectosigmoidectomy) by Dr. Haines and partial colpocleisis and perineorrhaphy by Dr. Biggs on 1/5/24. She presented to the ED from her [facility] overnight for rectal bleeding. Hgb is stable and WBC elevated to 27. CT shows postsurgical changes with several foci of perirectal fat stranding and free gas, postsurgical vs phlegmonous changes.    On exam, she is incontinent of soft stool with dark red blood in the stool and " clearly coming from her rectum. Sutures feel intact on digital exam, and she does have some mild tenderness with SUDHIR. She remains afebrile and vitally stable. She has been admitted to observation under the hospitalist.    1. C. Diff ordered. If negative, will plan to start her on Zosyn in case of possible anastomotic leak  2. Keep NPO for now. OK for sips with meds  3. Supportive cares per primary   4. We will follow    Medical Decision Making:   Additional workup / testing ordered: C. Diff  Procedure / Surgery recommended: TBD, no plans for surgery at this time  Old records reviewed - CRS notes, OP notes, ED notes, labs, imaging  Medications added / changed: Stop bisacodyl    This case was discussed with: Dr. Gant and Dr. Haines    Thank you for consulting [facility] regarding this patient's care. Please contact us with questions or concerns.     Time spent: 60 minutes, with 50 minutes spent in counseling and coordinating care    Irena Manrique PA-C  [facility]  [phone]     Colorectal Surgery Staff:  I have seen and examined the patient. I agree with the above documentation and plan of the fellow/PA above with the following additions/chages:    Ms. Agudelo is seen with her daughter for evaluation of rectal bleeding. Her daughter noticed that she had more activity yesterday and also saw her gynecologist and everything looked good. She then started to have rectal bleeding and was sent to the ER. She is unable to give her own history but her daughter states that she did not notice any fevers/chills/nausea/vomiting. She denies any abdominal or pelvic pain.    On admission, she was a little tachycardic (105).   On exam, she is in NAD. Her abdomen is soft, NT, ND.   Rectal exam is performed with her daughter in the room. On SUDHIR, I can feel the more distal suture line without a discreet cavity/perforation. However, as I feel more proximallly, there is a firm inflamed area  which is suspicious for the more proximal colon.     She had a WBC of 27 and Hb of 10.7 from 11.1 on discharge.  Her Cr was up to 1.04 from 0.62. She then received fluids and a repeat set of labs showed her WBC decreased to 14, Hb decreased to 8.5, and Cr decreased to 0.76.     I have personally reviewed the CT scan and report. There is no free air. There is not a lot of inflammation in the pelvis. I do have some trouble following the lumen of the rectum as it meets the more proximal colon. No abscess.    A/P: 89F w/ dementia and rectal/vaginal prolapse POD 12 s/p perineal rectosigmoidectomy presenting w/ rectal bleeding.     Based on her presentation, exam, and imaging, I am concerned for a contained disruption of the anastomosis. However, she has no free air, no abdominal/pelvic pain, and no signs of sepsis. Her tachycardia resolved with fluid and her WBC came down. I suspect the decrease in Hb to be somewhat dilutional as well opposed to a drop in 2gm/dL of hemoglobin.    I have spoken with her surgeon Dr. Haines who will be primarily managing Ms. Agudelo from tomorrow. At this time, I agree that she does not require urgent surgical intervention. We will plan to keep her NPO, start her on IVF fluids, and broad spectrum antibiotics. If she does decompensate in terms of either bleeding or uncontrolled infection, we will plan to take her to the operating room for an exam under anesthesia as well as a potential fecal diversion depending on the circumstance.     Total time spent: 60 minutes (>50% of time is face/face coordination of care)    Konrad Gant MD MBA  [facility]  Office: [phone]  1/17/2024 4:42 PM

## 2024-01-17 NOTE — ED NOTES
Bed: JNED-19  Expected date: 1/16/24  Expected time: 11:34 PM  Means of arrival: Ambulance  Comments:  Pierre Tineo  89 yo F rectal/vag bleeding

## 2024-01-17 NOTE — ED NOTES
Pt is a/o x4, daughter at bedside, VSS, pt noted to have small-mod bm with some blood tinged noted per EDT, pt denies pain.

## 2024-01-17 NOTE — ED PROVIDER NOTES
EMERGENCY DEPARTMENT ENCOUNTER      NAME: Angle Agduelo  AGE: 89 year old female  YOB: 1932  MRN: [id number]  EVALUATION DATE & TIME: 1/16/2024 11:42 PM    PCP: Koko Betancourt    ED PROVIDER: Donny Day MD      Chief Complaint   Patient presents with    Rectal vs vaginal bleeding         FINAL IMPRESSION:  No diagnosis found.      ED COURSE & MEDICAL DECISION MAKING:    Pertinent Labs & Imaging studies reviewed. (See chart for details)  89 year old female presents to the Emergency Department for evaluation of rectal versus vaginal bleeding.    ED Course as of 01/17/24 0252   Wed Jan 17, 2024   0049 Patient is a 89-year-old female with recent history of surgery on 1/5 for bladder and rectal prolapse now status post rectosigmoidectomy, perineorrhaphy and colpocleisis who presents to the emergency department with either vaginal or rectal bleeding.  Patient reportedly had a GYN exam today for postoperative evaluation at that point in time did not have any significant bleeding.  And then later this evening began having large amount of blood into her diaper.  Nursing facility staff is uncertain if is coming for vagina or rectum but states that she completely saturated several diapers.  Patient notes she has had a little bit abdominal discomfort in the lower abdomen but this has not changed significantly since surgery.  Denies any fevers.  No chest pain or shortness of breath.  Denies any lightheadedness or dizziness.  She has been having bowel movements but is uncertain if blood is coming from vagina or rectum.   0049 On exam here patient appears slightly pale and is tachycardic but hemodynamically stable otherwise.  Good peripheral perfusion.  Mild tenderness in the left lower quadrant without any guarding or rigidity.  External GYN rectal exam shows no blood from the vagina but does seem to have some clotted blood at the anal verge.  Perineal sutures appear intact.  Did not perform internal speculum  exam or digital rectal exam for concern of possible disrupting sutures are causing dehiscence.   0050 Will obtain blood work including coagulation studies and type and screen as well as a CT abdomen pelvis to evaluate for acute intra-abdominal process in the postoperative setting.   0158 Labs reviewed and interpreted myself.  CBC is notable for marked leukocytosis of 27.86% neutrophils.  Hemoglobin 10.7 which is only down about half a point compared to her hemoglobin prior to discharge.  BMP does show elevation in creatinine up to 1.04 suggesting an acute kidney injury.  Received 500 mL aliquot of lactated Ringer's.  Otherwise hepatic function panel is reassuring.  Lactate within normal limits.  Coagulation studies also unremarkable.       0159 CT of the abdomen pelvis shows postsurgical changes with small foci of perirectal fat stranding which are probably postsurgical changes however phlegmonous changes cannot be excluded.  I discussed with colorectal surgery and as patient does not have any fevers or infectious symptoms at home will hold off on empiric antibiotics at this point in time.  Do plan to admit to least for period of observation to ensure no recurrent heavy rectal bleeding or other significant changes.   0247 Hospitalist agrees to admit for observation this point in time.  Plan for serial CBC to evaluate for any significant drop in hemoglobin or continued progression of leukocytosis     12:11 AM I met and evaluated the patient.   12:21 AM I spoke with Dr. Haines, the patient's surgeon, from colorectal.  1:59 AM I spoke Dr. Haines again.  2:50 AM I spoke with the accepting hospitalist, Dr. Rashid.      Medical Decision Making    History:  Supplemental history from: Caregiver, Family Member/Significant Other, and EMS  External Record(s) reviewed: Documented in chart    Work Up:  Chart documentation includes differential considered and any EKGs or imaging independently interpreted by provider, where  specified.  In additional to work up documented, I considered the following work up: Documented in chart, if applicable.    External consultation:  Discussion of management with another provider: Other: Colorectal    Complicating factors:  Care impacted by chronic illness: Dementia and Hypertension  Care affected by social determinants of health: N/A    Disposition considerations: Admit.       At the conclusion of the encounter I discussed the results of all of the tests and the disposition. The questions were answered. The patient or family acknowledged understanding and was agreeable with the care plan.     0 minutes of critical care time     MEDICATIONS GIVEN IN THE EMERGENCY:  Medications - No data to display    NEW PRESCRIPTIONS STARTED AT TODAY'S ER VISIT  New Prescriptions    No medications on file          =================================================================    HPI    Patient information was obtained from: EMS, daughter, and patient    Use of : N/A         Angle Agudelo is a 89 year old female with a pertinent history of s/p LeFort partial colpocleisis and perineorrhaphy, dementia, and hypertension who presents to this ED by via EMS for evaluation of rectal/vaginal bleeding.    The patient arrives via EMS from [facility] for evaluation of bleeding in her vaginal/rectal area. The patient underwent a LeFort partial colpocleisis and perineorrhaphy on 1/5/24 due to 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 EMS states she is not on blood thinners.     Per daughter (Daija),   The patient had a follow up appointment with the surgeon that performed the colpocleisis. Using a speculum, the surgeon visualized the sutures and told them that the sutures were healing well but that there may be bleeding. The daughter went and saw the patient earlier today and helped change her briefs around 7:00 PM and did not see any bleeding. However, at 9:00 PM staff at the patient's facility  noted blood in her briefs and after the patient used the bathroom the toilet was filled with blood.     Per patient,   She has no pain but reports some discomfort in her vaginal/rectal area. She denies chest pain, shortness of breath, or any other complaints at this time.       Chart Review:  1/5-1/6/2024: The patient was admitted 12 days ago for LeFort partial colpocleisis and perineorrhaphy. She reported no pain and took only Tylenol for relief. Her Graves catheter was removed on POD 1 and she urinated independently. She had a normal bowel movement prior to discharge. She was discharged home on POD 1.      REVIEW OF SYSTEMS   Refer to the HPI    PAST MEDICAL HISTORY:  Past Medical History:   Diagnosis Date    Acute cerebrovascular accident (CVA) (H) 06/08/2020    Benign neoplasm of colon     Chronic bilateral low back pain without sciatica     Chronic bilateral low back pain without sciatica 08/12/2016    Dementia (H)     Disorder of bone and cartilage, unspecified     Dyspnea on exertion     Female genital prolapse     GI bleed     Memory impairment     Orthopnea     Pure hypercholesterolemia     Rectal prolapse     Skin cancer     Not Melanoma, not sure what kind though    Spinal stenosis of lumbar region     Swelling of both lower extremities     Thickened endometrium     Unspecified arthropathy, shoulder region     Unspecified essential hypertension     Walking troubles        PAST SURGICAL HISTORY:  Past Surgical History:   Procedure Laterality Date    APPENDECTOMY      BIOPSY CERVICAL, LOCAL EXCISION, SINGLE/MULTIPLE N/A 09/11/2018    Procedure: LOOP ELECTROSURGICAL EXCISION PROCEDURE, REMOVAL PESSARY;  Surgeon: Olive Biggs MD;  Location: [facility];  Service:     BIOPSY VULVA N/A 05/14/2020    Procedure: VULVAR BIOPSY;  Surgeon: Olive Biggs MD;  Location: [facility];  Service: Gynecology    CATARACT EXTRACTION EXTRACAPSULAR W/ INTRAOCULAR LENS IMPLANTATION Bilateral      CERVICAL BIOPSY N/A 05/14/2020    Procedure: PUNCH BIOPSY, CERVIX;  Surgeon: Olive Biggs MD;  Location: [facility];  Service: Gynecology    CERVICAL POLYPECTOMY N/A 10/12/2023    Procedure: AND POLYPECTOMY WITH ULTRASOUND GUIDANCE;  Surgeon: Olive Biggs MD;  Location: [facility] OR    CHOLECYSTECTOMY      COLONOSCOPY N/A 01/08/2020    Procedure: COLONOSCOPY;  Surgeon: Amanda Carson MD;  Location: [facility]    DILATION AND CURETTAGE, HYSTEROSCOPY WITH ULTRASOUND GUIDANCE N/A 10/12/2023    Procedure: HYSTEROSCOPY DILATION AND CURETTAGE;  Surgeon: Olive Biggs MD;  Location: [facility]    DILATION AND CURETTAGE, OPERATIVE HYSTEROSCOPY, COMBINED N/A 03/31/2022    Procedure: HYSTEROSCOPY;  Surgeon: Olive Biggs MD;  Location: [facility]    HC DILATION/CURETTAGE DIAG/THER NON OB N/A 05/14/2020    Procedure: DILATION AND CURETTAGE, UTERUS;  Surgeon: Olive Biggs MD;  Location: [facility];  Service: Gynecology    IR EXTREMITY ANGIOGRAM RIGHT  02/13/2018    LEFORTE COLPOCLEISIS PROCEDURE (VAGINAL OBLITERATION) N/A 1/5/2024    Procedure: COLPOCLEISIS;  Surgeon: Olive Biggs MD;  Location: [facility] OR    ORTHOPEDIC SURGERY      OTHER SURGICAL HISTORY      [id number]    OTHER SURGICAL HISTORY      finger abscess    PERINEORRHAPHY N/A 1/5/2024    Procedure: PERINEORRAPHY;  Surgeon: Olive Biggs MD;  Location: [facility]    LA ESOPHAGOGASTRODUODENOSCOPY TRANSORAL DIAGNOSTIC N/A 06/16/2021    Procedure: ESOPHAGOGASTRODUODENOSCOPY (EGD);  Surgeon: Primitivo Lafleur MD;  Location: [facility];  Service: Gastroenterology    RECTOSIGMOIDECTOMY PERINEAL N/A 1/5/2024    Procedure: PERINEAL RECTOSIGMOIDECTOMY;  Surgeon: Shannon Haines MD;  Location: [facility]    SHOULDER SURGERY      bilateral shoulder    SIGMOIDOSCOPY FLEXIBLE N/A 06/03/2018    Procedure: SIGMOIDOSCOPY;  Surgeon: Dayana RECINOS  MD Madelyn;  Location: [facility];  Service:     TONSILLECTOMY & ADENOIDECTOMY      TOTAL SHOULDER ARTHROPLASTY Bilateral R 7/12 L 9/11    US BIOPSY THYROID FINE NEEDLE ASPIRATION  08/07/2020           CURRENT MEDICATIONS:    acetaminophen (TYLENOL) 325 MG tablet  amLODIPine (NORVASC) 10 MG tablet  ASPIRIN LOW DOSE 81 MG chewable tablet  ibuprofen (ADVIL/MOTRIN) 600 MG tablet  lovastatin (MEVACOR) 40 MG tablet  memantine (NAMENDA) 10 MG tablet  ondansetron (ZOFRAN ODT) 4 MG ODT tab  polyethylene glycol (MIRALAX) 17 GM/Dose powder  traMADol (ULTRAM) 50 MG tablet        ALLERGIES:  Allergies   Allergen Reactions    Atorvastatin Muscle Pain (Myalgia)    Dextromethorphan Hives    Oxycodone Unknown    Pravastatin Muscle Pain (Myalgia)    Simvastatin Muscle Pain (Myalgia)    Codeine Rash       FAMILY HISTORY:  Family History   Problem Relation Age of Onset    Heart Disease Father     Coronary Artery Disease Father     Cerebrovascular Disease Father     Coronary Artery Disease Brother     Coronary Artery Disease Brother     Dementia Sister        SOCIAL HISTORY:   Social History     Socioeconomic History    Marital status:    Tobacco Use    Smoking status: Never    Smokeless tobacco: Never   Vaping Use    Vaping Use: Never used   Substance and Sexual Activity    Alcohol use: Yes     Alcohol/week: 2.0 standard drinks of alcohol     Comment: social    Drug use: No     Social Determinants of Health     Financial Resource Strain: Low Risk  (12/19/2023)    Financial Resource Strain     Within the past 12 months, have you or your family members you live with been unable to get utilities (heat, electricity) when it was really needed?: No   Food Insecurity: Low Risk  (12/19/2023)    Food Insecurity     Within the past 12 months, did you worry that your food would run out before you got money to buy more?: No     Within the past 12 months, did the food you bought just not last and you didn t have money to get more?: No    Transportation Needs: Low Risk  (12/19/2023)    Transportation Needs     Within the past 12 months, has lack of transportation kept you from medical appointments, getting your medicines, non-medical meetings or appointments, work, or from getting things that you need?: No   Interpersonal Safety: Low Risk  (10/6/2023)    Interpersonal Safety     Do you feel physically and emotionally safe where you currently live?: Yes     Within the past 12 months, have you been hit, slapped, kicked or otherwise physically hurt by someone?: No     Within the past 12 months, have you been humiliated or emotionally abused in other ways by your partner or ex-partner?: No   Housing Stability: Low Risk  (12/19/2023)    Housing Stability     Do you have housing? : Yes     Are you worried about losing your housing?: No       VITALS:  BP (!) 143/86   Pulse 112   Temp 98.4  F (36.9  C) (Oral)   Resp 20   LMP  (LMP Unknown)   SpO2 98%     PHYSICAL EXAM    Constitutional: Well developed, Well nourished, NAD,  HENT: Normocephalic, Atraumatic,  mucous membranes moist,   Neck- trachea midline, No stridor.    Eyes:EOMI, Conjunctiva normal, No discharge.   Respiratory: Normal breath sounds, No respiratory distress, No wheezing.    Cardiovascular: Normal heart rate, Regular rhythm,  No murmurs   Abdominal: Soft, No rebound or guarding.  LLQ tenderness.   : No vaginal bleeding, perineal stitches intact, clotted blood at anal verge no active bleeding. Chaperoned  Musculoskeletal: no deformity or malalignment   Integument: Warm, Dry, No erythema, No rash. Pale.  Neurologic: Alert & oriented x 3   Psychiatric: Affect normal, Cooperative.      LAB:  All pertinent labs reviewed and interpreted.       RADIOLOGY:  Reviewed all pertinent imaging. Please see official radiology report.  No orders to display       EKG:        PROCEDURES:         [employer] System Documentation:   CMS Diagnoses:              Haider ACOSTA, am serving as a  scribe to document services personally performed by Donny Day MD based on my observation and the provider's statements to me. I, Donny Day MD, attest that Haider Macrina is acting in a scribe capacity, has observed my performance of the services and has documented them in accordance with my direction.    Donny Day MD  [facility] EMERGENCY DEPARTMENT  [address] [place] [id number] [phone]      Donny Day MD  01/17/24 0254

## 2024-01-17 NOTE — MEDICATION SCRIBE - ADMISSION MEDICATION HISTORY
Medication Scribe Admission Medication History    Admission medication history is complete. The information provided in this note is only as accurate as the sources available at the time of the update.    Information Source(s): Facility ([facility]/) medication list/MAR via  Printout    Pertinent Information:     Changes made to PTA medication list:  Added: Hydrocortisone 2.5% cream (per fill history)  Deleted: None  Changed: None    Medication Affordability:       Allergies reviewed with patient and updates made in EHR: yes    Medication History Completed By: Alycia Ramirez 1/17/2024 2:02 AM    PTA Med List   Medication Sig Last Dose    acetaminophen (TYLENOL) 325 MG tablet Take 3 tablets (975 mg) by mouth every 6 hours as needed for mild pain Unknown at prn    amLODIPine (NORVASC) 10 MG tablet TAKE 1 TABLET DAILY 1/16/2024 at am    ASPIRIN LOW DOSE 81 MG chewable tablet CHEW & SWALLOW 1 TABLET ORALLY DAILY 1/16/2024 at am    hydrocortisone 2.5 % cream Apply topically 2 times daily as needed for other Unknown at prn    ibuprofen (ADVIL/MOTRIN) 600 MG tablet Take 1 tablet (600 mg) by mouth every 6 hours as needed for moderate pain Unknown at prn    lovastatin (MEVACOR) 40 MG tablet Take 40 mg by mouth daily 1/16/2024 at am    memantine (NAMENDA) 10 MG tablet Take 10 mg by mouth 2 times daily 1/16/2024 at pm    ondansetron (ZOFRAN ODT) 4 MG ODT tab Take 1 tablet (4 mg) by mouth every 8 hours as needed for nausea Unknown at prn    polyethylene glycol (MIRALAX) 17 GM/Dose powder Take 17 g by mouth daily as needed for constipation Unknown at prn    traMADol (ULTRAM) 50 MG tablet Take 0.5 tablets (25 mg) by mouth every 6 hours as needed (for severe pain not relieved by tylenol) Unknown at prn

## 2024-01-17 NOTE — H&P
[facility]    History and Physical - Hospitalist Service       Date of Admission:  1/16/2024    Assessment & Plan      Angle Agudelo is a 89F presents with acute rectal bleeding; pmhx includes rectosigmoidectomy/colpocleisis (1/5/24), chronic low back pain with sciatica, dementia, HTN/HLD, CVA; admitted to observation for rectal bleeding.    #rectal bleed  Acute episode of bright red bleeding, in setting of recent colorectal surgery. Anticipate likely expected healing course for procedure, with spontaneous bleed.  -CBC/Hgb BID  -type/screen  -PIV access x2  -colorectal surgery notified of observation admission    #leukocytosis  NO additional signs to suggest infection.  -CBC trend    #elevated Creatinine  In setting of acute bleed.  -LR 500ml bolus  -BMP trend    #hyperglycemia  In setting of acute event. Previously low 100s.  -BMP trend    #HTN  Borderline low BP.  -HOLD amlodipine 10    #HLD  -resume lovastatin 40mg at discharge    #dementia  -memantine 10mg PO BID    #code status  Discussed with daughter, Daija Lucas, who has been identified as DPOA. Both Ms. Arceo AND Daija are in agreement, that Marielos would prefer DNR/DNI as a code status should a cardiac event occur.  -DNR/DNI       Observation Goals: -diagnostic tests and consults completed and resulted, -vital signs normal or at patient baseline, Nurse to notify provider when observation goals have been met and patient is ready for discharge.  Diet: Regular Diet Adult  DVT Prophylaxis: Pneumatic Compression Devices  Graves Catheter: Not present  Lines: None     Cardiac Monitoring: None  Code Status: No CPR- Do NOT Intubate    Clinically Significant Risk Factors Present on Admission                # Drug Induced Platelet Defect: home medication list includes an antiplatelet medication   # Hypertension: Noted on problem list   # Dementia: noted on problem list    # Overweight: Estimated body mass index is 29.49 kg/m  as calculated  "from the following:    Height as of 1/5/24: 1.473 m (4' 10\").    Weight as of 1/5/24: 64 kg (141 lb 1.5 oz).         # Financial/Environmental Concerns:           Disposition Plan      Expected Discharge Date: 01/18/2024                  Ronaldo Rashid MD  Hospitalist Service  [facility]  Securely message with [employer] (more info)  Text page via [employer] Paging/Directory     ______________________________________________________________________    Chief Complaint   Rectal bleeding    History is obtained from the patient    History of Present Illness   Angle Agudelo is a 89F presents with acute rectal bleeding; pmhx includes rectosigmoidectomy/colpocleisis (1/5/24), chronic low back pain with sciatica, dementia, HTN/HLD, CVA; admitted to observation for rectal bleeding.    Recently underwent colpocleisis and rectosigmoidectomy for combined management of rectal/vesicle prolapse on January 5.  Had otherwise tolerated her procedures well, minimal pain/discomfort.  Evening of 1/16 she was noticed to have bright red blood in incontinence brief by facility staff.  This was in setting of recent postoperative examination of colpocleisis.  Reexamination in the emergency department was indicative of rectal bleed opposed to vaginal bleeding as potential source for bleeding observed by facility staff.  Hemoglobin relatively stable to anticipated baseline around 11.    Past Medical History    Past Medical History:   Diagnosis Date    Acute cerebrovascular accident (CVA) (H) 06/08/2020    Benign neoplasm of colon     Chronic bilateral low back pain without sciatica     Chronic bilateral low back pain without sciatica 08/12/2016    Dementia (H)     Disorder of bone and cartilage, unspecified     Dyspnea on exertion     Female genital prolapse     Gastroenteritis 6/14/2021    GI bleed     Hiatal hernia 6/16/2021    Memory impairment     Orthopnea     Pure hypercholesterolemia     Rectal prolapse     Skin " cancer     Not Melanoma, not sure what kind though    Spinal stenosis of lumbar region     Swelling of both lower extremities     Thickened endometrium     Unspecified arthropathy, shoulder region     Unspecified essential hypertension     Walking troubles        Past Surgical History   Past Surgical History:   Procedure Laterality Date    APPENDECTOMY      BIOPSY CERVICAL, LOCAL EXCISION, SINGLE/MULTIPLE N/A 09/11/2018    Procedure: LOOP ELECTROSURGICAL EXCISION PROCEDURE, REMOVAL PESSARY;  Surgeon: Olive Biggs MD;  Location: [facility] OR;  Service:     BIOPSY VULVA N/A 05/14/2020    Procedure: VULVAR BIOPSY;  Surgeon: Olive Biggs MD;  Location: [facility] OR;  Service: Gynecology    CATARACT EXTRACTION EXTRACAPSULAR W/ INTRAOCULAR LENS IMPLANTATION Bilateral     CERVICAL BIOPSY N/A 05/14/2020    Procedure: PUNCH BIOPSY, CERVIX;  Surgeon: Olive Biggs MD;  Location: [facility] OR;  Service: Gynecology    CERVICAL POLYPECTOMY N/A 10/12/2023    Procedure: AND POLYPECTOMY WITH ULTRASOUND GUIDANCE;  Surgeon: Olive Biggs MD;  Location: [facility]    CHOLECYSTECTOMY      COLONOSCOPY N/A 01/08/2020    Procedure: COLONOSCOPY;  Surgeon: Amanda Carson MD;  Location: [facility]    DILATION AND CURETTAGE, HYSTEROSCOPY WITH ULTRASOUND GUIDANCE N/A 10/12/2023    Procedure: HYSTEROSCOPY DILATION AND CURETTAGE;  Surgeon: Olive Biggs MD;  Location: [facility]    DILATION AND CURETTAGE, OPERATIVE HYSTEROSCOPY, COMBINED N/A 03/31/2022    Procedure: HYSTEROSCOPY;  Surgeon: Olive Biggs MD;  Location: [facility] DILATION/CURETTAGE DIAG/THER NON OB N/A 05/14/2020    Procedure: DILATION AND CURETTAGE, UTERUS;  Surgeon: Olive Biggs MD;  Location: [facility];  Service: Gynecology    IR EXTREMITY ANGIOGRAM RIGHT  02/13/2018    LEFORTE COLPOCLEISIS PROCEDURE (VAGINAL OBLITERATION) N/A 1/5/2024    Procedure:  COLPOCLEISIS;  Surgeon: Olive Biggs MD;  Location: [facility]    ORTHOPEDIC SURGERY      OTHER SURGICAL HISTORY      [id number]    OTHER SURGICAL HISTORY      finger abscess    PERINEORRHAPHY N/A 1/5/2024    Procedure: PERINEORRAPHY;  Surgeon: Olive Biggs MD;  Location: [facility] OR    FL ESOPHAGOGASTRODUODENOSCOPY TRANSORAL DIAGNOSTIC N/A 06/16/2021    Procedure: ESOPHAGOGASTRODUODENOSCOPY (EGD);  Surgeon: Primitivo Lafleur MD;  Location: [facility] OR;  Service: Gastroenterology    RECTOSIGMOIDECTOMY PERINEAL N/A 1/5/2024    Procedure: PERINEAL RECTOSIGMOIDECTOMY;  Surgeon: Shannon Haines MD;  Location: [facility]    SHOULDER SURGERY      bilateral shoulder    SIGMOIDOSCOPY FLEXIBLE N/A 06/03/2018    Procedure: SIGMOIDOSCOPY;  Surgeon: Dayana Joshi MD;  Location: [facility];  Service:     TONSILLECTOMY & ADENOIDECTOMY      TOTAL SHOULDER ARTHROPLASTY Bilateral R 7/12 L 9/11     BIOPSY THYROID FINE NEEDLE ASPIRATION  08/07/2020       Prior to Admission Medications   Prior to Admission Medications   Prescriptions Last Dose Informant Patient Reported? Taking?   ASPIRIN LOW DOSE 81 MG chewable tablet 1/16/2024 at am Nursing Home No Yes   Sig: CHEW & SWALLOW 1 TABLET ORALLY DAILY   acetaminophen (TYLENOL) 325 MG tablet Unknown at prn Nursing Home No Yes   Sig: Take 3 tablets (975 mg) by mouth every 6 hours as needed for mild pain   amLODIPine (NORVASC) 10 MG tablet 1/16/2024 at am Nursing Home No Yes   Sig: TAKE 1 TABLET DAILY   hydrocortisone 2.5 % cream Unknown at prn skilled nursing Yes Yes   Sig: Apply topically 2 times daily as needed for other   ibuprofen (ADVIL/MOTRIN) 600 MG tablet Unknown at prn Nursing Home No Yes   Sig: Take 1 tablet (600 mg) by mouth every 6 hours as needed for moderate pain   lovastatin (MEVACOR) 40 MG tablet 1/16/2024 at am Nursing Home Yes Yes   Sig: Take 40 mg by mouth daily   memantine (NAMENDA) 10 MG tablet 1/16/2024 at pm  Nursing Home Yes Yes   Sig: Take 10 mg by mouth 2 times daily   ondansetron (ZOFRAN ODT) 4 MG ODT tab Unknown at prn Nursing Home No Yes   Sig: Take 1 tablet (4 mg) by mouth every 8 hours as needed for nausea   polyethylene glycol (MIRALAX) 17 GM/Dose powder Unknown at prn Nursing Home No Yes   Sig: Take 17 g by mouth daily as needed for constipation   traMADol (ULTRAM) 50 MG tablet Unknown at prn Nursing Home No Yes   Sig: Take 0.5 tablets (25 mg) by mouth every 6 hours as needed (for severe pain not relieved by tylenol)      Facility-Administered Medications: None        Review of Systems    The 10 point Review of Systems is negative other than noted in the HPI or here.      Physical Exam   Vital Signs: Temp: 98.4  F (36.9  C) Temp src: Oral BP: 101/58 Pulse: 93   Resp: 20 SpO2: 95 % O2 Device: None (Room air)    Weight: 0 lbs 0 oz    Constitutional: awake, alert, cooperative, no apparent distress, and appears stated age  Respiratory: CTAB  Cardiovascular: RRR 3/6 systolic murmur  Musculoskeletal: shoulder/elbow flexion/extension 4+/5 bilaterally and symmetric  Neurologic: AOx3 (knows type of location, but not specifics) CN II-XII intact, short term memory limited    Medical Decision Making       45 MINUTES SPENT BY ME on the date of service doing chart review, history, exam, documentation & further activities per the note.  MANAGEMENT DISCUSSED with the following over the past 24 hours: patient, daughter   NOTE(S)/MEDICAL RECORDS REVIEWED over the past 24 hours: discharge summary, outpatient FM  Tests ORDERED & REVIEWED in the past 24 hours:  - See lab/imaging results included in the data section of the note      Data     I have personally reviewed the following data over the past 24 hrs:    27.4 (H)  \   10.7 (L)   / 265     138 102 19.0 /  209 (H)   4.4 23 1.04 (H) \     ALT: 5 AST: 17 AP: 98 TBILI: 0.4   ALB: 3.7 TOT PROTEIN: 6.6 LIPASE: N/A     Procal: N/A CRP: N/A Lactic Acid: 1.5       INR:  1.07 PTT:  25    D-dimer:  N/A Fibrinogen:  N/A       Imaging results reviewed over the past 24 hrs:   Recent Results (from the past 24 hour(s))   XR Chest 1 View    Narrative    EXAM: XR CHEST 1 VIEW  LOCATION: [facility]  DATE: 1/17/2024    INDICATION: significant leukocytosis, tachycardia, eval for possible pna  COMPARISON: 02/21/2020      Impression    IMPRESSION: The heart is normal in size. There is mild central pulmonary venous congestion. Bilateral shoulder prosthesis are seen in place.   CT Abdomen Pelvis w Contrast    Narrative    EXAM: CT ABDOMEN PELVIS W CONTRAST  LOCATION: [facility]  DATE: 1/17/2024    INDICATION: rectal bleeding, LLQ pain, s p perineorrhapy and rectosigmoidectomy on 1 5 24  COMPARISON: CT abdomen/pelvis with contrast 06/14/2021  TECHNIQUE: CT scan of the abdomen and pelvis was performed following injection of IV contrast. Multiplanar reformats were obtained. Dose reduction techniques were used.  CONTRAST: ISOVUE 370 75ML    FINDINGS:   LOWER CHEST: Mitral annular calcifications. No focal pulmonary consolidation or pleural effusion.    HEPATOBILIARY: Hepatic steatosis. Cholecystectomy. Reservoir effect of the bile ducts.    PANCREAS: Normal.    SPLEEN: Normal.    ADRENAL GLANDS: Normal.    KIDNEYS/BLADDER: Small renal cysts which require no dedicated follow-up. No hydronephrosis. Unremarkable urinary bladder.    BOWEL: Postsurgical changes of perineorrhaphy and rectosigmoidectomy. Several foci of perirectal fat stranding and free gas, possibly postsurgical. Phlegmonous changes could also have this appearance. No definite evidence of well-defined drainable fluid   collection. Of note the lower most anorectum and portions of the perineum are excluded by collimation. Colonic diverticulosis. Moderate colonic stool. No bowel obstruction.    LYMPH NODES: Normal.    VASCULATURE: Tortuous abdominal aorta. 3.7 cm infrarenal abdominal aortic aneurysm, stable.  Diffuse atherosclerotic calcifications of the aorta and its branches.    PELVIC ORGANS: Fluid distended endometrial canal.    MUSCULOSKELETAL: Multilevel degenerative changes of the thoracic and lumbosacral spine. Scoliosis. No acute osseous abnormality or suspicious bony lesion.      Impression    IMPRESSION:   1.  Postsurgical changes of perineorrhaphy and rectosigmoidectomy. Several foci of perirectal fat stranding and free gas, possibly postsurgical. Phlegmonous changes could also have this appearance. No definite evidence of well-defined drainable fluid   collection. If there is concern for bowel perforation a CT with rectal contrast could be performed. Of note the lower most anorectum and portions of the perineum are excluded by collimation.  2.  Fluid distended endometrial canal. Consider pelvic ultrasound for further evaluation which may be performed on a nonemergent outpatient basis.

## 2024-01-17 NOTE — ED TRIAGE NOTES
Pt arrived via EMS from [facility] for bleeding unknown whether it is either from vaginal or rectal. Pt had a vaginal exam today and today staff found her diaper soaked with blood in the last couple hours. EMS states pt is not on blood thinners. Hx of recent rectal surgery on January 5th as well, and dementia.      Triage Assessment (Adult)       Row Name 01/16/24 0957          Triage Assessment    Airway WDL WDL        Respiratory WDL    Respiratory WDL WDL        Cardiac WDL    Cardiac WDL WDL

## 2024-01-17 NOTE — PROGRESS NOTES
"PRIMARY DIAGNOSIS: \"GENERIC\" NURSING  OUTPATIENT/OBSERVATION GOALS TO BE MET BEFORE DISCHARGE:  ADLs back to baseline: No    Activity and level of assistance: Up with maximum assistance. Consider SW and/or PT evaluation.     Pain status: Pain free.    Return to near baseline physical activity: Yes     Discharge Planner Nurse   Safe discharge environment identified: Yes  Barriers to discharge: Yes       Entered by: Panchito Agudelo RN 01/17/2024 4:19 PM     Please review provider order for any additional goals.   Nurse to notify provider when observation goals have been met and patient is ready for discharge.  "

## 2024-01-17 NOTE — PROGRESS NOTES
Brief progress note:    Evaluated patient at bedside at approximately 10:09 AM.  Patient appears forgetful.  Does not remember having any bleeding from her rectum.  Does not remember undergoing a procedure on 1/5.  Continue gentle IV fluids for acute kidney injury.  Awaiting evaluation by colorectal surgery.  Trend hemoglobin.  Leukocytosis of 27-possibly reactive related to recent surgery and bleeding?  No other signs of infection noted.    Appreciate colorectal input regarding stability for discharge or other follow-up instructions.     Alysia Pillai, DO

## 2024-01-18 LAB
ANION GAP SERPL CALCULATED.3IONS-SCNC: 8 MMOL/L (ref 7–15)
BASOPHILS # BLD AUTO: 0.1 10E3/UL (ref 0–0.2)
BASOPHILS NFR BLD AUTO: 1 %
BUN SERPL-MCNC: 8.3 MG/DL (ref 8–23)
C DIFF TOX B STL QL: NEGATIVE
CALCIUM SERPL-MCNC: 8.6 MG/DL (ref 8.2–9.6)
CHLORIDE SERPL-SCNC: 107 MMOL/L (ref 98–107)
CREAT SERPL-MCNC: 0.79 MG/DL (ref 0.51–0.95)
DEPRECATED HCO3 PLAS-SCNC: 27 MMOL/L (ref 22–29)
EGFRCR SERPLBLD CKD-EPI 2021: 70 ML/MIN/1.73M2
EOSINOPHIL # BLD AUTO: 0.3 10E3/UL (ref 0–0.7)
EOSINOPHIL NFR BLD AUTO: 3 %
ERYTHROCYTE [DISTWIDTH] IN BLOOD BY AUTOMATED COUNT: 17.2 % (ref 10–15)
GLUCOSE SERPL-MCNC: 80 MG/DL (ref 70–99)
HCT VFR BLD AUTO: 30.6 % (ref 35–47)
HGB BLD-MCNC: 9.1 G/DL (ref 11.7–15.7)
HGB BLD-MCNC: 9.7 G/DL (ref 11.7–15.7)
IMM GRANULOCYTES # BLD: 0.1 10E3/UL
IMM GRANULOCYTES NFR BLD: 1 %
LYMPHOCYTES # BLD AUTO: 1.7 10E3/UL (ref 0.8–5.3)
LYMPHOCYTES NFR BLD AUTO: 16 %
MCH RBC QN AUTO: 27.1 PG (ref 26.5–33)
MCHC RBC AUTO-ENTMCNC: 29.7 G/DL (ref 31.5–36.5)
MCV RBC AUTO: 91 FL (ref 78–100)
MONOCYTES # BLD AUTO: 1 10E3/UL (ref 0–1.3)
MONOCYTES NFR BLD AUTO: 9 %
NEUTROPHILS # BLD AUTO: 7.4 10E3/UL (ref 1.6–8.3)
NEUTROPHILS NFR BLD AUTO: 70 %
NRBC # BLD AUTO: 0 10E3/UL
NRBC BLD AUTO-RTO: 0 /100
PLATELET # BLD AUTO: 201 10E3/UL (ref 150–450)
POTASSIUM SERPL-SCNC: 3.9 MMOL/L (ref 3.4–5.3)
RBC # BLD AUTO: 3.36 10E6/UL (ref 3.8–5.2)
SODIUM SERPL-SCNC: 142 MMOL/L (ref 135–145)
WBC # BLD AUTO: 10.5 10E3/UL (ref 4–11)

## 2024-01-18 PROCEDURE — 87493 C DIFF AMPLIFIED PROBE: CPT | Performed by: STUDENT IN AN ORGANIZED HEALTH CARE EDUCATION/TRAINING PROGRAM

## 2024-01-18 PROCEDURE — 85018 HEMOGLOBIN: CPT | Performed by: STUDENT IN AN ORGANIZED HEALTH CARE EDUCATION/TRAINING PROGRAM

## 2024-01-18 PROCEDURE — 36415 COLL VENOUS BLD VENIPUNCTURE: CPT | Performed by: STUDENT IN AN ORGANIZED HEALTH CARE EDUCATION/TRAINING PROGRAM

## 2024-01-18 PROCEDURE — 99232 SBSQ HOSP IP/OBS MODERATE 35: CPT | Performed by: INTERNAL MEDICINE

## 2024-01-18 PROCEDURE — 96376 TX/PRO/DX INJ SAME DRUG ADON: CPT

## 2024-01-18 PROCEDURE — 250N000013 HC RX MED GY IP 250 OP 250 PS 637: Performed by: INTERNAL MEDICINE

## 2024-01-18 PROCEDURE — G0378 HOSPITAL OBSERVATION PER HR: HCPCS

## 2024-01-18 PROCEDURE — 36415 COLL VENOUS BLD VENIPUNCTURE: CPT | Performed by: INTERNAL MEDICINE

## 2024-01-18 PROCEDURE — 80048 BASIC METABOLIC PNL TOTAL CA: CPT | Performed by: INTERNAL MEDICINE

## 2024-01-18 PROCEDURE — 96361 HYDRATE IV INFUSION ADD-ON: CPT

## 2024-01-18 PROCEDURE — 250N000013 HC RX MED GY IP 250 OP 250 PS 637: Performed by: STUDENT IN AN ORGANIZED HEALTH CARE EDUCATION/TRAINING PROGRAM

## 2024-01-18 PROCEDURE — 250N000011 HC RX IP 250 OP 636: Performed by: INTERNAL MEDICINE

## 2024-01-18 PROCEDURE — 85025 COMPLETE CBC W/AUTO DIFF WBC: CPT | Performed by: INTERNAL MEDICINE

## 2024-01-18 RX ORDER — MEMANTINE HYDROCHLORIDE 10 MG/1
TABLET ORAL
Qty: 56 TABLET | Refills: 11 | OUTPATIENT
Start: 2024-01-18

## 2024-01-18 RX ORDER — ATORVASTATIN CALCIUM 10 MG/1
10 TABLET, FILM COATED ORAL DAILY
Status: DISCONTINUED | OUTPATIENT
Start: 2024-01-18 | End: 2024-01-18 | Stop reason: ALTCHOICE

## 2024-01-18 RX ORDER — AMLODIPINE BESYLATE 10 MG/1
10 TABLET ORAL DAILY
Qty: 28 TABLET | Refills: 4 | Status: SHIPPED | OUTPATIENT
Start: 2024-01-18 | End: 2024-06-07

## 2024-01-18 RX ORDER — AMLODIPINE BESYLATE 5 MG/1
10 TABLET ORAL DAILY
Status: DISCONTINUED | OUTPATIENT
Start: 2024-01-18 | End: 2024-01-21 | Stop reason: HOSPADM

## 2024-01-18 RX ORDER — LOVASTATIN 20 MG
40 TABLET ORAL AT BEDTIME
Status: DISCONTINUED | OUTPATIENT
Start: 2024-01-18 | End: 2024-01-21 | Stop reason: HOSPADM

## 2024-01-18 RX ADMIN — PIPERACILLIN AND TAZOBACTAM 3.38 G: 3; .375 INJECTION, POWDER, FOR SOLUTION INTRAVENOUS at 04:17

## 2024-01-18 RX ADMIN — MEMANTINE 10 MG: 10 TABLET ORAL at 09:33

## 2024-01-18 RX ADMIN — MEMANTINE 10 MG: 10 TABLET ORAL at 20:36

## 2024-01-18 RX ADMIN — LOVASTATIN 40 MG: 20 TABLET ORAL at 20:37

## 2024-01-18 RX ADMIN — PIPERACILLIN AND TAZOBACTAM 3.38 G: 3; .375 INJECTION, POWDER, FOR SOLUTION INTRAVENOUS at 14:18

## 2024-01-18 RX ADMIN — PIPERACILLIN AND TAZOBACTAM 3.38 G: 3; .375 INJECTION, POWDER, FOR SOLUTION INTRAVENOUS at 20:32

## 2024-01-18 ASSESSMENT — ACTIVITIES OF DAILY LIVING (ADL)
ADLS_ACUITY_SCORE: 47

## 2024-01-18 NOTE — PROGRESS NOTES
[facility]    PROGRESS NOTE - Hospitalist Service    ASSESSMENT AND PLAN     Active Problems:    Rectal bleeding    Angle Agudelo is a 89 year old female with a history of CVA, colon neoplasm, sciatica, dementia, GI bleed, hiatal hernia, hyperlipidemia, hypertension who presented with rectal bleeding following rectosigmoidectomy/colpocleisis (1/5/24).      Acute rectal bleed in setting of recent colorectal surgery.   Colorectal surgery following  Hemoglobin remains stable  Concerns for possible contained disruption of the anastomosis.  Colorectal surgery recommending Zosyn and clear liquid diet  No plan for surgical intervention at this time  Appreciate any further colorectal recommendations.  Holding home ASA     Leukocytosis- resolved   Concern for possible infection versus reactive  On Zosyn currently     KIARRA  Baseline creatinine of elevated Creatinine 0.6-0.7  Resolved after IV fluids     Hyperglycemia-resolved  Likely reactive      HTN  Borderline low BP.  -Holding amlodipine 10     HLD  -resumed PTA lovastatin      Dementia  -memantine 10mg PO BID     Barriers to discharge: Colorectal surgery recommendations, IV antibiotics    Anticipated length of stay: 1 to 2 days      Present on Admission:   Rectal bleeding      Subjective:  Patient resting comfortably in bed.  Complains of no abdominal pain.  Does not remember having bloody bowel movements.  No other complaints.    PHYSICAL EXAM  Temp:  [97.7  F (36.5  C)-98  F (36.7  C)] 97.9  F (36.6  C)  Pulse:  [70-89] 70  Resp:  [14-20] 17  BP: (111-129)/(58-79) 111/58  SpO2:  [92 %-98 %] 92 %  Wt Readings from Last 1 Encounters:   01/05/24 64 kg (141 lb 1.5 oz)       Intake/Output Summary (Last 24 hours) at 1/18/2024 1121  Last data filed at 1/18/2024 0600  Gross per 24 hour   Intake 460.84 ml   Output 1250 ml   Net -789.16 ml      There is no height or weight on file to calculate BMI.    GENRL: Alert and answering some questions, confused.  . Not in acute distress. Lying in bed   CHEST: Clear to auscultation bilaterally. No wheezes, rhonchi or crackles. Breathing easily   HEART: Regular rate, + murmur, PVC's/PAC's noted   ABDMN: Soft. Non-tender, non-distended. No organomegaly. No guarding or rigidity. Bowel sounds present   EXTRM: trace pedal edema  NEURO: Following commands No involuntary movements. Normal mentation  PSYCH: Normal affect and mood.   INTGM: No skin rash, no cyanosis or clubbing    Medical Decision Making       35 MINUTES SPENT BY ME on the date of service doing chart review, history, exam, documentation & further activities per the note.      PERTINENT LABS/IMAGING:  Results for orders placed or performed during the hospital encounter of 01/16/24   CT Abdomen Pelvis w Contrast    Impression    IMPRESSION:   1.  Postsurgical changes of perineorrhaphy and rectosigmoidectomy. Several foci of perirectal fat stranding and free gas, possibly postsurgical. Phlegmonous changes could also have this appearance. No definite evidence of well-defined drainable fluid   collection. If there is concern for bowel perforation a CT with rectal contrast could be performed. Of note the lower most anorectum and portions of the perineum are excluded by collimation.  2.  Fluid distended endometrial canal. Consider pelvic ultrasound for further evaluation which may be performed on a nonemergent outpatient basis.   XR Chest 1 View    Impression    IMPRESSION: The heart is normal in size. There is mild central pulmonary venous congestion. Bilateral shoulder prosthesis are seen in place.     Most Recent 3 CBC's:  Recent Labs   Lab Test 01/18/24  0533 01/17/24  2043 01/17/24  1218 01/17/24  0001   WBC 10.5  --  14.5* 27.4*   HGB 9.1* 8.8* 8.5* 10.7*   MCV 91  --  89 90     --  192 265     Most Recent 3 BMP's:  Recent Labs   Lab Test 01/18/24  0533 01/17/24  1859 01/17/24  1218 01/17/24  0001     --  142 138   POTASSIUM 3.9  --  4.0 4.4   CHLORIDE 107  " --  108* 102   CO2 27  --  29 23   BUN 8.3  --  12.0 19.0   CR 0.79  --  0.76 1.04*   ANIONGAP 8  --  5* 13   BUBBA 8.6  --  8.5 8.8   GLC 80 89 91 209*     Most Recent 2 LFT's:  Recent Labs   Lab Test 01/17/24  0001 10/06/23  1458   AST 17 22   ALT 5 8   ALKPHOS 98 101   BILITOTAL 0.4 0.3       Recent Labs   Lab Test 06/04/21  0955   CHOL 142   HDL 52   LDL 78   TRIG 59     Recent Labs   Lab Test 06/04/21  0955 06/09/20  0653 06/07/17  1100   LDL 78 141* 92     Recent Labs   Lab Test 01/18/24  0533      POTASSIUM 3.9   CHLORIDE 107   CO2 27   GLC 80   BUN 8.3   CR 0.79   GFRESTIMATED 70   BUBBA 8.6     No results for input(s): \"A1C\" in the last 81172 hours.  Recent Labs   Lab Test 01/18/24  0533 01/17/24  2043 01/17/24  1218   HGB 9.1* 8.8* 8.5*     Recent Labs   Lab Test 06/15/21  0752 06/15/21  0144 06/14/21  1803   TROPONINI 0.02 0.02 0.05     Recent Labs   Lab Test 12/19/23  1446   NTBNP 424     Recent Labs   Lab Test 06/14/21  1803   TSH 0.51     Recent Labs   Lab Test 01/17/24  0001 01/10/21  1014 01/09/21  1645   INR 1.07 1.09 1.08       Alysia Pillai, DO  Hospitalist Service  [facility]      "

## 2024-01-18 NOTE — PLAN OF CARE
"PRIMARY DIAGNOSIS: \"GENERIC\" NURSING  OUTPATIENT/OBSERVATION GOALS TO BE MET BEFORE DISCHARGE:  ADLs back to baseline: No    Activity and level of assistance: in bed    Pain status: Pain free.    Return to near baseline physical activity: No     Discharge Planner Nurse   Safe discharge environment identified: No  Barriers to discharge: Yes       Entered by: Ge Rodriguez RN 01/18/2024 2:48 AM     Please review provider order for any additional goals.   Nurse to notify provider when observation goals have been met and patient is ready for discharge.Goal Outcome Evaluation:    Pt alert and confused. Dime sized blood clot in brief. Pt assist care x2. Denies pain. Plan of care ongoing.       "

## 2024-01-18 NOTE — PROGRESS NOTES
Col[facility]al [facility]  Daily Progress Note    Subjective  Patient states she is feeling well this morning. She denies any abdominal or rectal pain. Per RN notes, she had one dime sized blood clot in her brief and one small brown stool. She remains afebrile and vitally stable.     Hgb 9.1 from 8.8, 8.5, 10.7  WBC 10.5 from 14.5, 27.4  Cr 0.79    Objective  Intake/Output last 24 hrs:    Intake/Output Summary (Last 24 hours) at 1/18/2024 0823  Last data filed at 1/18/2024 0600  Gross per 24 hour   Intake 540.84 ml   Output 1250 ml   Net -709.16 ml     Temp:  [97.7  F (36.5  C)-99.2  F (37.3  C)] 97.9  F (36.6  C)  Pulse:  [70-89] 70  Resp:  [14-32] 17  BP: (111-129)/(56-79) 111/58  SpO2:  [92 %-98 %] 92 %    Physical Exam:  General: awake, alert, lying in bed, in no acute distress  Head: normocephalic, atraumatic  Respiratory: non-labored breathing  Abdomen: soft, non tender, non-distended  Skin: No rashes or lesions  Musculoskeletal: moves all four extremities equally  Psychological: alert, answers questions but poor recall    Pertinent Labs  Lab Results: personally reviewed.  Lab Results   Component Value Date     01/18/2024     01/17/2024     01/17/2024     01/13/2021     01/12/2021     01/11/2021    CO2 27 01/18/2024    CO2 29 01/17/2024    CO2 23 01/17/2024    CO2 25 03/17/2022    CO2 25 06/15/2021    CO2 27 06/14/2021    CO2 26 01/13/2021    CO2 29 01/12/2021    CO2 27 01/11/2021    BUN 8.3 01/18/2024    BUN 12.0 01/17/2024    BUN 19.0 01/17/2024    BUN 20 03/17/2022    BUN 11 06/15/2021    BUN 12 06/14/2021    BUN 9 01/13/2021    BUN 11 01/12/2021    BUN 17 01/11/2021     Lab Results   Component Value Date    WBC 10.5 01/18/2024    WBC 14.5 01/17/2024    WBC 27.4 01/17/2024    WBC 5.2 01/13/2021    WBC 5.5 01/12/2021    WBC 6.3 01/11/2021    HGB 9.1 01/18/2024    HGB 8.8 01/17/2024    HGB 8.5 01/17/2024    HGB 11.1 01/13/2021    HGB 12.6 01/12/2021    HGB 10.8 01/12/2021     HCT 30.6 01/18/2024    HCT 27.8 01/17/2024    HCT 34.9 01/17/2024    HCT 37.3 01/13/2021    HCT 34.5 01/12/2021    HCT 34.0 01/11/2021    MCV 91 01/18/2024    MCV 89 01/17/2024    MCV 90 01/17/2024     01/13/2021    MCV 99 01/12/2021    MCV 99 01/11/2021     01/18/2024     01/17/2024     01/17/2024     01/13/2021     01/12/2021     01/11/2021       Assessment/Plan: This is a 89 year old female w/ dementia and rectal/vaginal prolapse POD #13 s/p perineal rectosigmoidectomy presenting w/ rectal bleeding. Hgb stable and bleeding appears to be slowing down. Her vitals are normal, labs are reassuring, and she does not have peritonitis or rectal pain. Will plan to continue with conservative management, no plans to take her for an EUA at this point.     - OK for clear liquids  - Continue Zosyn  - Monitor for further rectal bleeding  - Supportive cares per primary  - Please notify CRS immediately for any change in patient condition      Discussed with Dr. Zaki Manrique PA-C  [facility]  [phone]..............................main    COLORECTAL STAFF ADDENDUM  Patient seen and examined by me. Agree with excellent note by PA. Very mild amount of red streak on BM today. Denies abdominal pain. Denies rectal pain but had some while having a BM. No pain now.     Vitals reviewed. Mildly higher HR at time of my exam 90's -100 but 70-90s all day per nursing  Awake alert, nad, nontoxic  Abd soft, no guarding or rebound  Rectal - brown soft stool, no blood; rectal exam performed. Anastomosis is palpable and feels intact, more proximally there is some firmness on the posterior wall of the vagina    A/p 13 days out from perineal rectosigmoidectomy with colpocleisis admitted with bleeding. Question of possible anastomotic disruption versus post-surgical changes. Bleeding has significantly slowed/resolved. As she seems to be improving, at this point,  I am recommending continued conservative management. If she were to spike a fever or her WBC goes up again, then I would recommend repeating a CT scan and then considering an exam under anesthesia to evaluate the anastomosis based on the results.   - continue abx  - recheck labs tomorrow  - clear liquids  - repeat CT pelvis if worsening clinical condition  - if continues to do well, could potentially advance diet and discharge in next day or so  - will follow closely        Shannon Haines MD, MS, FACS, FASCRS  [facility]  Office: [phone]  1/18/2024 7:06 PM

## 2024-01-18 NOTE — PLAN OF CARE
PRIMARY DIAGNOSIS: ACUTE PAIN  OUTPATIENT/OBSERVATION GOALS TO BE MET BEFORE DISCHARGE:  1. Pain Status: Improved-controlled with oral pain medications.    2. Return to near baseline physical activity: No    3. Cleared for discharge by consultants (if involved): No    Discharge Planner Nurse   Safe discharge environment identified: No  Barriers to discharge: Yes       Entered by: Ge Rodriguez RN 01/18/2024 6:01 AM     Please review provider order for any additional goals.   Nurse to notify provider when observation goals have been met and patient is ready for discharge.Goal Outcome Evaluation:    Pt alert and oriented to self.Denies pain. . S/Cath. 600ml. Zosyn running and 50 ml NS. Scant amount of brown stool on brief, unable to collect.

## 2024-01-18 NOTE — PROGRESS NOTES
Patient voided twice during [id number] small, incontinent amounts. Bladder scanned at 1930 for 632. Notified  via [employer]. Ordered straight catheter protocol. Straight catheterized patient for a total of 650 ml of urine. Patient tolerated.

## 2024-01-18 NOTE — TELEPHONE ENCOUNTER
Pretty sure namenda is managed by her neurologist out of [facility].    Koko Betancourt, CNP

## 2024-01-19 ENCOUNTER — APPOINTMENT (OUTPATIENT)
Dept: CT IMAGING | Facility: HOSPITAL | Age: 89
DRG: 378 | End: 2024-01-19
Payer: MEDICARE

## 2024-01-19 LAB
ERYTHROCYTE [DISTWIDTH] IN BLOOD BY AUTOMATED COUNT: 17.2 % (ref 10–15)
HCT VFR BLD AUTO: 30.5 % (ref 35–47)
HGB BLD-MCNC: 9.2 G/DL (ref 11.7–15.7)
MCH RBC QN AUTO: 27.2 PG (ref 26.5–33)
MCHC RBC AUTO-ENTMCNC: 30.2 G/DL (ref 31.5–36.5)
MCV RBC AUTO: 90 FL (ref 78–100)
PLATELET # BLD AUTO: 193 10E3/UL (ref 150–450)
RBC # BLD AUTO: 3.38 10E6/UL (ref 3.8–5.2)
WBC # BLD AUTO: 13.2 10E3/UL (ref 4–11)

## 2024-01-19 PROCEDURE — 250N000013 HC RX MED GY IP 250 OP 250 PS 637: Performed by: STUDENT IN AN ORGANIZED HEALTH CARE EDUCATION/TRAINING PROGRAM

## 2024-01-19 PROCEDURE — 96376 TX/PRO/DX INJ SAME DRUG ADON: CPT

## 2024-01-19 PROCEDURE — 74177 CT ABD & PELVIS W/CONTRAST: CPT | Mod: MG

## 2024-01-19 PROCEDURE — 85014 HEMATOCRIT: CPT | Performed by: COLON & RECTAL SURGERY

## 2024-01-19 PROCEDURE — 36415 COLL VENOUS BLD VENIPUNCTURE: CPT | Performed by: COLON & RECTAL SURGERY

## 2024-01-19 PROCEDURE — G0378 HOSPITAL OBSERVATION PER HR: HCPCS

## 2024-01-19 PROCEDURE — 99232 SBSQ HOSP IP/OBS MODERATE 35: CPT | Performed by: INTERNAL MEDICINE

## 2024-01-19 PROCEDURE — 250N000013 HC RX MED GY IP 250 OP 250 PS 637: Performed by: INTERNAL MEDICINE

## 2024-01-19 PROCEDURE — 250N000011 HC RX IP 250 OP 636: Performed by: INTERNAL MEDICINE

## 2024-01-19 RX ORDER — IOPAMIDOL 755 MG/ML
69 INJECTION, SOLUTION INTRAVASCULAR ONCE
Status: COMPLETED | OUTPATIENT
Start: 2024-01-19 | End: 2024-01-19

## 2024-01-19 RX ADMIN — IOPAMIDOL 69 ML: 755 INJECTION, SOLUTION INTRAVENOUS at 11:34

## 2024-01-19 RX ADMIN — PIPERACILLIN AND TAZOBACTAM 3.38 G: 3; .375 INJECTION, POWDER, FOR SOLUTION INTRAVENOUS at 20:16

## 2024-01-19 RX ADMIN — LOVASTATIN 40 MG: 20 TABLET ORAL at 21:43

## 2024-01-19 RX ADMIN — MEMANTINE 10 MG: 10 TABLET ORAL at 20:21

## 2024-01-19 RX ADMIN — PIPERACILLIN AND TAZOBACTAM 3.38 G: 3; .375 INJECTION, POWDER, FOR SOLUTION INTRAVENOUS at 13:22

## 2024-01-19 RX ADMIN — MEMANTINE 10 MG: 10 TABLET ORAL at 10:24

## 2024-01-19 RX ADMIN — PIPERACILLIN AND TAZOBACTAM 3.38 G: 3; .375 INJECTION, POWDER, FOR SOLUTION INTRAVENOUS at 03:57

## 2024-01-19 ASSESSMENT — ACTIVITIES OF DAILY LIVING (ADL)
ADLS_ACUITY_SCORE: 47
ADLS_ACUITY_SCORE: 38
ADLS_ACUITY_SCORE: 47
ADLS_ACUITY_SCORE: 38
ADLS_ACUITY_SCORE: 47
ADLS_ACUITY_SCORE: 38

## 2024-01-19 NOTE — PROGRESS NOTES
PRIMARY DIAGNOSIS: GI BLEED    OUTPATIENT/OBSERVATION GOALS TO BE MET BEFORE DISCHARGE  Orthostatic performed: N/A    Stable Hgb Yes.   Recent Labs   Lab Test 01/19/24  0714 01/18/24 2029 01/18/24  0533   HGB 9.2* 9.7* 9.1*       Resolved or declined bleeding episodes: Yes Last episode: 1/16    Appropriate testing complete: Yes    Cleared for discharge by consultants (if involved): No    Safe discharge environment identified: Yes    Discharge Planner Nurse   Safe discharge environment identified: Yes  Barriers to discharge:  Patient still followed by colorectal, Assessing for tolerance of advanced diet, Trending WBC count, Still on IV antibiotics       Entered by: Caroline Moreno RN 01/19/2024 3:03 PM     Please review provider order for any additional goals.   Nurse to notify provider when observation goals have been met and patient is ready for discharge.

## 2024-01-19 NOTE — PROGRESS NOTES
PRIMARY DIAGNOSIS: GI BLEED    OUTPATIENT/OBSERVATION GOALS TO BE MET BEFORE DISCHARGE  Orthostatic performed: N/A    Stable Hgb Yes.   Recent Labs   Lab Test 01/19/24  0714 01/18/24 2029 01/18/24  0533   HGB 9.2* 9.7* 9.1*       Resolved or declined bleeding episodes: No,  without any bleeding Last episode: yesterday per repot    Appropriate testing complete: Yes    Cleared for discharge by consultants (if involved): No    Safe discharge environment identified: No    Discharge Planner Nurse   Safe discharge environment identified: No  Barriers to discharge: Yes , per provider monitor overnight .    Writer received this pt at 1500. Pt was oriented times 3 and seems forgetful at times. She denied any pain. Vitals stable. Graves intact and draining well. No bowel movement and no bleeding noted this shift. Per report, she had loose BM in am but no blood noted. Report given to p1 nurse at 1743.         Entered by: Denny Miguel RN 01/19/2024 5:42 PM     Please review provider order for any additional goals.   Nurse to notify provider when observation goals have been met and patient is ready for discharge.

## 2024-01-19 NOTE — PROGRESS NOTES
Colon and Rectal Surgery  Daily Progress Note    Subjective  Patient reports she is doing well this morning. She was having her pad changed by the RN and CNA due to a BM. There was no melena or blood in the stool. She denies any abdominal or rectal pain. Remains afebrile and VSS.    WBC 13.2 (10.5)  Hgb 9.2 (9.7, 9.1)    Objective  Intake/Output last 24 hrs:    Intake/Output Summary (Last 24 hours) at 1/19/2024 0811  Last data filed at 1/18/2024 2052  Gross per 24 hour   Intake --   Output 1375 ml   Net -1375 ml     Temp:  [98.9  F (37.2  C)] 98.9  F (37.2  C)  Pulse:  [] 80  Resp:  [16-20] 16  BP: (112-137)/(64-69) 112/69  SpO2:  [97 %-98 %] 97 %    Physical Exam:  General: awake, alert, lying in bed, in no acute distress  Head: normocephalic, atraumatic  Respiratory: non-labored breathing  Abdomen: soft, appropriately tender, non-distended  Skin: No rashes or lesions  Musculoskeletal: moves all four extremities equally  Psychological: alert, answers questions, but poor recall, and states she is confused    Pertinent Labs  Lab Results: personally reviewed.  Lab Results   Component Value Date     01/18/2024     01/17/2024     01/17/2024     01/13/2021     01/12/2021     01/11/2021    CO2 27 01/18/2024    CO2 29 01/17/2024    CO2 23 01/17/2024    CO2 25 03/17/2022    CO2 25 06/15/2021    CO2 27 06/14/2021    CO2 26 01/13/2021    CO2 29 01/12/2021    CO2 27 01/11/2021    BUN 8.3 01/18/2024    BUN 12.0 01/17/2024    BUN 19.0 01/17/2024    BUN 20 03/17/2022    BUN 11 06/15/2021    BUN 12 06/14/2021    BUN 9 01/13/2021    BUN 11 01/12/2021    BUN 17 01/11/2021     Lab Results   Component Value Date    WBC 13.2 01/19/2024    WBC 10.5 01/18/2024    WBC 14.5 01/17/2024    WBC 5.2 01/13/2021    WBC 5.5 01/12/2021    WBC 6.3 01/11/2021    HGB 9.2 01/19/2024    HGB 9.7 01/18/2024    HGB 9.1 01/18/2024    HGB 11.1 01/13/2021    HGB 12.6 01/12/2021    HGB 10.8 01/12/2021    HCT 30.5  01/19/2024    HCT 30.6 01/18/2024    HCT 27.8 01/17/2024    HCT 37.3 01/13/2021    HCT 34.5 01/12/2021    HCT 34.0 01/11/2021    MCV 90 01/19/2024    MCV 91 01/18/2024    MCV 89 01/17/2024     01/13/2021    MCV 99 01/12/2021    MCV 99 01/11/2021     01/19/2024     01/18/2024     01/17/2024     01/13/2021     01/12/2021     01/11/2021       Assessment/Plan: This is a 89 year old female w/ dementia and rectal/vaginal prolapse POD #14 s/p perineal rectosigmoidectomy presenting w/ rectal bleeding. Hgb stable and bleeding appears to be slowing down. Her vitals are normal, slight increase in WBC today will repeat CT scan. Will monitor findings and determine if OR necessary depending on these results.      -repeat CT pelvis for WBC 13.2 (10.5)   -Continue clear liquid diet, revaluate after CT results  -Continue Zosyn  -Monitor for further rectal bleeding  -Supportive cares per primary  -Please notify CRS immediately for any change in patient condition     Discussed with Dr. Zaki Vizcarra PA-C  [facility]  [phone]..............................main         ADDENDUM:  CT scan showed a 3.6 x 3.1 x 4.9 cm debris and air collection in the region of the anus that appears to communicate with the bowel lumen, which could represent a contained collection at the surgical site.     Discussed with Dr. Zaki ROBLES for low fiber diet. Will recheck labs in the morning and plan to keep her 1-2 more days to monitor for any worsening pain, fevers, or change in clinical status.       Irena Manrique PA-C January 19, 2024  12:47 PM       COLORECTAL STAFF ADDENDUM  Patient seen and examined by me. Agree with excellent note by PA. Patient feeling well. Reports eating some food. Denies abdominal pain or rectal pain. No bleeding.     Vitals reviewed  Awake, alert, NAD  RRR  Breathing nonlabored  Abd soft, NTTP    CT reviewed - no intraabdominal fluid or air; small  pocket in anterior anus    A/P: 2 weeks postop from [facility] likely contained leak; pt not septic and appears clinically well.  Bleeding has resolved. Given her relatively lack of symptoms and that she appears clinically well, we will continue with conservative management. If she were to show any sign of worsening/decline in clinical condition, would need to consider diverting colostomy, but that may/may not be within patient and family wishes.   - okay for low fiber diet  - monitor labs  - continue antibiotics; anticipate 2 week course on discharge  - potentially home in 1-2 days if she continues to do well   - hopefully can increase activity (may be difficult as she has now been in the ER for 2.5 days)  - my partner to cover over weekend      Shannon Haines MD, MS, FACS, FASCRS  [facility]  Office: [phone]  1/19/2024 4:02 PM

## 2024-01-19 NOTE — PROGRESS NOTES
Care Management Follow Up    Length of Stay (days): 0    Expected Discharge Date: 01/19/2024     Concerns to be Addressed: discharge planning     Patient plan of care discussed at interdisciplinary rounds: No    Anticipated Discharge Disposition:  likely back to TCU.     Anticipated Discharge Services:    Anticipated Discharge DME:      Patient/family educated on Medicare website which has current facility and service quality ratings:    Education Provided on the Discharge Plan:    Patient/Family in Agreement with the Plan:      Referrals Placed by CM/SW:    Private pay costs discussed: Not applicable    Additional Information:  Possible discharge 1/19/24. Should be able to return to [facility] at discharge.    Lianna Vargas RN

## 2024-01-19 NOTE — PROGRESS NOTES
PRIMARY DIAGNOSIS: GI BLEED    OUTPATIENT/OBSERVATION GOALS TO BE MET BEFORE DISCHARGE  Orthostatic performed: N/A    Stable Hgb Yes.   Recent Labs   Lab Test 01/19/24  0714 01/18/24 2029 01/18/24  0533   HGB 9.2* 9.7* 9.1*       Resolved or declined bleeding episodes: Yes Last episode: 1/16    Appropriate testing complete: No, waiting for CT results    Cleared for discharge by consultants (if involved): No Colorectal is still following patient    Safe discharge environment identified: yes    Discharge Planner Nurse   Safe discharge environment identified: Yes  Barriers to discharge:  waiting for CT results, Colorectal wants to follow patient to monitor WBC count and signs of infection, Still on IV antibiotics       Entered by: Caroline Moreno RN 01/19/2024 3:01 PM     Please review provider order for any additional goals.   Nurse to notify provider when observation goals have been met and patient is ready for discharge.

## 2024-01-19 NOTE — PROGRESS NOTES
[facility]    Medicine Progress Note - Hospitalist Service    Date of Admission:  1/16/2024    Assessment & Plan   Angle Agudelo is a 89F presents with acute rectal bleeding; pmhx includes rectosigmoidectomy/colpocleisis (1/5/24), chronic low back pain with sciatica, dementia, HTN/HLD, CVA; admitted to observation for rectal bleeding.     #rectal bleed  Acute episode of bright red bleeding, in setting of recent colorectal surgery.  --Now slowing down  -- No surgery planned  -- Continue IV antibiotics.  Diet advanced.       #leukocytosis  NO additional signs to suggest infection.  Increased WBC today.    CT pelvis for WBC of 13.2 ordered by colorectal.  Shows 3.6 x 3.1 x 4.9 cm Aberra collection in the region of the anus.  Defer management to colorectal surgery.     Acute renal failure  In setting of acute bleed.  -LR 500ml bolus  Now improved     #hyperglycemia  In setting of acute event. Previously low 100s.  -BMP trend     #HTN  Borderline low BP.  -HOLD amlodipine 10     #HLD  -resume lovastatin 40mg at discharge     #dementia  -memantine 10mg PO BID     #code status  Discussed with daughter, Daija Lucas, who has been identified as DPOA. Both Ms. Arceo AND Daija are in agreement, that Marielos would prefer DNR/DNI as a code status should a cardiac event occur.  -DNR/DNI       Observation Goals: -diagnostic tests and consults completed and resulted, -vital signs normal or at patient baseline, Nurse to notify provider when observation goals have been met and patient is ready for discharge.  Diet: Low Fiber Diet    DVT Prophylaxis: VTE Prophylaxis contraindicated due to rectal bleeding  Graves Catheter: PRESENT, indication: Retention  Lines: None     Cardiac Monitoring: None  Code Status: No CPR- Do NOT Intubate      Clinically Significant Risk Factors Present on Admission                # Drug Induced Platelet Defect: home medication list includes an antiplatelet medication   #  "Hypertension: Noted on problem list   # Dementia: noted on problem list    # Overweight: Estimated body mass index is 29.49 kg/m  as calculated from the following:    Height as of 1/5/24: 1.473 m (4' 10\").    Weight as of 1/5/24: 64 kg (141 lb 1.5 oz).       # Financial/Environmental Concerns: none         Disposition Plan     Expected Discharge Date: 01/19/2024      Destination: assisted living              Tray Lewis MD  Hospitalist Service  [facility]  Securely message with [employer] (more info)  Text page via [employer] Paging/Directory   ______________________________________________________________________    Interval History   Patient new to me.  Chart reviewed.  Patient has dementia and cannot recall why she came to the hospital.  Nurse at bedside.  No rectal bleeding overnight.  Evaluated by colorectal surgeon.  Hemoglobin is stable.    Physical Exam   Vital Signs: Temp: 98.5  F (36.9  C) Temp src: Oral BP: 125/77 Pulse: 75   Resp: 19 SpO2: 98 % O2 Device: None (Room air)    Weight: 0 lbs 0 oz    Patient confused but not in distress  Lungs are clear to auscultation   abdomen is soft      Medical Decision Making       35 MINUTES SPENT BY ME on the date of service doing chart review, history, exam, documentation & further activities per the note.  MANAGEMENT DISCUSSED with the following over the past 24 hours: Patient's daughter   NOTE(S)/MEDICAL RECORDS REVIEWED over the past 24 hours: Colorectal notes       Data     I have personally reviewed the following data over the past 24 hrs:    13.2 (H)  \   9.2 (L)   / 193     N/A N/A N/A /  N/A   N/A N/A N/A \       Imaging results reviewed over the past 24 hrs:   Recent Results (from the past 24 hour(s))   CT Abdomen Pelvis w Contrast    Narrative    EXAM: CT ABDOMEN PELVIS W CONTRAST  LOCATION: [facility]  DATE: 1/19/2024    INDICATION: POD #14 s p perineal rectosigmidectomy, rectal bleeding and elevated " WBC  COMPARISON: 1/17/24.  TECHNIQUE: CT scan of the abdomen and pelvis was performed following injection of IV contrast. Multiplanar reformats were obtained. Dose reduction techniques were used.  CONTRAST: 69ml isovue 370    FINDINGS:   LOWER CHEST: Fluid in the included patulous lower esophagus. Severe coronary artery disease. Mitral annular calcified. Mild nonspecific fibrosis and traction bronchiectasis at the lung bases.    HEPATOBILIARY: Hysterectomy. Bile duct dilatation is again seen and may be from cholecystectomy. A circumscribed low-attenuation hepatic lesion measuring 11 mm has not changed (series 3 image 57).    PANCREAS: Normal.    SPLEEN: Normal.    ADRENAL GLANDS: Normal.    KIDNEYS/BLADDER: A single right renal cyst does not require follow-up. A Graves catheter is present.    BOWEL: Normal stomach. Normal caliber of the small bowel. Status post appendectomy. There is colonic diverticulosis. There is a 3.6 x 3.1 x 4.9 cm debris and air collection in the region of the anus (sagittal series 5 image 51 and axial series 3 image   188). This appears to be contiguous with the bowel lumen.    LYMPH NODES: Normal.    VASCULATURE: Atherosclerotic disease. Tortuosity of the abdominal aorta. There is focal enlargement of the infrarenal abdominal aorta, measuring 3.8 x 2.7 cm and extending over a length of 2.5 cm.    PELVIC ORGANS: Fluid distention of the endometrial canal is again seen.    MUSCULOSKELETAL: Lateral curvature of the spine. Grade 2 anterolisthesis of L4 and L5. Minimal presacral stranding and fluid has slightly increased.        Impression    IMPRESSION:   1.  There is a 3.6 x 3.1 x 4.9 cm debris and air collection in the region of the anus. This could represent a contained collection at the surgical site. This appears to communicate with the bowel lumen.  2.  Fluid distention of the endometrial canal is again seen.

## 2024-01-19 NOTE — PLAN OF CARE
PRIMARY DIAGNOSIS: Rectal Bleed  OUTPATIENT/OBSERVATION GOALS TO BE MET BEFORE DISCHARGE  Orthostatic performed: No    Stable Hgb Yes.   Recent Labs   Lab Test 01/18/24 2029 01/18/24  0533 01/17/24 2043   HGB 9.7* 9.1* 8.8*       Resolved or declined bleeding episodes: Yes Last episode: 1/16/23    Appropriate testing complete: N/A    Cleared for discharge by consultants (if involved): No. Still on clear liquids.    Safe discharge environment identified: Yes    Discharge Planner Nurse   Safe discharge environment identified: Yes  Barriers to discharge: Advancement of diet and has fox for retention. Still on IV antibiotic.         Entered by: Alisa Lyman RN 01/18/2024 10:39 PM     Please review provider order for any additional goals.   Nurse to notify provider when observation goals have been met and patient is ready for discharge.Goal Outcome Evaluation:

## 2024-01-20 LAB
ANION GAP SERPL CALCULATED.3IONS-SCNC: 6 MMOL/L (ref 7–15)
BUN SERPL-MCNC: 4.7 MG/DL (ref 8–23)
CALCIUM SERPL-MCNC: 8.4 MG/DL (ref 8.2–9.6)
CHLORIDE SERPL-SCNC: 108 MMOL/L (ref 98–107)
CREAT SERPL-MCNC: 0.87 MG/DL (ref 0.51–0.95)
DEPRECATED HCO3 PLAS-SCNC: 28 MMOL/L (ref 22–29)
EGFRCR SERPLBLD CKD-EPI 2021: 62 ML/MIN/1.73M2
ERYTHROCYTE [DISTWIDTH] IN BLOOD BY AUTOMATED COUNT: 17.2 % (ref 10–15)
GLUCOSE BLDC GLUCOMTR-MCNC: 125 MG/DL (ref 70–99)
GLUCOSE SERPL-MCNC: 90 MG/DL (ref 70–99)
HCT VFR BLD AUTO: 32.6 % (ref 35–47)
HGB BLD-MCNC: 9.8 G/DL (ref 11.7–15.7)
MCH RBC QN AUTO: 27.3 PG (ref 26.5–33)
MCHC RBC AUTO-ENTMCNC: 30.1 G/DL (ref 31.5–36.5)
MCV RBC AUTO: 91 FL (ref 78–100)
PLATELET # BLD AUTO: 207 10E3/UL (ref 150–450)
POTASSIUM SERPL-SCNC: 3.6 MMOL/L (ref 3.4–5.3)
RBC # BLD AUTO: 3.59 10E6/UL (ref 3.8–5.2)
SODIUM SERPL-SCNC: 142 MMOL/L (ref 135–145)
WBC # BLD AUTO: 10 10E3/UL (ref 4–11)

## 2024-01-20 PROCEDURE — 250N000013 HC RX MED GY IP 250 OP 250 PS 637: Performed by: STUDENT IN AN ORGANIZED HEALTH CARE EDUCATION/TRAINING PROGRAM

## 2024-01-20 PROCEDURE — 96376 TX/PRO/DX INJ SAME DRUG ADON: CPT

## 2024-01-20 PROCEDURE — 36415 COLL VENOUS BLD VENIPUNCTURE: CPT | Performed by: INTERNAL MEDICINE

## 2024-01-20 PROCEDURE — G0378 HOSPITAL OBSERVATION PER HR: HCPCS

## 2024-01-20 PROCEDURE — 80048 BASIC METABOLIC PNL TOTAL CA: CPT | Performed by: INTERNAL MEDICINE

## 2024-01-20 PROCEDURE — 120N000001 HC R&B MED SURG/OB

## 2024-01-20 PROCEDURE — 99232 SBSQ HOSP IP/OBS MODERATE 35: CPT | Performed by: INTERNAL MEDICINE

## 2024-01-20 PROCEDURE — 250N000011 HC RX IP 250 OP 636: Performed by: INTERNAL MEDICINE

## 2024-01-20 PROCEDURE — 250N000013 HC RX MED GY IP 250 OP 250 PS 637: Performed by: INTERNAL MEDICINE

## 2024-01-20 PROCEDURE — 85027 COMPLETE CBC AUTOMATED: CPT | Performed by: INTERNAL MEDICINE

## 2024-01-20 RX ADMIN — LOVASTATIN 40 MG: 20 TABLET ORAL at 21:04

## 2024-01-20 RX ADMIN — PIPERACILLIN AND TAZOBACTAM 3.38 G: 3; .375 INJECTION, POWDER, FOR SOLUTION INTRAVENOUS at 04:12

## 2024-01-20 RX ADMIN — PIPERACILLIN AND TAZOBACTAM 3.38 G: 3; .375 INJECTION, POWDER, FOR SOLUTION INTRAVENOUS at 12:23

## 2024-01-20 RX ADMIN — MEMANTINE 10 MG: 10 TABLET ORAL at 10:13

## 2024-01-20 RX ADMIN — MEMANTINE 10 MG: 10 TABLET ORAL at 20:57

## 2024-01-20 RX ADMIN — PIPERACILLIN AND TAZOBACTAM 3.38 G: 3; .375 INJECTION, POWDER, FOR SOLUTION INTRAVENOUS at 20:56

## 2024-01-20 ASSESSMENT — ACTIVITIES OF DAILY LIVING (ADL)
ADLS_ACUITY_SCORE: 41
ADLS_ACUITY_SCORE: 40
ADLS_ACUITY_SCORE: 38
ADLS_ACUITY_SCORE: 34
ADLS_ACUITY_SCORE: 38
ADLS_ACUITY_SCORE: 36
ADLS_ACUITY_SCORE: 37
ADLS_ACUITY_SCORE: 40
ADLS_ACUITY_SCORE: 38
ADLS_ACUITY_SCORE: 38

## 2024-01-20 NOTE — PROGRESS NOTES
[facility]    Medicine Progress Note - Hospitalist Service    Date of Admission:  1/16/2024    Assessment & Plan   Angle Agudelo is a 89F presents with acute rectal bleeding; pmhx includes rectosigmoidectomy/colpocleisis (1/5/24), chronic low back pain with sciatica, dementia, HTN/HLD, CVA; admitted to observation for rectal bleeding.     #rectal bleed  Acute episode of bright red bleeding, in setting of recent colorectal surgery.  --Now slowing down.  Hemoglobin is stable.  -- No surgery planned  -- Continue IV antibiotics.  Tolerating diet.       #leukocytosis  NO additional signs to suggest infection.  Increased WBC today.    CT pelvis for WBC of 13.2 ordered by colorectal.  Shows 3.6 x 3.1 x 4.9 cm collection in the region of the anus.  Defer management to colorectal surgery.  No surgical intervention recommended by colorectal.     Acute renal failure  In setting of acute bleed.  -LR 500ml bolus  Now improved    Urinary retention  --DC Graves's catheter on 1/20.     #hyperglycemia  In setting of acute event. Previously low 100s.  -BMP trend     #HTN  Borderline low BP.  -Restart amlodipine 10     #HLD  -resume lovastatin 40mg at discharge     #dementia  -memantine 10mg PO BID     #code status  Discussed with daughter, Daija Lucas, who has been identified as DPOA. Both Ms. Arceo AND Daija are in agreement, that Marielos would prefer DNR/DNI as a code status should a cardiac event occur.  -DNR/DNI          Diet: Low Fiber Diet    DVT Prophylaxis: VTE Prophylaxis contraindicated due to rectal bleeding  Graves Catheter: PRESENT, indication: Retention  Lines: None     Cardiac Monitoring: None  Code Status: No CPR- Do NOT Intubate      Clinically Significant Risk Factors Present on Admission                # Drug Induced Platelet Defect: home medication list includes an antiplatelet medication   # Hypertension: Noted on problem list   # Dementia: noted on problem list    # Overweight: Estimated  "body mass index is 29.49 kg/m  as calculated from the following:    Height as of 1/5/24: 1.473 m (4' 10\").    Weight as of 1/5/24: 64 kg (141 lb 1.5 oz).         # Financial/Environmental Concerns: none         Disposition Plan      Expected Discharge Date: 01/22/2024      Destination: assisted living              Tray Lewis MD  Hospitalist Service  [facility]  Securely message with [employer] (more info)  Text page via [employer] Paging/Directory   ______________________________________________________________________    Interval History   Patient had agitation overnight.  Reducing for discomfort.  Review of colorectal notes.  Will need 2 weeks of antibiotics on discharge.    Physical Exam   Vital Signs: Temp: 98  F (36.7  C) Temp src: Oral BP: 121/67 Pulse: 78   Resp: 20 SpO2: 94 % O2 Device: None (Room air)    Weight: 0 lbs 0 oz    Patient confused but not in distress  Lungs are clear to auscultation   abdomen is soft      Medical Decision Making       35 MINUTES SPENT BY ME on the date of service doing chart review, history, exam, documentation & further activities per the note.  MANAGEMENT DISCUSSED with the following over the past 24 hours: Patient's daughter   NOTE(S)/MEDICAL RECORDS REVIEWED over the past 24 hours: Colorectal notes       Data     I have personally reviewed the following data over the past 24 hrs:    10.0  \   9.8 (L)   / 207     142 108 (H) 4.7 (L) /  90   3.6 28 0.87 \       Imaging results reviewed over the past 24 hrs:   No results found for this or any previous visit (from the past 24 hour(s)).    "

## 2024-01-20 NOTE — PLAN OF CARE
Problem: Adult Inpatient Plan of Care  Goal: Optimal Comfort and Wellbeing  Outcome: Progressing     Problem: Gastrointestinal Bleeding  Goal: Optimal Coping with Acute Illness  Outcome: Progressing   Goal Outcome Evaluation:       Patient oriented to self and forgetful denies having any pain. Hade small  bowel movement no bleeding  fox catheter removed this shift. IV antibiotics was infused pt afebrile.

## 2024-01-20 NOTE — UTILIZATION REVIEW
Admission Status; Secondary Review Determination   Under the authority of the Utilization Management Committee, the utilization review process indicated a secondary review on Angle Agudelo. The review outcome is based on review of the medical records, discussions with staff, and applying clinical experience noted on the date of the review.   (x) Inpatient Status Appropriate - This patient's medical care is consistent with medical management for inpatient care and reasonable inpatient medical practice.     RATIONALE FOR DETERMINATION   89 years old female with PMH of recent  rectosigmoidectomy/colpocleisis (1/5/24), chronic low back pain with sciatica, dementia, HTN/HLD, CVA; who presented to ER for rectal bleeding. Colorectal surgery, still on IV antibiotics, advancing diet and monitor Hb, crossing 4th midnight     At the time of admission with the information available to the attending physician more than 2 nights Hospital complex care was anticipated, based on patient risk of adverse outcome if treated as outpatient and complex care required. Inpatient admission is appropriate based on the Medicare guidelines.   The information on this document is developed by the utilization review team in order for the business office to ensure compliance. This only denotes the appropriateness of proper admission status and does not reflect the quality of care rendered.   The definitions of Inpatient Status and Observation Status used in making the determination above are those provided in the CMS Coverage Manual, Chapter 1 and Chapter 6, section 70.4.   Sincerely,   Abdiaziz Ford MD  Utilization Review  Physician Advisor  [facility]

## 2024-01-20 NOTE — PLAN OF CARE
PRIMARY DIAGNOSIS: GI BLEED    OUTPATIENT/OBSERVATION GOALS TO BE MET BEFORE DISCHARGE  Orthostatic performed: N/A    Stable Hgb Yes.   Recent Labs   Lab Test 01/19/24  0714 01/18/24 2029 01/18/24  0533   HGB 9.2* 9.7* 9.1*       Resolved or declined bleeding episodes: Yes Last episode: 1/16    Appropriate testing complete: Yes    Cleared for discharge by consultants (if involved): No    Safe discharge environment identified: Yes    Discharge Planner Nurse   Safe discharge environment identified: Yes  Barriers to discharge: Yes       Entered by: Meredith Torres RN 01/20/2024 3:28 AM     Please review provider order for any additional goals.   Nurse to notify provider when observation goals have been met and patient is ready for discharge.Goal Outcome Evaluation:

## 2024-01-20 NOTE — PLAN OF CARE
PRIMARY DIAGNOSIS: GI BLEED    OUTPATIENT/OBSERVATION GOALS TO BE MET BEFORE DISCHARGE  Orthostatic performed: N/A    Stable Hgb Yes.   Recent Labs   Lab Test 01/20/24  0739 01/19/24  0714 01/18/24 2029   HGB 9.8* 9.2* 9.7*       Resolved or declined bleeding episodes: Yes Last episode: 1/18/2024    Appropriate testing complete: N/A    Cleared for discharge by consultants (if involved): Yes    Safe discharge environment identified: Yes    Discharge Planner Nurse   Safe discharge environment identified: Yes  Barriers to discharge: Yes       Please review provider order for any additional goals.   Nurse to notify provider when observation goals have been met and patient is ready for discharge.Goal Outcome Evaluation:

## 2024-01-20 NOTE — PROGRESS NOTES
Colon and Rectal Surgery  Daily Progress Note    Subjective  No acute events overnight. Tolerating low fiber diet. Says she feels well, has no pain or other complaints. Per nurse, she has not had any blood in her stool in the past 2 days.    Objective  Intake/Output last 24 hrs:    Intake/Output Summary (Last 24 hours) at 1/19/2024 0811  Last data filed at 1/18/2024 2052  Gross per 24 hour   Intake --   Output 1375 ml   Net -1375 ml     Temp:  [97.7  F (36.5  C)-98.3  F (36.8  C)] 98  F (36.7  C)  Pulse:  [] 78  Resp:  [18-20] 20  BP: ()/(55-85) 121/67  SpO2:  [93 %-98 %] 94 %    Physical Exam:  General: awake, alert, sitting in chair, in no acute distress  Head: normocephalic, atraumatic  Respiratory: non-labored breathing  Abdomen: soft, nontender, nondistended  Skin: No rashes or lesions  Musculoskeletal: moves all four extremities equally    Pertinent Labs  WBC 10.0 from 13.2  Hgb 9.8 from 9.2      Lab Results   Component Value Date     01/18/2024     01/17/2024     01/17/2024     01/13/2021     01/12/2021     01/11/2021    CO2 27 01/18/2024    CO2 29 01/17/2024    CO2 23 01/17/2024    CO2 25 03/17/2022    CO2 25 06/15/2021    CO2 27 06/14/2021    CO2 26 01/13/2021    CO2 29 01/12/2021    CO2 27 01/11/2021    BUN 8.3 01/18/2024    BUN 12.0 01/17/2024    BUN 19.0 01/17/2024    BUN 20 03/17/2022    BUN 11 06/15/2021    BUN 12 06/14/2021    BUN 9 01/13/2021    BUN 11 01/12/2021    BUN 17 01/11/2021     Lab Results   Component Value Date    WBC 13.2 01/19/2024    WBC 10.5 01/18/2024    WBC 14.5 01/17/2024    WBC 5.2 01/13/2021    WBC 5.5 01/12/2021    WBC 6.3 01/11/2021    HGB 9.2 01/19/2024    HGB 9.7 01/18/2024    HGB 9.1 01/18/2024    HGB 11.1 01/13/2021    HGB 12.6 01/12/2021    HGB 10.8 01/12/2021    HCT 30.5 01/19/2024    HCT 30.6 01/18/2024    HCT 27.8 01/17/2024    HCT 37.3 01/13/2021    HCT 34.5 01/12/2021    HCT 34.0 01/11/2021    MCV 90 01/19/2024    MCV 91  01/18/2024    MCV 89 01/17/2024     01/13/2021    MCV 99 01/12/2021    MCV 99 01/11/2021     01/19/2024     01/18/2024     01/17/2024     01/13/2021     01/12/2021     01/11/2021       Assessment/Plan: This is a 89 year old female w/ dementia and rectal/vaginal prolapse POD #15 s/p [place] perineal rectosigmoidectomy and colpocleisis with perineorraphy presenting w/ rectal bleeding and contained leak at coloanal anastomosis on CT scan. Rectal bleeding has now resolved and H/H is stable. She is tolerating low fiber diet, WBC is normalized and vitals and abd exam remain normal.    - no indication for surgical intervention at this time  - continue low fiber diet  - continue IV zosyn for now, plan on 2 week course of PO abx on discharge  - ok to remove Graves catheter from a surgical perspective  - family updated by phone; plan on possible discharge home tomorrow if she continues to do well    Patient d/w attending Dr. Esdras Macdonald.    Elvis Martin MD, MS  Fellow, Colon & Rectal Surgery  [facility]  01/20/2024  12:54 PM    Colorectal Surgery can be reached at [phone] at all times. Between 5pm and 7am you will be connected to the on-call physician

## 2024-01-20 NOTE — PLAN OF CARE
PRIMARY DIAGNOSIS: GI BLEED    OUTPATIENT/OBSERVATION GOALS TO BE MET BEFORE DISCHARGE  Orthostatic performed: N/A    Stable Hgb Yes.   Recent Labs   Lab Test 01/19/24  0714 01/18/24 2029 01/18/24  0533   HGB 9.2* 9.7* 9.1*       Resolved or declined bleeding episodes: Yes Last episode: 1/16    Appropriate testing complete: Yes    Cleared for discharge by consultants (if involved): No    Safe discharge environment identified: Yes    Discharge Planner Nurse   Safe discharge environment identified: Yes  Barriers to discharge: Yes       Entered by: Meredith Torres RN 01/20/2024 12:08 AM     Please review provider order for any additional goals.   Nurse to notify provider when observation goals have been met and patient is ready for discharge.Goal Outcome Evaluation:

## 2024-01-20 NOTE — PLAN OF CARE
PRIMARY DIAGNOSIS: GI BLEED    OUTPATIENT/OBSERVATION GOALS TO BE MET BEFORE DISCHARGE  Orthostatic performed: N/A    Stable Hgb Yes.   Recent Labs   Lab Test 01/19/24  0714 01/18/24 2029 01/18/24  0533   HGB 9.2* 9.7* 9.1*       Resolved or declined bleeding episodes: Yes Last episode: 1/16      Appropriate testing complete: Yes    Cleared for discharge by consultants (if involved): No    Safe discharge environment identified: Yes    Discharge Planner Nurse   Safe discharge environment identified: Yes  Barriers to discharge: Yes       Entered by: Meredith Torres RN 01/20/2024 7:46 AM     Please review provider order for any additional goals.   Nurse to notify provider when observation goals have been met and patient is ready for discharge.Goal Outcome Evaluation:  Alert and disoriented to place and situation. One assist with GB and walker. Denies pain. Slept well overnight. Graves intact.

## 2024-01-21 VITALS
RESPIRATION RATE: 20 BRPM | SYSTOLIC BLOOD PRESSURE: 116 MMHG | OXYGEN SATURATION: 94 % | TEMPERATURE: 98.5 F | HEART RATE: 76 BPM | DIASTOLIC BLOOD PRESSURE: 67 MMHG

## 2024-01-21 PROCEDURE — 250N000011 HC RX IP 250 OP 636: Performed by: INTERNAL MEDICINE

## 2024-01-21 PROCEDURE — 99239 HOSP IP/OBS DSCHRG MGMT >30: CPT | Performed by: INTERNAL MEDICINE

## 2024-01-21 PROCEDURE — 250N000013 HC RX MED GY IP 250 OP 250 PS 637: Performed by: INTERNAL MEDICINE

## 2024-01-21 PROCEDURE — 250N000013 HC RX MED GY IP 250 OP 250 PS 637: Performed by: STUDENT IN AN ORGANIZED HEALTH CARE EDUCATION/TRAINING PROGRAM

## 2024-01-21 RX ORDER — ASPIRIN 81 MG/1
81 TABLET, CHEWABLE ORAL DAILY
Qty: 30 TABLET | Refills: 11 | Status: SHIPPED | OUTPATIENT
Start: 2024-02-01

## 2024-01-21 RX ADMIN — MEMANTINE 10 MG: 10 TABLET ORAL at 09:26

## 2024-01-21 RX ADMIN — PIPERACILLIN AND TAZOBACTAM 3.38 G: 3; .375 INJECTION, POWDER, FOR SOLUTION INTRAVENOUS at 04:20

## 2024-01-21 RX ADMIN — AMLODIPINE BESYLATE 10 MG: 5 TABLET ORAL at 09:25

## 2024-01-21 RX ADMIN — AMOXICILLIN AND CLAVULANATE POTASSIUM 1 TABLET: 875; 125 TABLET, FILM COATED ORAL at 09:25

## 2024-01-21 ASSESSMENT — ACTIVITIES OF DAILY LIVING (ADL)
ADLS_ACUITY_SCORE: 40
ADLS_ACUITY_SCORE: 36
ADLS_ACUITY_SCORE: 36
ADLS_ACUITY_SCORE: 40
ADLS_ACUITY_SCORE: 36

## 2024-01-21 NOTE — PLAN OF CARE
Problem: Adult Inpatient Plan of Care  Goal: Absence of Hospital-Acquired Illness or Injury  Intervention: Identify and Manage Fall Risk  Recent Flowsheet Documentation  Taken 1/21/2024 0140 by Seda Bridges RN  Safety Promotion/Fall Prevention:   activity supervised   clutter free environment maintained   nonskid shoes/slippers when out of bed   safety round/check completed  Taken 1/20/2024 2103 by Seda Bridges RN  Safety Promotion/Fall Prevention:   activity supervised   clutter free environment maintained   nonskid shoes/slippers when out of bed   safety round/check completed     Problem: Gastrointestinal Bleeding  Goal: Hemostasis  Intervention: Manage Gastrointestinal Bleeding  Recent Flowsheet Documentation  Taken 1/21/2024 0140 by Seda Bridges RN  Bleeding Management: dressing monitored  Taken 1/20/2024 2103 by Seda Bridges RN  Bleeding Management: dressing monitored   Goal Outcome Evaluation:       Patient continue on IV abx therapy for post op-infection. Afebrile. Patient voided x2. Bladder scanned for 90 ml at 2100. Patient appeared pleasantly confused. Denied pain. VSS.

## 2024-01-21 NOTE — PROGRESS NOTES
Care Management Discharge Note    Discharge Date: 01/21/2024       Discharge Disposition: Assisted Living    Discharge Services: None    Discharge DME: None    Discharge Transportation: family or friend will provide    Private pay costs discussed: Not applicable    Does the patient's insurance plan have a 3 day qualifying hospital stay waiver?  Yes     Which insurance plan 3 day waiver is available? Alternative insurance waiver    Will the waiver be used for post-acute placement? No    PAS Confirmation Code: N/A  Patient/family educated on Medicare website which has current facility and service quality ratings: no    Education Provided on the Discharge Plan: Yes  Persons Notified of Discharge Plans: family; TRUONG  Patient/Family in Agreement with the Plan: yes    Handoff Referral Completed: Yes    Additional Information:  8:20 AM  ANURADHA notified Pt is able to return her apartment at [facility] today. SUZAN called [facility] and notified the staff of Pt's return. [facility] did not report any concerns with Pt returning and requested orders to be faxed to Pt's direct unit. Fax #: [phone]. SUZAN called Pt's daughter Daija to inquire about transportation plans and discharge timing. Daija reported that her niece and sister Will will transport Pt today. Daija stated that she would anticipate Will to arrive after [employer] this morning and to expect her around 1230. Daija clarified that Will will be bringing Pt clothes for her to wear at discharge so Pt may be here a little past 1230. SUZAN reported that CM will update the Care Team to anticipated for discharge around that time. Care Team updated.    11:14 AM  Orders sent. No further CM needs requested or anticipated for discharge. CM will sign off. Please contact CM if any additional needs arise prior to discharge.       SHARIF Parmar

## 2024-01-21 NOTE — DISCHARGE SUMMARY
"[facility]  Hospitalist Discharge Summary      Date of Admission:  1/16/2024  Date of Discharge:  1/21/2024  Discharging Provider: Tray Lewis MD  Discharge Service: Hospitalist Service    Discharge Diagnoses   Rectal bleeding    Clinically Significant Risk Factors     # Overweight: Estimated body mass index is 29.49 kg/m  as calculated from the following:    Height as of 1/5/24: 1.473 m (4' 10\").    Weight as of 1/5/24: 64 kg (141 lb 1.5 oz).       Follow-ups Needed After Discharge   Follow-up Appointments     Follow-up and recommended labs and tests       Follow-up with colorectal surgeon in 2 weeks        {    Unresulted Labs Ordered in the Past 30 Days of this Admission       Date and Time Order Name Status Description    1/17/2024 12:15 AM Blood Culture Arm, Right Preliminary     1/17/2024 12:15 AM Blood Culture Line, venous Preliminary         These results will be followed up by colorectal    Discharge Disposition   Discharged to home  Condition at discharge: Stable    Hospital Course   Angle Agudelo is a 89F presents with acute rectal bleeding; pmhx includes rectosigmoidectomy/colpocleisis (1/5/24), chronic low back pain with sciatica, dementia, HTN/HLD, CVA; admitted to observation for rectal bleeding.     #rectal bleed  Acute episode of bright red bleeding, in setting of recent colorectal surgery.  --Now slowing down.  Hemoglobin is stable.  -- No surgery planned  -- Completed 4 days of antibiotics in the hospital and would need 10 more days of p.o. antibiotics to complete a 14-day course as recommended by colorectal surgery  -- Hold aspirin for a week after discharge and then restart at 81 mg daily       #leukocytosis  NO additional signs to suggest infection.  Increased WBC today.    CT pelvis for WBC of 13.2 ordered by colorectal.  Shows 3.6 x 3.1 x 4.9 cm collection in the region of the anus.  Defer management to colorectal surgery.  No surgical intervention recommended by " colorectal.  Continue antibiotic as given above     Acute renal failure  In setting of acute bleed.  -LR 500ml bolus  Now improved    Urinary retention  --DC Graves's catheter on 1/20.  Postoperative surgery within normal limits.     #hyperglycemia  In setting of acute event. Previously low 100s.  -BMP trend     #HTN  Borderline low BP.  -Restart amlodipine 10     #HLD  -resume lovastatin 40mg at discharge     #dementia  -memantine 10mg PO BID     #code status  Discussed with daughter, Daija Lucas, who has been identified as DPOA. Both Ms. Arceo AND Daija are in agreement, that Marielos would prefer DNR/DNI as a code status should a cardiac event occur.  -DNR/DNI    Consultations This Hospital Stay   COLORECTAL SURGERY IP CONSULT  CARE MANAGEMENT / SOCIAL WORK IP CONSULT    Code Status   No CPR- Do NOT Intubate    Time Spent on this Encounter   I, Tray Lewis MD, personally saw the patient today and spent greater than 30 minutes discharging this patient.       Tray Lewis MD  [address] [address]  Phone: [phone]  Fax: [phone]  ______________________________________________________________________    Physical Exam   Vital Signs: Temp: 98.5  F (36.9  C) Temp src: Oral BP: 116/67 Pulse: 76   Resp: 20 SpO2: 94 % O2 Device: None (Room air)    Weight: 0 lbs 0 oz  Confusion but denies any pain or rectal bleeding       Primary Care Physician   Koko Betancourt    Discharge Orders      Reason for your hospital stay    Rectal bleeding     Follow-up and recommended labs and tests     Follow-up with colorectal surgeon in 2 weeks     Activity    Your activity upon discharge: activity as tolerated     Diet    Follow this diet upon discharge: Orders Placed This Encounter      Low Fiber Diet       Significant Results and Procedures   Results for orders placed or performed during the hospital encounter of 01/16/24   CT Abdomen Pelvis w Contrast    Narrative     EXAM: CT ABDOMEN PELVIS W CONTRAST  LOCATION: [facility]  DATE: 1/17/2024    INDICATION: rectal bleeding, LLQ pain, s p perineorrhapy and rectosigmoidectomy on 1 5 24  COMPARISON: CT abdomen/pelvis with contrast 06/14/2021  TECHNIQUE: CT scan of the abdomen and pelvis was performed following injection of IV contrast. Multiplanar reformats were obtained. Dose reduction techniques were used.  CONTRAST: ISOVUE 370 75ML    FINDINGS:   LOWER CHEST: Mitral annular calcifications. No focal pulmonary consolidation or pleural effusion.    HEPATOBILIARY: Hepatic steatosis. Cholecystectomy. Reservoir effect of the bile ducts.    PANCREAS: Normal.    SPLEEN: Normal.    ADRENAL GLANDS: Normal.    KIDNEYS/BLADDER: Small renal cysts which require no dedicated follow-up. No hydronephrosis. Unremarkable urinary bladder.    BOWEL: Postsurgical changes of perineorrhaphy and rectosigmoidectomy. Several foci of perirectal fat stranding and free gas, possibly postsurgical. Phlegmonous changes could also have this appearance. No definite evidence of well-defined drainable fluid   collection. Of note the lower most anorectum and portions of the perineum are excluded by collimation. Colonic diverticulosis. Moderate colonic stool. No bowel obstruction.    LYMPH NODES: Normal.    VASCULATURE: Tortuous abdominal aorta. 3.7 cm infrarenal abdominal aortic aneurysm, stable. Diffuse atherosclerotic calcifications of the aorta and its branches.    PELVIC ORGANS: Fluid distended endometrial canal.    MUSCULOSKELETAL: Multilevel degenerative changes of the thoracic and lumbosacral spine. Scoliosis. No acute osseous abnormality or suspicious bony lesion.      Impression    IMPRESSION:   1.  Postsurgical changes of perineorrhaphy and rectosigmoidectomy. Several foci of perirectal fat stranding and free gas, possibly postsurgical. Phlegmonous changes could also have this appearance. No definite evidence of well-defined  drainable fluid   collection. If there is concern for bowel perforation a CT with rectal contrast could be performed. Of note the lower most anorectum and portions of the perineum are excluded by collimation.  2.  Fluid distended endometrial canal. Consider pelvic ultrasound for further evaluation which may be performed on a nonemergent outpatient basis.   XR Chest 1 View    Narrative    EXAM: XR CHEST 1 VIEW  LOCATION: [facility]  DATE: 1/17/2024    INDICATION: significant leukocytosis, tachycardia, eval for possible pna  COMPARISON: 02/21/2020      Impression    IMPRESSION: The heart is normal in size. There is mild central pulmonary venous congestion. Bilateral shoulder prosthesis are seen in place.   CT Abdomen Pelvis w Contrast    Narrative    EXAM: CT ABDOMEN PELVIS W CONTRAST  LOCATION: [facility]  DATE: 1/19/2024    INDICATION: POD #14 s p perineal rectosigmidectomy, rectal bleeding and elevated WBC  COMPARISON: 1/17/24.  TECHNIQUE: CT scan of the abdomen and pelvis was performed following injection of IV contrast. Multiplanar reformats were obtained. Dose reduction techniques were used.  CONTRAST: 69ml isovue 370    FINDINGS:   LOWER CHEST: Fluid in the included patulous lower esophagus. Severe coronary artery disease. Mitral annular calcified. Mild nonspecific fibrosis and traction bronchiectasis at the lung bases.    HEPATOBILIARY: Hysterectomy. Bile duct dilatation is again seen and may be from cholecystectomy. A circumscribed low-attenuation hepatic lesion measuring 11 mm has not changed (series 3 image 57).    PANCREAS: Normal.    SPLEEN: Normal.    ADRENAL GLANDS: Normal.    KIDNEYS/BLADDER: A single right renal cyst does not require follow-up. A Graves catheter is present.    BOWEL: Normal stomach. Normal caliber of the small bowel. Status post appendectomy. There is colonic diverticulosis. There is a 3.6 x 3.1 x 4.9 cm debris and air collection in the  region of the anus (sagittal series 5 image 51 and axial series 3 image   188). This appears to be contiguous with the bowel lumen.    LYMPH NODES: Normal.    VASCULATURE: Atherosclerotic disease. Tortuosity of the abdominal aorta. There is focal enlargement of the infrarenal abdominal aorta, measuring 3.8 x 2.7 cm and extending over a length of 2.5 cm.    PELVIC ORGANS: Fluid distention of the endometrial canal is again seen.    MUSCULOSKELETAL: Lateral curvature of the spine. Grade 2 anterolisthesis of L4 and L5. Minimal presacral stranding and fluid has slightly increased.        Impression    IMPRESSION:   1.  There is a 3.6 x 3.1 x 4.9 cm debris and air collection in the region of the anus. This could represent a contained collection at the surgical site. This appears to communicate with the bowel lumen.  2.  Fluid distention of the endometrial canal is again seen.         Discharge Medications   Current Discharge Medication List        START taking these medications    Details   amoxicillin-clavulanate (AUGMENTIN) 875-125 MG tablet Take 1 tablet by mouth every 12 hours  Qty: 20 tablet, Refills: 0    Associated Diagnoses: Postoperative infection, initial encounter           CONTINUE these medications which have CHANGED    Details   aspirin (ASPIRIN LOW DOSE) 81 MG chewable tablet Take 1 tablet (81 mg) by mouth daily Restart on 02/01/2024  Qty: 30 tablet, Refills: 11    Associated Diagnoses: History of CVA (cerebrovascular accident)           CONTINUE these medications which have NOT CHANGED    Details   acetaminophen (TYLENOL) 325 MG tablet Take 3 tablets (975 mg) by mouth every 6 hours as needed for mild pain  Qty: 50 tablet, Refills: 0    Associated Diagnoses: Vagina bleeding      hydrocortisone 2.5 % cream Apply topically 2 times daily as needed for other      ibuprofen (ADVIL/MOTRIN) 600 MG tablet Take 1 tablet (600 mg) by mouth every 6 hours as needed for moderate pain  Qty: 30 tablet, Refills: 0     Associated Diagnoses: Vagina bleeding      lovastatin (MEVACOR) 40 MG tablet Take 40 mg by mouth daily      memantine (NAMENDA) 10 MG tablet Take 10 mg by mouth 2 times daily      ondansetron (ZOFRAN ODT) 4 MG ODT tab Take 1 tablet (4 mg) by mouth every 8 hours as needed for nausea  Qty: 4 tablet, Refills: 0    Associated Diagnoses: Vagina bleeding      polyethylene glycol (MIRALAX) 17 GM/Dose powder Take 17 g by mouth daily as needed for constipation  Qty: 510 g, Refills: 0    Associated Diagnoses: Rectal prolapse      traMADol (ULTRAM) 50 MG tablet Take 0.5 tablets (25 mg) by mouth every 6 hours as needed (for severe pain not relieved by tylenol)  Qty: 8 tablet, Refills: 0    Associated Diagnoses: Rectal prolapse      amLODIPine (NORVASC) 10 MG tablet 1 TABLET ORALLY DAILY  Qty: 28 tablet, Refills: 4    Comments: Please send refills for cycle fill. Thank you!  Associated Diagnoses: Essential hypertension           Allergies   Allergies   Allergen Reactions    Atorvastatin Muscle Pain (Myalgia)    Dextromethorphan Hives    Oxycodone Unknown    Pravastatin Muscle Pain (Myalgia)    Simvastatin Muscle Pain (Myalgia)    Codeine Rash

## 2024-01-21 NOTE — PLAN OF CARE
Problem: Gastrointestinal Bleeding  Goal: Optimal Coping with Acute Illness  Outcome: Progressing   Goal Outcome Evaluation:    Patient denies pain. Has not had bloody stools this shift. Started PO antibiotics this morning. Has been afebrile. Will discharge this afternoon back home.

## 2024-01-21 NOTE — PROGRESS NOTES
COLON & RECTAL SURGERY PROGRESS NOTE  Colon/Rectal Surgery Staff  POD#16 s/p altemeier with a contained anastomotic leak    SUBJECTIVE:  Feels fine.  Denies pain.  Tolerating diet.    VITALS:  B/P: 116/67, T: 98.5, P: 76, R: 20  Patient Vitals for the past 24 hrs:   BP Temp Temp src Pulse Resp SpO2   01/21/24 0853 116/67 98.5  F (36.9  C) Oral 76 20 94 %   01/20/24 2303 107/59 98.3  F (36.8  C) Oral 76 18 94 %   01/20/24 2040 124/70 98.1  F (36.7  C) Oral 70 18 97 %   01/20/24 1841 -- -- -- -- -- 97 %   01/20/24 1535 126/73 98.3  F (36.8  C) Oral 76 20 97 %   01/20/24 1147 121/67 98  F (36.7  C) Oral 78 20 94 %       Intake/Output Summary (Last 24 hours) at 1/21/2024 1050  Last data filed at 1/20/2024 1810  Gross per 24 hour   Intake 240 ml   Output 300 ml   Net -60 ml       EXAM:  General Appearance:  awake and alert, NAD  Abdomen: Soft, NT, ND     RECENT LABS:  Recent Labs   Lab Test 01/20/24  0739 01/19/24  0714   WBC 10.0 13.2*   RBC 3.59* 3.38*   HGB 9.8* 9.2*   HCT 32.6* 30.5*   MCV 91 90   MCH 27.3 27.2   MCHC 30.1* 30.2*    193     Recent Labs   Lab Test 01/20/24  0739 01/18/24  0533 01/17/24  1218   POTASSIUM 3.6 3.9 4.0   CHLORIDE 108* 107 108*   BUN 4.7* 8.3 12.0     Recent Labs   Lab Test 01/17/24  0001 01/10/21  1014 01/09/21  1645   INR 1.07 1.09 1.08       ASSESSMENT AND PLAN: 89 y/o woman POD#16 s/p altemeier with a contained anastomotic leak    - low fiber diet  - cont ABX to complete 14d course  - ok for discharge when cleared by ADIA Macdonald MD ....................  1/21/2024   10:50 AM  Colon and Rectal Surgery Staff  [phone]

## 2024-01-21 NOTE — PROGRESS NOTES
Patient discharged, daughter to transport patient back to [facility]. All personal belongings taken with from room.

## 2024-01-22 ENCOUNTER — PATIENT OUTREACH (OUTPATIENT)
Dept: CARE COORDINATION | Facility: CLINIC | Age: 89
End: 2024-01-22
Payer: MEDICARE

## 2024-01-22 LAB
BACTERIA BLD CULT: NO GROWTH
BACTERIA BLD CULT: NO GROWTH

## 2024-01-22 NOTE — PROGRESS NOTES
Clinic Care Coordination Contact  Patient was hospitalized at [facility]       Date of Admission:  1/16/2024  Date of Discharge:  1/21/2024     Discharge Diagnoses  Rectal bleeding     She she was discharged to [facility] where staff assist her with her care needs. With placement in an Assisted Living Facility, patient is not a candidate for Care Coordination through her primary care clinic. Writer will close the referral.

## 2024-01-26 ENCOUNTER — TRANSFERRED RECORDS (OUTPATIENT)
Dept: HEALTH INFORMATION MANAGEMENT | Facility: CLINIC | Age: 89
End: 2024-01-26
Payer: MEDICARE

## 2024-02-05 ENCOUNTER — TELEPHONE (OUTPATIENT)
Dept: FAMILY MEDICINE | Facility: CLINIC | Age: 89
End: 2024-02-05
Payer: MEDICARE

## 2024-02-05 DIAGNOSIS — N93.9 VAGINA BLEEDING: ICD-10-CM

## 2024-02-05 NOTE — TELEPHONE ENCOUNTER
Forms/Letter Request  Do we have the form/letter: Koko Betancourt's desk    Who is the form from? [facility]    Where did/will the form come from? fax    When is form/letter needed by: .When completed    How would you like the form/letter returned: Fax

## 2024-02-06 RX ORDER — ACETAMINOPHEN 325 MG/1
975 TABLET ORAL EVERY 6 HOURS PRN
Qty: 50 TABLET | Refills: 0 | Status: SHIPPED | OUTPATIENT
Start: 2024-02-06

## 2024-02-27 DIAGNOSIS — Z86.73 HISTORY OF CVA (CEREBROVASCULAR ACCIDENT): Primary | ICD-10-CM

## 2024-02-28 RX ORDER — LOVASTATIN 40 MG
40 TABLET ORAL
Qty: 90 TABLET | Refills: 2 | Status: SHIPPED | OUTPATIENT
Start: 2024-02-28

## 2024-03-29 ENCOUNTER — TELEPHONE (OUTPATIENT)
Dept: FAMILY MEDICINE | Facility: CLINIC | Age: 89
End: 2024-03-29
Payer: MEDICARE

## 2024-03-29 DIAGNOSIS — D49.2 SKIN GROWTH: Primary | ICD-10-CM

## 2024-03-29 NOTE — TELEPHONE ENCOUNTER
Forms/Letter Request    Type of form/letter: Nursing Home/Assisted Living Orders      Do we have the form/letter: Yes:     Who is the form from? [facility]    Where did/will the form come from? form was faxed in    When is form/letter needed by: when complete    How would you like the form/letter returned: Fax : [phone]    Placed in Koko's inbox

## 2024-04-03 ENCOUNTER — MEDICAL CORRESPONDENCE (OUTPATIENT)
Dept: HEALTH INFORMATION MANAGEMENT | Facility: CLINIC | Age: 89
End: 2024-04-03

## 2024-04-03 ENCOUNTER — OFFICE VISIT (OUTPATIENT)
Dept: FAMILY MEDICINE | Facility: CLINIC | Age: 89
End: 2024-04-03
Payer: MEDICARE

## 2024-04-03 VITALS
BODY MASS INDEX: 27.9 KG/M2 | HEIGHT: 58 IN | SYSTOLIC BLOOD PRESSURE: 114 MMHG | HEART RATE: 76 BPM | RESPIRATION RATE: 16 BRPM | WEIGHT: 132.9 LBS | DIASTOLIC BLOOD PRESSURE: 60 MMHG | TEMPERATURE: 97.9 F

## 2024-04-03 DIAGNOSIS — H61.21 IMPACTED CERUMEN OF RIGHT EAR: ICD-10-CM

## 2024-04-03 DIAGNOSIS — F02.818 DEMENTIA ASSOCIATED WITH OTHER UNDERLYING DISEASE WITH BEHAVIORAL DISTURBANCE (H): Primary | ICD-10-CM

## 2024-04-03 PROCEDURE — 69210 REMOVE IMPACTED EAR WAX UNI: CPT | Mod: RT | Performed by: NURSE PRACTITIONER

## 2024-04-03 PROCEDURE — 99215 OFFICE O/P EST HI 40 MIN: CPT | Mod: 25 | Performed by: NURSE PRACTITIONER

## 2024-04-03 RX ORDER — RESPIRATORY SYNCYTIAL VIRUS VACCINE 120MCG/0.5
0.5 KIT INTRAMUSCULAR ONCE
Qty: 1 EACH | Refills: 0 | Status: CANCELLED | OUTPATIENT
Start: 2024-04-03 | End: 2024-04-03

## 2024-04-03 ASSESSMENT — PAIN SCALES - GENERAL: PAINLEVEL: NO PAIN (0)

## 2024-04-03 NOTE — PATIENT INSTRUCTIONS
I am glad to hear that things are better after surgery    I will get your paperwork done today and added to the paperwork that Dr. Feliciano will fill out.

## 2024-04-03 NOTE — PROGRESS NOTES
"Assessment & Plan     ICD-10-CM    1. Dementia associated with other underlying disease with behavioral disturbance (H)  F02.818       2. Impacted cerumen of right ear  H61.21 REMOVE IMPACTED CERUMEN        Paperwork for [facility] affairs completed.  Copy made and sent to scanned.  Original given back to Dr. Feliciano's nurse to add to 's paperwork to be picked up on Friday.  Lavage attempted on right canal but patient expressed discomfort.  Alligator forceps then utilized and large cerumen impaction removed.    Total time spent was 46 minutes including reviewing records prior to arrival, consultation, placing orders, education, finishing paperwork, and reviewing the plan of care on the date of service.      Subjective     HPI     Patient presents today with paperwork that needs to be completed for the [facility] discussing her disabilities and extra needs.    Patient requires nursing home care.  Is unable to leave without assistance of others and a walker/wheelchair.  Has been having increasing difficulties with ambulation.  Needs assistance with ADLs including getting dressed, cooking, bathing, etc.  Is able to feed herself.    Review of Systems - negative except for what's listed in the HPI      Objective    /60 (BP Location: Left arm, Patient Position: Sitting, Cuff Size: Adult Regular)   Pulse 76   Temp 97.9  F (36.6  C) (Oral)   Resp 16   Ht 1.473 m (4' 10\")   Wt 60.3 kg (132 lb 14.4 oz)   LMP  (LMP Unknown)   BMI 27.78 kg/m    Physical Exam   General appearance - alert, well appearing, and in no distress  Mental status - alert, disoriented to time, situation.  Eyes -sclera white  Ears -right canal with cerumen impaction  Mouth - mucous membranes moist. No oral lesions.  Lymphatics - no palpable lymphadenopathy  Chest - clear to auscultation, no wheezes, rales or rhonchi, symmetric air entry  Heart - normal rate and regular rhythm, S1 and S2 normal, no murmurs noted  Neurological " -baseline  Musculoskeletal -slow sit to stand.  Antalgic gait.  Requires walker for ambulation.  Extremities - peripheral pulses normal, trace peripheral edema  Skin - normal coloration and turgor.    Koko Betancourt, CNP    This note has been dictated using voice recognition software. Any grammatical or context distortions are unintentional and inherent to the software.

## 2024-04-13 DIAGNOSIS — F02.818 DEMENTIA ASSOCIATED WITH OTHER UNDERLYING DISEASE WITH BEHAVIORAL DISTURBANCE (H): Primary | ICD-10-CM

## 2024-04-15 ENCOUNTER — TELEPHONE (OUTPATIENT)
Dept: FAMILY MEDICINE | Facility: CLINIC | Age: 89
End: 2024-04-15
Payer: MEDICARE

## 2024-04-15 NOTE — TELEPHONE ENCOUNTER
Forms/Letter Request    Type of form/letter: Home Health Certification      Do we have the form/letter: Yes: in Nathans inbox    Who is the form from? Home care    Where did/will the form come from? form was faxed in    When is form/letter needed by: when done    How would you like the form/letter returned: Fax : [phone]    Patient Notified form requests are processed in 5-7 business days:    Okay to leave a detailed message?:

## 2024-04-16 RX ORDER — MEMANTINE HYDROCHLORIDE 10 MG/1
TABLET ORAL
Qty: 60 TABLET | Refills: 11 | Status: SHIPPED | OUTPATIENT
Start: 2024-04-16

## 2024-04-26 ENCOUNTER — TELEPHONE (OUTPATIENT)
Dept: FAMILY MEDICINE | Facility: CLINIC | Age: 89
End: 2024-04-26
Payer: MEDICARE

## 2024-04-26 NOTE — TELEPHONE ENCOUNTER
Forms/Letter Request    Type of form/letter: Home Health Certification      Do we have the form/letter: Yes: in Nathans inbox    Who is the form from? Home care    Where did/will the form come from? form was faxed in    When is form/letter needed by: when done    How would you like the form/letter returned: Fax : [phone]

## 2024-04-29 ENCOUNTER — TELEPHONE (OUTPATIENT)
Dept: FAMILY MEDICINE | Facility: CLINIC | Age: 89
End: 2024-04-29
Payer: MEDICARE

## 2024-04-29 NOTE — TELEPHONE ENCOUNTER
Forms/Letter Request    Type of form/letter: OTHER: medication orders       Do we have the form/letter: Yes: placed in Koko's inbox    Who is the form from? [facility]     Where did/will the form come from? form was faxed in    When is form/letter needed by: when complete    How would you like the form/letter returned: Fax : [phone]

## 2024-04-30 ENCOUNTER — MEDICAL CORRESPONDENCE (OUTPATIENT)
Dept: HEALTH INFORMATION MANAGEMENT | Facility: CLINIC | Age: 89
End: 2024-04-30
Payer: MEDICARE

## 2024-04-30 ENCOUNTER — LAB REQUISITION (OUTPATIENT)
Dept: LAB | Facility: CLINIC | Age: 89
End: 2024-04-30
Payer: MEDICARE

## 2024-04-30 ENCOUNTER — TELEPHONE (OUTPATIENT)
Dept: FAMILY MEDICINE | Facility: CLINIC | Age: 89
End: 2024-04-30
Payer: MEDICARE

## 2024-04-30 DIAGNOSIS — N18.9 CHRONIC KIDNEY DISEASE, UNSPECIFIED: ICD-10-CM

## 2024-04-30 NOTE — TELEPHONE ENCOUNTER
Kelli,  at [facility] called to confirm Tamiflu orders for patient. She reports she received orders for 10 days from Dr. Jurado and orders for 14 days from Koko Betancourt.     Spoke with PCP, Koko Betancourt NP. He provided verbal order for 14 days.     Verbal order provided to CEE Pereira.     Marlena Soriano RN  [facility]

## 2024-05-01 LAB
ANION GAP SERPL CALCULATED.3IONS-SCNC: 10 MMOL/L (ref 7–15)
BUN SERPL-MCNC: 12.6 MG/DL (ref 8–23)
CALCIUM SERPL-MCNC: 8.7 MG/DL (ref 8.2–9.6)
CHLORIDE SERPL-SCNC: 108 MMOL/L (ref 98–107)
CREAT SERPL-MCNC: 0.78 MG/DL (ref 0.51–0.95)
DEPRECATED HCO3 PLAS-SCNC: 25 MMOL/L (ref 22–29)
EGFRCR SERPLBLD CKD-EPI 2021: 71 ML/MIN/1.73M2
GLUCOSE SERPL-MCNC: 112 MG/DL (ref 70–99)
POTASSIUM SERPL-SCNC: 4.1 MMOL/L (ref 3.4–5.3)
SODIUM SERPL-SCNC: 143 MMOL/L (ref 135–145)

## 2024-05-01 PROCEDURE — 36415 COLL VENOUS BLD VENIPUNCTURE: CPT | Mod: ORL | Performed by: NURSE PRACTITIONER

## 2024-05-01 PROCEDURE — P9603 ONE-WAY ALLOW PRORATED MILES: HCPCS | Mod: ORL | Performed by: NURSE PRACTITIONER

## 2024-05-01 PROCEDURE — 80048 BASIC METABOLIC PNL TOTAL CA: CPT | Mod: ORL | Performed by: NURSE PRACTITIONER

## 2024-06-06 DIAGNOSIS — I10 ESSENTIAL HYPERTENSION: ICD-10-CM

## 2024-06-07 RX ORDER — AMLODIPINE BESYLATE 10 MG/1
10 TABLET ORAL DAILY
Qty: 28 TABLET | Refills: 11 | Status: SHIPPED | OUTPATIENT
Start: 2024-06-07

## 2024-06-11 ENCOUNTER — TELEPHONE (OUTPATIENT)
Dept: FAMILY MEDICINE | Facility: CLINIC | Age: 89
End: 2024-06-11
Payer: MEDICARE

## 2024-06-11 NOTE — TELEPHONE ENCOUNTER
Forms/Letter Request    Type of form/letter: OTHER: consent for exercise participation       Do we have the form/letter: Yes: in Nathans inbox    Who is the form from? Home care    Where did/will the form come from? form was faxed in    When is form/letter needed by: when done    How would you like the form/letter returned: Fax : [phone]    Order details: request for fitness medical release and consent for exercise participation    Order number: n/a

## 2024-06-12 ENCOUNTER — MEDICAL CORRESPONDENCE (OUTPATIENT)
Dept: HEALTH INFORMATION MANAGEMENT | Facility: CLINIC | Age: 89
End: 2024-06-12
Payer: MEDICARE

## 2024-06-20 ENCOUNTER — MEDICAL CORRESPONDENCE (OUTPATIENT)
Dept: HEALTH INFORMATION MANAGEMENT | Facility: CLINIC | Age: 89
End: 2024-06-20

## 2024-06-20 ENCOUNTER — ANESTHESIA EVENT (OUTPATIENT)
Dept: SURGERY | Facility: CLINIC | Age: 89
DRG: 368 | End: 2024-06-20
Payer: MEDICARE

## 2024-06-20 ENCOUNTER — ANESTHESIA (OUTPATIENT)
Dept: SURGERY | Facility: CLINIC | Age: 89
DRG: 368 | End: 2024-06-20
Payer: MEDICARE

## 2024-06-20 ENCOUNTER — APPOINTMENT (OUTPATIENT)
Dept: CT IMAGING | Facility: CLINIC | Age: 89
DRG: 368 | End: 2024-06-20
Attending: STUDENT IN AN ORGANIZED HEALTH CARE EDUCATION/TRAINING PROGRAM
Payer: MEDICARE

## 2024-06-20 ENCOUNTER — HOSPITAL ENCOUNTER (INPATIENT)
Facility: CLINIC | Age: 89
LOS: 2 days | Discharge: NURSING, REHAB OR LONG-TERM CARE | DRG: 368 | End: 2024-06-22
Attending: EMERGENCY MEDICINE | Admitting: FAMILY MEDICINE
Payer: MEDICARE

## 2024-06-20 ENCOUNTER — APPOINTMENT (OUTPATIENT)
Dept: RADIOLOGY | Facility: CLINIC | Age: 89
DRG: 368 | End: 2024-06-20
Attending: STUDENT IN AN ORGANIZED HEALTH CARE EDUCATION/TRAINING PROGRAM
Payer: MEDICARE
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
## 2024-06-20 DIAGNOSIS — K92.0 HEMATEMESIS WITHOUT NAUSEA: ICD-10-CM

## 2024-06-20 DIAGNOSIS — T18.128A ESOPHAGEAL OBSTRUCTION DUE TO FOOD IMPACTION: Primary | ICD-10-CM

## 2024-06-20 DIAGNOSIS — K20.90 ESOPHAGITIS: ICD-10-CM

## 2024-06-20 DIAGNOSIS — N39.0 URINARY TRACT INFECTION WITHOUT HEMATURIA, SITE UNSPECIFIED: ICD-10-CM

## 2024-06-20 LAB
ABO/RH(D): NORMAL
ALBUMIN SERPL BCG-MCNC: 4.1 G/DL (ref 3.5–5.2)
ALBUMIN UR-MCNC: NEGATIVE MG/DL
ALP SERPL-CCNC: 88 U/L (ref 40–150)
ALT SERPL W P-5'-P-CCNC: <5 U/L (ref 0–50)
ANION GAP SERPL CALCULATED.3IONS-SCNC: 9 MMOL/L (ref 7–15)
ANTIBODY SCREEN: NEGATIVE
APPEARANCE UR: CLEAR
APTT PPP: 28 SECONDS (ref 22–38)
AST SERPL W P-5'-P-CCNC: 11 U/L (ref 0–45)
ATRIAL RATE - MUSE: 85 BPM
BASOPHILS # BLD AUTO: 0 10E3/UL (ref 0–0.2)
BASOPHILS # BLD AUTO: 0.1 10E3/UL (ref 0–0.2)
BASOPHILS NFR BLD AUTO: 0 %
BASOPHILS NFR BLD AUTO: 1 %
BILIRUB SERPL-MCNC: 0.3 MG/DL
BILIRUB UR QL STRIP: NEGATIVE
BLD PROD TYP BPU: NORMAL
BLOOD COMPONENT TYPE: NORMAL
BUN SERPL-MCNC: 20.3 MG/DL (ref 8–23)
CALCIUM SERPL-MCNC: 8.9 MG/DL (ref 8.2–9.6)
CHLORIDE SERPL-SCNC: 109 MMOL/L (ref 98–107)
CODING SYSTEM: NORMAL
COLOR UR AUTO: ABNORMAL
CREAT SERPL-MCNC: 0.73 MG/DL (ref 0.51–0.95)
CROSSMATCH: NORMAL
DEPRECATED HCO3 PLAS-SCNC: 25 MMOL/L (ref 22–29)
DIASTOLIC BLOOD PRESSURE - MUSE: NORMAL MMHG
EGFRCR SERPLBLD CKD-EPI 2021: 77 ML/MIN/1.73M2
EOSINOPHIL # BLD AUTO: 0 10E3/UL (ref 0–0.7)
EOSINOPHIL # BLD AUTO: 0.2 10E3/UL (ref 0–0.7)
EOSINOPHIL NFR BLD AUTO: 0 %
EOSINOPHIL NFR BLD AUTO: 2 %
ERYTHROCYTE [DISTWIDTH] IN BLOOD BY AUTOMATED COUNT: 20.1 % (ref 10–15)
ERYTHROCYTE [DISTWIDTH] IN BLOOD BY AUTOMATED COUNT: 20.4 % (ref 10–15)
GLUCOSE SERPL-MCNC: 99 MG/DL (ref 70–99)
GLUCOSE UR STRIP-MCNC: NEGATIVE MG/DL
HCT VFR BLD AUTO: 28.9 % (ref 35–47)
HCT VFR BLD AUTO: 32.8 % (ref 35–47)
HGB BLD-MCNC: 7.9 G/DL (ref 11.7–15.7)
HGB BLD-MCNC: 9.4 G/DL (ref 11.7–15.7)
HGB UR QL STRIP: ABNORMAL
HOLD SPECIMEN: NORMAL
HOLD SPECIMEN: NORMAL
IMM GRANULOCYTES # BLD: 0 10E3/UL
IMM GRANULOCYTES # BLD: 0.1 10E3/UL
IMM GRANULOCYTES NFR BLD: 0 %
IMM GRANULOCYTES NFR BLD: 0 %
INR PPP: 1.09 (ref 0.85–1.15)
INTERPRETATION ECG - MUSE: NORMAL
ISSUE DATE AND TIME: NORMAL
KETONES UR STRIP-MCNC: 10 MG/DL
LEUKOCYTE ESTERASE UR QL STRIP: ABNORMAL
LYMPHOCYTES # BLD AUTO: 1.1 10E3/UL (ref 0.8–5.3)
LYMPHOCYTES # BLD AUTO: 1.9 10E3/UL (ref 0.8–5.3)
LYMPHOCYTES NFR BLD AUTO: 23 %
LYMPHOCYTES NFR BLD AUTO: 9 %
MCH RBC QN AUTO: 19.8 PG (ref 26.5–33)
MCH RBC QN AUTO: 21.4 PG (ref 26.5–33)
MCHC RBC AUTO-ENTMCNC: 27.3 G/DL (ref 31.5–36.5)
MCHC RBC AUTO-ENTMCNC: 28.7 G/DL (ref 31.5–36.5)
MCV RBC AUTO: 73 FL (ref 78–100)
MCV RBC AUTO: 75 FL (ref 78–100)
MONOCYTES # BLD AUTO: 0.9 10E3/UL (ref 0–1.3)
MONOCYTES # BLD AUTO: 0.9 10E3/UL (ref 0–1.3)
MONOCYTES NFR BLD AUTO: 12 %
MONOCYTES NFR BLD AUTO: 8 %
NEUTROPHILS # BLD AUTO: 10 10E3/UL (ref 1.6–8.3)
NEUTROPHILS # BLD AUTO: 5.1 10E3/UL (ref 1.6–8.3)
NEUTROPHILS NFR BLD AUTO: 63 %
NEUTROPHILS NFR BLD AUTO: 82 %
NITRATE UR QL: NEGATIVE
NRBC # BLD AUTO: 0 10E3/UL
NRBC # BLD AUTO: 0 10E3/UL
NRBC BLD AUTO-RTO: 0 /100
NRBC BLD AUTO-RTO: 0 /100
P AXIS - MUSE: 25 DEGREES
PH UR STRIP: 5.5 [PH] (ref 5–7)
PLATELET # BLD AUTO: 203 10E3/UL (ref 150–450)
PLATELET # BLD AUTO: 227 10E3/UL (ref 150–450)
POTASSIUM SERPL-SCNC: 4.2 MMOL/L (ref 3.4–5.3)
PR INTERVAL - MUSE: 142 MS
PROT SERPL-MCNC: 6.7 G/DL (ref 6.4–8.3)
QRS DURATION - MUSE: 74 MS
QT - MUSE: 366 MS
QTC - MUSE: 435 MS
R AXIS - MUSE: -34 DEGREES
RBC # BLD AUTO: 3.98 10E6/UL (ref 3.8–5.2)
RBC # BLD AUTO: 4.4 10E6/UL (ref 3.8–5.2)
RBC URINE: 2 /HPF
SODIUM SERPL-SCNC: 143 MMOL/L (ref 135–145)
SP GR UR STRIP: 1.01 (ref 1–1.03)
SPECIMEN EXPIRATION DATE: NORMAL
SQUAMOUS EPITHELIAL: 1 /HPF
SYSTOLIC BLOOD PRESSURE - MUSE: NORMAL MMHG
T AXIS - MUSE: 38 DEGREES
TROPONIN T SERPL HS-MCNC: 15 NG/L
TROPONIN T SERPL HS-MCNC: 20 NG/L
UNIT ABO/RH: NORMAL
UNIT NUMBER: NORMAL
UNIT STATUS: NORMAL
UNIT TYPE ISBT: 5100
UPPER GI ENDOSCOPY: NORMAL
UROBILINOGEN UR STRIP-MCNC: <2 MG/DL
VENTRICULAR RATE- MUSE: 85 BPM
WBC # BLD AUTO: 12.1 10E3/UL (ref 4–11)
WBC # BLD AUTO: 8.1 10E3/UL (ref 4–11)
WBC CLUMPS #/AREA URNS HPF: PRESENT /HPF
WBC URINE: 41 /HPF

## 2024-06-20 PROCEDURE — 250N000011 HC RX IP 250 OP 636: Mod: JZ | Performed by: EMERGENCY MEDICINE

## 2024-06-20 PROCEDURE — 96375 TX/PRO/DX INJ NEW DRUG ADDON: CPT

## 2024-06-20 PROCEDURE — 80053 COMPREHEN METABOLIC PANEL: CPT | Performed by: STUDENT IN AN ORGANIZED HEALTH CARE EDUCATION/TRAINING PROGRAM

## 2024-06-20 PROCEDURE — 710N000012 HC RECOVERY PHASE 2, PER MINUTE: Performed by: INTERNAL MEDICINE

## 2024-06-20 PROCEDURE — C9113 INJ PANTOPRAZOLE SODIUM, VIA: HCPCS | Mod: JZ | Performed by: STUDENT IN AN ORGANIZED HEALTH CARE EDUCATION/TRAINING PROGRAM

## 2024-06-20 PROCEDURE — 370N000017 HC ANESTHESIA TECHNICAL FEE, PER MIN: Performed by: INTERNAL MEDICINE

## 2024-06-20 PROCEDURE — 36415 COLL VENOUS BLD VENIPUNCTURE: CPT | Performed by: EMERGENCY MEDICINE

## 2024-06-20 PROCEDURE — 85025 COMPLETE CBC W/AUTO DIFF WBC: CPT

## 2024-06-20 PROCEDURE — 87086 URINE CULTURE/COLONY COUNT: CPT | Performed by: FAMILY MEDICINE

## 2024-06-20 PROCEDURE — 360N000075 HC SURGERY LEVEL 2, PER MIN: Performed by: INTERNAL MEDICINE

## 2024-06-20 PROCEDURE — 93005 ELECTROCARDIOGRAM TRACING: CPT | Performed by: STUDENT IN AN ORGANIZED HEALTH CARE EDUCATION/TRAINING PROGRAM

## 2024-06-20 PROCEDURE — P9016 RBC LEUKOCYTES REDUCED: HCPCS | Performed by: STUDENT IN AN ORGANIZED HEALTH CARE EDUCATION/TRAINING PROGRAM

## 2024-06-20 PROCEDURE — 272N000001 HC OR GENERAL SUPPLY STERILE: Performed by: INTERNAL MEDICINE

## 2024-06-20 PROCEDURE — 250N000013 HC RX MED GY IP 250 OP 250 PS 637: Performed by: STUDENT IN AN ORGANIZED HEALTH CARE EDUCATION/TRAINING PROGRAM

## 2024-06-20 PROCEDURE — 84484 ASSAY OF TROPONIN QUANT: CPT | Performed by: STUDENT IN AN ORGANIZED HEALTH CARE EDUCATION/TRAINING PROGRAM

## 2024-06-20 PROCEDURE — 74174 CTA ABD&PLVS W/CONTRAST: CPT | Mod: MG

## 2024-06-20 PROCEDURE — 85025 COMPLETE CBC W/AUTO DIFF WBC: CPT | Performed by: STUDENT IN AN ORGANIZED HEALTH CARE EDUCATION/TRAINING PROGRAM

## 2024-06-20 PROCEDURE — 250N000011 HC RX IP 250 OP 636: Mod: JZ | Performed by: STUDENT IN AN ORGANIZED HEALTH CARE EDUCATION/TRAINING PROGRAM

## 2024-06-20 PROCEDURE — 999N000141 HC STATISTIC PRE-PROCEDURE NURSING ASSESSMENT: Performed by: INTERNAL MEDICINE

## 2024-06-20 PROCEDURE — 85730 THROMBOPLASTIN TIME PARTIAL: CPT | Performed by: STUDENT IN AN ORGANIZED HEALTH CARE EDUCATION/TRAINING PROGRAM

## 2024-06-20 PROCEDURE — 250N000009 HC RX 250

## 2024-06-20 PROCEDURE — 36415 COLL VENOUS BLD VENIPUNCTURE: CPT | Performed by: STUDENT IN AN ORGANIZED HEALTH CARE EDUCATION/TRAINING PROGRAM

## 2024-06-20 PROCEDURE — 71046 X-RAY EXAM CHEST 2 VIEWS: CPT

## 2024-06-20 PROCEDURE — 86923 COMPATIBILITY TEST ELECTRIC: CPT | Performed by: STUDENT IN AN ORGANIZED HEALTH CARE EDUCATION/TRAINING PROGRAM

## 2024-06-20 PROCEDURE — 85610 PROTHROMBIN TIME: CPT | Performed by: STUDENT IN AN ORGANIZED HEALTH CARE EDUCATION/TRAINING PROGRAM

## 2024-06-20 PROCEDURE — 120N000004 HC R&B MS OVERFLOW

## 2024-06-20 PROCEDURE — 258N000003 HC RX IP 258 OP 636: Mod: JZ

## 2024-06-20 PROCEDURE — 0W3P8ZZ CONTROL BLEEDING IN GASTROINTESTINAL TRACT, VIA NATURAL OR ARTIFICIAL OPENING ENDOSCOPIC: ICD-10-PCS | Performed by: INTERNAL MEDICINE

## 2024-06-20 PROCEDURE — 99285 EMERGENCY DEPT VISIT HI MDM: CPT | Mod: 25

## 2024-06-20 PROCEDURE — 0DC28ZZ EXTIRPATION OF MATTER FROM MIDDLE ESOPHAGUS, VIA NATURAL OR ARTIFICIAL OPENING ENDOSCOPIC: ICD-10-PCS | Performed by: INTERNAL MEDICINE

## 2024-06-20 PROCEDURE — 86900 BLOOD TYPING SEROLOGIC ABO: CPT | Performed by: STUDENT IN AN ORGANIZED HEALTH CARE EDUCATION/TRAINING PROGRAM

## 2024-06-20 PROCEDURE — 96374 THER/PROPH/DIAG INJ IV PUSH: CPT | Mod: 59

## 2024-06-20 PROCEDURE — 36415 COLL VENOUS BLD VENIPUNCTURE: CPT

## 2024-06-20 PROCEDURE — 250N000011 HC RX IP 250 OP 636

## 2024-06-20 PROCEDURE — 250N000013 HC RX MED GY IP 250 OP 250 PS 637

## 2024-06-20 PROCEDURE — 0DC38ZZ EXTIRPATION OF MATTER FROM LOWER ESOPHAGUS, VIA NATURAL OR ARTIFICIAL OPENING ENDOSCOPIC: ICD-10-PCS | Performed by: INTERNAL MEDICINE

## 2024-06-20 PROCEDURE — 81001 URINALYSIS AUTO W/SCOPE: CPT | Performed by: FAMILY MEDICINE

## 2024-06-20 RX ORDER — LIDOCAINE 40 MG/G
CREAM TOPICAL
Status: CANCELLED | OUTPATIENT
Start: 2024-06-20

## 2024-06-20 RX ORDER — ONDANSETRON 4 MG/1
4 TABLET, ORALLY DISINTEGRATING ORAL EVERY 6 HOURS PRN
Status: DISCONTINUED | OUTPATIENT
Start: 2024-06-20 | End: 2024-06-22 | Stop reason: HOSPADM

## 2024-06-20 RX ORDER — SODIUM CHLORIDE, SODIUM LACTATE, POTASSIUM CHLORIDE, CALCIUM CHLORIDE 600; 310; 30; 20 MG/100ML; MG/100ML; MG/100ML; MG/100ML
INJECTION, SOLUTION INTRAVENOUS CONTINUOUS
Status: CANCELLED | OUTPATIENT
Start: 2024-06-20

## 2024-06-20 RX ORDER — NYSTATIN 100000 [USP'U]/G
POWDER TOPICAL 2 TIMES DAILY PRN
COMMUNITY
Start: 2024-05-12 | End: 2024-09-11

## 2024-06-20 RX ORDER — PROCHLORPERAZINE 25 MG
12.5 SUPPOSITORY, RECTAL RECTAL EVERY 12 HOURS PRN
Status: DISCONTINUED | OUTPATIENT
Start: 2024-06-20 | End: 2024-06-22 | Stop reason: HOSPADM

## 2024-06-20 RX ORDER — ONDANSETRON 2 MG/ML
4 INJECTION INTRAMUSCULAR; INTRAVENOUS ONCE
Status: COMPLETED | OUTPATIENT
Start: 2024-06-20 | End: 2024-06-20

## 2024-06-20 RX ORDER — CALCIUM CARBONATE 500 MG/1
1000 TABLET, CHEWABLE ORAL 4 TIMES DAILY PRN
Status: DISCONTINUED | OUTPATIENT
Start: 2024-06-20 | End: 2024-06-22 | Stop reason: HOSPADM

## 2024-06-20 RX ORDER — FENTANYL CITRATE 50 UG/ML
50-100 INJECTION, SOLUTION INTRAMUSCULAR; INTRAVENOUS EVERY 5 MIN PRN
Status: DISCONTINUED | OUTPATIENT
Start: 2024-06-20 | End: 2024-06-20 | Stop reason: HOSPADM

## 2024-06-20 RX ORDER — ASPIRIN 81 MG/1
81 TABLET, CHEWABLE ORAL DAILY
Status: DISCONTINUED | OUTPATIENT
Start: 2024-06-21 | End: 2024-06-22 | Stop reason: HOSPADM

## 2024-06-20 RX ORDER — EPINEPHRINE 1 MG/ML
0.1 INJECTION, SOLUTION INTRAMUSCULAR; SUBCUTANEOUS
Status: DISCONTINUED | OUTPATIENT
Start: 2024-06-20 | End: 2024-06-20 | Stop reason: HOSPADM

## 2024-06-20 RX ORDER — PROPOFOL 10 MG/ML
INJECTION, EMULSION INTRAVENOUS CONTINUOUS PRN
Status: DISCONTINUED | OUTPATIENT
Start: 2024-06-20 | End: 2024-06-20

## 2024-06-20 RX ORDER — SIMETHICONE 40MG/0.6ML
133 SUSPENSION, DROPS(FINAL DOSAGE FORM)(ML) ORAL
Status: DISCONTINUED | OUTPATIENT
Start: 2024-06-20 | End: 2024-06-20 | Stop reason: HOSPADM

## 2024-06-20 RX ORDER — ONDANSETRON 2 MG/ML
4 INJECTION INTRAMUSCULAR; INTRAVENOUS ONCE
Status: DISCONTINUED | OUTPATIENT
Start: 2024-06-20 | End: 2024-06-20

## 2024-06-20 RX ORDER — MAGNESIUM HYDROXIDE/ALUMINUM HYDROXICE/SIMETHICONE 120; 1200; 1200 MG/30ML; MG/30ML; MG/30ML
30 SUSPENSION ORAL ONCE
Status: COMPLETED | OUTPATIENT
Start: 2024-06-20 | End: 2024-06-20

## 2024-06-20 RX ORDER — NALOXONE HYDROCHLORIDE 0.4 MG/ML
0.2 INJECTION, SOLUTION INTRAMUSCULAR; INTRAVENOUS; SUBCUTANEOUS
Status: DISCONTINUED | OUTPATIENT
Start: 2024-06-20 | End: 2024-06-20 | Stop reason: HOSPADM

## 2024-06-20 RX ORDER — SODIUM CHLORIDE 9 MG/ML
INJECTION, SOLUTION INTRAVENOUS CONTINUOUS PRN
Status: DISCONTINUED | OUTPATIENT
Start: 2024-06-20 | End: 2024-06-20

## 2024-06-20 RX ORDER — LIDOCAINE 40 MG/G
CREAM TOPICAL
Status: DISCONTINUED | OUTPATIENT
Start: 2024-06-20 | End: 2024-06-22 | Stop reason: HOSPADM

## 2024-06-20 RX ORDER — MICONAZOLE NITRATE 20 MG/G
CREAM TOPICAL 2 TIMES DAILY
Status: DISCONTINUED | OUTPATIENT
Start: 2024-06-20 | End: 2024-06-22 | Stop reason: HOSPADM

## 2024-06-20 RX ORDER — MEMANTINE HYDROCHLORIDE 5 MG/1
10 TABLET ORAL 2 TIMES DAILY
Status: DISCONTINUED | OUTPATIENT
Start: 2024-06-20 | End: 2024-06-22 | Stop reason: HOSPADM

## 2024-06-20 RX ORDER — SALIVA STIMULANT COMB. NO.3
1 SPRAY, NON-AEROSOL (ML) MUCOUS MEMBRANE 4 TIMES DAILY
Status: DISCONTINUED | OUTPATIENT
Start: 2024-06-20 | End: 2024-06-22 | Stop reason: HOSPADM

## 2024-06-20 RX ORDER — ATROPINE SULFATE 0.1 MG/ML
1 INJECTION INTRAVENOUS
Status: DISCONTINUED | OUTPATIENT
Start: 2024-06-20 | End: 2024-06-20 | Stop reason: HOSPADM

## 2024-06-20 RX ORDER — AMLODIPINE BESYLATE 10 MG/1
10 TABLET ORAL DAILY
Status: DISCONTINUED | OUTPATIENT
Start: 2024-06-21 | End: 2024-06-22 | Stop reason: HOSPADM

## 2024-06-20 RX ORDER — NALOXONE HYDROCHLORIDE 0.4 MG/ML
0.4 INJECTION, SOLUTION INTRAMUSCULAR; INTRAVENOUS; SUBCUTANEOUS
Status: DISCONTINUED | OUTPATIENT
Start: 2024-06-20 | End: 2024-06-20 | Stop reason: HOSPADM

## 2024-06-20 RX ORDER — IOPAMIDOL 755 MG/ML
90 INJECTION, SOLUTION INTRAVASCULAR ONCE
Status: COMPLETED | OUTPATIENT
Start: 2024-06-20 | End: 2024-06-20

## 2024-06-20 RX ORDER — ONDANSETRON 2 MG/ML
4 INJECTION INTRAMUSCULAR; INTRAVENOUS EVERY 6 HOURS PRN
Status: DISCONTINUED | OUTPATIENT
Start: 2024-06-20 | End: 2024-06-22 | Stop reason: HOSPADM

## 2024-06-20 RX ORDER — DIPHENHYDRAMINE HYDROCHLORIDE 50 MG/ML
25-50 INJECTION INTRAMUSCULAR; INTRAVENOUS
Status: DISCONTINUED | OUTPATIENT
Start: 2024-06-20 | End: 2024-06-20 | Stop reason: HOSPADM

## 2024-06-20 RX ORDER — ACETAMINOPHEN 325 MG/1
650 TABLET ORAL EVERY 4 HOURS PRN
Status: DISCONTINUED | OUTPATIENT
Start: 2024-06-20 | End: 2024-06-22 | Stop reason: HOSPADM

## 2024-06-20 RX ORDER — POLYETHYLENE GLYCOL 3350 17 G/17G
17 POWDER, FOR SOLUTION ORAL 2 TIMES DAILY PRN
Status: DISCONTINUED | OUTPATIENT
Start: 2024-06-20 | End: 2024-06-22 | Stop reason: HOSPADM

## 2024-06-20 RX ORDER — FLUMAZENIL 0.1 MG/ML
0.2 INJECTION, SOLUTION INTRAVENOUS
Status: DISCONTINUED | OUTPATIENT
Start: 2024-06-20 | End: 2024-06-20 | Stop reason: HOSPADM

## 2024-06-20 RX ORDER — PROCHLORPERAZINE MALEATE 5 MG
5 TABLET ORAL EVERY 6 HOURS PRN
Status: DISCONTINUED | OUTPATIENT
Start: 2024-06-20 | End: 2024-06-22 | Stop reason: HOSPADM

## 2024-06-20 RX ORDER — LOVASTATIN 10 MG
40 TABLET ORAL EVERY EVENING
Status: DISCONTINUED | OUTPATIENT
Start: 2024-06-20 | End: 2024-06-22 | Stop reason: HOSPADM

## 2024-06-20 RX ORDER — ACETAMINOPHEN 650 MG/1
650 SUPPOSITORY RECTAL EVERY 4 HOURS PRN
Status: DISCONTINUED | OUTPATIENT
Start: 2024-06-20 | End: 2024-06-22 | Stop reason: HOSPADM

## 2024-06-20 RX ORDER — ONDANSETRON 2 MG/ML
INJECTION INTRAMUSCULAR; INTRAVENOUS PRN
Status: DISCONTINUED | OUTPATIENT
Start: 2024-06-20 | End: 2024-06-20

## 2024-06-20 RX ORDER — LIDOCAINE HYDROCHLORIDE 10 MG/ML
INJECTION, SOLUTION INFILTRATION; PERINEURAL PRN
Status: DISCONTINUED | OUTPATIENT
Start: 2024-06-20 | End: 2024-06-20

## 2024-06-20 RX ADMIN — PANTOPRAZOLE SODIUM 80 MG: 40 INJECTION, POWDER, FOR SOLUTION INTRAVENOUS at 14:36

## 2024-06-20 RX ADMIN — PROPOFOL 100 MCG/KG/MIN: 10 INJECTION, EMULSION INTRAVENOUS at 16:39

## 2024-06-20 RX ADMIN — SODIUM CHLORIDE: 0.9 INJECTION, SOLUTION INTRAVENOUS at 16:37

## 2024-06-20 RX ADMIN — ALUMINUM HYDROXIDE, MAGNESIUM HYDROXIDE, AND SIMETHICONE 30 ML: 1200; 120; 1200 SUSPENSION ORAL at 11:40

## 2024-06-20 RX ADMIN — GLUCAGON 0.5 MG: KIT at 09:55

## 2024-06-20 RX ADMIN — ONDANSETRON 4 MG: 2 INJECTION INTRAMUSCULAR; INTRAVENOUS at 14:35

## 2024-06-20 RX ADMIN — MICONAZOLE NITRATE: 20 CREAM TOPICAL at 20:28

## 2024-06-20 RX ADMIN — IOPAMIDOL 90 ML: 755 INJECTION, SOLUTION INTRAVENOUS at 15:31

## 2024-06-20 RX ADMIN — Medication 1 SPRAY: at 20:29

## 2024-06-20 RX ADMIN — LIDOCAINE HYDROCHLORIDE 5 ML: 10 INJECTION, SOLUTION INFILTRATION; PERINEURAL at 16:39

## 2024-06-20 RX ADMIN — ONDANSETRON 4 MG: 2 INJECTION INTRAMUSCULAR; INTRAVENOUS at 17:00

## 2024-06-20 ASSESSMENT — ACTIVITIES OF DAILY LIVING (ADL)
ADLS_ACUITY_SCORE: 45
ADLS_ACUITY_SCORE: 38
ADLS_ACUITY_SCORE: 45
ADLS_ACUITY_SCORE: 38
ADLS_ACUITY_SCORE: 45
ADLS_ACUITY_SCORE: 38

## 2024-06-20 ASSESSMENT — COLUMBIA-SUICIDE SEVERITY RATING SCALE - C-SSRS
6. HAVE YOU EVER DONE ANYTHING, STARTED TO DO ANYTHING, OR PREPARED TO DO ANYTHING TO END YOUR LIFE?: NO
2. HAVE YOU ACTUALLY HAD ANY THOUGHTS OF KILLING YOURSELF IN THE PAST MONTH?: NO
1. IN THE PAST MONTH, HAVE YOU WISHED YOU WERE DEAD OR WISHED YOU COULD GO TO SLEEP AND NOT WAKE UP?: NO

## 2024-06-20 NOTE — ANESTHESIA CARE TRANSFER NOTE
Patient: Angle Agudelo    Procedure: Procedure(s):  ESOPHAGOGASTRODUODENOSCOPY WITH CLIP PLACEMENT       Diagnosis: Esophageal obstruction due to food impaction [T18.128A, W44.F3XA]  Diagnosis Additional Information: No value filed.    Anesthesia Type:   MAC     Note:    Oropharynx: oropharynx clear of all foreign objects  Level of Consciousness: drowsy  Oxygen Supplementation: face mask  Level of Supplemental Oxygen (L/min / FiO2): 8  Independent Airway: airway patency satisfactory and stable  Dentition: dentition unchanged  Vital Signs Stable: post-procedure vital signs reviewed and stable  Report to RN Given: handoff report given  Patient transferred to: Phase II    Handoff Report: Identifed the Patient, Identified the Reponsible Provider, Reviewed the pertinent medical history, Discussed the surgical course, Reviewed Intra-OP anesthesia mangement and issues during anesthesia, Set expectations for post-procedure period and Allowed opportunity for questions and acknowledgement of understanding      Vitals:  Vitals Value Taken Time   /84 06/20/24 1707   Temp 97.7    Pulse 90 06/20/24 1708   Resp 12 06/20/24 1708   SpO2 99 % 06/20/24 1708   Vitals shown include unfiled device data.    Electronically Signed By: SOFIE Longoria CRNA  June 20, 2024  5:09 PM

## 2024-06-20 NOTE — ED PROVIDER NOTES
Emergency Department Encounter   NAME: Angle Agudelo ; AGE: 89 year old female ; YOB: 1932 ; MRN: [id number] ; PCP: Koko Betancourt   ED PROVIDER: Erendira Domingo PA-C    Evaluation Date & Time:   No admission date for patient encounter.    CHIEF COMPLAINT:  Swallowed Foreign Body (Asprin stuck in throat)        Impression and Plan   FINAL IMPRESSION:    ICD-10-CM    1. Esophageal obstruction due to food impaction  T18.128A Case Request: ESOPHAGOGASTRODUODENOSCOPY    [id number] Case Request: ESOPHAGOGASTRODUODENOSCOPY      2. Esophagitis  K20.90       3. Hematemesis without nausea  K92.0           MDM:  Marielos is a 89 year old male with PMH of HTN, CVA, urinary incontinence, dementia, and Jihan-Carey tear presenting to the emergency department from her assisted living for concern of pill stuck in her throat.  She states she took her morning doses of lovastatin, amlodipine, and 81 mg ASA and following she felt as though a pill was stuck in her throat.  She has been spitting occasionally into an emesis bag. When EMS arrived, patient was tolerating drinking water but was still complaining of discomfort in the throat. Patient denies any vomiting.  No difficulty breathing.    Vitals reviewed and unremarkable. Differential diagnosis includes but not limited to pill esophagitis, globus, esophageal stricture, esophageal impaction. On exam she is resting comfortably in a wheelchair, occasionally spitting into an emesis bag but is overall tolerating her oral secretions. She is able to tolerate PO intake of fluids so I do not suspect complete impaction. Given she is still having some irritation and is spitting its possible the pill is stuck along the side of her esophagus. Could also consider globus sensation due to irritation from the pills.    Patient spent the first 3 hours in the lobby due to boarding crisis.  She was given a glucagon injection of 0.5 mg as well as water and carbonated soda to sip on. I do  not see any previous upper endoscopies in her chart to indicate that she has a known esophageal stricture or previous issues with impaction.     After 5 hours patient continues to spit out into an emesis bag.  She has not had any leslee emesis and has been able to tolerate PO intake throughout her time here.  At this point we pursued cardiac workup to ensure that this was not ACS with an atypical presentation. First troponin 20, repeat 15 and not indicative of ACS. Her EKG shows normal sinus rhythm without any evidence of ischemia.  CBC is without evidence of leukocytosis.  She does have evidence of a microcytic anemia with hemoglobin of 7.9. This is mildly diminished from 9.8 five months ago. PTT/INR within normal range. I spoke with Dr. Delfina TO, he will come meet the patient and determine if she needs to go to the OR for upper endoscopy.    Shortly after I spoke with GI I was called to the patient's room as she had just had an episode of bright red hematemesis.  This appeared to be about 1/4 cup.  Patient continues to have stable vitals.  IV Protonix and Zofran were ordered.  Given her CBC finds a hemoglobin of 7.9,  patient was also consented for a blood transfusion and given 1 unit of blood at this time.  CTA chest abdomen pelvis was ordered to evaluate for any active GI bleeding or Jihan-Carey tear.    Following the episode of hematemesis, patient returned to laying comfortably in the bed. She did not have a repeat episode of hematemesis. I spoke with GI again, plan to take her to the OR for scope and admit patient to the hospital for further workup.        History:  Supplemental history from: Documented in chart and Family Member/Significant Other  External Record(s) reviewed: Documented in chart    Work Up:  Chart documentation includes differential considered and any EKGs or imaging independently interpreted by provider, where specified.  In additional to work up documented, I considered the following  "work up: Documented in chart, if applicable.    External consultation:  Discussion of management with another provider: Dr. Romero    Complicating factors:  Care impacted by chronic illness: Dementia  Care affected by social determinants of health: N/A    Disposition considerations: Admit.      ED COURSE:  9:11 AM I met and introduced myself to the patient. I gathered initial history and performed my physical exam. We discussed plan for initial workup.   9:18 AM I have staffed the patient with Dr. Romero, ED MD, who has evaluated the patient and agrees with all aspects of today's care.   10:20 AM Pt is still endorsing sensation of something \"stuck in her throat\". Continues to spit into emesis bag due to pain but is tolerating her oral secretions and PO intake  2:03 PM I spoke with Dr. Phalen [facility]  2:15 PM I was called into the room by patient's nurse, she just vomited bright red blood.  2:28 PM I rechecked the patient, she is resting comfortably in the bed.  No additional episodes of hematemesis.  3:33 PM I spoke with Dr. Phalen GI again, plan to take patient to the OR for upper endoscopy and admit patient for further workup.  3:42 PM I spoke on the phone with Yogi Roberts, resident hospitalist, who accepts this patient.       At the conclusion of the encounter I discussed the results of all the tests and the disposition. The questions were answered. The patient or family acknowledged understanding and was agreeable with the care plan.      MEDICATIONS GIVEN IN THE EMERGENCY DEPARTMENT:  Medications   glucagon injection 0.5 mg (0.5 mg Intravenous $Given 6/20/24 6808)   alum & mag hydroxide-simethicone (MAALOX) suspension 30 mL (30 mLs Oral $Given 6/20/24 1140)   ondansetron (ZOFRAN) injection 4 mg (4 mg Intravenous $Given 6/20/24 9246)   pantoprazole (PROTONIX) IV push injection 80 mg (80 mg Intravenous $Given 6/20/24 1436)   pantoprazole (PROTONIX) IV push injection 40 mg (40 mg Intravenous Not Given 6/20/24 " 9447)   pantoprazole (PROTONIX) IV push injection 40 mg (40 mg Intravenous Not Given 6/20/24 1455)         NEW PRESCRIPTIONS STARTED AT TODAY'S ED VISIT:  New Prescriptions    No medications on file         HPI   Patient information was obtained from: Patient and daughter   Use of Intrepreter: N/A     Angle Agudelo is a 89 year old female with a pertinent history of CVA, hypertension, dementia, who presents to the ED by ambulance for evaluation of foreign body stuck in throat.     The patient reports that after taking her medications (aspirin 81 mg, lovastatin, and amlodipine) this morning, she feels like one is stuck in her throat. She is able to drink water and tolerate her secretions but cannot get it to pass, and says it still feels stuck in her esophagus.    Denies difficulty breathing or any other concerns at this time. No history of dysphagia or previous food impactions.     Medical History     Past Medical History:   Diagnosis Date    Acute cerebrovascular accident (CVA) (H) 06/08/2020    Benign neoplasm of colon     Chronic bilateral low back pain without sciatica     Chronic bilateral low back pain without sciatica 08/12/2016    Dementia (H)     Disorder of bone and cartilage, unspecified     Dyspnea on exertion     Female genital prolapse     Gastroenteritis 6/14/2021    GI bleed     Hiatal hernia 6/16/2021    Memory impairment     Orthopnea     Pure hypercholesterolemia     Rectal prolapse     Skin cancer     Spinal stenosis of lumbar region     Swelling of both lower extremities     Thickened endometrium     Unspecified arthropathy, shoulder region     Unspecified essential hypertension     Walking troubles        Past Surgical History:   Procedure Laterality Date    APPENDECTOMY      BIOPSY CERVICAL, LOCAL EXCISION, SINGLE/MULTIPLE N/A 09/11/2018    Procedure: LOOP ELECTROSURGICAL EXCISION PROCEDURE, REMOVAL PESSARY;  Surgeon: Olive Biggs MD;  Location: [facility];  Service:      BIOPSY VULVA N/A 05/14/2020    Procedure: VULVAR BIOPSY;  Surgeon: Olive Biggs MD;  Location: [facility] OR;  Service: Gynecology    CATARACT EXTRACTION EXTRACAPSULAR W/ INTRAOCULAR LENS IMPLANTATION Bilateral     CERVICAL BIOPSY N/A 05/14/2020    Procedure: PUNCH BIOPSY, CERVIX;  Surgeon: Olive Biggs MD;  Location: [facility] OR;  Service: Gynecology    CERVICAL POLYPECTOMY N/A 10/12/2023    Procedure: AND POLYPECTOMY WITH ULTRASOUND GUIDANCE;  Surgeon: Olive Biggs MD;  Location: [facility] OR    CHOLECYSTECTOMY      COLONOSCOPY N/A 01/08/2020    Procedure: COLONOSCOPY;  Surgeon: Amanda Carson MD;  Location: [facility] GI    DILATION AND CURETTAGE, HYSTEROSCOPY WITH ULTRASOUND GUIDANCE N/A 10/12/2023    Procedure: HYSTEROSCOPY DILATION AND CURETTAGE;  Surgeon: Olive Biggs MD;  Location: [facility]    DILATION AND CURETTAGE, OPERATIVE HYSTEROSCOPY, COMBINED N/A 03/31/2022    Procedure: HYSTEROSCOPY;  Surgeon: Olive Biggs MD;  Location: [facility]    HC DILATION/CURETTAGE DIAG/THER NON OB N/A 05/14/2020    Procedure: DILATION AND CURETTAGE, UTERUS;  Surgeon: Olive Biggs MD;  Location: [facility];  Service: Gynecology    IR EXTREMITY ANGIOGRAM RIGHT  02/13/2018    LEFORTE COLPOCLEISIS PROCEDURE (VAGINAL OBLITERATION) N/A 1/5/2024    Procedure: COLPOCLEISIS;  Surgeon: Olive Biggs MD;  Location: [facility] OR    ORTHOPEDIC SURGERY      OTHER SURGICAL HISTORY      [id number]    OTHER SURGICAL HISTORY      finger abscess    PERINEORRHAPHY N/A 1/5/2024    Procedure: PERINEORRAPHY;  Surgeon: Olive Biggs MD;  Location: [facility] OR    TN ESOPHAGOGASTRODUODENOSCOPY TRANSORAL DIAGNOSTIC N/A 06/16/2021    Procedure: ESOPHAGOGASTRODUODENOSCOPY (EGD);  Surgeon: Primitivo Lafleur MD;  Location: [facility] OR;  Service: Gastroenterology    RECTOSIGMOIDECTOMY PERINEAL N/A 1/5/2024    Procedure:  "PERINEAL RECTOSIGMOIDECTOMY;  Surgeon: Shannon Haines MD;  Location: [facility] OR    SHOULDER SURGERY      bilateral shoulder    SIGMOIDOSCOPY FLEXIBLE N/A 06/03/2018    Procedure: SIGMOIDOSCOPY;  Surgeon: Dayana Joshi MD;  Location: [facility];  Service:     TONSILLECTOMY & ADENOIDECTOMY      TOTAL SHOULDER ARTHROPLASTY Bilateral R 7/12 L 9/11     BIOPSY THYROID FINE NEEDLE ASPIRATION  08/07/2020       Family History   Problem Relation Age of Onset    Heart Disease Father     Coronary Artery Disease Father     Cerebrovascular Disease Father     Coronary Artery Disease Brother     Coronary Artery Disease Brother     Dementia Sister        Social History     Tobacco Use    Smoking status: Never    Smokeless tobacco: Never   Vaping Use    Vaping status: Never Used   Substance Use Topics    Alcohol use: Yes     Alcohol/week: 2.0 standard drinks of alcohol     Comment: social    Drug use: No       acetaminophen (TYLENOL) 325 MG tablet  amLODIPine (NORVASC) 10 MG tablet  amoxicillin-clavulanate (AUGMENTIN) 875-125 MG tablet  aspirin (ASPIRIN LOW DOSE) 81 MG chewable tablet  hydrocortisone 2.5 % cream  ibuprofen (ADVIL/MOTRIN) 600 MG tablet  lovastatin (MEVACOR) 40 MG tablet  memantine (NAMENDA) 10 MG tablet  ondansetron (ZOFRAN ODT) 4 MG ODT tab  polyethylene glycol (MIRALAX) 17 GM/Dose powder  traMADol (ULTRAM) 50 MG tablet          Physical Exam     First Vitals:  Patient Vitals for the past 24 hrs:   BP Temp Temp src Pulse Resp SpO2 Height Weight   06/20/24 1245 (!) 161/74 -- -- 90 23 -- -- --   06/20/24 1011 130/75 -- -- 87 20 95 % -- --   06/20/24 0934 -- -- -- -- -- 96 % -- --   06/20/24 0857 113/70 99.2  F (37.3  C) Oral 74 20 -- 1.575 m (5' 2\") 61.3 kg (135 lb 1.6 oz)         PHYSICAL EXAM    General Appearance:  Alert, cooperative, no distress, appears stated age.  Resting comfortably in a wheelchair, occasionally spitting into an emesis bag.  HENT: Normocephalic without obvious deformity, " atraumatic. Mucous membranes moist. No visible foreign body in the oropharynx.  No swelling or erythema of the posterior pharynx.  Eyes: Conjunctiva clear, Lids normal. No discharge.   Respiratory: No distress. Lungs clear to ausculation bilaterally. No wheezes, rhonchi or stridor.  No accessory muscle use.  No tripoding or drooling.  Cardiovascular: Regular rate and rhythm, no murmur. Normal cap refill. No peripheral edema  GI: Abdomen soft, non-tender  Musculoskeletal: Moving all extremities. No gross deformities  Integument: Warm, dry, no rashes or lesions  Neurologic: Alert and orientated x3. No focal deficits.  Psych: Normal mood and affect        Results     LAB:  All pertinent labs reviewed and interpreted  Labs Ordered and Resulted from Time of ED Arrival to Time of ED Departure   TROPONIN T, HIGH SENSITIVITY - Abnormal       Result Value    Troponin T, High Sensitivity 20 (*)    COMPREHENSIVE METABOLIC PANEL - Abnormal    Sodium 143      Potassium 4.2      Carbon Dioxide (CO2) 25      Anion Gap 9      Urea Nitrogen 20.3      Creatinine 0.73      GFR Estimate 77      Calcium 8.9      Chloride 109 (*)     Glucose 99      Alkaline Phosphatase 88      AST 11      ALT <5      Protein Total 6.7      Albumin 4.1      Bilirubin Total 0.3     CBC WITH PLATELETS AND DIFFERENTIAL - Abnormal    WBC Count 8.1      RBC Count 3.98      Hemoglobin 7.9 (*)     Hematocrit 28.9 (*)     MCV 73 (*)     MCH 19.8 (*)     MCHC 27.3 (*)     RDW 20.1 (*)     Platelet Count 227      % Neutrophils 63      % Lymphocytes 23      % Monocytes 12      % Eosinophils 2      % Basophils 1      % Immature Granulocytes 0      NRBCs per 100 WBC 0      Absolute Neutrophils 5.1      Absolute Lymphocytes 1.9      Absolute Monocytes 0.9      Absolute Eosinophils 0.2      Absolute Basophils 0.1      Absolute Immature Granulocytes 0.0      Absolute NRBCs 0.0     TROPONIN T, HIGH SENSITIVITY - Abnormal    Troponin T, High Sensitivity 15 (*)    INR -  Normal    INR 1.09     PARTIAL THROMBOPLASTIN TIME - Normal    aPTT 28     TYPE AND SCREEN, ADULT    SPECIMEN EXPIRATION DATE [id number]     PREPARE RED BLOOD CELLS (UNIT)   ABO/RH TYPE AND SCREEN       RADIOLOGY:  Chest XR,  PA & LAT   Final Result   IMPRESSION: No pleural fluid or pneumothorax. No airspace disease or edema. Normal size of the heart. Aortic calcification and tortuosity noted. Bilateral shoulder arthroplasty. There is lateral curvature of the spine. No retained radiopaque foreign    bodies.      CTA Chest Abdomen Pelvis w Contrast    (Results Pending)         ECG:  Performed at: 12:30 20-JUN-2024    Impression: Sinus rhythm with premature atrial complexes. Left axis deviation. When compared with ECG of 16-JAN-2024 23:48, no significant change was found.    Rate: 85 bpm  Rhythm: Sinus rhythm with premature atrial complexes   Axis: 25 -34 38  GA Interval: 142 ms  QRS Interval: 74 ms  QTc Interval: 366/435 ms  Comparison: When compared with ECG of 16-JAN-2024 23:48, no significant change was found.    EKG results reviewed and interpreted by Dr. Nick ED MD.     PROCEDURES:  N/A      I, Paula Pizarro, am serving as a scribe to document services personally performed by Erendira Domingo PA-C, based on my observation and the provider's statements to me. IErendira PA-C attest that Paula Pizarro is acting in a scribe capacity, has observed my performance of the services and has documented them in accordance with my direction.       Erendira Domingo PA-C   Emergency Medicine   [facility] EMERGENCY ROOM       Erendira Domingo PA-C  06/20/24 0893

## 2024-06-20 NOTE — ED NOTES
Tolerated a sip of rambo without vomiting back up but was spitting up oral secretions. Still has sensation of something stuck in throat.

## 2024-06-20 NOTE — ANESTHESIA PREPROCEDURE EVALUATION
Anesthesia Pre-Procedure Evaluation    Patient: Angle Agudelo   MRN: [id number] : 1932        Procedure : Procedure(s):  ESOPHAGOGASTRODUODENOSCOPY          Past Medical History:   Diagnosis Date    Acute cerebrovascular accident (CVA) (H) 2020    Benign neoplasm of colon     Chronic bilateral low back pain without sciatica     Chronic bilateral low back pain without sciatica 2016    Dementia (H)     Disorder of bone and cartilage, unspecified     Dyspnea on exertion     Female genital prolapse     Gastroenteritis 2021    GI bleed     Hiatal hernia 2021    Memory impairment     Orthopnea     Pure hypercholesterolemia     Rectal prolapse     Skin cancer     Not Melanoma, not sure what kind though    Spinal stenosis of lumbar region     Swelling of both lower extremities     Thickened endometrium     Unspecified arthropathy, shoulder region     Unspecified essential hypertension     Walking troubles       Past Surgical History:   Procedure Laterality Date    APPENDECTOMY      BIOPSY CERVICAL, LOCAL EXCISION, SINGLE/MULTIPLE N/A 2018    Procedure: LOOP ELECTROSURGICAL EXCISION PROCEDURE, REMOVAL PESSARY;  Surgeon: Olive Biggs MD;  Location: [facility] OR;  Service:     BIOPSY VULVA N/A 2020    Procedure: VULVAR BIOPSY;  Surgeon: Olive Biggs MD;  Location: [facility] OR;  Service: Gynecology    CATARACT EXTRACTION EXTRACAPSULAR W/ INTRAOCULAR LENS IMPLANTATION Bilateral     CERVICAL BIOPSY N/A 2020    Procedure: PUNCH BIOPSY, CERVIX;  Surgeon: Olive Biggs MD;  Location: [facility] OR;  Service: Gynecology    CERVICAL POLYPECTOMY N/A 10/12/2023    Procedure: AND POLYPECTOMY WITH ULTRASOUND GUIDANCE;  Surgeon: Olive Biggs MD;  Location: [facility] OR    CHOLECYSTECTOMY      COLONOSCOPY N/A 2020    Procedure: COLONOSCOPY;  Surgeon: Amanda Carson MD;  Location:  GI    DILATION AND CURETTAGE,  HYSTEROSCOPY WITH ULTRASOUND GUIDANCE N/A 10/12/2023    Procedure: HYSTEROSCOPY DILATION AND CURETTAGE;  Surgeon: Olive Biggs MD;  Location: [facility]    DILATION AND CURETTAGE, OPERATIVE HYSTEROSCOPY, COMBINED N/A 03/31/2022    Procedure: HYSTEROSCOPY;  Surgeon: Olive Biggs MD;  Location: [facility] DILATION/CURETTAGE DIAG/THER NON OB N/A 05/14/2020    Procedure: DILATION AND CURETTAGE, UTERUS;  Surgeon: Olive Biggs MD;  Location: [facility];  Service: Gynecology    IR EXTREMITY ANGIOGRAM RIGHT  02/13/2018    LEFORTE COLPOCLEISIS PROCEDURE (VAGINAL OBLITERATION) N/A 1/5/2024    Procedure: COLPOCLEISIS;  Surgeon: Olive Biggs MD;  Location: [facility]    ORTHOPEDIC SURGERY      OTHER SURGICAL HISTORY      [id number]    OTHER SURGICAL HISTORY      finger abscess    PERINEORRHAPHY N/A 1/5/2024    Procedure: PERINEORRAPHY;  Surgeon: Olive Biggs MD;  [facility]    LA ESOPHAGOGASTRODUODENOSCOPY TRANSORAL DIAGNOSTIC N/A 06/16/2021    Procedure: ESOPHAGOGASTRODUODENOSCOPY (EGD);  Surgeon: Primitivo Lafleur MD;  [place] [facility];  Service: Gastroenterology    RECTOSIGMOIDECTOMY PERINEAL N/A 1/5/2024    Procedure: PERINEAL RECTOSIGMOIDECTOMY;  Surgeon: Shannon Haines MD;  Location: [facility] OR    SHOULDER SURGERY      bilateral shoulder    SIGMOIDOSCOPY FLEXIBLE N/A 06/03/2018    Procedure: SIGMOIDOSCOPY;  Surgeon: Dayana Joshi MD;  Location: [facility] GI;  Service:     TONSILLECTOMY & ADENOIDECTOMY      TOTAL SHOULDER ARTHROPLASTY Bilateral R 7/12 L 9/11    US BIOPSY THYROID FINE NEEDLE ASPIRATION  08/07/2020      Allergies   Allergen Reactions    Atorvastatin Muscle Pain (Myalgia)    Dextromethorphan Hives    Oxycodone Unknown    Pravastatin Muscle Pain (Myalgia)    Simvastatin Muscle Pain (Myalgia)    Codeine Rash      Social History     Tobacco Use    Smoking status: Never    Smokeless  tobacco: Never   Substance Use Topics    Alcohol use: Yes     Alcohol/week: 2.0 standard drinks of alcohol     Comment: social      Wt Readings from Last 1 Encounters:   06/20/24 61.3 kg (135 lb 1.6 oz)        Anesthesia Evaluation   Pt has had prior anesthetic.     No history of anesthetic complications       ROS/MED HX  ENT/Pulmonary:  - neg pulmonary ROS     Neurologic:     (+)   dementia,       CVA,                      Cardiovascular:     (+)  hypertension- -   -  - -                                      METS/Exercise Tolerance:     Hematologic:     (+)      anemia,          Musculoskeletal:   (+)  arthritis,             GI/Hepatic:     (+)   esophageal disease,   hiatal hernia,              Renal/Genitourinary:       Endo:  - neg endo ROS     Psychiatric/Substance Use:  - neg psychiatric ROS     Infectious Disease:  - neg infectious disease ROS     Malignancy:  - neg malignancy ROS     Other:  - neg other ROS          Physical Exam    Airway  airway exam normal           Respiratory Devices and Support         Dental           Cardiovascular   cardiovascular exam normal       Rhythm and rate: regular and normal     Pulmonary   pulmonary exam normal        breath sounds clear to auscultation           OUTSIDE LABS:  CBC:   Lab Results   Component Value Date    WBC 8.1 06/20/2024    WBC 10.0 01/20/2024    HGB 7.9 (L) 06/20/2024    HGB 9.8 (L) 01/20/2024    HCT 28.9 (L) 06/20/2024    HCT 32.6 (L) 01/20/2024     06/20/2024     01/20/2024     BMP:   Lab Results   Component Value Date     06/20/2024     05/01/2024    POTASSIUM 4.2 06/20/2024    POTASSIUM 4.1 05/01/2024    CHLORIDE 109 (H) 06/20/2024    CHLORIDE 108 (H) 05/01/2024    CO2 25 06/20/2024    CO2 25 05/01/2024    BUN 20.3 06/20/2024    BUN 12.6 05/01/2024    CR 0.73 06/20/2024    CR 0.78 05/01/2024    GLC 99 06/20/2024     (H) 05/01/2024     COAGS:   Lab Results   Component Value Date    PTT 28 06/20/2024    INR 1.09  "06/20/2024     POC: No results found for: \"BGM\", \"HCG\", \"HCGS\"  HEPATIC:   Lab Results   Component Value Date    ALBUMIN 4.1 06/20/2024    PROTTOTAL 6.7 06/20/2024    ALT <5 06/20/2024    AST 11 06/20/2024    ALKPHOS 88 06/20/2024    BILITOTAL 0.3 06/20/2024     OTHER:   Lab Results   Component Value Date    PH 7.36 02/13/2019    LACT 1.5 01/17/2024    A1C 5.6 04/14/2014    BUBBA 8.9 06/20/2024    MAG 2.1 01/06/2024    LIPASE 19 06/14/2021    TSH 0.51 06/14/2021       Anesthesia Plan    ASA Status:  3       Anesthesia Type: MAC.              Consents    Anesthesia Plan(s) and associated risks, benefits, and realistic alternatives discussed. Questions answered and patient/representative(s) expressed understanding.     - Discussed:     - Discussed with:  Patient, Legal Guardian            Postoperative Care            Comments:               Yung Cleveland MD    I have reviewed the pertinent notes and labs in the chart from the past 30 days and (re)examined the patient.  Any updates or changes from those notes are reflected in this note.             # Drug Induced Platelet Defect: home medication list includes an antiplatelet medication      "

## 2024-06-20 NOTE — ANESTHESIA POSTPROCEDURE EVALUATION
Patient: Angle Agudelo    Procedure: Procedure(s):  ESOPHAGOGASTRODUODENOSCOPY WITH CLIP PLACEMENT       Anesthesia Type:  MAC    Note:  Disposition: Inpatient   Postop Pain Control: Uneventful            Sign Out: Well controlled pain   PONV: No   Neuro/Psych: Uneventful            Sign Out: Acceptable/Baseline neuro status   Airway/Respiratory: Uneventful            Sign Out: Acceptable/Baseline resp. status   CV/Hemodynamics: Uneventful            Sign Out: Acceptable CV status; No obvious hypovolemia; No obvious fluid overload   Other NRE: NONE   DID A NON-ROUTINE EVENT OCCUR? No           Last vitals:  Vitals Value Taken Time   /71 06/20/24 1720   Temp 36.1  C (97  F) 06/20/24 1707   Pulse 89 06/20/24 1726   Resp 24 06/20/24 1726   SpO2 98 % 06/20/24 1726   Vitals shown include unfiled device data.    Electronically Signed By: Yung Cleveland MD  June 20, 2024  5:27 PM

## 2024-06-20 NOTE — INTERVAL H&P NOTE
I have reviewed the surgical (or preoperative) H&P that is linked to this encounter, and examined the patient. There are no significant changes    Clinical Conditions Present on Arrival:  Clinically Significant Risk Factors Present on Admission                 # Drug Induced Platelet Defect: home medication list includes an antiplatelet medication

## 2024-06-20 NOTE — H&P
"[facility]    History and Physical - Hospitalist Service       Date of Admission:  6/20/2024    Assessment & Plan      Angle Agudelo is a 89 year old female admitted on 6/20/2024. She has a history of HTN, CVA, urinary incontinence, dementia, and Jihan-Carey tear and is admitted for hematemesis.    ED Course:   Arrived via EMS from assisted living facility after concern of pill stuck in throat.  Was vitally stable and in no acute distress with unremarkable labs other than Hgb of 7.9, down from 9.8 five months ago. CT Chest/Abd pertinent for \"dilated esophagus with mural thickening distally, correlate for esophagitis, esophageal dysfunction, or stricture. Large amounts of fluid and impacted ingested material are seen in the distal esophagus.\" Patient was given glucagon in the ED and observed for some time. She then developed retching and severe chest pain and had an episode of spitting up blood. GI consulted and will take patient for scope tonight. Also getting 1 Unit of blood in ED.     Hematemesis   concern for upper GI bleed  Esophageal foreign body   Presented to hospital after taking morning medications and experiencing sensation that pill was stuck in throat. 1 episode of hematemesis after retching. Presentation suggestive of possible Jihan Carey tear, as patient also has a hx of this. Will obtain EGD to assess for foreign body as well as source of bleed.   -GI Consulted   -performing EGD  -NPO with no exceptions for procedure  -SLP evaluation when appropriate     Thickened Endometrium discovered on CT Chest/Abd  1.7cm thickened endometrium seen on CT.   -follow-up outpatient pelvic US to exclude endometrial hyperplasia or malignancy     Chronic Conditions:     Hx of CVA:  holding ASA 81mg, Lovastatin 40mg   HTN: holding Amlodipine 10mg   Dementia: holding Memantine BID           Diet: NPO per Anesthesia Guidelines for Procedure/Surgery Except for: No Exceptions  DVT " "Prophylaxis: Pneumatic Compression Devices  Graves Catheter: Not present  Fluids: NPO   Lines: None     Cardiac Monitoring: None  Code Status: No CPR- Do NOT Intubate  Disposition Plan      Expected Discharge Date: 06/21/2024                The patient's care was discussed with the Attending Physician, Dr. Declan Brasher . Patient to be seen by Dr. J Carlos Vasquez tomorrow.       Corwin Pritchett MD  Hospitalist Service  [facility]  Securely message with [employer] (more info)  Text page via [employer] Paging/Directory   ______________________________________________________________________    Chief Complaint   Esophageal foreign body sensation   Hematemesis     History is obtained from ED provider, the patient and patient's daughter.     History of Present Illness   Angle Agudelo is a 89 year old female who has a history of HTN, CVA, urinary incontinence, dementia, and Jihan-Carey tear and is admitted for hematemesis.     Patient presents from [facility] after concern of pill stuck in throat after taking her morning medications. She was brought here via EMS and observed in the ED.     On arrival, vitally stable. CMP normal. CBC showing Hgb of 7.9, down from 9.8 five months ago. Negative CXR. CT Chest/abdomen revealed: \"No active extravasation. Stable 3.7 x 3.2 cm infrarenal abdominal aortic saccular aneurysm, unchanged.  Dilated esophagus with mural thickening distally, correlate for esophagitis, esophageal dysfunction, or stricture. Large amounts of fluid and impacted ingested material are seen in the distal esophagus.\" Patient was observed in the ED, started retching and developed severe chest pain. It was at that time a cardiac work-up was performed which was otherwise unremarkable. Initial trop of 20 and repeat trop of 15; reassuring EKG. She then was noted to have hematemesis (~1/4 cup of bright red blood in emesis bag). GI was consulted in the ED and will plan to take her for scope this PM. Given " Hgb of 7.9, patient also getting 1Unit of PRBCs in the ED prior to EGD.     Currently denies any nausea, abdominal pain. Needs to urinate. Daughter at bedside.     Past Medical History    Past Medical History:   Diagnosis Date    Acute cerebrovascular accident (CVA) (H) 06/08/2020    Benign neoplasm of colon     Chronic bilateral low back pain without sciatica     Chronic bilateral low back pain without sciatica 08/12/2016    Dementia (H)     Disorder of bone and cartilage, unspecified     Dyspnea on exertion     Female genital prolapse     Gastroenteritis 6/14/2021    GI bleed     Hiatal hernia 6/16/2021    Memory impairment     Orthopnea     Pure hypercholesterolemia     Rectal prolapse     Skin cancer     Not Melanoma, not sure what kind though    Spinal stenosis of lumbar region     Swelling of both lower extremities     Thickened endometrium     Unspecified arthropathy, shoulder region     Unspecified essential hypertension     Walking troubles        Past Surgical History   Past Surgical History:   Procedure Laterality Date    APPENDECTOMY      BIOPSY CERVICAL, LOCAL EXCISION, SINGLE/MULTIPLE N/A 09/11/2018    Procedure: LOOP ELECTROSURGICAL EXCISION PROCEDURE, REMOVAL PESSARY;  Surgeon: Olive Biggs MD;  Location: [facility] OR;  Service:     BIOPSY VULVA N/A 05/14/2020    Procedure: VULVAR BIOPSY;  Surgeon: Olive Biggs MD;  Location: [facility] OR;  Service: Gynecology    CATARACT EXTRACTION EXTRACAPSULAR W/ INTRAOCULAR LENS IMPLANTATION Bilateral     CERVICAL BIOPSY N/A 05/14/2020    Procedure: PUNCH BIOPSY, CERVIX;  Surgeon: Olive Biggs MD;  Location: [facility] OR;  Service: Gynecology    CERVICAL POLYPECTOMY N/A 10/12/2023    Procedure: AND POLYPECTOMY WITH ULTRASOUND GUIDANCE;  Surgeon: Olive Biggs MD;  Location: [facility] OR    CHOLECYSTECTOMY      COLONOSCOPY N/A 01/08/2020    Procedure: COLONOSCOPY;  Surgeon: Amanda Carson MD;   Location: [facility] GI    DILATION AND CURETTAGE, HYSTEROSCOPY WITH ULTRASOUND GUIDANCE N/A 10/12/2023    Procedure: HYSTEROSCOPY DILATION AND CURETTAGE;  Surgeon: Olive Biggs MD;  Location: [facility]    DILATION AND CURETTAGE, OPERATIVE HYSTEROSCOPY, COMBINED N/A 03/31/2022    Procedure: HYSTEROSCOPY;  Surgeon: Olive Biggs MD;  Location: [facility] DILATION/CURETTAGE DIAG/THER NON OB N/A 05/14/2020    Procedure: DILATION AND CURETTAGE, UTERUS;  Surgeon: Olive Biggs MD;  Location: [facility];  Service: Gynecology    IR EXTREMITY ANGIOGRAM RIGHT  02/13/2018    LEFORTE COLPOCLEISIS PROCEDURE (VAGINAL OBLITERATION) N/A 1/5/2024    Procedure: COLPOCLEISIS;  Surgeon: Olive Biggs MD;  Location: [facility]    ORTHOPEDIC SURGERY      OTHER SURGICAL HISTORY      [id number]    OTHER SURGICAL HISTORY      finger abscess    PERINEORRHAPHY N/A 1/5/2024    Procedure: PERINEORRAPHY;  Surgeon: Olive Biggs MD;  Location: [facility]    MS ESOPHAGOGASTRODUODENOSCOPY TRANSORAL DIAGNOSTIC N/A 06/16/2021    Procedure: ESOPHAGOGASTRODUODENOSCOPY (EGD);  Surgeon: Primitivo Lafleur MD;  Location: [facility];  Service: Gastroenterology    RECTOSIGMOIDECTOMY PERINEAL N/A 1/5/2024    Procedure: PERINEAL RECTOSIGMOIDECTOMY;  Surgeon: Shannon Haines MD;  Location: [facility]    SHOULDER SURGERY      bilateral shoulder    SIGMOIDOSCOPY FLEXIBLE N/A 06/03/2018    Procedure: SIGMOIDOSCOPY;  Surgeon: Dayana Joshi MD;  Location: [facility] GI;  Service:     TONSILLECTOMY & ADENOIDECTOMY      TOTAL SHOULDER ARTHROPLASTY Bilateral R 7/12 L 9/11     BIOPSY THYROID FINE NEEDLE ASPIRATION  08/07/2020       Prior to Admission Medications   Prior to Admission Medications   Prescriptions Last Dose Informant Patient Reported? Taking?   NYAMYC 704347 UNIT/GM external powder Unknown at PRN  Yes Yes   Sig: Apply topically 2 times daily as needed    acetaminophen (TYLENOL) 325 MG tablet Unknown at PRN  No Yes   Sig: Take 3 tablets (975 mg) by mouth every 6 hours as needed for mild pain   amLODIPine (NORVASC) 10 MG tablet 2024 at 0800  No Yes   Si TABLET ORALLY DAILY   aspirin (ASPIRIN LOW DOSE) 81 MG chewable tablet 2024 at 0800  No Yes   Sig: Take 1 tablet (81 mg) by mouth daily Restart on 2024   hydrocortisone 2.5 % cream Unknown at [facility] Yes Yes   Sig: Apply topically 2 times daily as needed for other   ibuprofen (ADVIL/MOTRIN) 600 MG tablet Unknown at [facility] No Yes   Sig: Take 1 tablet (600 mg) by mouth every 6 hours as needed for moderate pain   lovastatin (MEVACOR) 40 MG tablet 2024 at 2000  No Yes   Si TABLET ORALLY AT BEDTIME   memantine (NAMENDA) 10 MG tablet 2024 at 0800; 1 of 2  No Yes   Si TABLET ORALLY 2 TIMES DAILY   ondansetron (ZOFRAN ODT) 4 MG ODT tab Unknown at [facility] No Yes   Sig: Take 1 tablet (4 mg) by mouth every 8 hours as needed for nausea   polyethylene glycol (MIRALAX) 17 GM/Dose powder Unknown at [facility] No Yes   Sig: Take 17 g by mouth daily as needed for constipation   traMADol (ULTRAM) 50 MG tablet Unknown at [facility] No Yes   Sig: Take 0.5 tablets (25 mg) by mouth every 6 hours as needed (for severe pain not relieved by tylenol)      Facility-Administered Medications: None          Physical Exam   Vital Signs: Temp: 97.4  F (36.3  C) Temp src: Oral BP: 134/70 Pulse: 84   Resp: 16 SpO2: 95 % O2 Device: None (Room air)    Weight: 135 lbs 1.6 oz    Constitutional: awake, alert, cooperative, no apparent distress, and appears stated age  ENT: normocephalic, without obvious abnormality. Oral mucosa moist; no lesions   Respiratory: No increased work of breathing, good air exchange, clear to auscultation bilaterally, no crackles or wheezing  Cardiovascular: systolic murmur with occasional PVCs. Regular rate.   GI: soft, nontender. Bowel sounds present.        Data     I have personally reviewed the following data over the past 24 hrs:    8.1  \   7.9 (L)   / 227     143 109 (H) 20.3 /  99   4.2 25 0.73 \     ALT: <5 AST: 11 AP: 88 TBILI: 0.3   ALB: 4.1 TOT PROTEIN: 6.7 LIPASE: N/A     Trop: 15 (H) BNP: N/A     INR:  1.09 PTT:  28   D-dimer:  N/A Fibrinogen:  N/A       Imaging results reviewed over the past 24 hrs:   Recent Results (from the past 24 hour(s))   Chest XR,  PA & LAT    Narrative    EXAM: XR CHEST 2 VIEWS  LOCATION: [facility]  DATE: 6/20/2024    INDICATION: chest pain, foreign body  COMPARISON: 1/17/24      Impression    IMPRESSION: No pleural fluid or pneumothorax. No airspace disease or edema. Normal size of the heart. Aortic calcification and tortuosity noted. Bilateral shoulder arthroplasty. There is lateral curvature of the spine. No retained radiopaque foreign   bodies.   CTA Chest Abdomen Pelvis w Contrast    Narrative    EXAM: CTA CHEST ABDOMEN PELVIS W CONTRAST  LOCATION: [facility]  DATE: 6/20/2024    INDICATION: Patient here initially for sensation of pill stuck in her throat, just had an episode of hematemesis. Hgb down from baseline. Rule out active GI bleeding and Jihan-Carey.  COMPARISON: 6/11 and 6/14/2021  TECHNIQUE: CT angiogram chest abdomen pelvis during arterial phase of injection of IV contrast. 2D and 3D MIP reconstructions were performed by the CT technologist. Dose reduction techniques were used.   CONTRAST: Isovue 370 90mL    FINDINGS:   CT ANGIOGRAM CHEST, ABDOMEN, AND PELVIS: Stable 3.7 x 3.2 cm saccular infrarenal abdominal aortic aneurysm with left anterolateral orientation. Tortuous atherosclerotic aorta. No dissection. No active extravasation. Patent arterial branch vessels of the   aorta. Patent portal veins and hepatic veins.    LUNGS AND PLEURA: Normal.    MEDIASTINUM/AXILLAE: Small hiatal hernia. Dilated esophagus containing fluid and other materials proximal to  GE junction with focal mural thickening of the distal esophagus.    Borderline enlarged heart. No pericardial effusion. No hilar or mediastinal lymphadenopathy. Mitral annular calcifications.    5.9 x 6.2 cm enlarged left thyroid nodule.    CORONARY ARTERY CALCIFICATION: Severe.    HEPATOBILIARY: Status post cholecystectomy.    PANCREAS: Normal.    SPLEEN: Normal.    ADRENAL GLANDS: Normal.    KIDNEYS/BLADDER: Simple renal cysts, no follow-up required. No obstructing renal or ureteral stone. No hydroureteronephrosis.    BOWEL: Colonic diverticulosis. Status post appendectomy. No obstruction, colitis, or diverticulitis.    LYMPH NODES: Normal.    PELVIC ORGANS: Posterior uterine body fibroid. 1.7 cm thickened endometrium.    MUSCULOSKELETAL: Moderate to severe multilevel discogenic degenerative change. Grade I anterolisthesis of L4 on L5, likely degenerative. S-shaped scoliosis.      Impression    IMPRESSION:  1.  No active extravasation. Stable 3.7 x 3.2 cm infrarenal abdominal aortic saccular aneurysm, unchanged. Severe atherosclerotic calcifications. No dissection or central pulmonary embolus.  2.  Dilated esophagus with mural thickening distally, correlate for esophagitis, esophageal dysfunction, or stricture. Large amounts of fluid and impacted ingested material are seen in the distal esophagus.  3.  Diverticulosis.  4.  Coronary artery disease and atherosclerotic vascular disease with a mildly enlarged heart.  5.  Status post cholecystectomy.  6.  Bilateral simple renal cysts, no follow-up required.  7.  1.7 cm thickened endometrium, consider correlation with nonemergent pelvic ultrasound to exclude endometrial hyperplasia or malignancy.

## 2024-06-20 NOTE — ED NOTES
Tolerated glucagon injection without vomiting or without feeling relief of feeling of something stuck in middle/lower throat.

## 2024-06-20 NOTE — ED NOTES
EDT & RN assisted Pt onto commode.  While PT was bent over Pt projectile vomited on floor.  The vomit appeared to be blood.  PA aware

## 2024-06-20 NOTE — ED NOTES
"Pt had episode of bright red blood emesis.  Pt states chest and abdomen fell \"funny\". Provider notified, provider in room.  "

## 2024-06-20 NOTE — ED TRIAGE NOTES
EMS called to [facility] when patient said she felt like her morning Asprin was stuck in throat and she is unable to get it to pass.  Was drinking water when EMS arrived and states she can occasionally swallow her own spit.  Spitting in triage     Triage Assessment (Adult)       Row Name 06/20/24 0858          Triage Assessment    Airway WDL all     Airway Symptoms partially obstructed        Respiratory WDL    Respiratory WDL WDL        Skin Circulation/Temperature WDL    Skin Circulation/Temperature WDL WDL        Cardiac WDL    Cardiac WDL WDL        Peripheral/Neurovascular WDL    Peripheral Neurovascular WDL WDL        Cognitive/Neuro/Behavioral WDL    Cognitive/Neuro/Behavioral WDL X  history of dementia

## 2024-06-20 NOTE — PROGRESS NOTES
PRE-PROCEDURE NOTE      REASON FOR PROCEDURE: Dysphagia, hematemesis    History and Physical: Reviewed, no changes    Pre-sedation Assessment:     VS: AVSS  General: Alert, NAD  Airway: normal  Heart: RRR, systolic murmur  Lungs: CTA    Previous Reaction to Sedation: None    Sedation Plan Based on Assessment: MAC    Mallampati: II    ASA Classification: 3      Impression: Patient deemed adequate candidate for MAC sedation    Plan: esophagogastroduodenoscopy              Truman Brown MD  Thank you for the opportunity to participate in the care of this patient.   Please feel free to call me with any questions or concerns.  Phone number [phone].            6/20/2024 4:33 PM

## 2024-06-20 NOTE — H&P (VIEW-ONLY)
[facility]      NAME: Angle Agudelo    MEDICAL RECORD #: [id number]    DATE / TIME: 6/20/2024/3:39 PM    PRIMARY CARE PROVIDER: Koko Betancourt    REQUESTING PROVIDER: Marshall Romero MD          REASON FOR THE CONSULT: Hematemesis and concern regarding esophageal obstruction    HPI:     This is a 89-year-old female who underwent an EGD 3 years ago for nausea and vomiting and was found to have a hiatal hernia along with what looked like a Jihan-Carey tear at the time with no esophageal stricture described.  The patient has dementia but is able to respond to questions.  She has been here for several hours today.  She has had intermittent chest discomfort and inability to swallow.  This all began after swallowing one of her pills this morning.  She has not had any food today.  She did try drinking some liquids earlier but per her daughter, who provides much of the history, the patient keeps spitting up saliva and liquids.  She denies any abdominal pain.  Most recently after retching multiple times today she had half a cup full of bloody hematemesis.  There has been no report of melena.  She denies current shortness of breath.  EKG did not show any acute ischemic concerns.  Troponin was mildly elevated and decreased on recheck most recently.    REVIEW OF SYSTEMS: 13 point review of systems is negative except that noted above.    PAST MEDICAL HISTORY:   Past Medical History:   Diagnosis Date    Acute cerebrovascular accident (CVA) (H) 06/08/2020    Benign neoplasm of colon     Chronic bilateral low back pain without sciatica     Chronic bilateral low back pain without sciatica 08/12/2016    Dementia (H)     Disorder of bone and cartilage, unspecified     Dyspnea on exertion     Female genital prolapse     Gastroenteritis 6/14/2021    GI bleed     Hiatal hernia 6/16/2021    Memory impairment     Orthopnea     Pure hypercholesterolemia     Rectal prolapse     Skin cancer     Not Melanoma,  not sure what kind though    Spinal stenosis of lumbar region     Swelling of both lower extremities     Thickened endometrium     Unspecified arthropathy, shoulder region     Unspecified essential hypertension     Walking troubles        PAST SURGICAL HISTORY:   Past Surgical History:   Procedure Laterality Date    APPENDECTOMY      BIOPSY CERVICAL, LOCAL EXCISION, SINGLE/MULTIPLE N/A 09/11/2018    Procedure: LOOP ELECTROSURGICAL EXCISION PROCEDURE, REMOVAL PESSARY;  Surgeon: Olive Biggs MD;  Location: [facility] OR;  Service:     BIOPSY VULVA N/A 05/14/2020    Procedure: VULVAR BIOPSY;  Surgeon: Olive Biggs MD;  Location: [facility] OR;  Service: Gynecology    CATARACT EXTRACTION EXTRACAPSULAR W/ INTRAOCULAR LENS IMPLANTATION Bilateral     CERVICAL BIOPSY N/A 05/14/2020    Procedure: PUNCH BIOPSY, CERVIX;  Surgeon: Olive Biggs MD;  Location: [facility] OR;  Service: Gynecology    CERVICAL POLYPECTOMY N/A 10/12/2023    Procedure: AND POLYPECTOMY WITH ULTRASOUND GUIDANCE;  Surgeon: Olive Biggs MD;  Location: [facility]    CHOLECYSTECTOMY      COLONOSCOPY N/A 01/08/2020    Procedure: COLONOSCOPY;  Surgeon: Amanda Carson MD;  [facility]    DILATION AND CURETTAGE, HYSTEROSCOPY WITH ULTRASOUND GUIDANCE N/A 10/12/2023    Procedure: HYSTEROSCOPY DILATION AND CURETTAGE;  Surgeon: Olive Biggs MD;  Location: [facility]    DILATION AND CURETTAGE, OPERATIVE HYSTEROSCOPY, COMBINED N/A 03/31/2022    Procedure: HYSTEROSCOPY;  Surgeon: Olive Biggs MD;  Location: [facility] OR     DILATION/CURETTAGE DIAG/THER NON OB N/A 05/14/2020    Procedure: DILATION AND CURETTAGE, UTERUS;  Surgeon: Olive Biggs MD;  Location: [facility] OR;  Service: Gynecology    IR EXTREMITY ANGIOGRAM RIGHT  02/13/2018    LEFORTE COLPOCLEISIS PROCEDURE (VAGINAL OBLITERATION) N/A 1/5/2024    Procedure: COLPOCLEISIS;  Surgeon:  Olive Biggs MD;  Location: [facility]    ORTHOPEDIC SURGERY      OTHER SURGICAL HISTORY      [id number]    OTHER SURGICAL HISTORY      finger abscess    PERINEORRHAPHY N/A 1/5/2024    Procedure: PERINEORRAPHY;  Surgeon: Olive Biggs MD;  [facility] [facility] [place]    CA ESOPHAGOGASTRODUODENOSCOPY TRANSORAL DIAGNOSTIC N/A 06/16/2021    Procedure: ESOPHAGOGASTRODUODENOSCOPY (EGD);  Surgeon: Primitivo Lafleur MD;  Location: [facility] [place] [place];  Service: Gastroenterology    RECTOSIGMOIDECTOMY PERINEAL N/A 1/5/2024    Procedure: PERINEAL RECTOSIGMOIDECTOMY;  Surgeon: Shannon Haines MD;  Location: [facility]    SHOULDER SURGERY      bilateral shoulder    SIGMOIDOSCOPY FLEXIBLE N/A 06/03/2018    Procedure: SIGMOIDOSCOPY;  Surgeon: Dayana Joshi MD;  Location: [facility];  Service:     TONSILLECTOMY & ADENOIDECTOMY      TOTAL SHOULDER ARTHROPLASTY Bilateral R 7/12 L 9/11     BIOPSY THYROID FINE NEEDLE ASPIRATION  08/07/2020       FAMILY HISTORY:   Family History   Problem Relation Age of Onset    Heart Disease Father     Coronary Artery Disease Father     Cerebrovascular Disease Father     Coronary Artery Disease Brother     Coronary Artery Disease Brother     Dementia Sister        SOCIAL HISTORY:   Social History     Tobacco Use    Smoking status: Never    Smokeless tobacco: Never   Substance Use Topics    Alcohol use: Yes     Alcohol/week: 2.0 standard drinks of alcohol     Comment: social        MEDS:  Medications reviewed    ALLERGIES/SENSITIVITIES: Atorvastatin, Dextromethorphan, Oxycodone, Pravastatin, Simvastatin, and Codeine    MEDICATIONS:  Current Outpatient Medications   Medication Sig Dispense Refill    acetaminophen (TYLENOL) 325 MG tablet Take 3 tablets (975 mg) by mouth every 6 hours as needed for mild pain 50 tablet 0    amLODIPine (NORVASC) 10 MG tablet 1 TABLET ORALLY DAILY 28 tablet 11    aspirin (ASPIRIN LOW DOSE) 81 MG chewable tablet Take 1 tablet (81  mg) by mouth daily Restart on 02/01/2024 30 tablet 11    hydrocortisone 2.5 % cream Apply topically 2 times daily as needed for other      ibuprofen (ADVIL/MOTRIN) 600 MG tablet Take 1 tablet (600 mg) by mouth every 6 hours as needed for moderate pain 30 tablet 0    lovastatin (MEVACOR) 40 MG tablet 1 TABLET ORALLY AT BEDTIME 90 tablet 2    memantine (NAMENDA) 10 MG tablet 1 TABLET ORALLY 2 TIMES DAILY 60 tablet 11    NYAMYC 452065 UNIT/GM external powder Apply topically 2 times daily as needed      ondansetron (ZOFRAN ODT) 4 MG ODT tab Take 1 tablet (4 mg) by mouth every 8 hours as needed for nausea 4 tablet 0    polyethylene glycol (MIRALAX) 17 GM/Dose powder Take 17 g by mouth daily as needed for constipation 510 g 0    traMADol (ULTRAM) 50 MG tablet Take 0.5 tablets (25 mg) by mouth every 6 hours as needed (for severe pain not relieved by tylenol) 8 tablet 0       PHYSICAL EXAM:  Temp:  [99.2  F (37.3  C)] 99.2  F (37.3  C)  Pulse:  [74-90] 90  Resp:  [20-23] 23  BP: (113-161)/(70-75) 161/74  SpO2:  [95 %-96 %] 95 %  Body mass index is 24.71 kg/m .  GEN: Well developed, well nourished 89 year old in no acute distress.  HEENT: sclera anicteric, moist mucous membranes.  No crepitus  LYMPH: No cervical lymphadenopathy  PULM: Nonlabored breathing. Breath sounds equal.   CARDIO: Regular rate, systolic ejection murmur (chronic)  GI: Non-distended. Soft.  Non-tender to palpation.  No guarding. No rebound tenderness.  EXT: warm, no lower extremity edema  NEURO: Alert. No focal defects.   PSYCH: Dementia but responds to questions, friendly    LABORATORY DATA:  CBC RESULTS:   Recent Labs   Lab Test 06/20/24  0911 01/20/24  0739 01/19/24  0714   WBC 8.1 10.0 13.2*   RBC 3.98 3.59* 3.38*   HGB 7.9* 9.8* 9.2*   HCT 28.9* 32.6* 30.5*   MCV 73* 91 90   MCH 19.8* 27.3 27.2   MCHC 27.3* 30.1* 30.2*   RDW 20.1* 17.2* 17.2*    207 193        CMP Results:   Recent Labs   Lab Test 06/20/24  0911 05/01/24  1108 01/20/24  1612  01/20/24  0739 01/17/24  1218 01/17/24  0001 01/05/24  0723 10/06/23  1458    143  --  142   < > 138   < > 143   POTASSIUM 4.2 4.1  --  3.6   < > 4.4   < > 4.3   CHLORIDE 109* 108*  --  108*   < > 102   < > 106   CO2 25 25  --  28   < > 23   < > 26   ANIONGAP 9 10  --  6*   < > 13   < > 11   GLC 99 112* 125* 90   < > 209*   < > 114*   BUN 20.3 12.6  --  4.7*   < > 19.0   < > 15.3   CR 0.73 0.78  --  0.87   < > 1.04*   < > 0.81   BILITOTAL 0.3  --   --   --   --  0.4  --  0.3   ALKPHOS 88  --   --   --   --  98  --  101   ALT <5  --   --   --   --  5  --  8   AST 11  --   --   --   --  17  --  22    < > = values in this interval not displayed.        INR Results:   Recent Labs   Lab Test 06/20/24  1430 01/17/24  0001 01/10/21  1014   INR 1.09 1.07 1.09              IMPRESSION:   This is a 89-year-old female who has had difficulty swallowing since she ingested one of her medications this morning.  She has had intermittent mitten bouts of retching and most recently on a small amount of bloody emesis.  She did have a Jihan-Carey tear in 2021 and this may be a recurrence of that.  Esophageal impaction is a consideration as well.  Review of laboratory data show a drop in hemoglobin relative to January with a low MCV consistent with chronic blood loss.  She does have a history of at least moderate size hiatal hernia.  She may have Thong erosions but the differential for the anemia includes ulcerative esophagitis, peptic ulcer disease, angioectasia blood loss, and neoplasm.    RECOMMENDATIONS:   Urgent EGD  Recommend admission overnight in light of associated bleeding  Pantoprazole 40 mg IV every 12 hours      Total time spent today in the management of this patient (which may include  chart review, review of imaging, discussion of case with additional specialists and/or family members, face to face consultation/exam with patient, documentation, and coordination of care): 40 minutes                 Truman Brown  MD  Thank you for the opportunity to participate in the care of this patient.   Please feel free to call me with any questions or concerns.  Phone number [phone].            CC: [facility], Koko Betancourt

## 2024-06-20 NOTE — CONSULTS
[facility]      NAME: Angle Agudelo    MEDICAL RECORD #: [id number]    DATE / TIME: 6/20/2024/3:39 PM    PRIMARY CARE PROVIDER: Koko Betancourt    REQUESTING PROVIDER: Marshall Romero MD          REASON FOR THE CONSULT: Hematemesis and concern regarding esophageal obstruction    HPI:     This is a 89-year-old female who underwent an EGD 3 years ago for nausea and vomiting and was found to have a hiatal hernia along with what looked like a Jihan-Carey tear at the time with no esophageal stricture described.  The patient has dementia but is able to respond to questions.  She has been here for several hours today.  She has had intermittent chest discomfort and inability to swallow.  This all began after swallowing one of her pills this morning.  She has not had any food today.  She did try drinking some liquids earlier but per her daughter, who provides much of the history, the patient keeps spitting up saliva and liquids.  She denies any abdominal pain.  Most recently after retching multiple times today she had half a cup full of bloody hematemesis.  There has been no report of melena.  She denies current shortness of breath.  EKG did not show any acute ischemic concerns.  Troponin was mildly elevated and decreased on recheck most recently.    REVIEW OF SYSTEMS: 13 point review of systems is negative except that noted above.    PAST MEDICAL HISTORY:   Past Medical History:   Diagnosis Date    Acute cerebrovascular accident (CVA) (H) 06/08/2020    Benign neoplasm of colon     Chronic bilateral low back pain without sciatica     Chronic bilateral low back pain without sciatica 08/12/2016    Dementia (H)     Disorder of bone and cartilage, unspecified     Dyspnea on exertion     Female genital prolapse     Gastroenteritis 6/14/2021    GI bleed     Hiatal hernia 6/16/2021    Memory impairment     Orthopnea     Pure hypercholesterolemia     Rectal prolapse     Skin cancer     Not Melanoma,  not sure what kind though    Spinal stenosis of lumbar region     Swelling of both lower extremities     Thickened endometrium     Unspecified arthropathy, shoulder region     Unspecified essential hypertension     Walking troubles        PAST SURGICAL HISTORY:   Past Surgical History:   Procedure Laterality Date    APPENDECTOMY      BIOPSY CERVICAL, LOCAL EXCISION, SINGLE/MULTIPLE N/A 09/11/2018    Procedure: LOOP ELECTROSURGICAL EXCISION PROCEDURE, REMOVAL PESSARY;  Surgeon: Olive Biggs MD;  Location: [facility] OR;  Service:     BIOPSY VULVA N/A 05/14/2020    Procedure: VULVAR BIOPSY;  Surgeon: Oliev Biggs MD;  Location: [facility] OR;  Service: Gynecology    CATARACT EXTRACTION EXTRACAPSULAR W/ INTRAOCULAR LENS IMPLANTATION Bilateral     CERVICAL BIOPSY N/A 05/14/2020    Procedure: PUNCH BIOPSY, CERVIX;  Surgeon: Olive Biggs MD;  Location: [facility] OR;  Service: Gynecology    CERVICAL POLYPECTOMY N/A 10/12/2023    Procedure: AND POLYPECTOMY WITH ULTRASOUND GUIDANCE;  Surgeon: Olive Biggs MD;  Location: [facility]    CHOLECYSTECTOMY      COLONOSCOPY N/A 01/08/2020    Procedure: COLONOSCOPY;  Surgeon: Amanda Carson MD;  [facility]    DILATION AND CURETTAGE, HYSTEROSCOPY WITH ULTRASOUND GUIDANCE N/A 10/12/2023    Procedure: HYSTEROSCOPY DILATION AND CURETTAGE;  Surgeon: Olive Biggs MD;  Location: [facility]    DILATION AND CURETTAGE, OPERATIVE HYSTEROSCOPY, COMBINED N/A 03/31/2022    Procedure: HYSTEROSCOPY;  Surgeon: Olive Biggs MD;  Location: [facility] OR     DILATION/CURETTAGE DIAG/THER NON OB N/A 05/14/2020    Procedure: DILATION AND CURETTAGE, UTERUS;  Surgeon: Olive Biggs MD;  Location: [facility] OR;  Service: Gynecology    IR EXTREMITY ANGIOGRAM RIGHT  02/13/2018    LEFORTE COLPOCLEISIS PROCEDURE (VAGINAL OBLITERATION) N/A 1/5/2024    Procedure: COLPOCLEISIS;  Surgeon:  Olive Biggs MD;  Location: [facility] [place]    ORTHOPEDIC SURGERY      OTHER SURGICAL HISTORY      [id number]    OTHER SURGICAL HISTORY      finger abscess    PERINEORRHAPHY N/A 1/5/2024    Procedure: PERINEORRAPHY;  Surgeon: Olive Biggs MD;  [place] [facility]    AK ESOPHAGOGASTRODUODENOSCOPY TRANSORAL DIAGNOSTIC N/A 06/16/2021    Procedure: ESOPHAGOGASTRODUODENOSCOPY (EGD);  Surgeon: Primitivo Lafleur MD;  Location: [facility];  Service: Gastroenterology    RECTOSIGMOIDECTOMY PERINEAL N/A 1/5/2024    Procedure: PERINEAL RECTOSIGMOIDECTOMY;  Surgeon: Shannon Haines MD;  Location: [facility] [place]    SHOULDER SURGERY      bilateral shoulder    SIGMOIDOSCOPY FLEXIBLE N/A 06/03/2018    Procedure: SIGMOIDOSCOPY;  Surgeon: Dayana Joshi MD;  Location: [facility];  Service:     TONSILLECTOMY & ADENOIDECTOMY      TOTAL SHOULDER ARTHROPLASTY Bilateral R 7/12 L 9/11     BIOPSY THYROID FINE NEEDLE ASPIRATION  08/07/2020       FAMILY HISTORY:   Family History   Problem Relation Age of Onset    Heart Disease Father     Coronary Artery Disease Father     Cerebrovascular Disease Father     Coronary Artery Disease Brother     Coronary Artery Disease Brother     Dementia Sister        SOCIAL HISTORY:   Social History     Tobacco Use    Smoking status: Never    Smokeless tobacco: Never   Substance Use Topics    Alcohol use: Yes     Alcohol/week: 2.0 standard drinks of alcohol     Comment: social        MEDS:  Medications reviewed    ALLERGIES/SENSITIVITIES: Atorvastatin, Dextromethorphan, Oxycodone, Pravastatin, Simvastatin, and Codeine    MEDICATIONS:  Current Outpatient Medications   Medication Sig Dispense Refill    acetaminophen (TYLENOL) 325 MG tablet Take 3 tablets (975 mg) by mouth every 6 hours as needed for mild pain 50 tablet 0    amLODIPine (NORVASC) 10 MG tablet 1 TABLET ORALLY DAILY 28 tablet 11    aspirin (ASPIRIN LOW DOSE) 81 MG chewable tablet Take 1 tablet (81  mg) by mouth daily Restart on 02/01/2024 30 tablet 11    hydrocortisone 2.5 % cream Apply topically 2 times daily as needed for other      ibuprofen (ADVIL/MOTRIN) 600 MG tablet Take 1 tablet (600 mg) by mouth every 6 hours as needed for moderate pain 30 tablet 0    lovastatin (MEVACOR) 40 MG tablet 1 TABLET ORALLY AT BEDTIME 90 tablet 2    memantine (NAMENDA) 10 MG tablet 1 TABLET ORALLY 2 TIMES DAILY 60 tablet 11    NYAMYC [id number] UNIT/GM external powder Apply topically 2 times daily as needed      ondansetron (ZOFRAN ODT) 4 MG ODT tab Take 1 tablet (4 mg) by mouth every 8 hours as needed for nausea 4 tablet 0    polyethylene glycol (MIRALAX) 17 GM/Dose powder Take 17 g by mouth daily as needed for constipation 510 g 0    traMADol (ULTRAM) 50 MG tablet Take 0.5 tablets (25 mg) by mouth every 6 hours as needed (for severe pain not relieved by tylenol) 8 tablet 0       PHYSICAL EXAM:  Temp:  [99.2  F (37.3  C)] 99.2  F (37.3  C)  Pulse:  [74-90] 90  Resp:  [20-23] 23  BP: (113-161)/(70-75) 161/74  SpO2:  [95 %-96 %] 95 %  Body mass index is 24.71 kg/m .  GEN: Well developed, well nourished 89 year old in no acute distress.  HEENT: sclera anicteric, moist mucous membranes.  No crepitus  LYMPH: No cervical lymphadenopathy  PULM: Nonlabored breathing. Breath sounds equal.   CARDIO: Regular rate, systolic ejection murmur (chronic)  GI: Non-distended. Soft.  Non-tender to palpation.  No guarding. No rebound tenderness.  EXT: warm, no lower extremity edema  NEURO: Alert. No focal defects.   PSYCH: Dementia but responds to questions, friendly    LABORATORY DATA:  CBC RESULTS:   Recent Labs   Lab Test 06/20/24  0911 01/20/24  0739 01/19/24  0714   WBC 8.1 10.0 13.2*   RBC 3.98 3.59* 3.38*   HGB 7.9* 9.8* 9.2*   HCT 28.9* 32.6* 30.5*   MCV 73* 91 90   MCH 19.8* 27.3 27.2   MCHC 27.3* 30.1* 30.2*   RDW 20.1* 17.2* 17.2*    207 193        CMP Results:   Recent Labs   Lab Test 06/20/24  0911 05/01/24  1108 01/20/24  1615  01/20/24  0739 01/17/24  1218 01/17/24  0001 01/05/24  0723 10/06/23  1458    143  --  142   < > 138   < > 143   POTASSIUM 4.2 4.1  --  3.6   < > 4.4   < > 4.3   CHLORIDE 109* 108*  --  108*   < > 102   < > 106   CO2 25 25  --  28   < > 23   < > 26   ANIONGAP 9 10  --  6*   < > 13   < > 11   GLC 99 112* 125* 90   < > 209*   < > 114*   BUN 20.3 12.6  --  4.7*   < > 19.0   < > 15.3   CR 0.73 0.78  --  0.87   < > 1.04*   < > 0.81   BILITOTAL 0.3  --   --   --   --  0.4  --  0.3   ALKPHOS 88  --   --   --   --  98  --  101   ALT <5  --   --   --   --  5  --  8   AST 11  --   --   --   --  17  --  22    < > = values in this interval not displayed.        INR Results:   Recent Labs   Lab Test 06/20/24  1430 01/17/24  0001 01/10/21  1014   INR 1.09 1.07 1.09              IMPRESSION:   This is a 89-year-old female who has had difficulty swallowing since she ingested one of her medications this morning.  She has had intermittent mitten bouts of retching and most recently on a small amount of bloody emesis.  She did have a Jihan-Carey tear in 2021 and this may be a recurrence of that.  Esophageal impaction is a consideration as well.  Review of laboratory data show a drop in hemoglobin relative to January with a low MCV consistent with chronic blood loss.  She does have a history of at least moderate size hiatal hernia.  She may have Thong erosions but the differential for the anemia includes ulcerative esophagitis, peptic ulcer disease, angioectasia blood loss, and neoplasm.    RECOMMENDATIONS:   Urgent EGD  Recommend admission overnight in light of associated bleeding  Pantoprazole 40 mg IV every 12 hours      Total time spent today in the management of this patient (which may include  chart review, review of imaging, discussion of case with additional specialists and/or family members, face to face consultation/exam with patient, documentation, and coordination of care): 40 minutes                 Truman Brown  MD  Thank you for the opportunity to participate in the care of this patient.   Please feel free to call me with any questions or concerns.  Phone number [phone].            CC: [facility], Koko Betancourt

## 2024-06-20 NOTE — MEDICATION SCRIBE - ADMISSION MEDICATION HISTORY
"Medication Scribe Admission Medication History    Admission medication history is complete. The information provided in this note is only as accurate as the sources available at the time of the update.    Information Source(s): Patient, Facility (U/NH/) medication list/MAR, and CareEverywhere/SureScripts via in-person. Family present in the room as well.     Pertinent Information: Patient is coming from [facility] in . Attempted to call on three occasions with no answer. Due to this there is no PRN med last dose information available. The facility did send a current medication list printed on 6/20 PTA. Speaking to the patient's family member it was reported that AM medications have been taken so far today.     Changes made to PTA medication list:  Added:   Nystatin 100,000 unit/g powder  Deleted:   Amox-clav - likely done, not listed on facility med list  Changed: None    Allergies reviewed with patient and updates made in EHR: yes    Medication History Completed By: hTong Sanchez 6/20/2024 3:28 PM    PTA Med List   Medication Sig Last Dose    acetaminophen (TYLENOL) 325 MG tablet Take 3 tablets (975 mg) by mouth every 6 hours as needed for mild pain Unknown at PRN    amLODIPine (NORVASC) 10 MG tablet 1 TABLET ORALLY DAILY 6/20/2024 at 0800    aspirin (ASPIRIN LOW DOSE) 81 MG chewable tablet Take 1 tablet (81 mg) by mouth daily Restart on 02/01/2024 6/20/2024 at 0800    hydrocortisone 2.5 % cream Apply topically 2 times daily as needed for other Unknown at PRN    ibuprofen (ADVIL/MOTRIN) 600 MG tablet Take 1 tablet (600 mg) by mouth every 6 hours as needed for moderate pain Unknown at PRN    lovastatin (MEVACOR) 40 MG tablet 1 TABLET ORALLY AT BEDTIME 6/19/2024 at 2000    memantine (NAMENDA) 10 MG tablet 1 TABLET ORALLY 2 TIMES DAILY 6/20/2024 at 0800; 1 of 2    NYAMYC [id number] UNIT/GM external powder Apply topically 2 times daily as needed Unknown at PRN    ondansetron (ZOFRAN ODT) " 4 MG ODT tab Take 1 tablet (4 mg) by mouth every 8 hours as needed for nausea Unknown at PRN    polyethylene glycol (MIRALAX) 17 GM/Dose powder Take 17 g by mouth daily as needed for constipation Unknown at PRN    traMADol (ULTRAM) 50 MG tablet Take 0.5 tablets (25 mg) by mouth every 6 hours as needed (for severe pain not relieved by tylenol) Unknown at PRN

## 2024-06-20 NOTE — ED PROVIDER NOTES
"Emergency Department Midlevel Supervisory Note     I personally saw the patient and performed a substantive portion of the visit including all aspects of the medical decision making.    ED Course:  9:18 AM Erendira Domingo PA-C staffed patient with me. I agree with their assessment and plan of management, and I will see the patient.  1030 I met with the patient to introduce myself, gather additional history, perform my initial exam, and discuss the plan.     Brief HPI:     Angle Agudelo is a 89 year old female who presents for evaluation of medication stuck in throat. Patient is able to drink water and is tolerating her secretions, denies difficulty breathing.    The patient reports that after taking her medications (aspirin 81 mg, lovastatin, and amlodipine) this morning, she feels like one is stuck in her throat. She is able to drink water and tolerate her secretions but cannot get it to pass, and says it still feels stuck in her esophagus.      I, Paula Pizarro, am serving as a scribe to document services personally performed by Marshall Romero MD, based on my observations and the provider's statements to me.   I, Marshall Romero MD, attest that Paula Pizarro was acting in a scribe capacity, has observed my performance of the services and has documented them in accordance with my direction.    Brief Physical Exam: BP (!) 161/74   Pulse 90   Temp 99.2  F (37.3  C) (Oral)   Resp 23   Ht 1.575 m (5' 2\")   Wt 61.3 kg (135 lb 1.6 oz)   LMP  (LMP Unknown)   SpO2 95%   BMI 24.71 kg/m    Constitutional:  Appaers to be in distress spitting up fluids  EYES: Conjunctivae clear  HENT:  Atraumatic  Respiratory:  Respirations even, unlabored, in no acute respiratory distress  Cardiovascular:  Regular rate and rhythm, good peripheral perfusion  GI: Soft, non-distended, non-tender  Musculoskeletal:  Moves all 4 extremities equally, grossly symmetrical strength  Integument: Warm & dry. No appreciable rash, " erythema.  Neurologic:  Alert & oriented, speech clear and fluent, no focal deficits noted  Psych: Normal mood and affect       MDM:  Patient presenting with sensation of a pill stuck in her throat since this morning.  Spitting up fluids.  We initially attempted conservative management in the emergency department trying glucagon and some oral fluids to see if we can get the symptoms to pass she was administered Maalox she had a few episodes of vomiting however the symptoms have been persistent and she is spitting up here in the emergency department having trouble with her secretions.  Never had this before.  Our plan of care at this time is to broaden her workup and engage gastroenterology for discussion    3:26 PM  We had discussed the patient with [facility] and after that consultation request the patient subsequently had an episode of hematemesis.  I would not characterize it as large volume but we escalated patient's care at that point with institution of PPI.  At this point it has been a single isolated episode without evidence of ongoing bleeding.  We have ordered CTA chest abdomen pelvis to evaluate for any active extravasation or evidence of esophageal injury the patient overall is doing well so I think this hopefully is going to be without any significant critical findings.  Given the change in the situation we are planning to admit to the hospital and we will update GI with this plan of care.  Please see MODE note for the details.    1. Esophageal obstruction due to food impaction    2. Esophagitis    3. Hematemesis without nausea        Labs and Imaging:  Results for orders placed or performed during the hospital encounter of 06/20/24   Chest XR,  PA & LAT    Impression    IMPRESSION: No pleural fluid or pneumothorax. No airspace disease or edema. Normal size of the heart. Aortic calcification and tortuosity noted. Bilateral shoulder arthroplasty. There is lateral curvature of the spine. No  retained radiopaque foreign   bodies.   Extra Green Top (Lithium Heparin) Tube   Result Value Ref Range    Hold Specimen JIC    Extra Purple Top Tube   Result Value Ref Range    Hold Specimen JIC    Troponin T, High Sensitivity (now)   Result Value Ref Range    Troponin T, High Sensitivity 20 (H) <=14 ng/L   Comprehensive metabolic panel   Result Value Ref Range    Sodium 143 135 - 145 mmol/L    Potassium 4.2 3.4 - 5.3 mmol/L    Carbon Dioxide (CO2) 25 22 - 29 mmol/L    Anion Gap 9 7 - 15 mmol/L    Urea Nitrogen 20.3 8.0 - 23.0 mg/dL    Creatinine 0.73 0.51 - 0.95 mg/dL    GFR Estimate 77 >60 mL/min/1.73m2    Calcium 8.9 8.2 - 9.6 mg/dL    Chloride 109 (H) 98 - 107 mmol/L    Glucose 99 70 - 99 mg/dL    Alkaline Phosphatase 88 40 - 150 U/L    AST 11 0 - 45 U/L    ALT <5 0 - 50 U/L    Protein Total 6.7 6.4 - 8.3 g/dL    Albumin 4.1 3.5 - 5.2 g/dL    Bilirubin Total 0.3 <=1.2 mg/dL   CBC with platelets and differential   Result Value Ref Range    WBC Count 8.1 4.0 - 11.0 10e3/uL    RBC Count 3.98 3.80 - 5.20 10e6/uL    Hemoglobin 7.9 (L) 11.7 - 15.7 g/dL    Hematocrit 28.9 (L) 35.0 - 47.0 %    MCV 73 (L) 78 - 100 fL    MCH 19.8 (L) 26.5 - 33.0 pg    MCHC 27.3 (L) 31.5 - 36.5 g/dL    RDW 20.1 (H) 10.0 - 15.0 %    Platelet Count 227 150 - 450 10e3/uL    % Neutrophils 63 %    % Lymphocytes 23 %    % Monocytes 12 %    % Eosinophils 2 %    % Basophils 1 %    % Immature Granulocytes 0 %    NRBCs per 100 WBC 0 <1 /100    Absolute Neutrophils 5.1 1.6 - 8.3 10e3/uL    Absolute Lymphocytes 1.9 0.8 - 5.3 10e3/uL    Absolute Monocytes 0.9 0.0 - 1.3 10e3/uL    Absolute Eosinophils 0.2 0.0 - 0.7 10e3/uL    Absolute Basophils 0.1 0.0 - 0.2 10e3/uL    Absolute Immature Granulocytes 0.0 <=0.4 10e3/uL    Absolute NRBCs 0.0 10e3/uL   Result Value Ref Range    INR 1.09 0.85 - 1.15   Result Value Ref Range    aPTT 28 22 - 38 Seconds   Troponin T, High Sensitivity (now)   Result Value Ref Range    Troponin T, High Sensitivity 15 (H) <=14  ng/L   ECG 12-LEAD WITH MUSE (LHE)   Result Value Ref Range    Systolic Blood Pressure  mmHg    Diastolic Blood Pressure  mmHg    Ventricular Rate 85 BPM    Atrial Rate 85 BPM    WV Interval 142 ms    QRS Duration 74 ms     ms    QTc 435 ms    P Axis 25 degrees    R AXIS -34 degrees    T Axis 38 degrees    Interpretation ECG       Sinus rhythm with Premature atrial complexes  Left axis deviation  Abnormal ECG  When compared with ECG of 16-JAN-2024 23:48,  No significant change was found  Confirmed by SEE ED PROVIDER NOTE FOR, ECG INTERPRETATION ([id number]),  CED MCCARTHY ([id number]) on 6/20/2024 12:43:46 PM     Adult Type and Screen   Result Value Ref Range    SPECIMEN EXPIRATION DATE [id number]      I have reviewed the relevant laboratory and radiology studies  Independently reviewed and interpreted by me with no significant acute findings appreciated.    Procedures:  I was present for the key portions of procedures documented in midlevel note, see midlevel note for further details.    EKG: Personally reviewed and interpreted as documented below;  Normal sinus rhythm nonischemic appearing ECG    Marshall Romero MD  [facility] EMERGENCY ROOM  [address] [id number] [phone]      Marshall Romero MD  06/20/24 3530

## 2024-06-20 NOTE — PROGRESS NOTES
BLOOD TRANSFUSION STILL RUNNING. Pt had a large episode of bloody emesis. Pt was cleaned up. Pt c/o she needs to pee but did have an incontinent episode in bed.  Brief changed.

## 2024-06-21 ENCOUNTER — APPOINTMENT (OUTPATIENT)
Dept: RADIOLOGY | Facility: CLINIC | Age: 89
DRG: 368 | End: 2024-06-21
Payer: MEDICARE

## 2024-06-21 ENCOUNTER — APPOINTMENT (OUTPATIENT)
Dept: SPEECH THERAPY | Facility: CLINIC | Age: 89
DRG: 368 | End: 2024-06-21
Payer: MEDICARE

## 2024-06-21 LAB
ANION GAP SERPL CALCULATED.3IONS-SCNC: 10 MMOL/L (ref 7–15)
BUN SERPL-MCNC: 16.8 MG/DL (ref 8–23)
CALCIUM SERPL-MCNC: 8.5 MG/DL (ref 8.2–9.6)
CHLORIDE SERPL-SCNC: 112 MMOL/L (ref 98–107)
CREAT SERPL-MCNC: 0.67 MG/DL (ref 0.51–0.95)
DEPRECATED HCO3 PLAS-SCNC: 23 MMOL/L (ref 22–29)
EGFRCR SERPLBLD CKD-EPI 2021: 82 ML/MIN/1.73M2
ERYTHROCYTE [DISTWIDTH] IN BLOOD BY AUTOMATED COUNT: 20.4 % (ref 10–15)
GLUCOSE SERPL-MCNC: 106 MG/DL (ref 70–99)
HCT VFR BLD AUTO: 28.8 % (ref 35–47)
HGB BLD-MCNC: 8.3 G/DL (ref 11.7–15.7)
MCH RBC QN AUTO: 21.3 PG (ref 26.5–33)
MCHC RBC AUTO-ENTMCNC: 28.8 G/DL (ref 31.5–36.5)
MCV RBC AUTO: 74 FL (ref 78–100)
PLATELET # BLD AUTO: 181 10E3/UL (ref 150–450)
POTASSIUM SERPL-SCNC: 3.9 MMOL/L (ref 3.4–5.3)
RBC # BLD AUTO: 3.89 10E6/UL (ref 3.8–5.2)
SODIUM SERPL-SCNC: 145 MMOL/L (ref 135–145)
WBC # BLD AUTO: 12.3 10E3/UL (ref 4–11)

## 2024-06-21 PROCEDURE — 85014 HEMATOCRIT: CPT

## 2024-06-21 PROCEDURE — C9113 INJ PANTOPRAZOLE SODIUM, VIA: HCPCS | Mod: JZ

## 2024-06-21 PROCEDURE — 92610 EVALUATE SWALLOWING FUNCTION: CPT | Mod: GN | Performed by: SPEECH-LANGUAGE PATHOLOGIST

## 2024-06-21 PROCEDURE — 80048 BASIC METABOLIC PNL TOTAL CA: CPT

## 2024-06-21 PROCEDURE — 99222 1ST HOSP IP/OBS MODERATE 55: CPT | Mod: AI

## 2024-06-21 PROCEDURE — 250N000013 HC RX MED GY IP 250 OP 250 PS 637

## 2024-06-21 PROCEDURE — 250N000011 HC RX IP 250 OP 636: Mod: JZ

## 2024-06-21 PROCEDURE — 120N000004 HC R&B MS OVERFLOW

## 2024-06-21 PROCEDURE — 36415 COLL VENOUS BLD VENIPUNCTURE: CPT

## 2024-06-21 PROCEDURE — 71045 X-RAY EXAM CHEST 1 VIEW: CPT

## 2024-06-21 RX ORDER — CEFTRIAXONE 1 G/1
1 INJECTION, POWDER, FOR SOLUTION INTRAMUSCULAR; INTRAVENOUS EVERY 24 HOURS
Status: DISCONTINUED | OUTPATIENT
Start: 2024-06-21 | End: 2024-06-22 | Stop reason: HOSPADM

## 2024-06-21 RX ADMIN — Medication 1 SPRAY: at 20:50

## 2024-06-21 RX ADMIN — PANTOPRAZOLE SODIUM 40 MG: 40 INJECTION, POWDER, FOR SOLUTION INTRAVENOUS at 09:57

## 2024-06-21 RX ADMIN — MICONAZOLE NITRATE: 20 CREAM TOPICAL at 10:00

## 2024-06-21 RX ADMIN — PANTOPRAZOLE SODIUM 40 MG: 40 INJECTION, POWDER, FOR SOLUTION INTRAVENOUS at 02:31

## 2024-06-21 RX ADMIN — PANTOPRAZOLE SODIUM 40 MG: 40 INJECTION, POWDER, FOR SOLUTION INTRAVENOUS at 20:50

## 2024-06-21 RX ADMIN — LOVASTATIN 40 MG: 10 TABLET ORAL at 20:52

## 2024-06-21 RX ADMIN — Medication 1 SPRAY: at 10:00

## 2024-06-21 RX ADMIN — CEFTRIAXONE 1 G: 1 INJECTION, POWDER, FOR SOLUTION INTRAMUSCULAR; INTRAVENOUS at 12:34

## 2024-06-21 RX ADMIN — MEMANTINE HYDROCHLORIDE 10 MG: 5 TABLET, FILM COATED ORAL at 20:50

## 2024-06-21 ASSESSMENT — ACTIVITIES OF DAILY LIVING (ADL)
ADLS_ACUITY_SCORE: 46
ADLS_ACUITY_SCORE: 47
ADLS_ACUITY_SCORE: 45
ADLS_ACUITY_SCORE: 46
ADLS_ACUITY_SCORE: 47
ADLS_ACUITY_SCORE: 46
DEPENDENT_IADLS:: CLEANING;COOKING;LAUNDRY;SHOPPING;MEAL PREPARATION;MEDICATION MANAGEMENT;MONEY MANAGEMENT;TRANSPORTATION
ADLS_ACUITY_SCORE: 45
ADLS_ACUITY_SCORE: 47
ADLS_ACUITY_SCORE: 46
ADLS_ACUITY_SCORE: 47
ADLS_ACUITY_SCORE: 48
ADLS_ACUITY_SCORE: 47
ADLS_ACUITY_SCORE: 48
ADLS_ACUITY_SCORE: 47
ADLS_ACUITY_SCORE: 45
ADLS_ACUITY_SCORE: 47
ADLS_ACUITY_SCORE: 47
ADLS_ACUITY_SCORE: 46
ADLS_ACUITY_SCORE: 47
ADLS_ACUITY_SCORE: 47
ADLS_ACUITY_SCORE: 46
ADLS_ACUITY_SCORE: 45
ADLS_ACUITY_SCORE: 46

## 2024-06-21 NOTE — CONSULTS
Care Management Initial Consult    General Information  Assessment completed with: Patient, Children,    Type of CM/SW Visit: Initial Assessment    Primary Care Provider verified and updated as needed: Yes   Readmission within the last 30 days: no previous admission in last 30 days      Reason for Consult: discharge planning  Advance Care Planning: Advance Care Planning Reviewed: present on chart          Communication Assessment  Patient's communication style: spoken language (English or Bilingual)             Cognitive  Cognitive/Neuro/Behavioral: .WDL except, level of consciousness, orientation  Level of Consciousness: intermittent confusion  Arousal Level: opens eyes spontaneously  Orientation: disoriented to, time, situation  Mood/Behavior: labile     Speech: spontaneous, clear    Living Environment:   People in home: facility resident     Current living Arrangements: assisted living      Able to return to prior arrangements: yes       Family/Social Support:  Care provided by: spouse/significant other, other (see comments) (Staff)  Provides care for: no one, unable/limited ability to care for self  Marital Status:   , Children  Jamal       Description of Support System: Supportive, Involved         Current Resources:   Patient receiving home care services: No     Community Resources: None  Equipment currently used at home: walker, rolling  Supplies currently used at home: Incontinence Supplies    Employment/Financial:  Employment Status: retired        Financial Concerns: none   Referral to Financial Worker: No       Does the patient's insurance plan have a 3 day qualifying hospital stay waiver?  No    Lifestyle & Psychosocial Needs:  Social Determinants of Health     Food Insecurity: Low Risk  (12/19/2023)    Food Insecurity     Within the past 12 months, did you worry that your food would run out before you got money to buy more?: No     Within the past 12 months, did the food you bought just not  last and you didn t have money to get more?: No   Depression: Not at risk (4/3/2024)    PHQ-2     PHQ-2 Score: 0   Housing Stability: Low Risk  (12/19/2023)    Housing Stability     Do you have housing? : Yes     Are you worried about losing your housing?: No   Tobacco Use: Low Risk  (4/3/2024)    Patient History     Smoking Tobacco Use: Never     Smokeless Tobacco Use: Never     Passive Exposure: Not on file   Financial Resource Strain: Low Risk  (12/19/2023)    Financial Resource Strain     Within the past 12 months, have you or your family members you live with been unable to get utilities (heat, electricity) when it was really needed?: No   Alcohol Use: Not on file   Transportation Needs: Low Risk  (12/19/2023)    Transportation Needs     Within the past 12 months, has lack of transportation kept you from medical appointments, getting your medicines, non-medical meetings or appointments, work, or from getting things that you need?: No   Physical Activity: Not on file   Interpersonal Safety: Low Risk  (10/6/2023)    Interpersonal Safety     Do you feel physically and emotionally safe where you currently live?: Yes     Within the past 12 months, have you been hit, slapped, kicked or otherwise physically hurt by someone?: No     Within the past 12 months, have you been humiliated or emotionally abused in other ways by your partner or ex-partner?: No   Stress: Not on file   Social Connections: Not on file   Health Literacy: Not on file       Functional Status:  Prior to admission patient needed assistance:   Dependent ADLs:: Ambulation-walker  Dependent IADLs:: Cleaning, Cooking, Laundry, Shopping, Meal Preparation, Medication Management, Money Management, Transportation       Mental Health Status:  Mental Health Status: No Current Concerns       Chemical Dependency Status:  Chemical Dependency Status: No Current Concerns             Values/Beliefs:  Spiritual, Cultural Beliefs, Mormonism Practices, Values that  affect care: no               Additional Information:  [facility] met and introduced self and CM services to Pt, daughter and granddaughter who was visiting. Pt lives at [facility] with  Jamal who has Parkinsons.  Pt getting assistance with dressing, toileting, bathing, meals  and medications.  Pt/family plan is to return to [facility].  [facility] has call out to Kelli-  Nurse at [facility] [phone]. Family to transport when medically ready.  Kelli said to call [phone] for the weekend if Pt is ready to return.      SHARIF Matute

## 2024-06-21 NOTE — PROGRESS NOTES
"   06/21/24 1141   Appointment Info   Signing Clinician's Name / Credentials (SLP) Og Kyle MA [facility] SLP   General Information   Onset of Illness/Injury or Date of Surgery 06/20/24   Referring Physician Corwin Pritchett MD   Pertinent History of Current Problem Per H&P and Provider Notes: \"Angle Agudelo is a 89 year old female admitted on 6/20/2024. She has a history of HTN, CVA, urinary incontinence, dementia, and Jihan-Carey tear and is admitted for hematemesis.\" \"... (patient) underwent an EGD 3 years ago for nausea and vomiting and was found to have a hiatal hernia along with what looked like a Jihan-Carey tear at the time with no esophageal stricture described.  The patient has dementia but is able to respond to questions.  She has been here for several hours today.  She has had intermittent chest discomfort and inability to swallow.  This all began after swallowing one of her pills this morning.  She has not had any food today.  She did try drinking some liquids earlier but per her daughter, who provides much of the history, the patient keeps spitting up saliva and liquids.  She denies any abdominal pain.  Most recently after retching multiple times today she had half a cup full of bloody hematemesis.  There has been no report of melena.  She denies current shortness of breath.  EKG did not show any acute ischemic concerns. \" Patient cleared by GI for clear liquids diet pending SLP evaluation.   Type of Evaluation   Type of Evaluation Swallow Evaluation   Oral Motor   Oral Musculature generally intact   Structural Abnormalities none present   Mucosal Quality dry   Dentition (Oral Motor)   Dentition (Oral Motor) natural dentition   Facial Symmetry (Oral Motor)   Facial Symmetry (Oral Motor) WNL   Lip Function (Oral Motor)   Lip Range of Motion (Oral Motor) WNL   Tongue Function (Oral Motor)   Tongue ROM (Oral Motor) WNL   Jaw Function (Oral Motor)   Jaw Function (Oral Motor) WNL   Cough/Swallow/Gag " Reflex (Oral Motor)   Soft Palate/Velum (Oral Motor) WNL   Volitional Throat Clear/Cough (Oral Motor) WNL   Volitional Swallow (Oral Motor) mildly delayed;xerostomia   Vocal Quality/Secretion Management (Oral Motor)   Vocal Quality (Oral Motor) WNL   Secretion Management (Oral Motor) WNL   General Swallowing Observations   Past History of Dysphagia History of hiatal hernia and Jihan-Carey tear. Assess June 2020 for dysarthria with no further need for treatment.   Current Diet/Method of Nutritional Intake (General Swallowing Observations, NIS) NPO   Swallowing Evaluation Clinical swallow evaluation   Clinical Swallow Evaluation   Feeding Assistance set up only required   Clinical Swallow Evaluation Textures Trialed thin liquids   Clinical Swallow Eval: Thin Liquid Texture Trial   Mode of Presentation, Thin Liquids straw;cup;self-fed   Volume of Liquid or Food Presented >4 oz   Oral Phase of Swallow WFL   Pharyngeal Phase of Swallow intact   Diagnostic Statement No overt s/s of aspiration observed.   Esophageal Phase of Swallow   Patient reports or presents with symptoms of esophageal dysphagia Yes   Esophageal comments History of hiatal hernia and Jihan-Carey tear.   Swallowing Recommendations   Diet Consistency Recommendations thin liquids (level 0);clear liquid diet   Supervision Level for Intake patient independent   Mode of Delivery Recommendations bolus size, small;slow rate of intake   Monitoring/Assistance Required (Eating/Swallowing) stop eating activities when fatigue is present   Recommended Feeding/Eating Techniques (Swallow Eval) maintain upright posture during/after eating for 30 minutes   Medication Administration Recommendations, Swallowing (SLP) As tolerated/cleared by GI   Instrumental Assessment Recommendations   (Pending progress)   General Therapy Interventions   Planned Therapy Interventions Dysphagia Treatment   Dysphagia treatment Modified diet education;Instruction of safe swallow  strategies   Clinical Impression   Criteria for Skilled Therapeutic Interventions Met (SLP Eval) Yes, treatment indicated   SLP Diagnosis Acute on Chronic Esophageal Dysphagia   Risks & Benefits of therapy have been explained evaluation/treatment results reviewed;care plan/treatment goals reviewed;risks/benefits reviewed;current/potential barriers reviewed;participants voiced agreement with care plan;participants included;patient   Clinical Impression Comments   Limited CSE completed this AM per provider orders. Patient presents with acute on chronic esophageal dysphagia in setting of new hematemesis. Oral motor evaluation revealed dry oral mucosa and noted small, red wound on upper left-side of velum - patient reported no oral pain. Patient followed simple directions well. Trials of clear liquids, thin consistency completed at the bedside (GI only cleared for thin liquids at this time). No overt s/s of aspiration, patient IND taking small sips and using a slow rate of intake.     Recommend advance to clear liquid diet - thin consistency. Follow reflux precautions and use safe swallow strategies of small sips with an overall slow rate of intake. SLP will follow for dysphagia management, though will defer to GI team to determine appropriateness for advancement of diet.     SLP Total Evaluation Time   Eval: oral/pharyngeal swallow function, clinical swallow Minutes ([id number]) 14   SLP Discharge Planning   SLP Discharge Recommendation home with assist   SLP Rationale for DC Rec Anticipate patient may meet all swallow goals at time of discharge

## 2024-06-21 NOTE — CARE PLAN
"SLP: Orders received and chart reviewed. Given patient's medical history, recent concerns for upper GI bleed, and \"Large amounts of fluid and impacted ingested material are seen in the distal esophagus\" per imaging note, SLP will hold swallow evaluation at this time until cleared by GI provider team. Messaged with primary provider team via [employer] to confirm this plan.  "

## 2024-06-21 NOTE — PROGRESS NOTES
"    [facility]    Progress Note - Hospitalist Service       Date of Admission:  6/20/2024    Assessment & Plan   Angle Agudelo is a 89 year old female admitted on 6/20/2024. She has a history of HTN, CVA, urinary incontinence, dementia, and Jihan-Carey tear and is admitted for hematemesis in setting of esophageal foreign body.      Hematemesis   upper GI bleed  Esophageal foreign body   Presented to hospital after taking morning medications and experiencing sensation that pill was stuck in throat. Vitally stable and in no acute distress on arrival. Labs pertinent for Hgb of 7.9, down from 9.8 five months ago. CT Chest/Abd pertinent for \"dilated esophagus with mural thickening distally, correlate for esophagitis, esophageal dysfunction, or stricture. Large amounts of fluid and impacted ingested material are seen in the distal esophagus.\" Patient was given glucagon in the ED and observed for some time. She then developed retching and severe chest pain and had an episode of spitting up blood. EGD performed by Dr. Phalen. Discovered food impaction 2/2 esphageal stricture, with bleeding 2/2 impaction/retching. Foreign body removed and bleeding clipped. Received 1U PRBC with improvement now to 8.3.   -GI Consulted              -IV Protonix 40mg q12   -clears; advance as tolerated   -plan repeat EGD with dilation in 2 weeks   -s/p 1 U PRBC (6/20)   -SLP evaluation when appropriate     Urinary frequency  UTI  UA with positive leuks and WBCs, suggestive of UTI in setting of urinary frequency. Will start treatment for UTI and await cultures.   -ceftraixone Ig Q24  -follow up on cx     Concern for Aspiration  Hypoxic respiratory failure    Requiring supplemental O2 overnight currently on 2L. CXR obtained showing \"Left greater than right heterogeneous opacities are concerning for multifocal or atypical infection.\", not seen on initial CXR at admission. Inconsistent with aspiration event, but will " continue to monitor. Patient started on Ceftriaxone for UTI; will broaden to cover atypical PNA if there's further clinical suspicion.   -abx as above; Broaden if needed     Thickened Endometrium discovered on CT Chest/Abd  1.7cm thickened endometrium seen on CT.   -follow-up outpatient pelvic US to exclude endometrial hyperplasia or malignancy      Chronic Conditions:      Hx of CVA:  holding ASA 81mg; restart Lovastatin 40mg   HTN: holding Amlodipine 10mg   Dementia: Memantine BID         Diet: Clear Liquid Diet    DVT Prophylaxis: Pneumatic Compression Devices  Graves Catheter: PRESENT, indication: Acute retention or obstruction  Fluids: NPO   Lines: None     Cardiac Monitoring: None  Code Status: No CPR- Do NOT Intubate      Clinically Significant Risk Factors Present on Admission                # Drug Induced Platelet Defect: home medication list includes an antiplatelet medication   # Hypertension: Noted on problem list   # Dementia: noted on problem list  # Anemia: based on hgb <11  # Anemia: based on hgb <11               # Financial/Environmental Concerns:           Disposition Plan      Expected Discharge Date: 06/22/2024        Discharge Comments: continues to need supplemental O2        The patient's care was discussed with the Attending Physician, Dr. J Carlos Vasquez .    Corwin Pritchett MD  Hospitalist Service  [facility]  Securely message with clickTRUE (more info)  Text page via [facility] Paging/Directory   ______________________________________________________________________    Interval History   NAEO. Denies any abdominal pain, nausea. Has not spit up blood since endoscopy.     Physical Exam   Vital Signs: Temp: 98.8  F (37.1  C) Temp src: Oral BP: 117/64 Pulse: 88   Resp: 20 SpO2: 92 % O2 Device: Nasal cannula Oxygen Delivery: 2 LPM  Weight: 135 lbs 1.6 oz    Constitutional: awake, alert, cooperative, no apparent distress, and appears stated age  ENT: normocephalic, without  obvious abnormality  Respiratory: R lower base crackles  Cardiovascular: systolic murmur; otherwise regular rate and rhythm       Data     I have personally reviewed the following data over the past 24 hrs:    12.3 (H)  \   8.3 (L)   / 181     145 112 (H) 16.8 /  106 (H)   3.9 23 0.67 \     Trop: 15 (H) BNP: N/A     INR:  1.09 PTT:  28   D-dimer:  N/A Fibrinogen:  N/A       Imaging results reviewed over the past 24 hrs:   Recent Results (from the past 24 hour(s))   Chest XR,  PA & LAT    Narrative    EXAM: XR CHEST 2 VIEWS  LOCATION: [facility]  DATE: 6/20/2024    INDICATION: chest pain, foreign body  COMPARISON: 1/17/24      Impression    IMPRESSION: No pleural fluid or pneumothorax. No airspace disease or edema. Normal size of the heart. Aortic calcification and tortuosity noted. Bilateral shoulder arthroplasty. There is lateral curvature of the spine. No retained radiopaque foreign   bodies.   CTA Chest Abdomen Pelvis w Contrast    Narrative    EXAM: CTA CHEST ABDOMEN PELVIS W CONTRAST  LOCATION: [facility]  DATE: 6/20/2024    INDICATION: Patient here initially for sensation of pill stuck in her throat, just had an episode of hematemesis. Hgb down from baseline. Rule out active GI bleeding and Jihan-Carey.  COMPARISON: 6/11 and 6/14/2021  TECHNIQUE: CT angiogram chest abdomen pelvis during arterial phase of injection of IV contrast. 2D and 3D MIP reconstructions were performed by the CT technologist. Dose reduction techniques were used.   CONTRAST: Isovue 370 90mL    FINDINGS:   CT ANGIOGRAM CHEST, ABDOMEN, AND PELVIS: Stable 3.7 x 3.2 cm saccular infrarenal abdominal aortic aneurysm with left anterolateral orientation. Tortuous atherosclerotic aorta. No dissection. No active extravasation. Patent arterial branch vessels of the   aorta. Patent portal veins and hepatic veins.    LUNGS AND PLEURA: Normal.    MEDIASTINUM/AXILLAE: Small hiatal hernia. Dilated  esophagus containing fluid and other materials proximal to GE junction with focal mural thickening of the distal esophagus.    Borderline enlarged heart. No pericardial effusion. No hilar or mediastinal lymphadenopathy. Mitral annular calcifications.    5.9 x 6.2 cm enlarged left thyroid nodule.    CORONARY ARTERY CALCIFICATION: Severe.    HEPATOBILIARY: Status post cholecystectomy.    PANCREAS: Normal.    SPLEEN: Normal.    ADRENAL GLANDS: Normal.    KIDNEYS/BLADDER: Simple renal cysts, no follow-up required. No obstructing renal or ureteral stone. No hydroureteronephrosis.    BOWEL: Colonic diverticulosis. Status post appendectomy. No obstruction, colitis, or diverticulitis.    LYMPH NODES: Normal.    PELVIC ORGANS: Posterior uterine body fibroid. 1.7 cm thickened endometrium.    MUSCULOSKELETAL: Moderate to severe multilevel discogenic degenerative change. Grade I anterolisthesis of L4 on L5, likely degenerative. S-shaped scoliosis.      Impression    IMPRESSION:  1.  No active extravasation. Stable 3.7 x 3.2 cm infrarenal abdominal aortic saccular aneurysm, unchanged. Severe atherosclerotic calcifications. No dissection or central pulmonary embolus.  2.  Dilated esophagus with mural thickening distally, correlate for esophagitis, esophageal dysfunction, or stricture. Large amounts of fluid and impacted ingested material are seen in the distal esophagus.  3.  Diverticulosis.  4.  Coronary artery disease and atherosclerotic vascular disease with a mildly enlarged heart.  5.  Status post cholecystectomy.  6.  Bilateral simple renal cysts, no follow-up required.  7.  1.7 cm thickened endometrium, consider correlation with nonemergent pelvic ultrasound to exclude endometrial hyperplasia or malignancy.     XR Chest Port 1 View    Narrative    EXAM: XR CHEST PORT 1 VIEW  LOCATION: [facility]  DATE: 6/21/2024    INDICATION: Crackles on exam.  COMPARISON: 6/20/24      Impression    IMPRESSION:  No pleural effusion or pneumothorax. Left greater than right heterogeneous opacities are concerning for multifocal or atypical infection. Normal size of the heart. There is aortic calcification and tortuosity. Bilateral shoulder arthroplasty   noted.

## 2024-06-21 NOTE — PLAN OF CARE
I Illness Severity: Watcher    PPatient Summary:      Telemetry Orders: No    Interpretation of Cardiac Rhythm: N/A    Dyspnea improved and O2 sats >88% at RA or at prior home O2 therapy level:  No    Last Bowel Movement: N/A    Acute Symptoms or Concerns: Esophagitis    Is this a new or worsening symptom or concern? No    Is this symptom or concern being adequately managed? Yes    Shift Summary: Alert to self and situation. Vitally stable on 2L via NC. Denies pain, n/v.     AActions:    Diagnostic testing and/or procedures completed, and results are available for discharge:  Not Met    SSituational Awareness:      Anticipated post discharge treatment plan formulated and the following needs have been identified:   None identified at the moment.    SSynthesis:     Was bedside rounding completed on your shift? Yes     What team members present during bedside rounds?  RN           Goal Outcome Evaluation:    Problem: Adult Inpatient Plan of Care  Goal: Absence of Hospital-Acquired Illness or Injury  Intervention: Prevent Skin Injury  Recent Flowsheet Documentation  Taken 6/21/2024 0130 by Jamia Haines RN  Body Position:   position changed independently   side-lying   left  Device Skin Pressure Protection:   absorbent pad utilized/changed   positioning supports utilized   tubing/devices free from skin contact     Problem: Adult Inpatient Plan of Care  Goal: Optimal Comfort and Wellbeing  Outcome: Progressing     Problem: Risk for Delirium  Goal: Improved Behavioral Control  Outcome: Progressing  Intervention: Minimize Safety Risk  Recent Flowsheet Documentation  Taken 6/21/2024 0130 by Jamia Haines RN  Communication Enhancement Strategies:   call light answered in person   communication board used   extra time allowed for response   nonverbal strategies used   one-step directions provided   repetition utilized   verbal and visual cues paired  Enhanced Safety Measures:   review medications for side effects with  activity   room near unit station

## 2024-06-21 NOTE — CARE PLAN
I Illness Severity: Stable    PPatient Summary:      Telemetry Orders: No    Interpretation of Cardiac Rhythm: NA    Dyspnea improved and O2 sats >88% at RA or at prior home O2 therapy level:  Yes    Last Bowel Movement:     Acute Symptoms or Concerns: No    Is this a new or worsening symptom or concern?  NA    Is this symptom or concern being adequately managed?  NA      AActions:    Diagnostic testing and/or procedures completed, and results are available for discharge:  Met    SSituational Awareness:      Anticipated post discharge treatment plan formulated and the following needs have been identified:   None identified    SSynthesis:     Was bedside rounding completed on your shift? Yes     What team members present during bedside rounds?  RN    [facility] Daily4- Move to Chair

## 2024-06-21 NOTE — PROGRESS NOTES
"  GASTROENTEROLOGY PROGRESS NOTE     SUBJECTIVE   The patient reports feeling well. No nausea or vomiting since undergoing EGD 6/20/24. Daughter (Daija) at the bedside. No report of oropharyngeal dysphagia prior to admission.   No fevers.      OBJECTIVE     Vitals Blood pressure 117/64, pulse 88, temperature 98.8  F (37.1  C), temperature source Oral, resp. rate 20, height 1.575 m (5' 2\"), weight 61.3 kg (135 lb 1.6 oz), SpO2 92%, not currently breastfeeding.          Physical Exam  General: awake, alert, responds appropriately   Cardiovascular: S1S2, no edema   Chest: coarse   Abdomen: +bs, soft, not tender   Neurologic: grossly intact        LABORATORY    ELECTROLYTE PANEL   Recent Labs   Lab 06/21/24  0455 06/20/24  0911    143   POTASSIUM 3.9 4.2   CHLORIDE 112* 109*   CO2 23 25   * 99   CR 0.67 0.73   BUN 16.8 20.3      HEMATOLOGY PANEL   Recent Labs   Lab 06/21/24  0455 06/20/24  2059 06/20/24  1430 06/20/24  0911   HGB 8.3* 9.4*  --  7.9*   MCV 74* 75*  --  73*   WBC 12.3* 12.1*  --  8.1    203  --  227   INR  --   --  1.09  --       LIVER AND PANCREAS PANEL   Recent Labs   Lab 06/20/24  0911   AST 11   ALT <5   ALKPHOS 88   BILITOTAL 0.3     IMAGING STUDIES    EXAM: CTA CHEST ABDOMEN PELVIS W CONTRAST  LOCATION: [facility]  DATE: 6/20/2024     INDICATION: Patient here initially for sensation of pill stuck in her throat, just had an episode of hematemesis. Hgb down from baseline. Rule out active GI bleeding and Jihan-Carey.  COMPARISON: 6/11 and 6/14/2021  TECHNIQUE: CT angiogram chest abdomen pelvis during arterial phase of injection of IV contrast. 2D and 3D MIP reconstructions were performed by the CT technologist. Dose reduction techniques were used.   CONTRAST: Isovue 370 90mL     FINDINGS:   CT ANGIOGRAM CHEST, ABDOMEN, AND PELVIS: Stable 3.7 x 3.2 cm saccular infrarenal abdominal aortic aneurysm with left anterolateral orientation. Tortuous " atherosclerotic aorta. No dissection. No active extravasation. Patent arterial branch vessels of the   aorta. Patent portal veins and hepatic veins.     LUNGS AND PLEURA: Normal.     MEDIASTINUM/AXILLAE: Small hiatal hernia. Dilated esophagus containing fluid and other materials proximal to GE junction with focal mural thickening of the distal esophagus.     Borderline enlarged heart. No pericardial effusion. No hilar or mediastinal lymphadenopathy. Mitral annular calcifications.     5.9 x 6.2 cm enlarged left thyroid nodule.     CORONARY ARTERY CALCIFICATION: Severe.     HEPATOBILIARY: Status post cholecystectomy.     PANCREAS: Normal.     SPLEEN: Normal.     ADRENAL GLANDS: Normal.     KIDNEYS/BLADDER: Simple renal cysts, no follow-up required. No obstructing renal or ureteral stone. No hydroureteronephrosis.     BOWEL: Colonic diverticulosis. Status post appendectomy. No obstruction, colitis, or diverticulitis.     LYMPH NODES: Normal.     PELVIC ORGANS: Posterior uterine body fibroid. 1.7 cm thickened endometrium.     MUSCULOSKELETAL: Moderate to severe multilevel discogenic degenerative change. Grade I anterolisthesis of L4 on L5, likely degenerative. S-shaped scoliosis.                                                                      IMPRESSION:  1.  No active extravasation. Stable 3.7 x 3.2 cm infrarenal abdominal aortic saccular aneurysm, unchanged. Severe atherosclerotic calcifications. No dissection or central pulmonary embolus.  2.  Dilated esophagus with mural thickening distally, correlate for esophagitis, esophageal dysfunction, or stricture. Large amounts of fluid and impacted ingested material are seen in the distal esophagus.  3.  Diverticulosis.  4.  Coronary artery disease and atherosclerotic vascular disease with a mildly enlarged heart.  5.  Status post cholecystectomy.  6.  Bilateral simple renal cysts, no follow-up required.  7.  1.7 cm thickened endometrium, consider correlation with  nonemergent pelvic ultrasound to exclude endometrial hyperplasia or malignancy.    I have reviewed the current diagnostic and laboratory tests.                EGD 6/20/24  Findings:       Food and large clot was found in the middle to lower third of the        esophagus. Gradual and gentle advancement of the scope moved the food        and clot into the stomach revealing a Schatzki's ring with focal        ulceration. Two areas of bleeding were seen in the middle to distal        esophagus related to small mucosal tears (likely secondary to food        impaction and retching). Two hemoclips were placed over one bleeding        focus and a third hemoclip was placed over the second site of bleeding        with hemostasis achieved.        A hiatal hernia was present.   Impression:            - Eosphageal food impaction secondary to esophageal                          stricture, with bleeding secondary to impaction /                          retching (treated with FB removal and hemoclip                          deployment respectively)   Recommendation:        - Admit the patient to [facility] for observation.                          - Sips of clears only for tonight                          - IV protonix 40 mg q 12 hours                          - Continue to monitor for evidence of recurrent                          bleeding                          - Favor torso elevation to at least 30 degrees for                          tonight if able                          - We will follow up with patient. Please call with any                          questions or concerns.                                                                                      Truman Brown MD   __________________   IMPRESSION   Esophageal food impaction secondary to esophageal stricture with associated gi bleeding-- This 89 yo female presented with reports of difficulty swallowing and hematemesis.  There was mild acute blood blood loss  anemia requiring transfusion of 2 units.      Emergent upper endoscopy on June 20 showed an esophageal food impaction with a Schatzki's ring and focal ulceration.  There were 2 areas of bleeding within the middle to distal esophagus related to small mucosal tears/impaction.  2 hemoclips were placed and there has not been evidence of recurrent GI bleeding.         PLAN   No objection to clear liquids and further advance to SOFT diet.   Elevate head of bed at least 30 degress/sit upright after eating.  No objection to swallow eval by SLP.  Will plan to repeat egd with dilation in at least 2 weeks (ordered through [facility])  Continue twice daily iv PPI. Follow Hgb and transfuse RBCs as needed.     Agree with further evaluation with CXR out of concern for aspiration with food impaction 6/20/24-- defer to primary team.     Total time spent on this encounter=35minutes.         Lynn Rae PA-C  Thank you for the opportunity to participate in the care of this patient.   Please feel free to call me with any questions or concerns.  Phone number [phone].

## 2024-06-21 NOTE — PROGRESS NOTES
"POST OP CHECK    SUBJECTIVE:  Angle returns following upper endoscopy procedure. Angle is doing well. She reports no pain and no increase in fatigue. She reports increased urinary frequency.     ROS: Denies headache, fever, nausea, shortness of breath, chest pain.     BP (!) 141/65   Pulse 110   Temp 98.1  F (36.7  C)   Resp 18   Ht 1.575 m (5' 2\")   Wt 61.3 kg (135 lb 1.6 oz)   LMP  (LMP Unknown)   SpO2 93%   BMI 24.71 kg/m        EXAM:   Physical Exam  Constitutional:       General: She is not in acute distress.     Appearance: She is not toxic-appearing or diaphoretic.   HENT:      Head: Normocephalic and atraumatic.      Right Ear: External ear normal.      Left Ear: External ear normal.      Nose: Nose normal.      Mouth/Throat:      Mouth: Mucous membranes are moist.   Eyes:      Extraocular Movements: Extraocular movements intact.   Cardiovascular:      Rate and Rhythm: Normal rate and regular rhythm.   Pulmonary:      Effort: Pulmonary effort is normal. No respiratory distress.      Breath sounds: Normal breath sounds. No wheezing or rales.   Chest:      Chest wall: No tenderness.   Abdominal:      General: There is no distension.   Skin:     Findings: No rash.   Neurological:      Mental Status: She is alert.      Comments: Oriented to person, , and that she is in a hospital. She did not know which hospital she was at.   Psychiatric:         Mood and Affect: Mood normal.         Behavior: Behavior normal.        A/P  -Repeat hemoglobin 2 hours post transfusion  -UA given urinary frequency   -monitor vital sign   -continue to assess for ongoing signs of blood loss or hemodynamic instability     Amarjit Campos, DO      "

## 2024-06-21 NOTE — PLAN OF CARE
Problem: Risk for Delirium  Goal: Improved Behavioral Control  Outcome: Progressing  Intervention: Minimize Safety Risk  Recent Flowsheet Documentation  Taken 6/21/2024 0957 by Laura Decker, RN  Enhanced Safety Measures: room near unit station     Problem: Fall Injury Risk  Goal: Absence of Fall and Fall-Related Injury  Outcome: Progressing  Intervention: Promote Injury-Free Environment  Recent Flowsheet Documentation  Taken 6/21/2024 1400 by Laura Decker, RN  Safety Promotion/Fall Prevention: safety round/check completed  Taken 6/21/2024 0957 by Laura Decker, RN  Safety Promotion/Fall Prevention:   room door open   safety round/check completed   Pt disoriented to time, place, and situation. Denying pain. No nausea or emesis. Tolerated clear liquids, diet advanced to mechanical soft.  Using 3 L NC.

## 2024-06-22 ENCOUNTER — APPOINTMENT (OUTPATIENT)
Dept: PHYSICAL THERAPY | Facility: CLINIC | Age: 89
DRG: 368 | End: 2024-06-22
Payer: MEDICARE

## 2024-06-22 ENCOUNTER — APPOINTMENT (OUTPATIENT)
Dept: OCCUPATIONAL THERAPY | Facility: CLINIC | Age: 89
DRG: 368 | End: 2024-06-22
Payer: MEDICARE

## 2024-06-22 ENCOUNTER — APPOINTMENT (OUTPATIENT)
Dept: SPEECH THERAPY | Facility: CLINIC | Age: 89
DRG: 368 | End: 2024-06-22
Payer: MEDICARE

## 2024-06-22 VITALS
HEIGHT: 62 IN | BODY MASS INDEX: 24.82 KG/M2 | TEMPERATURE: 97.7 F | RESPIRATION RATE: 18 BRPM | WEIGHT: 134.9 LBS | HEART RATE: 75 BPM | SYSTOLIC BLOOD PRESSURE: 127 MMHG | OXYGEN SATURATION: 94 % | DIASTOLIC BLOOD PRESSURE: 58 MMHG

## 2024-06-22 PROBLEM — I35.0 NONRHEUMATIC AORTIC VALVE STENOSIS: Status: ACTIVE | Noted: 2024-06-22

## 2024-06-22 LAB
BACTERIA UR CULT: ABNORMAL
ERYTHROCYTE [DISTWIDTH] IN BLOOD BY AUTOMATED COUNT: 21 % (ref 10–15)
HCT VFR BLD AUTO: 30.6 % (ref 35–47)
HGB BLD-MCNC: 8.8 G/DL (ref 11.7–15.7)
MCH RBC QN AUTO: 21.7 PG (ref 26.5–33)
MCHC RBC AUTO-ENTMCNC: 28.8 G/DL (ref 31.5–36.5)
MCV RBC AUTO: 76 FL (ref 78–100)
PLATELET # BLD AUTO: 175 10E3/UL (ref 150–450)
RBC # BLD AUTO: 4.05 10E6/UL (ref 3.8–5.2)
WBC # BLD AUTO: 9.2 10E3/UL (ref 4–11)

## 2024-06-22 PROCEDURE — 92526 ORAL FUNCTION THERAPY: CPT | Mod: GN

## 2024-06-22 PROCEDURE — 97110 THERAPEUTIC EXERCISES: CPT | Mod: GP

## 2024-06-22 PROCEDURE — 97161 PT EVAL LOW COMPLEX 20 MIN: CPT | Mod: GP

## 2024-06-22 PROCEDURE — 97535 SELF CARE MNGMENT TRAINING: CPT | Mod: GO

## 2024-06-22 PROCEDURE — 99238 HOSP IP/OBS DSCHRG MGMT 30/<: CPT | Mod: GC

## 2024-06-22 PROCEDURE — 250N000013 HC RX MED GY IP 250 OP 250 PS 637

## 2024-06-22 PROCEDURE — 85027 COMPLETE CBC AUTOMATED: CPT | Performed by: PHYSICIAN ASSISTANT

## 2024-06-22 PROCEDURE — 250N000011 HC RX IP 250 OP 636: Mod: JZ

## 2024-06-22 PROCEDURE — 36415 COLL VENOUS BLD VENIPUNCTURE: CPT | Performed by: PHYSICIAN ASSISTANT

## 2024-06-22 PROCEDURE — 97165 OT EVAL LOW COMPLEX 30 MIN: CPT | Mod: GO

## 2024-06-22 PROCEDURE — 97116 GAIT TRAINING THERAPY: CPT | Mod: GP

## 2024-06-22 PROCEDURE — C9113 INJ PANTOPRAZOLE SODIUM, VIA: HCPCS | Mod: JZ

## 2024-06-22 PROCEDURE — 97530 THERAPEUTIC ACTIVITIES: CPT | Mod: GO

## 2024-06-22 RX ORDER — SULFAMETHOXAZOLE/TRIMETHOPRIM 800-160 MG
1 TABLET ORAL 2 TIMES DAILY
Qty: 10 TABLET | Refills: 0 | Status: SHIPPED | OUTPATIENT
Start: 2024-06-22 | End: 2024-06-27

## 2024-06-22 RX ADMIN — CEFTRIAXONE 1 G: 1 INJECTION, POWDER, FOR SOLUTION INTRAMUSCULAR; INTRAVENOUS at 12:41

## 2024-06-22 RX ADMIN — MICONAZOLE NITRATE: 20 CREAM TOPICAL at 07:54

## 2024-06-22 RX ADMIN — PANTOPRAZOLE SODIUM 40 MG: 40 INJECTION, POWDER, FOR SOLUTION INTRAVENOUS at 07:54

## 2024-06-22 RX ADMIN — MEMANTINE HYDROCHLORIDE 10 MG: 5 TABLET, FILM COATED ORAL at 07:54

## 2024-06-22 ASSESSMENT — ACTIVITIES OF DAILY LIVING (ADL)
ADLS_ACUITY_SCORE: 47

## 2024-06-22 NOTE — PROGRESS NOTES
"  GASTROENTEROLOGY PROGRESS NOTE     SUBJECTIVE   Tolerated soft diet yesterday evening. No breakfast yet today. Denies dysphagia, odynophagia, chest pain, abdominal pain. No stools overnight or this morning.      OBJECTIVE     Vitals Blood pressure 127/58, pulse 75, temperature 97.7  F (36.5  C), temperature source Oral, resp. rate 18, height 1.575 m (5' 2\"), weight 61.2 kg (134 lb 14.4 oz), SpO2 94%, not currently breastfeeding.          Physical Exam  General: awake, alert, responds appropriately   Cardiovascular: S1S2, no edema   Chest: coarse   Abdomen: +bs, soft, not tender   Neurologic: grossly intact        LABORATORY    ELECTROLYTE PANEL   Recent Labs   Lab 06/21/24  0455 06/20/24  0911    143   POTASSIUM 3.9 4.2   CHLORIDE 112* 109*   CO2 23 25   * 99   CR 0.67 0.73   BUN 16.8 20.3      HEMATOLOGY PANEL   Recent Labs   Lab 06/22/24  0605 06/21/24  0455 06/20/24 2059 06/20/24  1430   HGB 8.8* 8.3* 9.4*  --    MCV 76* 74* 75*  --    WBC 9.2 12.3* 12.1*  --     181 203  --    INR  --   --   --  1.09      LIVER AND PANCREAS PANEL   Recent Labs   Lab 06/20/24  0911   AST 11   ALT <5   ALKPHOS 88   BILITOTAL 0.3     IMAGING STUDIES    EXAM: CTA CHEST ABDOMEN PELVIS W CONTRAST  LOCATION: [facility]  DATE: 6/20/2024     INDICATION: Patient here initially for sensation of pill stuck in her throat, just had an episode of hematemesis. Hgb down from baseline. Rule out active GI bleeding and Jihan-Carey.  COMPARISON: 6/11 and 6/14/2021  TECHNIQUE: CT angiogram chest abdomen pelvis during arterial phase of injection of IV contrast. 2D and 3D MIP reconstructions were performed by the CT technologist. Dose reduction techniques were used.   CONTRAST: Isovue 370 90mL     FINDINGS:   CT ANGIOGRAM CHEST, ABDOMEN, AND PELVIS: Stable 3.7 x 3.2 cm saccular infrarenal abdominal aortic aneurysm with left anterolateral orientation. Tortuous atherosclerotic aorta. No dissection. No " active extravasation. Patent arterial branch vessels of the   aorta. Patent portal veins and hepatic veins.     LUNGS AND PLEURA: Normal.     MEDIASTINUM/AXILLAE: Small hiatal hernia. Dilated esophagus containing fluid and other materials proximal to GE junction with focal mural thickening of the distal esophagus.     Borderline enlarged heart. No pericardial effusion. No hilar or mediastinal lymphadenopathy. Mitral annular calcifications.     5.9 x 6.2 cm enlarged left thyroid nodule.     CORONARY ARTERY CALCIFICATION: Severe.     HEPATOBILIARY: Status post cholecystectomy.     PANCREAS: Normal.     SPLEEN: Normal.     ADRENAL GLANDS: Normal.     KIDNEYS/BLADDER: Simple renal cysts, no follow-up required. No obstructing renal or ureteral stone. No hydroureteronephrosis.     BOWEL: Colonic diverticulosis. Status post appendectomy. No obstruction, colitis, or diverticulitis.     LYMPH NODES: Normal.     PELVIC ORGANS: Posterior uterine body fibroid. 1.7 cm thickened endometrium.     MUSCULOSKELETAL: Moderate to severe multilevel discogenic degenerative change. Grade I anterolisthesis of L4 on L5, likely degenerative. S-shaped scoliosis.                                                                      IMPRESSION:  1.  No active extravasation. Stable 3.7 x 3.2 cm infrarenal abdominal aortic saccular aneurysm, unchanged. Severe atherosclerotic calcifications. No dissection or central pulmonary embolus.  2.  Dilated esophagus with mural thickening distally, correlate for esophagitis, esophageal dysfunction, or stricture. Large amounts of fluid and impacted ingested material are seen in the distal esophagus.  3.  Diverticulosis.  4.  Coronary artery disease and atherosclerotic vascular disease with a mildly enlarged heart.  5.  Status post cholecystectomy.  6.  Bilateral simple renal cysts, no follow-up required.  7.  1.7 cm thickened endometrium, consider correlation with nonemergent pelvic ultrasound to exclude  endometrial hyperplasia or malignancy.    I have reviewed the current diagnostic and laboratory tests.                EGD 6/20/24  Findings:       Food and large clot was found in the middle to lower third of the        esophagus. Gradual and gentle advancement of the scope moved the food        and clot into the stomach revealing a Schatzki's ring with focal        ulceration. Two areas of bleeding were seen in the middle to distal        esophagus related to small mucosal tears (likely secondary to food        impaction and retching). Two hemoclips were placed over one bleeding        focus and a third hemoclip was placed over the second site of bleeding        with hemostasis achieved.        A hiatal hernia was present.   Impression:            - Eosphageal food impaction secondary to esophageal                          stricture, with bleeding secondary to impaction /                          retching (treated with FB removal and hemoclip                          deployment respectively)   Recommendation:        - Admit the patient to hospital [facility] for observation.                          - Sips of clears only for tonight                          - IV protonix 40 mg q 12 hours                          - Continue to monitor for evidence of recurrent                          bleeding                          - Favor torso elevation to at least 30 degrees for                          tonight if able                          - We will follow up with patient. Please call with any                          questions or concerns.                                                                                      Truman Brown MD   __________________   IMPRESSION   Esophageal food impaction secondary to esophageal stricture with associated gi bleeding-- This 89 yo female presented with reports of difficulty swallowing and hematemesis.  There was mild acute blood blood loss anemia requiring transfusion of 2 units.       Emergent upper endoscopy on June 20 showed an esophageal food impaction with a Schatzki's ring and focal ulceration.  There were 2 areas of bleeding within the middle to distal esophagus related to small mucosal tears/impaction.  2 hemoclips were placed and there has not been evidence of recurrent GI bleeding.    HGB stable. No signs of overt GI bleeding. Hemodynamically stable. On IV PPI BID. Tolerating PO intake.         PLAN   --Soft diet as tolerated.   --Our office is arranging EGD with dilation in 2 weeks.   --Continue twice daily iv PPI.   --Follow Hgb and transfuse RBCs as needed.     GI will sign off, please call with questions.         Wanda Carson, PAC  [facility]

## 2024-06-22 NOTE — PROGRESS NOTES
"   06/22/24 0845   Appointment Info   Signing Clinician's Name / Credentials (PT) Ronnell Rosario, PT   Living Environment   People in Home spouse   Current Living Arrangements assisted living   Living Environment Comments per OT note: \"daughter arrived after session - clarified PLOF information. lives with spouse who uses power wc d/t parkinsons. daughter reports spouse's cognition is generally functional but his mobility and speech are impaired.\"   Self-Care   Usual Activity Tolerance moderate   Current Activity Tolerance good   Regular Exercise No   Equipment Currently Used at Home walker, rolling   Fall history within last six months no   Activity/Exercise/Self-Care Comment per OT: assist for LB dressing, bathing; IND for toileting   General Information   Onset of Illness/Injury or Date of Surgery 06/20/24   Referring Physician J Carlos Vasquez MD   Patient/Family Therapy Goals Statement (PT) \"Get back to golf\"   Existing Precautions/Restrictions fall;NPO   Cognition   Affect/Mental Status (Cognition) confused   Orientation Status (Cognition) verbal cues/prompts needed for orientation;disoriented to;place;time   Follows Commands (Cognition) follows one-step commands   Safety Deficit (Cognition) insight into deficits/self-awareness   Integumentary/Edema   Integumentary/Edema no deficits were identifed   Posture    Posture Forward head position;Protracted shoulders   Range of Motion (ROM)   Range of Motion ROM is WFL   Strength (Manual Muscle Testing)   Strength (Manual Muscle Testing) strength is WFL   Transfers   Transfers sit-stand transfer   Maintains Weight-bearing Status (Transfers) able to maintain   Sit-Stand Transfer   Sit-Stand [place] (Transfers) maximum assist (25% patient effort)   Assistive Device (Sit-Stand Transfers) walker, front-wheeled   Comment, (Sit-Stand Transfer) requires max A x 2 for shooting with drawsheet  (transferring out of a broken chair)   Gait/Stairs (Locomotion)   [place] " Level (Gait) contact guard   Assistive Device (Gait) walker, front-wheeled   Distance in Feet (Gait) 5   Pattern (Gait) step-to   Balance   Balance no deficits were identified   Sensory Examination   Sensory Perception patient reports no sensory changes   Coordination   Coordination no deficits were identified   Muscle Tone   Muscle Tone no deficits were identified   Clinical Impression   Criteria for Skilled Therapeutic Intervention Yes, treatment indicated   PT Diagnosis (PT) impaired functional mobility   Functional limitations due to impairments ambulation   Clinical Presentation (PT Evaluation Complexity) stable   Clinical Presentation Rationale patient presents as medically diagnosed   Clinical Decision Making (Complexity) low complexity   Planned Therapy Interventions (PT) transfer training;gait training;ROM (range of motion);strengthening   Risk & Benefits of therapy have been explained patient;risks/benefits reviewed   PT Total Evaluation Time   PT Eval, Low Complexity Minutes (98925) 15   Physical Therapy Goals   PT Frequency Daily   PT Predicted Duration/Target Date for Goal Attainment 06/26/24   PT Goals Gait;Transfers   PT: Transfers Minimal assist;Sit to/from stand;Assistive device   PT: Gait Minimal assist;Rolling walker;50 feet   Interventions   Interventions Quick Adds Gait Training;Therapeutic Procedure;Therapeutic Activity   Therapeutic Procedure/Exercise   Ther. Procedure: strength, endurance, ROM, flexibillity Minutes (40962) 10   Symptoms Noted During/After Treatment none   Treatment Detail/Skilled Intervention Seated LE ROM: heel slides x 10; ABD/ADD x 10; ankle pumps x 15.   Gait Training   Gait Training Minutes (33583) 15   Symptoms Noted During/After Treatment (Gait Training) none   Treatment Detail/Skilled Intervention 2 person assist to get out of broken bedside chair (leg rest would not go down). Requires max A x 2 using drawsheet to scoot to leg rest position then 90 deg turn in prep  for sit to stand (garbage pale placed under legrest for safety). Sit to stand FWW, min A x2 for safety transferring off leg rest. Walk to sofa in room while RN finds new bedside chair. Stand to sit on sofa, verbal cues for placement of hands. Sit to stand from sofa FWW, min A x 2. Walk to new bedside chair with FWW, CGA x 1. Stand to sit with CGA, verbal cues for hand placement. Pt able to scoot back with prompt.   Distance in Feet 5, 5   [place] Level (Gait Training) maximum assist (25% patient effort)  (started Max A x 2 for transfer out of broken chair; ended session with CGA x 1. Consider chair follow if progressing gait distance.)   Physical Assistance Level (Gait Training) verbal cues;1 person + 1 person to manage equipment   Weight Bearing (Gait Training) full weight-bearing   Assistive Device (Gait Training) rolling walker  (FWW)   Pattern Analysis (Gait Training) swing-to gait   Gait Analysis Deviations decreased marquez   Impairments (Gait Analysis/Training) strength decreased   PT Discharge Planning   PT Plan progress gait as able (chair follow), transfers, strengthening   PT Discharge Recommendation (DC Rec) home with home care physical therapy;home with assist   PT Rationale for DC Rec patient requires assistance for ADLs and mobility   PT Brief overview of current status Gait: 5' FWW, CGA; transfers: min A x 1 + FWW   PT Equipment Needed at Discharge walker, rolling   Total Session Time   Timed Code Treatment Minutes 25   Total Session Time (sum of timed and untimed services) 40

## 2024-06-22 NOTE — PROGRESS NOTES
06/22/24 0930   Appointment Info   Signing Clinician's Name / Credentials (OT) Ellie Blair, OTR/L   Living Environment   People in Home spouse   Current Living Arrangements assisted living   Living Environment Comments daughter arrived after session - clarified PLOF information. lives with spouse who uses power wc d/t parkinsons. daughter reports spouse's cognition is generally functional but his mobility and speech are impaired.   Self-Care   Equipment Currently Used at Home walker, rolling   Activity/Exercise/Self-Care Comment assist for LB dressing, bathing; IND for toileting   Instrumental Activities of Daily Living (IADL)   IADL Comments assist for all IADLs.   General Information   Onset of Illness/Injury or Date of Surgery 06/20/24   Referring Physician Sayda Rosales MD   Patient/Family Therapy Goal Statement (OT) go home   Additional Occupational Profile Info/Pertinent History of Current Problem admit for hematemesis in setting of esophageal foreign body, concern for upper GI bleed. PMH HTN, CVA, urinary incontinence, dementia, and Jihan-Carey tear   Existing Precautions/Restrictions fall   Limitations/Impairments safety/cognitive   Cognitive Status Examination   Orientation Status person;time  (oriented to place type when cued to look around room)   Affect/Mental Status (Cognitive) WFL   Follows Commands WFL   Pain Assessment   Patient Currently in Pain No   Range of Motion Comprehensive   General Range of Motion bilateral upper extremity ROM WFL   Strength Comprehensive (MMT)   Comment, General Manual Muscle Testing (MMT) Assessment weakness BUE but WFL for light ADLs   Bed Mobility   Bed Mobility supine-sit   Supine-Sit [place] (Bed Mobility) minimum assist (75% patient effort)   Comment (Bed Mobility) per clinical judgement   Transfers   Transfers sit-stand transfer;toilet transfer   Sit-Stand Transfer   Sit-Stand [place] (Transfers) minimum assist (75% patient effort)   Toilet  Transfer   Type (Toilet Transfer) sit-stand;stand-sit   [place] Level (Toilet Transfer) minimum assist (75% patient effort)   Balance   Balance Comments CGA with FWW for static standing   Activities of Daily Living   BADL Assessment/Intervention lower body dressing;toileting   Lower Body Dressing Assessment/Training   [place] Level (Lower Body Dressing) moderate assist (50% patient effort)   Toileting   Comment, (Toileting) per clinical judgement   [place] Level (Toileting) minimum assist (75% patient effort)   Clinical Impression   Criteria for Skilled Therapeutic Interventions Met (OT) Yes, treatment indicated   OT Diagnosis dec BADL indep   OT Problem List-Impairments impacting ADL problems related to;activity tolerance impaired;balance;cognition;mobility;strength   Assessment of Occupational Performance 1-3 Performance Deficits   Identified Performance Deficits toileting, bed mob, transfers   Planned Therapy Interventions (OT) ADL retraining;bed mobility training;progressive activity/exercise;transfer training   Clinical Decision Making Complexity (OT) problem focused assessment/low complexity   Risk & Benefits of therapy have been explained evaluation/treatment results reviewed;participants included;patient;daughter   OT Total Evaluation Time   OT Eval, Low Complexity Minutes (22140) 15   OT Goals   Therapy Frequency (OT) 5 times/week   OT Predicted Duration/Target Date for Goal Attainment 06/27/24   OT Goals Toilet Transfer/Toileting;Bed Mobility   OT: Bed Mobility Supervision/stand-by assist;supine to/from sitting   OT: Toilet Transfer/Toileting Supervision/stand-by assist;toilet transfer;cleaning and garment management   Self-Care/Home Management   Self-Care/Home Mgmt/ADL, Compensatory, Meal Prep Minutes (42994) 12   Symptoms Noted During/After Treatment (Meal Preparation/Planning Training) fatigue   Treatment Detail/Skilled Intervention Cues for hand placement to increase ease of transfer -  progresses to CGA with increased effort for sit > stand from recliner x2. Cues for hand placement while hiking brief over hips, encouraged use of FWW to improve balance, progresses to min A to don brief, only assist to thread RLE.   Therapeutic Activities   Therapeutic Activity Minutes (67557) 9   Symptoms noted during/after treatment fatigue   Treatment Detail/Skilled Intervention Faciltiated mobility in room to progress activity tolerance needed for BADL routines. Cues for FWW positioning, to get closer to FWW. CGA with FWW and min cues for 15ft mob in room.   OT Discharge Planning   OT Plan 1 more session? bed mob and toileting   OT Discharge Recommendation (DC Rec) home with assist;home with home care occupational therapy   OT Rationale for DC Rec lives in [place] with good support for BADLs. recommend home OT to maximize ADL IND.   OT Brief overview of current status CGA mobility, min A ADLs   Total Session Time   Timed Code Treatment Minutes 21   Total Session Time (sum of timed and untimed services) 36

## 2024-06-22 NOTE — PLAN OF CARE
Goal Outcome Evaluation:      Plan of Care Reviewed With: patient    Overall Patient Progress: no changeOverall Patient Progress: no change      Problem: Infection  Goal: Absence of Infection Signs and Symptoms  Outcome: Progressing  Problem: Adult Inpatient Plan of Care  Goal: Absence of Hospital-Acquired Illness or Injury  Intervention: Identify and Manage Fall Risk    I Illness Severity: Stable    PPatient Summary:      Telemetry Orders: No    Interpretation of Cardiac Rhythm: N/A    Dyspnea improved and O2 sats >88% at RA or at prior home O2 therapy level:  Yes    Last Bowel Movement:     Acute Symptoms or Concerns: None    Is this a new or worsening symptom or concern?  NA    Is this symptom or concern being adequately managed? Yes    Shift Summary: Outcome Evaluation: A&O to self. VSS On RA. calm and cooperative. denies pain. Assist of 2 with transfer. Has been voiding okay. Pt is confused. Pending discharge back to the [facility].    AActions:    Diagnostic testing and/or procedures completed, and results are available for discharge:  Met    SSituational Awareness:      Anticipated post discharge treatment plan formulated and the following needs have been identified:   homecare    SSynthesis:     Was bedside rounding completed on your shift? Yes     What team members present during bedside rounds?  RN

## 2024-06-22 NOTE — PROGRESS NOTES
Pt discharge information provided to give to [facility]. Left with all belongings, family to transport.

## 2024-06-22 NOTE — DISCHARGE SUMMARY
"[facility]  Discharge Summary - Medicine & Pediatrics       Date of Admission:  6/20/2024  Date of Discharge:  6/22/2024  Discharging Provider: J Carlos Vasquez MD; Corwin Pritchett MD   Discharge Service: Hospitalist Service    Discharge Diagnoses   Eosphageal food impaction secondary to esophageal stricture  with bleeding secondary to impaction /retching   Urinary tract infection     Clinically Significant Risk Factors          Follow-ups Needed After Discharge   Follow-up Appointments     Follow Up and recommended labs and tests      Follow up with primary care provider in 7 days for hospital follow-up.   Follow up with GI team in 2 weeks for repeat EGD and dilation.            Unresulted Labs Ordered in the Past 30 Days of this Admission       Date and Time Order Name Status Description    6/20/2024  6:58 PM Urine Culture Preliminary         These results will be followed up by PCP    Discharge Disposition   Discharged to assisted living  Condition at discharge: Stable    Hospital Course   Angle Agudelo is a 89 year old female who was admitted on 6/20/2024 for hematemesis in setting of esophageal foreign body. She has a history of HTN, CVA, urinary incontinence, dementia, and Jihan-Carey tear. The following issues were addressed during this hospitalization:      Hematemesis   upper GI bleed  Esophageal foreign body   Presented to hospital after taking morning medications and experiencing sensation that pill was stuck in throat. Vitally stable and in no acute distress on arrival. Labs pertinent for Hgb of 7.9, down from 9.8 five months ago. CT Chest/Abd pertinent for \"dilated esophagus with mural thickening distally, correlate for esophagitis, esophageal dysfunction, or stricture. Large amounts of fluid and impacted ingested material are seen in the distal esophagus.\" Patient was given glucagon in the ED and observed for some time. She then developed retching and severe chest pain and " "had an episode of spitting up blood. EGD performed by Dr. Phalen, in which they discovered food impaction 2/2 esphageal stricture, with bleeding 2/2 impaction/retching. Foreign body removed and bleeding clipped. Received 1U PRBC with now stable hgb's. Patient remained clinically stable after EGD. She is to follow-up with [facility] in 2 weeks for repeat EGD with dilation.   -GI Consulted this admission:               -discharge home with Protonix 40mg q12   -soft diet as tolerated    -plan repeat EGD with dilation in 2 weeks     Urinary frequency  UTI  Patient was endorsing urinary frequency on arrival. UA with positive leuks and WBCs, with urine cx growing E. Coli. Started [id number] q24 and will be discharged home on 5 day course of Bactrim.    -ceftraixone 1g Q24 for 2 days  -bactrim PO for 5 day course     Concern for Aspiration  Hypoxic respiratory failure - resolved    Patient Required brief supplementatal oxygenation this admission s/p endoscopy. There was some concern of aspiration so CXR obtained showing \"Left greater than right heterogeneous opacities are concerning for multifocal or atypical infection.\", not seen on initial CXR at admission. Inconsistent with aspiration, but patient was transitioned to RA and maintaining her saturations. She remained linically stable and was safe for discharge home with low suspicion on aspiration.     Thickened Endometrium discovered on CT Chest/Abd  1.7cm thickened endometrium seen on CT.   -to follow up with her PCP, as needed. Can consider pelvic US to exclude endometrial hyperplasia or malignancy       Consultations This Hospital Stay   GASTROENTEROLOGY IP CONSULT  SPEECH LANGUAGE PATH ADULT IP CONSULT  CARE MANAGEMENT / SOCIAL WORK IP CONSULT  PHYSICAL THERAPY ADULT IP CONSULT  OCCUPATIONAL THERAPY ADULT IP CONSULT  PHYSICAL THERAPY ADULT IP CONSULT  OCCUPATIONAL THERAPY ADULT IP CONSULT    Code Status   No CPR- Do NOT Intubate       The patient was discussed with " MD Corwin Vides MD  [facility] 3 ICU  [address] [facility] [address]  Phone: [phone]  Fax: [phone]  ______________________________________________________________________    Physical Exam   Vital Signs: Temp: 97.7  F (36.5  C) Temp src: Oral BP: 127/58 Pulse: 75   Resp: 18 SpO2: 94 % O2 Device: None (Room air) Oxygen Delivery: 2 LPM  Weight: 134 lbs 14.39 oz  Constitutional: awake, alert, cooperative, no apparent distress, and appears stated age  ENT: normocephalic, without obvious abnormality; no oral lesions.   Respiratory: some R lower base crackles  Cardiovascular: systolic murmur; otherwise regular rate and rhythm   Psych: alert to self only; unaware of situation.       Primary Care Physician   Koko Betancourt    Discharge Orders      General info for SNF    Length of Stay Estimate: Long Term Care  Condition at Discharge: Stable  Level of care:board and care  Rehabilitation Potential: Good  Admission H&P remains valid and up-to-date: Yes  Recent Chemotherapy: N/A  Use Nursing Home Standing Orders: Yes     Mantoux instructions    Give two-step Mantoux (PPD) Per Facility Policy Yes     Follow Up and recommended labs and tests    Follow up with primary care provider in 7 days for hospital follow-up. Follow up with GI team in 2 weeks for repeat EGD and dilation.     Activity - Up ad avelino     Reason for your hospital stay    You were hospitalized for food impaction and an esophageal bleed that required endoscopic removal and clipping. You were also found to have a bladder infection that we treated with antibiotics. You overall improved and are doing well enough to be discharged back to your assisted living facility.     Physical Therapy Adult Consult    Evaluate and treat as clinically indicated.    Reason:  deconditioning     Occupational Therapy Adult Consult    Evaluate and treat as clinically  indicated.    Reason:  deconditioning     Fall precautions     Diet    Follow this diet upon discharge: Orders Placed This Encounter      Easy to Chew Diet (level 7)       Significant Results and Procedures   Most Recent 3 CBC's:  Recent Labs   Lab Test 06/22/24  0605 06/21/24  0455 06/20/24  2059   WBC 9.2 12.3* 12.1*   HGB 8.8* 8.3* 9.4*   MCV 76* 74* 75*    181 203     Most Recent 3 BMP's:  Recent Labs   Lab Test 06/21/24  0455 06/20/24  0911 05/01/24  1108    143 143   POTASSIUM 3.9 4.2 4.1   CHLORIDE 112* 109* 108*   CO2 23 25 25   BUN 16.8 20.3 12.6   CR 0.67 0.73 0.78   ANIONGAP 10 9 10   BUBBA 8.5 8.9 8.7   * 99 112*   ,   Results for orders placed or performed during the hospital encounter of 06/20/24   Chest XR,  PA & LAT    Narrative    EXAM: XR CHEST 2 VIEWS  LOCATION: [facility]  DATE: 6/20/2024    INDICATION: chest pain, foreign body  COMPARISON: 1/17/24      Impression    IMPRESSION: No pleural fluid or pneumothorax. No airspace disease or edema. Normal size of the heart. Aortic calcification and tortuosity noted. Bilateral shoulder arthroplasty. There is lateral curvature of the spine. No retained radiopaque foreign   bodies.   CTA Chest Abdomen Pelvis w Contrast    Narrative    EXAM: CTA CHEST ABDOMEN PELVIS W CONTRAST  LOCATION: [facility]  DATE: 6/20/2024    INDICATION: Patient here initially for sensation of pill stuck in her throat, just had an episode of hematemesis. Hgb down from baseline. Rule out active GI bleeding and Jihan-Carey.  COMPARISON: 6/11 and 6/14/2021  TECHNIQUE: CT angiogram chest abdomen pelvis during arterial phase of injection of IV contrast. 2D and 3D MIP reconstructions were performed by the CT technologist. Dose reduction techniques were used.   CONTRAST: Isovue 370 90mL    FINDINGS:   CT ANGIOGRAM CHEST, ABDOMEN, AND PELVIS: Stable 3.7 x 3.2 cm saccular infrarenal abdominal aortic aneurysm with left  anterolateral orientation. Tortuous atherosclerotic aorta. No dissection. No active extravasation. Patent arterial branch vessels of the   aorta. Patent portal veins and hepatic veins.    LUNGS AND PLEURA: Normal.    MEDIASTINUM/AXILLAE: Small hiatal hernia. Dilated esophagus containing fluid and other materials proximal to GE junction with focal mural thickening of the distal esophagus.    Borderline enlarged heart. No pericardial effusion. No hilar or mediastinal lymphadenopathy. Mitral annular calcifications.    5.9 x 6.2 cm enlarged left thyroid nodule.    CORONARY ARTERY CALCIFICATION: Severe.    HEPATOBILIARY: Status post cholecystectomy.    PANCREAS: Normal.    SPLEEN: Normal.    ADRENAL GLANDS: Normal.    KIDNEYS/BLADDER: Simple renal cysts, no follow-up required. No obstructing renal or ureteral stone. No hydroureteronephrosis.    BOWEL: Colonic diverticulosis. Status post appendectomy. No obstruction, colitis, or diverticulitis.    LYMPH NODES: Normal.    PELVIC ORGANS: Posterior uterine body fibroid. 1.7 cm thickened endometrium.    MUSCULOSKELETAL: Moderate to severe multilevel discogenic degenerative change. Grade I anterolisthesis of L4 on L5, likely degenerative. S-shaped scoliosis.      Impression    IMPRESSION:  1.  No active extravasation. Stable 3.7 x 3.2 cm infrarenal abdominal aortic saccular aneurysm, unchanged. Severe atherosclerotic calcifications. No dissection or central pulmonary embolus.  2.  Dilated esophagus with mural thickening distally, correlate for esophagitis, esophageal dysfunction, or stricture. Large amounts of fluid and impacted ingested material are seen in the distal esophagus.  3.  Diverticulosis.  4.  Coronary artery disease and atherosclerotic vascular disease with a mildly enlarged heart.  5.  Status post cholecystectomy.  6.  Bilateral simple renal cysts, no follow-up required.  7.  1.7 cm thickened endometrium, consider correlation with nonemergent pelvic ultrasound to  exclude endometrial hyperplasia or malignancy.     XR Chest Port 1 View    Narrative    EXAM: XR CHEST PORT 1 VIEW  LOCATION: [facility]  DATE: 6/21/2024    INDICATION: Crackles on exam.  COMPARISON: 6/20/24      Impression    IMPRESSION: No pleural effusion or pneumothorax. Left greater than right heterogeneous opacities are concerning for multifocal or atypical infection. Normal size of the heart. There is aortic calcification and tortuosity. Bilateral shoulder arthroplasty   noted.        Discharge Medications   Current Discharge Medication List        START taking these medications    Details   omeprazole (PRILOSEC) 20 MG DR capsule Take 2 capsules (40 mg) by mouth 2 times daily for 30 days  Qty: 120 capsule, Refills: 0    Associated Diagnoses: Esophagitis; Hematemesis without nausea      sulfamethoxazole-trimethoprim (BACTRIM DS) 800-160 MG tablet Take 1 tablet by mouth 2 times daily for 5 days  Qty: 10 tablet, Refills: 0    Associated Diagnoses: Urinary tract infection without hematuria, site unspecified           CONTINUE these medications which have NOT CHANGED    Details   acetaminophen (TYLENOL) 325 MG tablet Take 3 tablets (975 mg) by mouth every 6 hours as needed for mild pain  Qty: 50 tablet, Refills: 0    Associated Diagnoses: Vagina bleeding      amLODIPine (NORVASC) 10 MG tablet 1 TABLET ORALLY DAILY  Qty: 28 tablet, Refills: 11    Comments: Please send refills for cycle fill. Thank you!  Associated Diagnoses: Essential hypertension      aspirin (ASPIRIN LOW DOSE) 81 MG chewable tablet Take 1 tablet (81 mg) by mouth daily Restart on 02/01/2024  Qty: 30 tablet, Refills: 11    Associated Diagnoses: History of CVA (cerebrovascular accident)      hydrocortisone 2.5 % cream Apply topically 2 times daily as needed for other      ibuprofen (ADVIL/MOTRIN) 600 MG tablet Take 1 tablet (600 mg) by mouth every 6 hours as needed for moderate pain  Qty: 30 tablet, Refills: 0    Associated  Diagnoses: Vagina bleeding      lovastatin (MEVACOR) 40 MG tablet 1 TABLET ORALLY AT BEDTIME  Qty: 90 tablet, Refills: 2    Comments: Please send refills for cycle fill. Thank you!  Associated Diagnoses: History of CVA (cerebrovascular accident)      memantine (NAMENDA) 10 MG tablet 1 TABLET ORALLY 2 TIMES DAILY  Qty: 60 tablet, Refills: 11    Comments: Please send refills for cycle fill. Thank you!  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)      NYAMYC [id number] UNIT/GM external powder Apply topically 2 times daily as needed      ondansetron (ZOFRAN ODT) 4 MG ODT tab Take 1 tablet (4 mg) by mouth every 8 hours as needed for nausea  Qty: 4 tablet, Refills: 0    Associated Diagnoses: Vagina bleeding      polyethylene glycol (MIRALAX) 17 GM/Dose powder Take 17 g by mouth daily as needed for constipation  Qty: 510 g, Refills: 0    Associated Diagnoses: Rectal prolapse      traMADol (ULTRAM) 50 MG tablet Take 0.5 tablets (25 mg) by mouth every 6 hours as needed (for severe pain not relieved by tylenol)  Qty: 8 tablet, Refills: 0    Associated Diagnoses: Rectal prolapse           Allergies   Allergies   Allergen Reactions    Atorvastatin Muscle Pain (Myalgia)    Dextromethorphan Hives    Oxycodone Unknown    Pravastatin Muscle Pain (Myalgia)    Simvastatin Muscle Pain (Myalgia)    Codeine Rash

## 2024-06-22 NOTE — PROGRESS NOTES
Care Management Discharge Note    Discharge Date: 06/22/2024       Discharge Disposition: [facility]    Discharge Services: None    Discharge DME: None    Discharge Transportation: family or friend will provide    Private pay costs discussed: Not applicable      Education Provided on the Discharge Plan: Yes  Persons Notified of Discharge Plans: pt, daughter, [facility] ([facility])  Patient/Family in Agreement with the Plan: yes    Handoff Referral Completed: Yes    Additional Information:  1:52 PM  Pt medically ready to discharge back to [facility].  [facility] will provider PT and OT.  SUZAN spoke with CEE Vogel at [facility].  She confirmed admit back to facility.  SUZAN faxed discharge orders.  Daughter to transport.  No further discharge needs.    TEZ Tate

## 2024-06-22 NOTE — PLAN OF CARE
Occupational Therapy Discharge Summary    Reason for therapy discharge:    Discharged to home with home therapy.    Progress towards therapy goal(s). See goals on Care Plan in [facility] electronic health record for goal details.  Goals not met.  Barriers to achieving goals:   discharge on same date as initial evaluation.    Therapy recommendation(s):    Continued therapy is recommended.  Rationale/Recommendations:  maximize ADL IND.

## 2024-06-23 NOTE — PROGRESS NOTES
Physical Therapy Discharge Summary    Reason for therapy discharge:    Discharged to home with home therapy.    Progress towards therapy goal(s). See goals on Care Plan in [facility] electronic health record for goal details.  Goals not met.  Barriers to achieving goals:   discharge from facility.    Therapy recommendation(s):    Continued therapy is recommended.  Rationale/Recommendations:  Home PT.

## 2024-06-24 ENCOUNTER — PATIENT OUTREACH (OUTPATIENT)
Dept: FAMILY MEDICINE | Facility: CLINIC | Age: 89
End: 2024-06-24
Payer: MEDICARE

## 2024-06-24 NOTE — TELEPHONE ENCOUNTER
Transitions of Care Outreach  No chief complaint on file.      Most Recent Admission Date: 6/20/2024   Most Recent Admission Diagnosis: Esophagitis - K20.90  Esophageal obstruction due to food impaction - T18.128A, W44.F3XA  Hematemesis without nausea - K92.0     Most Recent Discharge Date: 6/22/2024   Most Recent Discharge Diagnosis: Esophageal obstruction due to food impaction - T18.128A, W44.F3XA  Esophagitis - K20.90  Hematemesis without nausea - K92.0  Urinary tract infection without hematuria, site unspecified - N39.0     Transitions of Care Assessment    Discharge Assessment  How are you doing now that you are home?: Daughter hasn't talked to her today, but is set up with soft diet from assisted living  How are your symptoms? (Red Flag symptoms escalate to triage hotline per guidelines): Improved  Do you know how to contact your clinic care team if you have future questions or changes to your health status? : Yes  Does the patient have their discharge instructions? : Yes  Does the patient have questions regarding their discharge instructions? : No  Were you started on any new medications or were there changes to any of your previous medications? : Yes  Do you have questions regarding any of your medications? : No  Do you have all of your needed medical supplies or equipment (DME)?  (i.e. oxygen tank, CPAP, cane, etc.): Yes    Follow up Plan     Discharge Follow-Up  Discharge follow up appointment scheduled in alignment with recommended follow up timeframe or Transitions of Risk Category? (Low = within 30 days; Moderate= within 14 days; High= within 7 days): No (Per daughter will be having an appointment with regards to stretching her esophagus.  If needs appt with PCP daughter will call.)  Patient's follow up appointment not scheduled: Patient declined scheduling support. Education on the importance of transitions of care follow up. Provided scheduling phone number.    No future appointments.    Outpatient  Plan as outlined on AVS reviewed with patient.    For any urgent concerns, please contact our 24 hour nurse triage line: [phone] ([phone])       Angle Dietrich RN

## 2024-06-27 ENCOUNTER — TELEPHONE (OUTPATIENT)
Dept: FAMILY MEDICINE | Facility: CLINIC | Age: 89
End: 2024-06-27
Payer: MEDICARE

## 2024-06-27 NOTE — TELEPHONE ENCOUNTER
Forms/Letter Request     Type of form/letter: OTHER: Skin integrity notification   Do we have the form/letter: Yes: in Nathans inbox     Who is the form from? Home care     Where did/will the form come from? form was faxed in     When is form/letter needed by: when done     How would you like the form/letter returned: Fax : [phone]     Order details: bruise on L forearm measuring 10 x 7cm; resident can't remember how she got it.   Order number: n/a; dated 6/27/24

## 2024-06-28 ENCOUNTER — MEDICAL CORRESPONDENCE (OUTPATIENT)
Dept: HEALTH INFORMATION MANAGEMENT | Facility: CLINIC | Age: 89
End: 2024-06-28
Payer: MEDICARE

## 2024-07-02 ENCOUNTER — DOCUMENTATION ONLY (OUTPATIENT)
Dept: OTHER | Facility: CLINIC | Age: 89
End: 2024-07-02
Payer: MEDICARE

## 2024-07-06 ENCOUNTER — MEDICAL CORRESPONDENCE (OUTPATIENT)
Dept: HEALTH INFORMATION MANAGEMENT | Facility: CLINIC | Age: 89
End: 2024-07-06
Payer: MEDICARE

## 2024-08-13 ENCOUNTER — TELEPHONE (OUTPATIENT)
Dept: FAMILY MEDICINE | Facility: CLINIC | Age: 89
End: 2024-08-13
Payer: MEDICARE

## 2024-08-13 NOTE — TELEPHONE ENCOUNTER
Forms/Letter Request    Type of form/letter: OTHER: medication orders       Do we have the form/letter: Yes: placed in Koko's inbox    Who is the form from? [facility]     Where did/will the form come from? form was faxed in    When is form/letter needed by: when signed    How would you like the form/letter returned: Fax : [phone]

## 2024-09-12 ENCOUNTER — OFFICE VISIT (OUTPATIENT)
Dept: FAMILY MEDICINE | Facility: CLINIC | Age: 89
End: 2024-09-12
Payer: MEDICARE

## 2024-09-12 VITALS
HEART RATE: 82 BPM | OXYGEN SATURATION: 98 % | WEIGHT: 136 LBS | TEMPERATURE: 97.8 F | HEIGHT: 62 IN | DIASTOLIC BLOOD PRESSURE: 60 MMHG | BODY MASS INDEX: 25.03 KG/M2 | RESPIRATION RATE: 16 BRPM | SYSTOLIC BLOOD PRESSURE: 100 MMHG

## 2024-09-12 DIAGNOSIS — K44.9 HIATAL HERNIA: ICD-10-CM

## 2024-09-12 DIAGNOSIS — I10 ESSENTIAL HYPERTENSION: ICD-10-CM

## 2024-09-12 DIAGNOSIS — Z01.818 PREOP GENERAL PHYSICAL EXAM: Primary | ICD-10-CM

## 2024-09-12 DIAGNOSIS — K22.6 MALLORY-WEISS TEAR: ICD-10-CM

## 2024-09-12 DIAGNOSIS — Z86.73 HISTORY OF CVA (CEREBROVASCULAR ACCIDENT): ICD-10-CM
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
## 2024-09-12 DIAGNOSIS — D50.9 IRON DEFICIENCY ANEMIA, UNSPECIFIED IRON DEFICIENCY ANEMIA TYPE: ICD-10-CM

## 2024-09-12 DIAGNOSIS — E78.00 HYPERCHOLESTEROLEMIA: ICD-10-CM

## 2024-09-12 DIAGNOSIS — T18.128A ESOPHAGEAL OBSTRUCTION DUE TO FOOD IMPACTION: ICD-10-CM

## 2024-09-12 DIAGNOSIS — F03.B0 MODERATE DEMENTIA WITHOUT BEHAVIORAL DISTURBANCE, PSYCHOTIC DISTURBANCE, MOOD DISTURBANCE, OR ANXIETY, UNSPECIFIED DEMENTIA TYPE (H): ICD-10-CM

## 2024-09-12 DIAGNOSIS — I35.0 NONRHEUMATIC AORTIC VALVE STENOSIS: ICD-10-CM

## 2024-09-12 DIAGNOSIS — R93.89 THICKENED ENDOMETRIUM: ICD-10-CM

## 2024-09-12 DIAGNOSIS — K92.0 GASTROINTESTINAL HEMORRHAGE WITH HEMATEMESIS: ICD-10-CM

## 2024-09-12 DIAGNOSIS — M48.061 SPINAL STENOSIS OF LUMBAR REGION, UNSPECIFIED WHETHER NEUROGENIC CLAUDICATION PRESENT: ICD-10-CM

## 2024-09-12 LAB
ERYTHROCYTE [DISTWIDTH] IN BLOOD BY AUTOMATED COUNT: 20.7 % (ref 10–15)
FERRITIN SERPL-MCNC: 12 NG/ML (ref 11–328)
HCT VFR BLD AUTO: 29.3 % (ref 35–47)
HGB BLD-MCNC: 8.2 G/DL (ref 11.7–15.7)
IRON BINDING CAPACITY (ROCHE): 378 UG/DL (ref 240–430)
IRON SATN MFR SERPL: 3 % (ref 15–46)
IRON SERPL-MCNC: 13 UG/DL (ref 37–145)
MCH RBC QN AUTO: 21.1 PG (ref 26.5–33)
MCHC RBC AUTO-ENTMCNC: 28 G/DL (ref 31.5–36.5)
MCV RBC AUTO: 75 FL (ref 78–100)
PLATELET # BLD AUTO: 197 10E3/UL (ref 150–450)
RBC # BLD AUTO: 3.89 10E6/UL (ref 3.8–5.2)
WBC # BLD AUTO: 5.5 10E3/UL (ref 4–11)

## 2024-09-12 PROCEDURE — 83550 IRON BINDING TEST: CPT | Performed by: NURSE PRACTITIONER

## 2024-09-12 PROCEDURE — 36415 COLL VENOUS BLD VENIPUNCTURE: CPT | Performed by: NURSE PRACTITIONER

## 2024-09-12 PROCEDURE — 82728 ASSAY OF FERRITIN: CPT | Performed by: NURSE PRACTITIONER

## 2024-09-12 PROCEDURE — 85027 COMPLETE CBC AUTOMATED: CPT | Performed by: NURSE PRACTITIONER

## 2024-09-12 PROCEDURE — 99215 OFFICE O/P EST HI 40 MIN: CPT | Performed by: NURSE PRACTITIONER

## 2024-09-12 PROCEDURE — 83540 ASSAY OF IRON: CPT | Performed by: NURSE PRACTITIONER

## 2024-09-12 PROCEDURE — G2211 COMPLEX E/M VISIT ADD ON: HCPCS | Performed by: NURSE PRACTITIONER

## 2024-09-12 ASSESSMENT — PAIN SCALES - GENERAL: PAINLEVEL: NO PAIN (0)

## 2024-09-12 NOTE — PROGRESS NOTES
Preoperative Evaluation  [facility]  [address], [address]  [place]  [facility] [address]  Phone: [phone]  Fax: [phone]  Primary Provider: Koko Betancourt NP  Pre-op Performing Provider: Koko Betancourt NP  Sep 12, 2024           9/12/2024   Surgical Information   What procedure is being done? esophagus check and possible dialation   Facility or Hospital where procedure/surgery [facility] be performed[facility]   Who is doing the procedure / surgery? [facility]   Date of surgery / procedure: sept 25 2024   Time of surgery / procedure: 11:15 am   Where do you plan to recover after surgery? Other - Going home        Fax number for surgical facility: Note does not need to be faxed, will be available electronically in Epic.    Assessment & Plan     The proposed surgical procedure is considered LOW risk.    Preop general physical exam  Medically optimized for surgery    Esophageal obstruction due to food impaction  Planned repeat EGD    Jihan-Carey tear  Planned repeat EGD    Hypercholesterolemia  On statin therapy    Gastrointestinal hemorrhage with hematemesis  Related to Jihan-Carey tear.  Recheck today to confirm stability/improvement  - CBC with platelets; Future  - Iron and iron binding capacity; Future  - Ferritin; Future  - CBC with platelets  - Iron and iron binding capacity  - Ferritin    History of CVA (cerebrovascular accident)  Residual defects including weakness and dementia    Spinal stenosis of lumbar region, unspecified whether neurogenic claudication present  Poor ambulation.  Higher risk for falls    Thickened endometrium  Noted on CT.  Addressed by gynecology.    Essential hypertension  Well-controlled    Nonrheumatic aortic valve stenosis  Stable.  Known systolic murmur unchanged    Hiatal hernia    Moderate dementia without behavioral disturbance, psychotic disturbance, mood disturbance, or anxiety, unspecified dementia type  (H)  Increased risk of postoperative delirium            - No identified additional risk factors other than previously addressed    Okay to take amlodipine morning of procedure with small sip of water.    Recommendation  Approval given to proceed with proposed procedure, without further diagnostic evaluation.      Reviewed ED note x 1, hospitalist note x 1 CBC x 3, metabolic panel x 1, CT abdomen x 1    The longitudinal plan of care for the diagnosis(es)/condition(s) as documented were addressed during this visit. Due to the added complexity in care, I will continue to support Marielos in the subsequent management and with ongoing continuity of care.      Total time spent was 44 minutes including reviewing records prior to arrival, consultation, placing orders, education, and reviewing the plan of care on the date of service.      Titus Arceo is a 89 year old, presenting for the following:  Pre-Op Exam          9/12/2024     2:43 PM   Additional Questions   Roomed by Silvia Steele CMA   Accompanied by Alena     HPI related to upcoming procedure:   Patient was seen in the hospital for esophageal obstruction earlier this summer.  Plan was for repeat EGD with possible dilation.    They bring in additional paperwork today for [employer] benefits.        9/12/2024   Pre-Op Questionnaire   Have you ever had a heart attack or stroke? No   Have you ever had surgery on your heart or blood vessels, such as a stent placement, a coronary artery bypass, or surgery on an artery in your head, neck, heart, or legs? No   Do you have chest pain with activity? No   Do you have a history of heart failure? No   Do you currently have a cold, bronchitis or symptoms of other infection? No   Do you have a cough, shortness of breath, or wheezing? No   Do you or anyone in your family have previous history of blood clots? No   Do you or does anyone in your family have a serious bleeding problem such as prolonged bleeding following surgeries  or cuts? No   Have you ever had problems with anemia or been told to take iron pills? No   Have you had any abnormal blood loss such as black, tarry or bloody stools, or abnormal vaginal bleeding? No   Have you ever had a blood transfusion? No   Are you willing to have a blood transfusion if it is medically needed before, during, or after your surgery? Yes   Have you or any of your relatives ever had problems with anesthesia? No   Do you have sleep apnea, excessive snoring or daytime drowsiness? No   Do you have any artifical heart valves or other implanted medical devices like a pacemaker, defibrillator, or continuous glucose monitor? No   Do you have artificial joints? (!) YES   Are you allergic to latex? No        Health Care Directive  Patient has a Health Care Directive on file      Preoperative Review of   Nothing current      Status of Chronic Conditions:  See problem list for active medical problems.  Problems all longstanding and stable, except as noted/documented.  See ROS for pertinent symptoms related to these conditions.    Patient Active Problem List    Diagnosis Date Noted    Nonrheumatic aortic valve stenosis 06/22/2024     Priority: Medium     2020:  mild AS      Esophagitis 06/20/2024     Priority: Medium    Esophageal obstruction due to food impaction 06/20/2024     Priority: Medium    Hematemesis without nausea 06/20/2024     Priority: Medium    Abnormal glucose 01/17/2024     Priority: Medium     Formatting of this note might be different from the original.   Created by Conversion      Actinic keratosis 01/17/2024     Priority: Medium     Formatting of this note might be different from the original.   Created by Conversion      Arthropathy of shoulder region 01/17/2024     Priority: Medium     Formatting of this note might be different from the original.   Created by Conversion      Replacement Utility updated for latest IMO load      Diverticulosis of large intestine with hemorrhage  01/17/2024     Priority: Medium    Myopathy 01/17/2024     Priority: Medium     Formatting of this note might be different from the original.   Created by Conversion      Obesity 01/17/2024     Priority: Medium    Urinary incontinence 01/17/2024     Priority: Medium    Dementia (H) 01/17/2024     Priority: Medium    Hypercholesterolemia 01/17/2024     Priority: Medium    Rectal bleeding 01/17/2024     Priority: Medium    Rectal prolapse 01/05/2024     Priority: Medium    Infection due to 2019 novel coronavirus 12/07/2023     Priority: Medium    Uterine perforation 03/31/2022     Priority: Medium    Thickened endometrium 03/17/2022     Priority: Medium    Essential hypertension 03/17/2022     Priority: Medium     Formatting of this note might be different from the original.   Created by Conversion      Replacement Utility updated for latest IMO load      Human papilloma virus infection 08/05/2021     Priority: Medium    Uterine lump 06/21/2021     Priority: Medium    Hiatal hernia 06/16/2021     Priority: Medium    Jihan-Carey tear 06/16/2021     Priority: Medium    Diarrhea 06/14/2021     Priority: Medium    Vomiting and diarrhea 06/14/2021     Priority: Medium     Formatting of this note might be different from the original.   Added automatically from request for surgery [id number]      Gastroenteritis 06/14/2021     Priority: Medium    GI bleed 01/09/2021     Priority: Medium    Female cystocele 09/22/2020     Priority: Medium    Memory impairment 07/22/2020     Priority: Medium    Myalgia and myositis, unspecified 07/22/2020     Priority: Medium     Created by Conversion      Spinal stenosis of lumbar region 07/22/2020     Priority: Medium    Primary hypercholesterolemia 07/22/2020     Priority: Medium     Created by Conversion      Benign neoplasm of colon 07/22/2020     Priority: Medium     Created by Conversion      Disorder of bone and cartilage 07/22/2020     Priority: Medium     Created by  Conversion    Replacement Utility updated for latest IMO load      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      Priority: Medium    History of CVA (cerebrovascular accident) 06/08/2020     Priority: Medium    Cerebrovascular accident (H) 06/08/2020     Priority: Medium    GI bleeding 01/07/2020     Priority: Medium    Vagina bleeding 01/07/2020     Priority: Medium    Chronic bilateral low back pain without sciatica 08/12/2016     Priority: Medium    Backache 06/08/2015     Priority: Medium    Arthropathy, multiple sites 06/08/2015     Priority: Medium     Formatting of this note might be different from the original.   Replacement Utility updated for latest IMO load        Past Medical History:   Diagnosis Date    Acute cerebrovascular accident (CVA) (H) 06/08/2020    Benign neoplasm of colon     Chronic bilateral low back pain without sciatica     Chronic bilateral low back pain without sciatica 08/12/2016    Dementia (H)     Disorder of bone and cartilage, unspecified     Dyspnea on exertion     Female genital prolapse     Gastroenteritis 6/14/2021    GI bleed     Hiatal hernia 6/16/2021    Memory impairment     Orthopnea     Pure hypercholesterolemia     Rectal prolapse     Skin cancer     Not Melanoma, not sure what kind though    Spinal stenosis of lumbar region     Swelling of both lower extremities     Thickened endometrium     Unspecified arthropathy, shoulder region     Unspecified essential hypertension     Walking troubles      Past Surgical History:   Procedure Laterality Date    APPENDECTOMY      BIOPSY CERVICAL, LOCAL EXCISION, SINGLE/MULTIPLE N/A 09/11/2018    Procedure: LOOP ELECTROSURGICAL EXCISION PROCEDURE, REMOVAL PESSARY;  Surgeon: Olive Biggs MD;  Location: [facility];  Service:     BIOPSY VULVA N/A 05/14/2020    Procedure: VULVAR BIOPSY;  Surgeon: Olive Biggs MD;  Location: [facility] OR;  Service: Gynecology    CATARACT  EXTRACTION EXTRACAPSULAR W/ INTRAOCULAR LENS IMPLANTATION Bilateral     CERVICAL BIOPSY N/A 05/14/2020    Procedure: PUNCH BIOPSY, CERVIX;  Surgeon: Olive Biggs MD;  Location: [facility] OR;  Service: Gynecology    CERVICAL POLYPECTOMY N/A 10/12/2023    Procedure: AND POLYPECTOMY WITH ULTRASOUND GUIDANCE;  Surgeon: Olive Biggs MD;  Location: [facility] OR    CHOLECYSTECTOMY      COLONOSCOPY N/A 01/08/2020    Procedure: COLONOSCOPY;  Surgeon: Amanda Carson MD;  Location: [facility]    DILATION AND CURETTAGE, HYSTEROSCOPY WITH ULTRASOUND GUIDANCE N/A 10/12/2023    Procedure: HYSTEROSCOPY DILATION AND CURETTAGE;  Surgeon: Olive Biggs MD;  Location: [facility]    DILATION AND CURETTAGE, OPERATIVE HYSTEROSCOPY, COMBINED N/A 03/31/2022    Procedure: HYSTEROSCOPY;  Surgeon: Olive Biggs MD;  Location: [facility]    ESOPHAGOSCOPY, GASTROSCOPY, DUODENOSCOPY (EGD), COMBINED N/A 6/20/2024    Procedure: ESOPHAGOGASTRODUODENOSCOPY WITH CLIP PLACEMENT;  Surgeon: Truman Brown MD, MD;  Location: [facility] OR    HC DILATION/CURETTAGE DIAG/THER NON OB N/A 05/14/2020    Procedure: DILATION AND CURETTAGE, UTERUS;  Surgeon: Olive Biggs MD;  Location: [facility] OR;  Service: Gynecology    IR EXTREMITY ANGIOGRAM RIGHT  02/13/2018    LEFORTE COLPOCLEISIS PROCEDURE (VAGINAL OBLITERATION) N/A 1/5/2024    Procedure: COLPOCLEISIS;  Surgeon: Olive Biggs MD;  Location: [facility] OR    ORTHOPEDIC SURGERY      OTHER SURGICAL HISTORY      [id number]    OTHER SURGICAL HISTORY      finger abscess    PERINEORRHAPHY N/A 1/5/2024    Procedure: PERINEORRAPHY;  Surgeon: Olive Biggs MD;  Location: [facility] OR    ID ESOPHAGOGASTRODUODENOSCOPY TRANSORAL DIAGNOSTIC N/A 06/16/2021    Procedure: ESOPHAGOGASTRODUODENOSCOPY (EGD);  Surgeon: Primitivo Lafleur MD;  Location: [facility];  Service: Gastroenterology     RECTOSIGMOIDECTOMY PERINEAL N/A 1/5/2024    Procedure: PERINEAL RECTOSIGMOIDECTOMY;  Surgeon: Shannon Haines MD;  Location: [facility] OR    SHOULDER SURGERY      bilateral shoulder    SIGMOIDOSCOPY FLEXIBLE N/A 06/03/2018    Procedure: SIGMOIDOSCOPY;  Surgeon: Dayana Joshi MD;  Location: [facility];  Service:     TONSILLECTOMY & ADENOIDECTOMY      TOTAL SHOULDER ARTHROPLASTY Bilateral R 7/12 L 9/11     BIOPSY THYROID FINE NEEDLE ASPIRATION  08/07/2020     Current Outpatient Medications   Medication Sig Dispense Refill    acetaminophen (TYLENOL) 325 MG tablet Take 3 tablets (975 mg) by mouth every 6 hours as needed for mild pain 50 tablet 0    amLODIPine (NORVASC) 10 MG tablet 1 TABLET ORALLY DAILY 28 tablet 11    aspirin (ASPIRIN LOW DOSE) 81 MG chewable tablet Take 1 tablet (81 mg) by mouth daily Restart on 02/01/2024 30 tablet 11    ibuprofen (ADVIL/MOTRIN) 600 MG tablet Take 1 tablet (600 mg) by mouth every 6 hours as needed for moderate pain 30 tablet 0    lovastatin (MEVACOR) 40 MG tablet 1 TABLET ORALLY AT BEDTIME 90 tablet 2    memantine (NAMENDA) 10 MG tablet 1 TABLET ORALLY 2 TIMES DAILY 60 tablet 11    ondansetron (ZOFRAN ODT) 4 MG ODT tab Take 1 tablet (4 mg) by mouth every 8 hours as needed for nausea 4 tablet 0    polyethylene glycol (MIRALAX) 17 GM/Dose powder Take 17 g by mouth daily as needed for constipation 510 g 0       Allergies   Allergen Reactions    Atorvastatin Muscle Pain (Myalgia)    Dextromethorphan Hives    Oxycodone Unknown    Pravastatin Muscle Pain (Myalgia)    Simvastatin Muscle Pain (Myalgia)    Codeine Rash        Social History     Tobacco Use    Smoking status: Never    Smokeless tobacco: Never   Substance Use Topics    Alcohol use: Yes     Alcohol/week: 2.0 standard drinks of alcohol     Comment: social     Family History   Problem Relation Age of Onset    Heart Disease Father     Coronary Artery Disease Father     Cerebrovascular Disease Father     Coronary  "Artery Disease Brother     Coronary Artery Disease Brother     Dementia Sister      History   Drug Use No             Review of Systems  Constitutional, HEENT, cardiovascular, pulmonary, gi and gu systems are negative, except as otherwise noted.    Objective    /60 (BP Location: Left arm, Patient Position: Sitting, Cuff Size: Adult Regular)   Pulse 82   Temp 97.8  F (36.6  C) (Oral)   Resp 16   Ht 1.575 m (5' 2\")   Wt 62 kg (136 lb 11.2 oz)   LMP  (LMP Unknown)   SpO2 98%   BMI 25.00 kg/m     Estimated body mass index is 25 kg/m  as calculated from the following:    Height as of this encounter: 1.575 m (5' 2\").    Weight as of this encounter: 62 kg (136 lb 11.2 oz).  Physical Exam  GENERAL: alert and no distress  EYES: Eyes grossly normal to inspection, PERRL and conjunctivae and sclerae normal  HENT: ear canals and TM's normal, nose and mouth without ulcers or lesions  NECK: no adenopathy, no asymmetry, masses, or scars  RESP: lungs clear to auscultation - no rales, rhonchi or wheezes  CV: regular rate and rhythm, normal S1 S2, no S3 or S4, 2/6 systolic murmur, click or rub, no peripheral edema  ABDOMEN: soft, nontender, no hepatosplenomegaly, no masses and bowel sounds normal  MS: no gross musculoskeletal defects noted, no edema  SKIN: no suspicious lesions or rashes  NEURO: Normal strength and tone, mentation intact and speech normal  PSYCH: mentation appears normal, affect normal/bright    Recent Labs   Lab Test 06/22/24  0605 06/21/24  0455 06/20/24  2059 06/20/24  1430 06/20/24  0911 01/17/24  1218 01/17/24  0001   HGB 8.8* 8.3*   < >  --  7.9*   < > 10.7*    181   < >  --  227   < > 265   INR  --   --   --  1.09  --   --  1.07   NA  --  145  --   --  143   < > 138   POTASSIUM  --  3.9  --   --  4.2   < > 4.4   CR  --  0.67  --   --  0.73   < > 1.04*    < > = values in this interval not displayed.        Diagnostics  Labs pending at this time.  Results will be reviewed when available.   No " EKG required for low risk surgery (cataract, skin procedure, breast biopsy, etc).    Revised Cardiac Risk Index (RCRI)  The patient has the following serious cardiovascular risks for perioperative complications:   - Cerebrovascular Disease (TIA or CVA) = 1 point     RCRI Interpretation: 1 point: Class II (low risk - 0.9% complication rate)         Signed Electronically by: Koko Betancourt NP  A copy of this evaluation report is provided to the requesting physician.

## 2024-09-12 NOTE — PATIENT INSTRUCTIONS
I will get your paperwork faxed to your surgeon.    Follow your surgeon's directions regarding eating and drinking prior to surgery.     Medications you can take the morning of surgery include any inhalers and: amlodipine    Your surgeon will manage any complications after your procedure.    We are rechecking blood counts just to make sure that everything has stabilized/improved since the hospital stay.    I will get your paperwork finished up today and it will be available for pickup at the  tomorrow morning.

## 2024-09-13 ENCOUNTER — TELEPHONE (OUTPATIENT)
Dept: FAMILY MEDICINE | Facility: CLINIC | Age: 89
End: 2024-09-13
Payer: MEDICARE

## 2024-09-13 PROBLEM — R19.7 DIARRHEA: Status: RESOLVED | Noted: 2021-06-14 | Resolved: 2024-09-13

## 2024-09-13 PROBLEM — N93.9 VAGINA BLEEDING: Status: RESOLVED | Noted: 2020-01-07 | Resolved: 2024-09-13

## 2024-09-13 PROBLEM — R11.10 VOMITING AND DIARRHEA: Status: RESOLVED | Noted: 2021-06-14 | Resolved: 2024-09-13

## 2024-09-13 PROBLEM — K92.0 HEMATEMESIS WITHOUT NAUSEA: Status: RESOLVED | Noted: 2024-06-20 | Resolved: 2024-09-13

## 2024-09-13 PROBLEM — R19.7 VOMITING AND DIARRHEA: Status: RESOLVED | Noted: 2021-06-14 | Resolved: 2024-09-13

## 2024-09-13 PROBLEM — K52.9 GASTROENTERITIS: Status: RESOLVED | Noted: 2021-06-14 | Resolved: 2024-09-13

## 2024-09-13 PROBLEM — K62.5 RECTAL BLEEDING: Status: RESOLVED | Noted: 2024-01-17 | Resolved: 2024-09-13

## 2024-09-13 PROBLEM — U07.1 INFECTION DUE TO 2019 NOVEL CORONAVIRUS: Status: RESOLVED | Noted: 2023-12-07 | Resolved: 2024-09-13

## 2024-09-13 PROBLEM — K92.2 GI BLEEDING: Status: RESOLVED | Noted: 2020-01-07 | Resolved: 2024-09-13

## 2024-09-13 NOTE — TELEPHONE ENCOUNTER
Forms/Letter Request    Type of form/letter: OTHER: incident report-fall       Do we have the form/letter: Yes: placed in [facility]     Who is the form from? [facility]     Where did/will the form come from? form was faxed in    When is form/letter needed by: when signed    How would you like the form/letter returned: Fax : [phone]

## 2024-09-18 DIAGNOSIS — E61.1 IRON DEFICIENCY: Primary | ICD-10-CM

## 2024-09-19 DIAGNOSIS — F02.818 DEMENTIA ASSOCIATED WITH OTHER UNDERLYING DISEASE WITH BEHAVIORAL DISTURBANCE (H): ICD-10-CM

## 2024-09-19 DIAGNOSIS — Z86.73 HISTORY OF CVA (CEREBROVASCULAR ACCIDENT): ICD-10-CM

## 2024-09-19 RX ORDER — MEMANTINE HYDROCHLORIDE 10 MG/1
TABLET ORAL
Qty: 56 TABLET | Refills: 11 | OUTPATIENT
Start: 2024-09-19

## 2024-09-19 RX ORDER — LOVASTATIN 40 MG
40 TABLET ORAL
Qty: 28 TABLET | Refills: 11 | OUTPATIENT
Start: 2024-09-19

## 2024-09-19 RX ORDER — ASPIRIN 81 MG
TABLET,CHEWABLE ORAL
Qty: 28 TABLET | Refills: 11 | OUTPATIENT
Start: 2024-09-19

## 2024-09-23 ENCOUNTER — TELEPHONE (OUTPATIENT)
Dept: FAMILY MEDICINE | Facility: CLINIC | Age: 89
End: 2024-09-23
Payer: MEDICARE

## 2024-09-23 RX ORDER — FERROUS SULFATE 325(65) MG
325 TABLET ORAL
Qty: 90 TABLET | Refills: 3 | Status: SHIPPED | OUTPATIENT
Start: 2024-09-23

## 2024-09-23 RX ORDER — MULTIVIT WITH MINERALS/LUTEIN
250 TABLET ORAL DAILY
Qty: 90 TABLET | Refills: 3 | Status: SHIPPED | OUTPATIENT
Start: 2024-09-23

## 2024-09-23 NOTE — TELEPHONE ENCOUNTER
Forms/Letter Request    Type of form/letter: OTHER: physician orders       Do we have the form/letter: Yes: in [facility] inbox    Who is the form from? Home care    Where did/will the form come from? form was faxed in    When is form/letter needed by: when done    How would you like the form/letter returned: Fax : [phone]    Order details/form description: physician orders for medications    Order number (if applicable): dated for 09/2024

## 2024-09-24 ENCOUNTER — TELEPHONE (OUTPATIENT)
Dept: FAMILY MEDICINE | Facility: CLINIC | Age: 89
End: 2024-09-24
Payer: MEDICARE

## 2024-09-24 NOTE — TELEPHONE ENCOUNTER
9/12/24 pt advised to take OTC iron with Vitamin C.   Orders need to be faxed to [facility].   Copy of prescriptions printed and waiting provider's signature.

## 2024-09-24 NOTE — TELEPHONE ENCOUNTER
Order/Referral Request    Who is requesting: [facility]     Orders being requested: Orders for patient to take Vitamin C and Ferrous Sulfate     Reason service is needed/diagnosis: Anemia     When are orders needed by: ASAP    Has this been discussed with Provider: Yes    Does patient have a preference on a Group/Provider/Facility? [facility]     Does patient have an appointment scheduled?: No    Where to send orders: Fax: [phone]    Okay to leave a detailed message?: Yes at Other phone number:  [phone]

## 2024-09-25 ENCOUNTER — HOSPITAL ENCOUNTER (OUTPATIENT)
Facility: CLINIC | Age: 89
Discharge: HOME | End: 2024-09-25
Attending: INTERNAL MEDICINE | Admitting: INTERNAL MEDICINE
Payer: MEDICARE

## 2024-09-25 ENCOUNTER — ANESTHESIA (OUTPATIENT)
Dept: SURGERY | Facility: CLINIC | Age: 89
End: 2024-09-25
Payer: MEDICARE

## 2024-09-25 ENCOUNTER — ANESTHESIA EVENT (OUTPATIENT)
Dept: SURGERY | Facility: CLINIC | Age: 89
End: 2024-09-25
Payer: MEDICARE

## 2024-09-25 ENCOUNTER — MEDICAL CORRESPONDENCE (OUTPATIENT)
Dept: HEALTH INFORMATION MANAGEMENT | Facility: CLINIC | Age: 89
End: 2024-09-25
Payer: MEDICARE

## 2024-09-25 VITALS
OXYGEN SATURATION: 94 % | HEART RATE: 62 BPM | RESPIRATION RATE: 12 BRPM | DIASTOLIC BLOOD PRESSURE: 60 MMHG | TEMPERATURE: 97.8 F | SYSTOLIC BLOOD PRESSURE: 108 MMHG

## 2024-09-25 LAB — UPPER GI ENDOSCOPY: NORMAL

## 2024-09-25 PROCEDURE — 710N000012 HC RECOVERY PHASE 2, PER MINUTE: Performed by: INTERNAL MEDICINE

## 2024-09-25 PROCEDURE — C1726 CATH, BAL DIL, NON-VASCULAR: HCPCS | Performed by: INTERNAL MEDICINE

## 2024-09-25 PROCEDURE — 250N000011 HC RX IP 250 OP 636: Performed by: ANESTHESIOLOGY

## 2024-09-25 PROCEDURE — 258N000003 HC RX IP 258 OP 636: Performed by: ANESTHESIOLOGY

## 2024-09-25 PROCEDURE — 999N000141 HC STATISTIC PRE-PROCEDURE NURSING ASSESSMENT: Performed by: INTERNAL MEDICINE

## 2024-09-25 PROCEDURE — 272N000001 HC OR GENERAL SUPPLY STERILE: Performed by: INTERNAL MEDICINE

## 2024-09-25 PROCEDURE — 360N000075 HC SURGERY LEVEL 2, PER MIN: Performed by: INTERNAL MEDICINE

## 2024-09-25 PROCEDURE — 370N000017 HC ANESTHESIA TECHNICAL FEE, PER MIN: Performed by: INTERNAL MEDICINE

## 2024-09-25 RX ORDER — PROPOFOL 10 MG/ML
INJECTION, EMULSION INTRAVENOUS CONTINUOUS PRN
Status: DISCONTINUED | OUTPATIENT
Start: 2024-09-25 | End: 2024-09-25

## 2024-09-25 RX ORDER — SODIUM CHLORIDE, SODIUM LACTATE, POTASSIUM CHLORIDE, CALCIUM CHLORIDE 600; 310; 30; 20 MG/100ML; MG/100ML; MG/100ML; MG/100ML
INJECTION, SOLUTION INTRAVENOUS CONTINUOUS
Status: DISCONTINUED | OUTPATIENT
Start: 2024-09-25 | End: 2024-09-25 | Stop reason: HOSPADM

## 2024-09-25 RX ORDER — DEXAMETHASONE SODIUM PHOSPHATE 4 MG/ML
INJECTION, SOLUTION INTRA-ARTICULAR; INTRALESIONAL; INTRAMUSCULAR; INTRAVENOUS; SOFT TISSUE PRN
Status: DISCONTINUED | OUTPATIENT
Start: 2024-09-25 | End: 2024-09-25

## 2024-09-25 RX ORDER — DEXAMETHASONE SODIUM PHOSPHATE 10 MG/ML
4 INJECTION, SOLUTION INTRAMUSCULAR; INTRAVENOUS
Status: CANCELLED | OUTPATIENT
Start: 2024-09-25

## 2024-09-25 RX ORDER — FENTANYL CITRATE 50 UG/ML
25 INJECTION, SOLUTION INTRAMUSCULAR; INTRAVENOUS EVERY 5 MIN PRN
Status: CANCELLED | OUTPATIENT
Start: 2024-09-25

## 2024-09-25 RX ORDER — LABETALOL HYDROCHLORIDE 5 MG/ML
10 INJECTION, SOLUTION INTRAVENOUS
Status: CANCELLED | OUTPATIENT
Start: 2024-09-25

## 2024-09-25 RX ORDER — ONDANSETRON 2 MG/ML
4 INJECTION INTRAMUSCULAR; INTRAVENOUS EVERY 30 MIN PRN
Status: CANCELLED | OUTPATIENT
Start: 2024-09-25

## 2024-09-25 RX ORDER — ONDANSETRON 2 MG/ML
INJECTION INTRAMUSCULAR; INTRAVENOUS PRN
Status: DISCONTINUED | OUTPATIENT
Start: 2024-09-25 | End: 2024-09-25

## 2024-09-25 RX ORDER — SODIUM CHLORIDE, SODIUM LACTATE, POTASSIUM CHLORIDE, CALCIUM CHLORIDE 600; 310; 30; 20 MG/100ML; MG/100ML; MG/100ML; MG/100ML
INJECTION, SOLUTION INTRAVENOUS CONTINUOUS
Status: CANCELLED | OUTPATIENT
Start: 2024-09-25

## 2024-09-25 RX ORDER — ONDANSETRON 4 MG/1
4 TABLET, ORALLY DISINTEGRATING ORAL EVERY 30 MIN PRN
Status: CANCELLED | OUTPATIENT
Start: 2024-09-25

## 2024-09-25 RX ORDER — PROPOFOL 10 MG/ML
INJECTION, EMULSION INTRAVENOUS PRN
Status: DISCONTINUED | OUTPATIENT
Start: 2024-09-25 | End: 2024-09-25

## 2024-09-25 RX ORDER — OXYCODONE HYDROCHLORIDE 5 MG/1
5 TABLET ORAL
Status: CANCELLED | OUTPATIENT
Start: 2024-09-25

## 2024-09-25 RX ORDER — LIDOCAINE 40 MG/G
CREAM TOPICAL
Status: DISCONTINUED | OUTPATIENT
Start: 2024-09-25 | End: 2024-09-25 | Stop reason: HOSPADM

## 2024-09-25 RX ORDER — OXYCODONE HYDROCHLORIDE 5 MG/1
10 TABLET ORAL
Status: CANCELLED | OUTPATIENT
Start: 2024-09-25

## 2024-09-25 RX ORDER — HYDROMORPHONE HCL IN WATER/PF 6 MG/30 ML
0.2 PATIENT CONTROLLED ANALGESIA SYRINGE INTRAVENOUS EVERY 5 MIN PRN
Status: CANCELLED | OUTPATIENT
Start: 2024-09-25

## 2024-09-25 RX ORDER — NALOXONE HYDROCHLORIDE 0.4 MG/ML
0.1 INJECTION, SOLUTION INTRAMUSCULAR; INTRAVENOUS; SUBCUTANEOUS
Status: CANCELLED | OUTPATIENT
Start: 2024-09-25

## 2024-09-25 RX ORDER — HYDROMORPHONE HCL IN WATER/PF 6 MG/30 ML
0.4 PATIENT CONTROLLED ANALGESIA SYRINGE INTRAVENOUS EVERY 5 MIN PRN
Status: CANCELLED | OUTPATIENT
Start: 2024-09-25

## 2024-09-25 RX ORDER — FENTANYL CITRATE 50 UG/ML
50 INJECTION, SOLUTION INTRAMUSCULAR; INTRAVENOUS EVERY 5 MIN PRN
Status: CANCELLED | OUTPATIENT
Start: 2024-09-25

## 2024-09-25 RX ORDER — DIPHENHYDRAMINE HCL 12.5 MG/5ML
12.5 SOLUTION ORAL EVERY 6 HOURS PRN
Status: CANCELLED | OUTPATIENT
Start: 2024-09-25

## 2024-09-25 RX ORDER — DIPHENHYDRAMINE HYDROCHLORIDE 50 MG/ML
12.5 INJECTION INTRAMUSCULAR; INTRAVENOUS EVERY 6 HOURS PRN
Status: CANCELLED | OUTPATIENT
Start: 2024-09-25

## 2024-09-25 RX ADMIN — PROPOFOL 175 MCG/KG/MIN: 10 INJECTION, EMULSION INTRAVENOUS at 09:50

## 2024-09-25 RX ADMIN — DEXAMETHASONE SODIUM PHOSPHATE 4 MG: 4 INJECTION, SOLUTION INTRA-ARTICULAR; INTRALESIONAL; INTRAMUSCULAR; INTRAVENOUS; SOFT TISSUE at 09:53

## 2024-09-25 RX ADMIN — SODIUM CHLORIDE, POTASSIUM CHLORIDE, SODIUM LACTATE AND CALCIUM CHLORIDE: 600; 310; 30; 20 INJECTION, SOLUTION INTRAVENOUS at 09:44

## 2024-09-25 RX ADMIN — PROPOFOL 20 MG: 10 INJECTION, EMULSION INTRAVENOUS at 09:54

## 2024-09-25 RX ADMIN — ONDANSETRON 4 MG: 2 INJECTION INTRAMUSCULAR; INTRAVENOUS at 09:53

## 2024-09-25 ASSESSMENT — ACTIVITIES OF DAILY LIVING (ADL)
ADLS_ACUITY_SCORE: 36
ADLS_ACUITY_SCORE: 38

## 2024-09-25 NOTE — ANESTHESIA CARE TRANSFER NOTE
Patient: Angle Agudelo    Procedure: Procedure(s):  ESOPHAGOGASTRODUODENOSCOPY WITH Balloon DILATATION       Diagnosis: Abnormal finding on imaging [R93.89]  Diagnosis Additional Information: No value filed.    Anesthesia Type:   MAC     Note:    Oropharynx: oropharynx clear of all foreign objects and spontaneously breathing  Level of Consciousness: unresponsive  Oxygen Supplementation: face mask  Level of Supplemental Oxygen (L/min / FiO2): 8  Independent Airway: airway patency satisfactory and stable  Dentition: dentition unchanged  Vital Signs Stable: post-procedure vital signs reviewed and stable  Report to RN Given: handoff report given  Patient transferred to: Phase II    Handoff Report: Identifed the Patient, Identified the Reponsible Provider, Reviewed the pertinent medical history, Discussed the surgical course, Reviewed Intra-OP anesthesia mangement and issues during anesthesia, Set expectations for post-procedure period and Allowed opportunity for questions and acknowledgement of understanding      Vitals:  Vitals Value Taken Time   BP 96/54 09/25/24 1010   Temp 36.3  C (97.3  F) 09/25/24 1008   Pulse 60 09/25/24 1010   Resp 12 09/25/24 1008   SpO2 100 % 09/25/24 1010   Vitals shown include unfiled device data.    Electronically Signed By: SOFIE Lubin CRNA  September 25, 2024  10:12 AM

## 2024-09-25 NOTE — PROGRESS NOTES
Pt has known dementia. A&O x1   Pt does not know why she is here.   Discussed with surgeon.   Daughter Daija to sign consent for procedure.   Nurse manager informed.

## 2024-09-25 NOTE — H&P
ENDOSCOPY PRE-PROCEDURE HISTORY & PHYSICAL    CHIEF COMPLAINT / REASON FOR PROCEDURE:  Dysphagia, follow-up EGD.  Swallowing better at this time.  No recent episodes of difficulty swallowing.    PERTINENT HISTORY   Past Medical History:   Diagnosis Date    Acute cerebrovascular accident (CVA) (H) 06/08/2020    Benign neoplasm of colon     Chronic bilateral low back pain without sciatica     Chronic bilateral low back pain without sciatica 08/12/2016    Dementia (H)     Disorder of bone and cartilage, unspecified     Dyspnea on exertion     Female genital prolapse     Gastroenteritis 6/14/2021    GI bleed     Hiatal hernia 6/16/2021    Memory impairment     Orthopnea     Pure hypercholesterolemia     Rectal prolapse     Skin cancer     Not Melanoma, not sure what kind though    Spinal stenosis of lumbar region     Swelling of both lower extremities     Thickened endometrium     Unspecified arthropathy, shoulder region     Unspecified essential hypertension     Walking troubles      Past Surgical History:   Procedure Laterality Date    APPENDECTOMY      BIOPSY CERVICAL, LOCAL EXCISION, SINGLE/MULTIPLE N/A 09/11/2018    Procedure: LOOP ELECTROSURGICAL EXCISION PROCEDURE, REMOVAL PESSARY;  Surgeon: Olive Biggs MD;  Location: [facility] OR;  Service:     BIOPSY VULVA N/A 05/14/2020    Procedure: VULVAR BIOPSY;  Surgeon: Olive Biggs MD;  Location: [facility] OR;  Service: Gynecology    CATARACT EXTRACTION EXTRACAPSULAR W/ INTRAOCULAR LENS IMPLANTATION Bilateral     CERVICAL BIOPSY N/A 05/14/2020    Procedure: PUNCH BIOPSY, CERVIX;  Surgeon: Oilve Biggs MD;  Location: [facility] OR;  Service: Gynecology    CERVICAL POLYPECTOMY N/A 10/12/2023    Procedure: AND POLYPECTOMY WITH ULTRASOUND GUIDANCE;  Surgeon: Olive Biggs MD;  Location: [facility] OR    CHOLECYSTECTOMY      COLONOSCOPY N/A 01/08/2020    Procedure: COLONOSCOPY;  Surgeon: Amanda Carson,  MD;  Location: [facility]    DILATION AND CURETTAGE, HYSTEROSCOPY WITH ULTRASOUND GUIDANCE N/A 10/12/2023    Procedure: HYSTEROSCOPY DILATION AND CURETTAGE;  Surgeon: Olive Biggs MD;  Location: [facility]    DILATION AND CURETTAGE, OPERATIVE HYSTEROSCOPY, COMBINED N/A 03/31/2022    Procedure: HYSTEROSCOPY;  Surgeon: Olive Biggs MD;  Location: [facility]    ESOPHAGOSCOPY, GASTROSCOPY, DUODENOSCOPY (EGD), COMBINED N/A 6/20/2024    Procedure: ESOPHAGOGASTRODUODENOSCOPY WITH CLIP PLACEMENT;  Surgeon: Truman Brown MD, MD;  Location: [facility] DILATION/CURETTAGE DIAG/THER NON OB N/A 05/14/2020    Procedure: DILATION AND CURETTAGE, UTERUS;  Surgeon: Olive Biggs MD;  Location: [facility];  Service: Gynecology    IR EXTREMITY ANGIOGRAM RIGHT  02/13/2018    LEFORTE COLPOCLEISIS PROCEDURE (VAGINAL OBLITERATION) N/A 1/5/2024    Procedure: COLPOCLEISIS;  Surgeon: Olive Biggs MD;  Location: [facility] OR    ORTHOPEDIC SURGERY      OTHER SURGICAL HISTORY      [id number]    OTHER SURGICAL HISTORY      finger abscess    PERINEORRHAPHY N/A 1/5/2024    Procedure: PERINEORRAPHY;  Surgeon: Olive Biggs MD;  Location: [facility]    OH ESOPHAGOGASTRODUODENOSCOPY TRANSORAL DIAGNOSTIC N/A 06/16/2021    Procedure: ESOPHAGOGASTRODUODENOSCOPY (EGD);  Surgeon: Primitivo Lafleur MD;  Location: [facility];  Service: Gastroenterology    RECTOSIGMOIDECTOMY PERINEAL N/A 1/5/2024    Procedure: PERINEAL RECTOSIGMOIDECTOMY;  Surgeon: Shannon Haines MD;  Location: [facility]    SHOULDER SURGERY      bilateral shoulder    SIGMOIDOSCOPY FLEXIBLE N/A 06/03/2018    Procedure: SIGMOIDOSCOPY;  Surgeon: Dayana Joshi MD;  Location: [facility];  Service:     TONSILLECTOMY & ADENOIDECTOMY      TOTAL SHOULDER ARTHROPLASTY Bilateral R 7/12 L 9/11     BIOPSY THYROID FINE NEEDLE ASPIRATION  08/07/2020     Other:  None  Bleeding tendencies:   None    Relevant Family History:  None    Relevant Social History:  None    A relevant Review of Systems was performed and was negative.    ALLERGIES/SENSITIVITIES:   Allergies   Allergen Reactions    Atorvastatin Muscle Pain (Myalgia)    Dextromethorphan Hives    Oxycodone Unknown    Pravastatin Muscle Pain (Myalgia)    Simvastatin Muscle Pain (Myalgia)    Codeine Rash        CURRENT MEDICATIONS:     Medications Prior to Admission   Medication Sig Dispense Refill Last Dose    acetaminophen (TYLENOL) 325 MG tablet Take 3 tablets (975 mg) by mouth every 6 hours as needed for mild pain 50 tablet 0 Past Week    amLODIPine (NORVASC) 10 MG tablet 1 TABLET ORALLY DAILY 28 tablet 11 9/24/2024    aspirin (ASPIRIN LOW DOSE) 81 MG chewable tablet Take 1 tablet (81 mg) by mouth daily Restart on 02/01/2024 30 tablet 11 9/24/2024    ferrous sulfate (FEROSUL) 325 (65 Fe) MG tablet Take 1 tablet (325 mg) by mouth daily (with breakfast). 90 tablet 3 Unknown    ibuprofen (ADVIL/MOTRIN) 600 MG tablet Take 1 tablet (600 mg) by mouth every 6 hours as needed for moderate pain 30 tablet 0 Past Week    lovastatin (MEVACOR) 40 MG tablet 1 TABLET ORALLY AT BEDTIME 90 tablet 2 9/24/2024    memantine (NAMENDA) 10 MG tablet 1 TABLET ORALLY 2 TIMES DAILY 60 tablet 11 9/24/2024    ondansetron (ZOFRAN ODT) 4 MG ODT tab Take 1 tablet (4 mg) by mouth every 8 hours as needed for nausea 4 tablet 0 More than a month    polyethylene glycol (MIRALAX) 17 GM/Dose powder Take 17 g by mouth daily as needed for constipation 510 g 0 More than a month    vitamin C (ASCORBIC ACID) 250 MG tablet Take 1 tablet (250 mg) by mouth daily. 90 tablet 3 Unknown           LABORATORY:   CMP Results: No lab results found in last 7 days.   CBCNo lab results found in last 7 days.  INRNo lab results found in last 7 days.       I reviewed the patient's pertinent imaging test results.  None      RELEVANT EXAM:     /74 (Cuff Size: Adult Small)   Temp 97.3  F (36.3  C)  (Temporal)   Resp 14   LMP  (LMP Unknown)   SpO2 97%    Lung Exam:   Normal  Heart Exam:  Normal  Airway Exam:  Normal  Mental Status: The patient is not fully oriented.  This is her baseline cognitive status.  She is here with her daughters.  One of them will be signing consent form.  The patient and family understand the risks and benefits of the procedure.    Mallampati:II  Previous reaction to anesthesia/sedation:   None  Sedation plan based on assessment:  Moderate  Comment(s):  None    Consent signed by: Daughter    ASA Classification:  3    IMPRESSION:      Dysphagia, better at this time    PLAN:      REZA Hadley DO  [facility]  [phone]

## 2024-09-25 NOTE — H&P
GENERAL PRE-PROCEDURE:   Procedure:  EGD  Date/Time:  9/25/2024 9:38 AM    Verbal consent obtained?: Yes    Written consent obtained?: Yes    Risks and benefits: Risks, benefits and alternatives were discussed    Consent given by: Daughter.  Patient states understanding of procedure being performed: Yes    Patient's understanding of procedure matches consent: Yes    Procedure consent matches procedure scheduled: Yes    Expected level of sedation:  Moderate  Appropriately NPO:  Yes  ASA Class:  3  Mallampati  :  Grade 2- soft palate, base of uvula, tonsillar pillars, and portion of posterior pharyngeal wall visible  Lungs:  Lungs clear with good breath sounds bilaterally  Heart:  Normal heart sounds and rate  History & Physical reviewed:  History and physical reviewed and no updates needed  Statement of review:  I have reviewed the lab findings, diagnostic data, medications, and the plan for sedation

## 2024-09-25 NOTE — ANESTHESIA POSTPROCEDURE EVALUATION
Patient: Angle Agudelo    Procedure: Procedure(s):  ESOPHAGOGASTRODUODENOSCOPY WITH Balloon DILATATION       Anesthesia Type:  MAC    Note:  Disposition: Outpatient   Postop Pain Control: Uneventful            Sign Out: Well controlled pain   PONV: No   Neuro/Psych: Uneventful            Sign Out: Acceptable/Baseline neuro status   Airway/Respiratory: Uneventful            Sign Out: Acceptable/Baseline resp. status   CV/Hemodynamics: Uneventful            Sign Out: Acceptable CV status; No obvious hypovolemia; No obvious fluid overload   Other NRE: NONE   DID A NON-ROUTINE EVENT OCCUR? No           Last vitals:  Vitals Value Taken Time   /60 09/25/24 1030   Temp 36.6  C (97.8  F) 09/25/24 1030   Pulse 66 09/25/24 1033   Resp 12 09/25/24 1030   SpO2 92 % 09/25/24 1033   Vitals shown include unfiled device data.    Electronically Signed By: Alexx Knott MD  September 25, 2024  1:11 PM

## 2024-09-25 NOTE — ANESTHESIA PREPROCEDURE EVALUATION
Anesthesia Pre-Procedure Evaluation    Patient: Angle Agudelo   MRN: [id number] : 1932        Procedure : Procedure(s):  ESOPHAGOGASTRODUODENOSCOPY          Past Medical History:   Diagnosis Date    Acute cerebrovascular accident (CVA) (H) 2020    Benign neoplasm of colon     Chronic bilateral low back pain without sciatica     Chronic bilateral low back pain without sciatica 2016    Dementia (H)     Disorder of bone and cartilage, unspecified     Dyspnea on exertion     Female genital prolapse     Gastroenteritis 2021    GI bleed     Hiatal hernia 2021    Memory impairment     Orthopnea     Pure hypercholesterolemia     Rectal prolapse     Skin cancer     Not Melanoma, not sure what kind though    Spinal stenosis of lumbar region     Swelling of both lower extremities     Thickened endometrium     Unspecified arthropathy, shoulder region     Unspecified essential hypertension     Walking troubles       Past Surgical History:   Procedure Laterality Date    APPENDECTOMY      BIOPSY CERVICAL, LOCAL EXCISION, SINGLE/MULTIPLE N/A 2018    Procedure: LOOP ELECTROSURGICAL EXCISION PROCEDURE, REMOVAL PESSARY;  Surgeon: Olive Biggs MD;  Location: [facility] OR;  Service:     BIOPSY VULVA N/A 2020    Procedure: VULVAR BIOPSY;  Surgeon: Olive Biggs MD;  Location: [facility] OR;  Service: Gynecology    CATARACT EXTRACTION EXTRACAPSULAR W/ INTRAOCULAR LENS IMPLANTATION Bilateral     CERVICAL BIOPSY N/A 2020    Procedure: PUNCH BIOPSY, CERVIX;  Surgeon: Olive Biggs MD;  Location: [facility] OR;  Service: Gynecology    CERVICAL POLYPECTOMY N/A 10/12/2023    Procedure: AND POLYPECTOMY WITH ULTRASOUND GUIDANCE;  Surgeon: Olive Biggs MD;  Location: [facility] OR    CHOLECYSTECTOMY      COLONOSCOPY N/A 2020    Procedure: COLONOSCOPY;  Surgeon: Amanda Carson MD;  Location:  GI    DILATION AND CURETTAGE,  HYSTEROSCOPY WITH ULTRASOUND GUIDANCE N/A 10/12/2023    Procedure: HYSTEROSCOPY DILATION AND CURETTAGE;  Surgeon: Olive Biggs MD;  Location: [facility]    DILATION AND CURETTAGE, OPERATIVE HYSTEROSCOPY, COMBINED N/A 03/31/2022    Procedure: HYSTEROSCOPY;  Surgeon: Olive Biggs MD;  Location: [facility]    ESOPHAGOSCOPY, GASTROSCOPY, DUODENOSCOPY (EGD), COMBINED N/A 6/20/2024    Procedure: ESOPHAGOGASTRODUODENOSCOPY WITH CLIP PLACEMENT;  Surgeon: Truman Brown MD, MD;  Location: [facility]    HC DILATION/CURETTAGE DIAG/THER NON OB N/A 05/14/2020    Procedure: DILATION AND CURETTAGE, UTERUS;  Surgeon: Olive Biggs MD;  Location: [facility];  Service: Gynecology    IR EXTREMITY ANGIOGRAM RIGHT  02/13/2018    LEFORTE COLPOCLEISIS PROCEDURE (VAGINAL OBLITERATION) N/A 1/5/2024    Procedure: COLPOCLEISIS;  Surgeon: Olive Biggs MD;  Location: [facility]    ORTHOPEDIC SURGERY      OTHER SURGICAL HISTORY      [id number]    OTHER SURGICAL HISTORY      finger abscess    PERINEORRHAPHY N/A 1/5/2024    Procedure: PERINEORRAPHY;  Surgeon: Olive Biggs MD;  Location: [facility]    MD ESOPHAGOGASTRODUODENOSCOPY TRANSORAL DIAGNOSTIC N/A 06/16/2021    Procedure: ESOPHAGOGASTRODUODENOSCOPY (EGD);  Surgeon: Primitivo Lafleur MD;  Location: [facility];  Service: Gastroenterology    RECTOSIGMOIDECTOMY PERINEAL N/A 1/5/2024    Procedure: PERINEAL RECTOSIGMOIDECTOMY;  Surgeon: Shannon Haines MD;  Location: [facility]    SHOULDER SURGERY      bilateral shoulder    SIGMOIDOSCOPY FLEXIBLE N/A 06/03/2018    Procedure: SIGMOIDOSCOPY;  Surgeon: Dayana Joshi MD;  Location: [facility];  Service:     TONSILLECTOMY & ADENOIDECTOMY      TOTAL SHOULDER ARTHROPLASTY Bilateral R 7/12 L 9/11    US BIOPSY THYROID FINE NEEDLE ASPIRATION  08/07/2020      Allergies   Allergen Reactions    Atorvastatin Muscle Pain (Myalgia)     Dextromethorphan Hives    Oxycodone Unknown    Pravastatin Muscle Pain (Myalgia)    Simvastatin Muscle Pain (Myalgia)    Codeine Rash      Social History     Tobacco Use    Smoking status: Never    Smokeless tobacco: Never   Substance Use Topics    Alcohol use: Yes     Alcohol/week: 2.0 standard drinks of alcohol     Comment: social      Wt Readings from Last 1 Encounters:   09/12/24 61.7 kg (136 lb)        Anesthesia Evaluation   Pt has had prior anesthetic.     No history of anesthetic complications       ROS/MED HX  ENT/Pulmonary:  - neg pulmonary ROS     Neurologic:     (+)   dementia,       CVA,                      Cardiovascular:     (+)  hypertension- -   -  - -                                      METS/Exercise Tolerance:     Hematologic:     (+)      anemia,          Musculoskeletal:   (+)  arthritis,             GI/Hepatic:     (+)   esophageal disease,   hiatal hernia,              Renal/Genitourinary:       Endo:  - neg endo ROS     Psychiatric/Substance Use:  - neg psychiatric ROS     Infectious Disease:  - neg infectious disease ROS     Malignancy:  - neg malignancy ROS     Other:  - neg other ROS          Physical Exam    Airway  airway exam normal           Respiratory Devices and Support         Dental           Cardiovascular   cardiovascular exam normal       Rhythm and rate: regular and normal     Pulmonary   pulmonary exam normal        breath sounds clear to auscultation           OUTSIDE LABS:  CBC:   Lab Results   Component Value Date    WBC 5.5 09/12/2024    WBC 9.2 06/22/2024    HGB 8.2 (L) 09/12/2024    HGB 8.8 (L) 06/22/2024    HCT 29.3 (L) 09/12/2024    HCT 30.6 (L) 06/22/2024     09/12/2024     06/22/2024     BMP:   Lab Results   Component Value Date     06/21/2024     06/20/2024    POTASSIUM 3.9 06/21/2024    POTASSIUM 4.2 06/20/2024    CHLORIDE 112 (H) 06/21/2024    CHLORIDE 109 (H) 06/20/2024    CO2 23 06/21/2024    CO2 25 06/20/2024    BUN 16.8 06/21/2024  "   BUN 20.3 06/20/2024    CR 0.67 06/21/2024    CR 0.73 06/20/2024     (H) 06/21/2024    GLC 99 06/20/2024     COAGS:   Lab Results   Component Value Date    PTT 28 06/20/2024    INR 1.09 06/20/2024     POC: No results found for: \"BGM\", \"HCG\", \"HCGS\"  HEPATIC:   Lab Results   Component Value Date    ALBUMIN 4.1 06/20/2024    PROTTOTAL 6.7 06/20/2024    ALT <5 06/20/2024    AST 11 06/20/2024    ALKPHOS 88 06/20/2024    BILITOTAL 0.3 06/20/2024     OTHER:   Lab Results   Component Value Date    PH 7.36 02/13/2019    LACT 1.5 01/17/2024    A1C 5.6 04/14/2014    BUBBA 8.5 06/21/2024    MAG 2.1 01/06/2024    LIPASE 19 06/14/2021    TSH 0.51 06/14/2021       Anesthesia Plan    ASA Status:  3       Anesthesia Type: MAC.              Consents    Anesthesia Plan(s) and associated risks, benefits, and realistic alternatives discussed. Questions answered and patient/representative(s) expressed understanding.     - Discussed:     - Discussed with:  Patient, Legal Guardian            Postoperative Care            Comments:               Alexx Knott MD    I have reviewed the pertinent notes and labs in the chart from the past 30 days and (re)examined the patient.  Any updates or changes from those notes are reflected in this note.             # Drug Induced Platelet Defect: home medication list includes an antiplatelet medication      "

## 2024-09-30 ENCOUNTER — TELEPHONE (OUTPATIENT)
Dept: FAMILY MEDICINE | Facility: CLINIC | Age: 89
End: 2024-09-30
Payer: MEDICARE

## 2024-09-30 NOTE — TELEPHONE ENCOUNTER
Forms/Letter Request    Type of form/letter: OTHER: MD Orders       Do we have the form/letter: Yes: in Nathans inbox    Who is the form from? Home care    Where did/will the form come from? form was faxed in    When is form/letter needed by: when done    How would you like the form/letter returned: Fax : [phone]    Order details/form description: Rash in groin-Nystatin powder to effected area BID until resolved the PRN BID there after.    Order number (if applicable): n/a dated 5/12/24

## 2024-10-02 ENCOUNTER — MEDICAL CORRESPONDENCE (OUTPATIENT)
Dept: HEALTH INFORMATION MANAGEMENT | Facility: CLINIC | Age: 89
End: 2024-10-02
Payer: MEDICARE

## 2024-10-09 ENCOUNTER — TELEPHONE (OUTPATIENT)
Dept: FAMILY MEDICINE | Facility: CLINIC | Age: 89
End: 2024-10-09
Payer: MEDICARE

## 2024-10-09 NOTE — TELEPHONE ENCOUNTER
Forms/Letter Request    Type of form/letter: OTHER: medication orders dated 10/9/24       Do we have the form/letter: Yes: placed in Koko's inbox    Who is the form from? [facility]    Where did/will the form come from? form was faxed in    When is form/letter needed by: when signed    How would you like the form/letter returned: Fax : [phone]

## 2024-10-10 ENCOUNTER — MEDICAL CORRESPONDENCE (OUTPATIENT)
Dept: HEALTH INFORMATION MANAGEMENT | Facility: CLINIC | Age: 89
End: 2024-10-10
Payer: MEDICARE

## 2024-10-22 ENCOUNTER — TELEPHONE (OUTPATIENT)
Dept: FAMILY MEDICINE | Facility: CLINIC | Age: 89
End: 2024-10-22
Payer: MEDICARE

## 2024-10-22 NOTE — TELEPHONE ENCOUNTER
Forms/Letter Request    Type of form/letter: OTHER: incident report-fall        Do we have the form/letter: Yes: placed in Koko's inbox    Who is the form from? [place] (if other please explain)    Where did/will the form come from? form was faxed in    When is form/letter needed by: when signed    How would you like the form/letter returned: Fax : [phone]

## 2024-11-08 ENCOUNTER — TELEPHONE (OUTPATIENT)
Dept: FAMILY MEDICINE | Facility: CLINIC | Age: 89
End: 2024-11-08
Payer: MEDICARE

## 2024-11-08 NOTE — TELEPHONE ENCOUNTER
Forms/Letter Request    Type of form/letter: Incident reports, 2 2 page faxes     Do we have the form/letter: Yes: At Koko Betancourt desk to be completed. There are 2 forms for the same Home Care Facility one is about a broken blood vessel in left eye and the other form is about patient scratched or bumped forearm.    Who is the form from? [facility]     Where did/will the form come from? form was faxed in    When is form/letter needed by: When its completed     How would you like the form/letter returned: Fax : [phone]

## 2024-11-12 ENCOUNTER — MEDICAL CORRESPONDENCE (OUTPATIENT)
Dept: HEALTH INFORMATION MANAGEMENT | Facility: CLINIC | Age: 89
End: 2024-11-12
Payer: MEDICARE

## 2024-12-02 ENCOUNTER — TELEPHONE (OUTPATIENT)
Dept: FAMILY MEDICINE | Facility: CLINIC | Age: 89
End: 2024-12-02
Payer: MEDICARE

## 2024-12-02 NOTE — TELEPHONE ENCOUNTER
Forms/Letter Request    Type of form/letter: OTHER: medication orders       Do we have the form/letter: Yes: placed in Koko's inbox    Who is the form from? [facility]    Where did/will the form come from? form was faxed in    When is form/letter needed by: when signed    How would you like the form/letter returned: Fax : [phone]

## 2024-12-04 NOTE — TELEPHONE ENCOUNTER
Paperwork faxed and copy sent to medical records to be scanned into patients chart.    Copies made for onsite clinic records?  Yes    Copy needed for patient? NO    If Yes,

## 2024-12-11 ENCOUNTER — TELEPHONE (OUTPATIENT)
Dept: FAMILY MEDICINE | Facility: CLINIC | Age: 89
End: 2024-12-11
Payer: MEDICARE

## 2024-12-11 NOTE — TELEPHONE ENCOUNTER
Forms/Letter Request     Type of form/letter: OTHER: MD Orders     Do we have the form/letter: Yes: in Nathans inbox     Who is the form from? Home care     Where did/will the form come from? form was faxed in     When is form/letter needed by: when done     How would you like the form/letter returned: Fax : [phone]     Order details/form description: fall report and request for medication to help with loose stool     Order number (if applicable): n/a dated 1/11/24

## 2024-12-16 ENCOUNTER — MEDICAL CORRESPONDENCE (OUTPATIENT)
Dept: HEALTH INFORMATION MANAGEMENT | Facility: CLINIC | Age: 89
End: 2024-12-16
Payer: MEDICARE

## 2024-12-23 ENCOUNTER — TELEPHONE (OUTPATIENT)
Dept: FAMILY MEDICINE | Facility: CLINIC | Age: 89
End: 2024-12-23
Payer: MEDICARE

## 2024-12-23 DIAGNOSIS — R45.1 AGITATION: ICD-10-CM

## 2024-12-23 NOTE — TELEPHONE ENCOUNTER
Forms/Letter Request     Type of form/letter: OTHER: med question     Do we have the form/letter: Yes: in Nathans inbox     Who is the form from? Home care     Where did/will the form come from? form was faxed in     When is form/letter needed by: when done     How would you like the form/letter returned: Fax : [phone]     Order details/form description: do you want quetiapine 25mg to take 1/2 tab 12.5 mg or 25 mg BID     Order number (if applicable): n/a dated 12/23/24

## 2024-12-24 NOTE — TELEPHONE ENCOUNTER
Please contact family.    We received word from [facility] that they will not be able to give Angle a medication as needed due to unlicensed staff handling medication.    The 2 options include taking the medication scheduled every single day or skipping it altogether.  Do they have a preference based on what they've observed?    Koko Betancourt, CNP

## 2024-12-24 NOTE — TELEPHONE ENCOUNTER
Left message to call back for: Daija pt's daughter.  Information to relay to patient: see below

## 2024-12-26 ENCOUNTER — APPOINTMENT (OUTPATIENT)
Dept: CT IMAGING | Facility: CLINIC | Age: 89
End: 2024-12-26
Payer: MEDICARE

## 2024-12-26 ENCOUNTER — NURSE TRIAGE (OUTPATIENT)
Dept: FAMILY MEDICINE | Facility: CLINIC | Age: 89
End: 2024-12-26
Payer: MEDICARE

## 2024-12-26 ENCOUNTER — HOSPITAL ENCOUNTER (EMERGENCY)
Facility: CLINIC | Age: 89
Discharge: HOME | End: 2024-12-26
Payer: MEDICARE

## 2024-12-26 VITALS
OXYGEN SATURATION: 98 % | RESPIRATION RATE: 20 BRPM | DIASTOLIC BLOOD PRESSURE: 69 MMHG | HEART RATE: 79 BPM | TEMPERATURE: 97.9 F | HEIGHT: 62 IN | WEIGHT: 125 LBS | SYSTOLIC BLOOD PRESSURE: 129 MMHG | BODY MASS INDEX: 23 KG/M2

## 2024-12-26 DIAGNOSIS — S00.12XA PERIORBITAL ECCHYMOSIS OF LEFT EYE, INITIAL ENCOUNTER: ICD-10-CM

## 2024-12-26 LAB
ALBUMIN SERPL BCG-MCNC: 4.4 G/DL (ref 3.5–5.2)
ALP SERPL-CCNC: 115 U/L (ref 40–150)
ALT SERPL W P-5'-P-CCNC: 9 U/L (ref 0–50)
ANION GAP SERPL CALCULATED.3IONS-SCNC: 11 MMOL/L (ref 7–15)
APTT PPP: 27 SECONDS (ref 22–38)
AST SERPL W P-5'-P-CCNC: 22 U/L (ref 0–45)
BILIRUB SERPL-MCNC: 0.4 MG/DL
BUN SERPL-MCNC: 16.3 MG/DL (ref 8–23)
CALCIUM SERPL-MCNC: 9.4 MG/DL (ref 8.8–10.4)
CHLORIDE SERPL-SCNC: 107 MMOL/L (ref 98–107)
CREAT SERPL-MCNC: 0.73 MG/DL (ref 0.51–0.95)
EGFRCR SERPLBLD CKD-EPI 2021: 77 ML/MIN/1.73M2
ERYTHROCYTE [DISTWIDTH] IN BLOOD BY AUTOMATED COUNT: 21.9 % (ref 10–15)
GLUCOSE SERPL-MCNC: 96 MG/DL (ref 70–99)
HCO3 SERPL-SCNC: 24 MMOL/L (ref 22–29)
HCT VFR BLD AUTO: 42.2 % (ref 35–47)
HGB BLD-MCNC: 12.9 G/DL (ref 11.7–15.7)
INR PPP: 0.98 (ref 0.85–1.15)
MCH RBC QN AUTO: 27.2 PG (ref 26.5–33)
MCHC RBC AUTO-ENTMCNC: 30.6 G/DL (ref 31.5–36.5)
MCV RBC AUTO: 89 FL (ref 78–100)
PLATELET # BLD AUTO: 155 10E3/UL (ref 150–450)
POTASSIUM SERPL-SCNC: 4.1 MMOL/L (ref 3.4–5.3)
PROT SERPL-MCNC: 7.3 G/DL (ref 6.4–8.3)
RBC # BLD AUTO: 4.74 10E6/UL (ref 3.8–5.2)
SODIUM SERPL-SCNC: 142 MMOL/L (ref 135–145)
WBC # BLD AUTO: 5.5 10E3/UL (ref 4–11)

## 2024-12-26 PROCEDURE — 36415 COLL VENOUS BLD VENIPUNCTURE: CPT

## 2024-12-26 PROCEDURE — 82435 ASSAY OF BLOOD CHLORIDE: CPT

## 2024-12-26 PROCEDURE — 85018 HEMOGLOBIN: CPT

## 2024-12-26 PROCEDURE — 84155 ASSAY OF PROTEIN SERUM: CPT

## 2024-12-26 PROCEDURE — 99284 EMERGENCY DEPT VISIT MOD MDM: CPT | Mod: 25

## 2024-12-26 PROCEDURE — 85730 THROMBOPLASTIN TIME PARTIAL: CPT

## 2024-12-26 PROCEDURE — 85610 PROTHROMBIN TIME: CPT

## 2024-12-26 PROCEDURE — G1010 CDSM STANSON: HCPCS

## 2024-12-26 NOTE — TELEPHONE ENCOUNTER
Nurse Triage SBAR    Is this a 2nd Level Triage? NO    Situation: Patient noted by [place] staff to have a small bruise on each side of bridge of nose.  Unsure how she got this.    Background: Patient lives in [place].  Does not recall falling. Did report she fell a couple weeks ago at night and then was found sleeping in bed.      Assessment: Patient has no symptoms, no headache, dizziness, no complaints of pain or bleeding.    Protocol Recommended Disposition:   Home Care    Recommendation: Advised to monitor for any signs of bleeding or other symptoms.  Nurse verbalized understanding.    ÓSCAR Espinosa RN             Does the patient meet one of the following criteria for ADS visit consideration? 16+ years old, with an [facility] PCP     TIP  Providers, please consider if this condition is appropriate for management at one of our Acute and Diagnostic Services sites.     If patient is a good candidate, please use dotphrase <dot>triageresponse and select Refer to ADS to document.    Reason for Disposition   Minor injury or bruising from direct blow    Additional Information   Negative: Shock suspected (e.g., cold/pale/clammy skin, too weak to stand, low BP, rapid pulse)   Negative: Fever and purple or blood-colored spots or dots   Negative: Sounds like a life-threatening emergency to the triager   Negative: Bruise(s) of forehead or head from an injury   Negative: Bruise(s) of face or jaw from an injury   Negative: Patient has a concerning injury (e.g., chest, neck, leg)   Negative: Falls or falling is main concern   Negative: Post-op bruising   Negative: Feeling weak or lightheaded (e.g., woozy, feeling like they might faint)   Negative: Bruise on head, face, chest, or abdomen and taking Coumadin (warfarin) or other strong blood thinner, or known bleeding disorder (e.g., thrombocytopenia)   Negative: Unexplained bleeding from another site (e.g., gums, nose, urine)   Negative: Patient sounds very sick or weak to the triager    "Negative: SEVERE pain and not improved 2 hours after pain medicine/ice packs   Negative: Purple or blood-colored spots or dots that are not from injury or friction (no fever and sounds well to triager)   Negative: 5 or more bruises now, NOT caused by an injury   Negative: Raised bruise and size > 2 inches (5 cm) and getting bigger   Negative: Taking Coumadin (warfarin) or other strong blood thinner, OR known bleeding disorder (e.g., thrombocytopenia)  (Exception: Small bruise at heparin injection site.)   Negative: Suspicious history for the injury   Negative: Minor bruising at site of heparin injection (e.g., heparin, Fragmin, Innohep, Lovenox)   Negative: Less than 5 unxplained bruises now, NOT caused by an injury   Negative: After 10 days and bruise not fading   Negative: After 3 weeks and bruise still present   Negative: Patient wants to be seen   Negative: Bruising easily is a chronic symptom (recurrent or ongoing AND present > 4 weeks)    Answer Assessment - Initial Assessment Questions  1. APPEARANCE of BRUISE: \"Describe the bruise.\"       Bruises on either side of nose by eyes  2. SIZE: \"How large is the bruise?\"       1 cm on each side  3. NUMBER: \"How many bruises are there?\"       two  4. LOCATION: \"Where is the bruise located?\"       Either side of nose  5. ONSET: \"How long ago did the bruise occur?\"       unsure  6. CAUSE: \"What do you think caused the bruise?\"      unsure  7. MEDICAL HISTORY: \"Do you have any medical problems that can cause easy bruising or bleeding?\" (e.g., leukemia, liver disease, recent chemotherapy)      no  8. MEDICINES: \"Do you take any medicines which thin the blood such as: aspirin, apixaban, heparin, ibuprofen (NSAIDS), Plavix, or Coumadin?\"      Low dose aspirin  9. OTHER SYMPTOMS: \"Do you have any other symptoms?\"  (e.g., weakness, dizziness, pain, fever, nosebleed, blood in urine/stool)      no  10. PREGNANCY: \"Is there any chance you are pregnant?\" \"When was your last " "menstrual period?\"        no    Protocols used: Bruises-A-OH    "

## 2024-12-26 NOTE — DISCHARGE INSTRUCTIONS
Your work-up and imaging was negative as discussed.    You may use cool compresses as needed. You may take Tylenol or ibuprofen as needed.    Ibuprofen/Naproxen Discharge Instructions:  You may take ibuprofen for pain control.  The maximum dose of (ibuprofen is 3200 mg ) in a 24-hour period.    Take this medication with food to prevent stomach irritation.  With long-term use this medication can irritate the stomach causing pain and lead to development of a stomach ulcer.  If you notice stomach pain or vomiting of coffee-ground colored vomit or blood, please be seen by a healthcare provider.  Attempt to use this medication for the shortest time possible.      Tylenol (Acetaminophen) Discharge Instructions:  You may take 2 tablets of regular strength, over-the-counter, Tylenol (acetaminophen) every 4-6 hours as needed for pain.  Take no more than 4000 mg of Tylenol in a 24-hour period.      Avoid taking more than 1 acetaminophen-containing product at a time and be aware that many over-the-counter medications contain a combination of acetaminophen and other products.  If you are taking Tylenol in addition to a combination product please keep track of your daily acetaminophen dose to make sure you do not exceed the recommended 4000 mg.  Taking too much acetaminophen can cause permanent damage to your liver.    Follow-up with your primary care provider for reevaluation and ongoing management.     Return to the emergency department for any new or worsening symptoms.

## 2024-12-26 NOTE — ED TRIAGE NOTES
Pt presents to the ED with c/o bruise under left eye since this morning. Pt denies any injury or sx.

## 2024-12-26 NOTE — ED PROVIDER NOTES
EMERGENCY DEPARTMENT ENCOUNTER      NAME: Angle Agudelo  AGE: 89 year old female  YOB: 1932  MRN: [id number]  EVALUATION DATE & TIME: No admission date for patient encounter.    PCP: Koko Betancourt    ED PROVIDER: Wendy Ariza PA-C      Chief Complaint   Patient presents with    Eye Problem         FINAL IMPRESSION:  1. Periorbital ecchymosis of left eye, initial encounter          ED COURSE & MEDICAL DECISION MAKIN:07 PM Met with patient for initial interview. Plan for care discussed.  3:45 PM Reevaluated and updated patient. I discussed the plan for discharge with the patient, and patient is agreeable. We discussed supportive cares at home and reasons for return to the ER including new or worsening symptoms. All questions and concerns addressed. Patient to be discharged by RN.    89 year old female with a history of HTN, HLD, CVA, aortic stenosis, GI bleed, memory impairment presents to the Emergency Department for evaluation of mild ecchymosis medial left eye that she noticed upon waking this morning. Patient denies any pain, recent falls/trauma, blood thinners, decreased vision, double vision, headache, numbness/weakness arms or legs, or any other concerns. She called into her PCP office and was advised to present to the ED for further evaluation. Upon exam, patient is afebrile, hemodynamically stable, and in no acute distress. Very mild/faint ecchymosis left eye. No bony tenderness to palpation or crepitus. Patient at baseline cognitively per her daughter and exhibits no focal neurologic deficits. Differential diagnosis includes but not limited to periorbital fracture, ICH, thrombocytopenia, liver dysfunction, bleeding/clotting disorder. Based on patient's presenting symptoms, laboratories and imaging were ordered.    CT Head negative. CBC without anemia or thrombocytopenia. CMP unremarkable. INR and PTT WNL.     Symptoms and workup most consistent with likely broken superficial blood  vessel during her sleep. Patient was made aware of the above findings. Plan to discharge patient home with symptomatic management, strict return precautions, and close follow up with their PCP for reevaluation and ongoing management. The patient was stable and well appearing upon discharge. The patient was advised to return to the ER if any new or worsening symptoms develop. The patient verbalizes understanding and agrees with the plan.     Medical Decision Making  Obtained supplemental history:Supplemental history obtained?: Documented in chart  Reviewed external records: External records reviewed?: No  Care impacted by chronic illness:Documented in Chart  Did you consider but not order tests?: Work up considered but not performed and documented in chart, if applicable  Did you interpret images independently?: Independent interpretation of ECG and images noted in documentation, when applicable.  Consultation discussion with other provider:Did you involve another provider (consultant, MH, pharmacy, etc.)?: No  Discharge. No recommendations on prescription strength medication(s). See documentation for any additional details.    MIPS: Not Applicable      MEDICATIONS GIVEN IN THE EMERGENCY:  Medications - No data to display    NEW PRESCRIPTIONS STARTED AT TODAY'S ER VISIT  New Prescriptions    No medications on file          =================================================================    HPI    Patient information was obtained from: patient and patient's daughter    Use of : N/A       Angle Agudelo is a 89 year old female with a pertinent history of HTN, HLD, CVA, aortic stenosis, GI bleed, memory impairment presents to the Emergency Department for evaluation of mild ecchymosis medial left eye that she noticed upon waking this morning. Patient denies any pain, recent falls/trauma, blood thinners, decreased vision, double vision, headache, numbness/weakness arms or legs, speech difficulty, gait  instability, confusion, or any other concerns. She called into her PCP office and was advised to present to the ED for further evaluation.  Patient states that she woke up this morning and noticed some mild bruising near her left eye.  She denies any history of bleeding or clotting disorders.  She denies bruising elsewhere.  She denies any chest pain, shortness of breath, nausea or vomiting, abdominal pain, or any other complaints.  Patient is accompanied by her daughter who reports that patient is at baseline cognitively.      REVIEW OF SYSTEMS   Review of Systems see HPI    PAST MEDICAL HISTORY:  Past Medical History:   Diagnosis Date    Acute cerebrovascular accident (CVA) (H) 06/08/2020    Benign neoplasm of colon     Chronic bilateral low back pain without sciatica     Chronic bilateral low back pain without sciatica 08/12/2016    Dementia (H)     Disorder of bone and cartilage, unspecified     Dyspnea on exertion     Female genital prolapse     Gastroenteritis 6/14/2021    GI bleed     Hiatal hernia 6/16/2021    Memory impairment     Orthopnea     Pure hypercholesterolemia     Rectal prolapse     Skin cancer     Not Melanoma, not sure what kind though    Spinal stenosis of lumbar region     Swelling of both lower extremities     Thickened endometrium     Unspecified arthropathy, shoulder region     Unspecified essential hypertension     Walking troubles        PAST SURGICAL HISTORY:  Past Surgical History:   Procedure Laterality Date    APPENDECTOMY      BIOPSY CERVICAL, LOCAL EXCISION, SINGLE/MULTIPLE N/A 09/11/2018    Procedure: LOOP ELECTROSURGICAL EXCISION PROCEDURE, REMOVAL PESSARY;  Surgeon: Olive Biggs MD;  Location: [facility];  Service:     BIOPSY VULVA N/A 05/14/2020    Procedure: VULVAR BIOPSY;  Surgeon: Olive Biggs MD;  Location: [facility] OR;  Service: Gynecology    CATARACT EXTRACTION EXTRACAPSULAR W/ INTRAOCULAR LENS IMPLANTATION Bilateral     CERVICAL BIOPSY N/A  05/14/2020    Procedure: PUNCH BIOPSY, CERVIX;  Surgeon: Olive Biggs MD;  Location: [facility] OR;  Service: Gynecology    CERVICAL POLYPECTOMY N/A 10/12/2023    Procedure: AND POLYPECTOMY WITH ULTRASOUND GUIDANCE;  Surgeon: Olive Biggs MD;  Location: [facility] OR    CHOLECYSTECTOMY      COLONOSCOPY N/A 01/08/2020    Procedure: COLONOSCOPY;  Surgeon: Amanda Carson MD;  Location: [facility] GI    DILATION AND CURETTAGE, HYSTEROSCOPY WITH ULTRASOUND GUIDANCE N/A 10/12/2023    Procedure: HYSTEROSCOPY DILATION AND CURETTAGE;  Surgeon: Olive Biggs MD;  Location: [facility] OR    DILATION AND CURETTAGE, OPERATIVE HYSTEROSCOPY, COMBINED N/A 03/31/2022    Procedure: HYSTEROSCOPY;  Surgeon: Olive Biggs MD;  Location: [facility]    ESOPHAGOSCOPY, GASTROSCOPY, DUODENOSCOPY (EGD), COMBINED N/A 6/20/2024    Procedure: ESOPHAGOGASTRODUODENOSCOPY WITH CLIP PLACEMENT;  Surgeon: Truman Brown MD, MD;  Location: [facility]    ESOPHAGOSCOPY, GASTROSCOPY, DUODENOSCOPY (EGD), DILATATION, COMBINED N/A 9/25/2024    Procedure: ESOPHAGOGASTRODUODENOSCOPY WITH Balloon DILATATION;  Surgeon: Fausto Hadley DO;  Location: [facility] OR    HC DILATION/CURETTAGE DIAG/THER NON OB N/A 05/14/2020    Procedure: DILATION AND CURETTAGE, UTERUS;  Surgeon: Olive Biggs MD;  Location: [facility] OR;  Service: Gynecology    IR EXTREMITY ANGIOGRAM RIGHT  02/13/2018    LEFORTE COLPOCLEISIS PROCEDURE (VAGINAL OBLITERATION) N/A 1/5/2024    Procedure: COLPOCLEISIS;  Surgeon: Olive Biggs MD;  Location: [facility] OR    ORTHOPEDIC SURGERY      OTHER SURGICAL HISTORY      [id number]    OTHER SURGICAL HISTORY      finger abscess    PERINEORRHAPHY N/A 1/5/2024    Procedure: PERINEORRAPHY;  Surgeon: Olive Biggs MD;  Location: [facility] OR    GA ESOPHAGOGASTRODUODENOSCOPY TRANSORAL DIAGNOSTIC N/A 06/16/2021    Procedure:  ESOPHAGOGASTRODUODENOSCOPY (EGD);  Surgeon: Primitivo Lafleur MD;  Location: [facility] OR;  Service: Gastroenterology    RECTOSIGMOIDECTOMY PERINEAL N/A 1/5/2024    Procedure: PERINEAL RECTOSIGMOIDECTOMY;  Surgeon: Shannon Haines MD;  Location: [facility]    SHOULDER SURGERY      bilateral shoulder    SIGMOIDOSCOPY FLEXIBLE N/A 06/03/2018    Procedure: SIGMOIDOSCOPY;  Surgeon: Dayana Joshi MD;  Location: [facility] GI;  Service:     TONSILLECTOMY & ADENOIDECTOMY      TOTAL SHOULDER ARTHROPLASTY Bilateral R 7/12 L 9/11     BIOPSY THYROID FINE NEEDLE ASPIRATION  08/07/2020           CURRENT MEDICATIONS:    acetaminophen (TYLENOL) 325 MG tablet  amLODIPine (NORVASC) 10 MG tablet  aspirin (ASPIRIN LOW DOSE) 81 MG chewable tablet  ferrous sulfate (FEROSUL) 325 (65 Fe) MG tablet  ibuprofen (ADVIL/MOTRIN) 600 MG tablet  lovastatin (MEVACOR) 40 MG tablet  memantine (NAMENDA) 10 MG tablet  ondansetron (ZOFRAN ODT) 4 MG ODT tab  polyethylene glycol (MIRALAX) 17 GM/Dose powder  QUEtiapine (SEROQUEL) 25 MG tablet  vitamin C (ASCORBIC ACID) 250 MG tablet        ALLERGIES:  Allergies   Allergen Reactions    Atorvastatin Muscle Pain (Myalgia)    Dextromethorphan Hives    Oxycodone Unknown    Pravastatin Muscle Pain (Myalgia)    Simvastatin Muscle Pain (Myalgia)    Codeine Rash       FAMILY HISTORY:  Family History   Problem Relation Age of Onset    Heart Disease Father     Coronary Artery Disease Father     Cerebrovascular Disease Father     Coronary Artery Disease Brother     Coronary Artery Disease Brother     Dementia Sister        SOCIAL HISTORY:   Social History     Socioeconomic History    Marital status:    Tobacco Use    Smoking status: Never    Smokeless tobacco: Never   Vaping Use    Vaping status: Never Used   Substance and Sexual Activity    Alcohol use: Yes     Alcohol/week: 2.0 standard drinks of alcohol     Comment: social    Drug use: No     Social Drivers of Health  "    Financial Resource Strain: Low Risk  (12/19/2023)    Financial Resource Strain     Within the past 12 months, have you or your family members you live with been unable to get utilities (heat, electricity) when it was really needed?: No   Food Insecurity: Low Risk  (12/19/2023)    Food Insecurity     Within the past 12 months, did you worry that your food would run out before you got money to buy more?: No     Within the past 12 months, did the food you bought just not last and you didn t have money to get more?: No   Transportation Needs: Low Risk  (12/19/2023)    Transportation Needs     Within the past 12 months, has lack of transportation kept you from medical appointments, getting your medicines, non-medical meetings or appointments, work, or from getting things that you need?: No   Interpersonal Safety: Low Risk  (9/12/2024)    Interpersonal Safety     Do you feel physically and emotionally safe where you currently live?: Yes     Within the past 12 months, have you been hit, slapped, kicked or otherwise physically hurt by someone?: No     Within the past 12 months, have you been humiliated or emotionally abused in other ways by your partner or ex-partner?: No   Housing Stability: Low Risk  (12/19/2023)    Housing Stability     Do you have housing? : Yes     Are you worried about losing your housing?: No       VITALS:  /69   Pulse 79   Temp 97.9  F (36.6  C) (Oral)   Resp 20   Ht 1.575 m (5' 2\")   Wt 56.7 kg (125 lb)   LMP  (LMP Unknown)   SpO2 98%   BMI 22.86 kg/m      PHYSICAL EXAM    Constitutional:  Alert, in no acute distress. Cooperative.  EYES: Very mild/faint ecchymosis left eye, lids and lashes otherwise normal without erythema, edema, warmth, purulence, fluctuance, crepitus. No bony tenderness to palpation or crepitus. Peripheral fields intact. No photophobia. PERRL. EOM full without pain. Conjunctiva clear. No obvious corneal ulcerations. No hypopyon or hyphema. VA at baseline. "   HENT:  Normocephalic. Oropharynx clear. Moist membranes. Tongue without deviation.  Respiratory:  Respirations even, unlabored, in no acute respiratory distress. No cough. Speaks in full sentences easily.  Cardiovascular:  Regular rate, good peripheral perfusion.  GI: Soft, flat, non-distended.  Musculoskeletal:  No edema. No cyanosis. Range of motion major extremities intact.    Integument: Warm, Dry. No rash to visualized skin.   Neurologic:  Alert & oriented - at baseline cognitively per patient's daughter. No focal deficits noted. GCS 15. Sensation intact. Motor intact. Coordination intact. 5/5 strength.   Psych: Normal mood and affect.      LAB:  All pertinent labs reviewed and interpreted.  Results for orders placed or performed during the hospital encounter of 12/26/24   CT Head w/o Contrast    Impression    IMPRESSION: Negative for acute intracranial hemorrhage, transcortical infarct, hydrocephalus, or sizable intracranial mass. No skull fracture.   CBC with platelets   Result Value Ref Range    WBC Count 5.5 4.0 - 11.0 10e3/uL    RBC Count 4.74 3.80 - 5.20 10e6/uL    Hemoglobin 12.9 11.7 - 15.7 g/dL    Hematocrit 42.2 35.0 - 47.0 %    MCV 89 78 - 100 fL    MCH 27.2 26.5 - 33.0 pg    MCHC 30.6 (L) 31.5 - 36.5 g/dL    RDW 21.9 (H) 10.0 - 15.0 %    Platelet Count 155 150 - 450 10e3/uL   Comprehensive metabolic panel   Result Value Ref Range    Sodium 142 135 - 145 mmol/L    Potassium 4.1 3.4 - 5.3 mmol/L    Carbon Dioxide (CO2) 24 22 - 29 mmol/L    Anion Gap 11 7 - 15 mmol/L    Urea Nitrogen 16.3 8.0 - 23.0 mg/dL    Creatinine 0.73 0.51 - 0.95 mg/dL    GFR Estimate 77 >60 mL/min/1.73m2    Calcium 9.4 8.8 - 10.4 mg/dL    Chloride 107 98 - 107 mmol/L    Glucose 96 70 - 99 mg/dL    Alkaline Phosphatase 115 40 - 150 U/L    AST 22 0 - 45 U/L    ALT 9 0 - 50 U/L    Protein Total 7.3 6.4 - 8.3 g/dL    Albumin 4.4 3.5 - 5.2 g/dL    Bilirubin Total 0.4 <=1.2 mg/dL   Result Value Ref Range    INR 0.98 0.85 - 1.15    Partial thromboplastin time   Result Value Ref Range    aPTT 27 22 - 38 Seconds       RADIOLOGY:  Reviewed all pertinent imaging. Please see official radiology report.  CT Head w/o Contrast   Final Result   IMPRESSION: Negative for acute intracranial hemorrhage, transcortical infarct, hydrocephalus, or sizable intracranial mass. No skull fracture.        Wendy Ariza PA-C  [facility] EMERGENCY ROOM  [facility] [facility] [address] [phone]      Wendy Ariza PA-C  12/26/24 [id number]

## 2024-12-30 ENCOUNTER — TELEPHONE (OUTPATIENT)
Dept: FAMILY MEDICINE | Facility: CLINIC | Age: 89
End: 2024-12-30
Payer: MEDICARE

## 2024-12-30 NOTE — TELEPHONE ENCOUNTER
Spoke with daughter, Daija. She spoke with [facility] staff during bc. They agreed one half a pill(12.5mg) every day.     [facility] staff contacted now. Pt and  live together.  would like pt to have this due to agitation in the evening towards him. She hasn't had any Seroquel medication given to her yet and would like to start this. Sign form and fax back.      Pt having trouble from sitting to standing. Request for PT order. Verbal order okay. [facility] staff state that she looks deconditioned. She is good with short instructions but doesn't want to walk a lot.

## 2024-12-30 NOTE — TELEPHONE ENCOUNTER
Forms/Letter Request    Type of form/letter: OTHER: incident report 12/29/24       Do we have the form/letter: Yes: placed in Koko's inbox    Who is the form from? [facility]    Where did/will the form come from? form was faxed in    When is form/letter needed by: when signed    How would you like the form/letter returned: Fax : [phone]

## 2025-01-01 RX ORDER — QUETIAPINE FUMARATE 25 MG/1
12.5 TABLET, FILM COATED ORAL EVERY EVENING
Qty: 30 TABLET | Refills: 2 | Status: SHIPPED | OUTPATIENT
Start: 2025-01-01

## 2025-01-08 ENCOUNTER — PATIENT OUTREACH (OUTPATIENT)
Dept: FAMILY MEDICINE | Facility: CLINIC | Age: OVER 89
End: 2025-01-08
Payer: MEDICARE

## 2025-01-08 ENCOUNTER — TELEPHONE (OUTPATIENT)
Dept: FAMILY MEDICINE | Facility: CLINIC | Age: OVER 89
End: 2025-01-08
Payer: MEDICARE

## 2025-01-08 NOTE — TELEPHONE ENCOUNTER
Patient Quality Outreach    Patient is due for the following:   Physical Annual Wellness Visit    Action(s) Taken:   Patient has upcoming appointment, these items will be addressed at that time.    Type of outreach:    Chart review performed, no outreach needed.    Questions for provider review:    None           Silvia Steele MA

## 2025-01-08 NOTE — TELEPHONE ENCOUNTER
Forms/Letter Request     Type of form/letter: skin integ     Do we have the form/letter: Yes: in Nathans inbox     Who is the form from? Home care     Where did/will the form come from? form was faxed in     When is form/letter needed by: when done     How would you like the form/letter returned: Fax : [phone]     Order details/form description: FYI-resident has yellow faded bruising area on R buttock aprox. 10 cm x 10 cm. Denies any pain. Daughter hailee here and saw area.     Order number (if applicable): n/a dated 1/8/25

## 2025-01-09 ENCOUNTER — MEDICAL CORRESPONDENCE (OUTPATIENT)
Dept: HEALTH INFORMATION MANAGEMENT | Facility: CLINIC | Age: OVER 89
End: 2025-01-09
Payer: MEDICARE

## 2025-01-27 ENCOUNTER — TELEPHONE (OUTPATIENT)
Dept: FAMILY MEDICINE | Facility: CLINIC | Age: OVER 89
End: 2025-01-27
Payer: MEDICARE

## 2025-01-27 NOTE — TELEPHONE ENCOUNTER
Forms/Letter Request    Type of form/letter: OTHER: incident report      Do we have the form/letter: Yes: placed in Koko in box    Who is the form from?  [facility]    Where did/will the form come from? form was faxed in    When is form/letter needed by: when done    How would you like the form/letter returned: Fax : [phone]

## 2025-01-28 ENCOUNTER — MEDICAL CORRESPONDENCE (OUTPATIENT)
Dept: HEALTH INFORMATION MANAGEMENT | Facility: CLINIC | Age: OVER 89
End: 2025-01-28
Payer: MEDICARE

## 2025-01-28 NOTE — TELEPHONE ENCOUNTER
Form prepped and up for NK to review, complete and sign.    Karyn Mcmahon MA on 1/27/2025 at 6:22 PM

## 2025-02-15 ENCOUNTER — APPOINTMENT (OUTPATIENT)
Dept: CARDIOLOGY | Facility: CLINIC | Age: OVER 89
DRG: 291 | End: 2025-02-15
Attending: STUDENT IN AN ORGANIZED HEALTH CARE EDUCATION/TRAINING PROGRAM
Payer: MEDICARE

## 2025-02-15 ENCOUNTER — HOSPITAL ENCOUNTER (INPATIENT)
Facility: CLINIC | Age: OVER 89
LOS: 5 days | Discharge: NURSING, REHAB OR LONG-TERM CARE | DRG: 291 | End: 2025-02-20
Attending: EMERGENCY MEDICINE
Payer: MEDICARE

## 2025-02-15 ENCOUNTER — APPOINTMENT (OUTPATIENT)
Dept: CT IMAGING | Facility: CLINIC | Age: OVER 89
DRG: 291 | End: 2025-02-15
Payer: MEDICARE

## 2025-02-15 DIAGNOSIS — J81.0 ACUTE PULMONARY EDEMA (H): ICD-10-CM

## 2025-02-15 DIAGNOSIS — F02.818 DEMENTIA ASSOCIATED WITH OTHER UNDERLYING DISEASE WITH BEHAVIORAL DISTURBANCE (H): Primary | ICD-10-CM

## 2025-02-15 DIAGNOSIS — J96.01 ACUTE HYPOXEMIC RESPIRATORY FAILURE (H): ICD-10-CM

## 2025-02-15 DIAGNOSIS — M62.81 GENERALIZED MUSCLE WEAKNESS: ICD-10-CM

## 2025-02-15 DIAGNOSIS — I50.9 CONGESTIVE HEART FAILURE, UNSPECIFIED HF CHRONICITY, UNSPECIFIED HEART FAILURE TYPE (H): ICD-10-CM

## 2025-02-15 DIAGNOSIS — J90 PLEURAL EFFUSION: ICD-10-CM

## 2025-02-15 LAB
ANION GAP SERPL CALCULATED.3IONS-SCNC: 13 MMOL/L (ref 7–15)
ATRIAL RATE - MUSE: 74 BPM
BASOPHILS # BLD AUTO: 0 10E3/UL (ref 0–0.2)
BASOPHILS NFR BLD AUTO: 1 %
BUN SERPL-MCNC: 11.4 MG/DL (ref 8–23)
CALCIUM SERPL-MCNC: 9.1 MG/DL (ref 8.8–10.4)
CHLORIDE SERPL-SCNC: 104 MMOL/L (ref 98–107)
CREAT SERPL-MCNC: 0.83 MG/DL (ref 0.51–0.95)
D DIMER PPP FEU-MCNC: 6.52 UG/ML FEU (ref 0–0.5)
DIASTOLIC BLOOD PRESSURE - MUSE: 58 MMHG
EGFRCR SERPLBLD CKD-EPI 2021: 65 ML/MIN/1.73M2
EOSINOPHIL # BLD AUTO: 0 10E3/UL (ref 0–0.7)
EOSINOPHIL NFR BLD AUTO: 1 %
ERYTHROCYTE [DISTWIDTH] IN BLOOD BY AUTOMATED COUNT: 19.9 % (ref 10–15)
FLUAV RNA SPEC QL NAA+PROBE: NEGATIVE
FLUBV RNA RESP QL NAA+PROBE: NEGATIVE
GLUCOSE SERPL-MCNC: 100 MG/DL (ref 70–99)
HCO3 SERPL-SCNC: 24 MMOL/L (ref 22–29)
HCT VFR BLD AUTO: 42.2 % (ref 35–47)
HGB BLD-MCNC: 13.1 G/DL (ref 11.7–15.7)
IMM GRANULOCYTES # BLD: 0 10E3/UL
IMM GRANULOCYTES NFR BLD: 1 %
INTERPRETATION ECG - MUSE: NORMAL
LVEF ECHO: NORMAL
LYMPHOCYTES # BLD AUTO: 0.8 10E3/UL (ref 0.8–5.3)
LYMPHOCYTES NFR BLD AUTO: 21 %
MCH RBC QN AUTO: 31 PG (ref 26.5–33)
MCHC RBC AUTO-ENTMCNC: 31 G/DL (ref 31.5–36.5)
MCV RBC AUTO: 100 FL (ref 78–100)
MONOCYTES # BLD AUTO: 0.3 10E3/UL (ref 0–1.3)
MONOCYTES NFR BLD AUTO: 7 %
NEUTROPHILS # BLD AUTO: 2.5 10E3/UL (ref 1.6–8.3)
NEUTROPHILS NFR BLD AUTO: 70 %
NRBC # BLD AUTO: 0 10E3/UL
NRBC BLD AUTO-RTO: 0 /100
NT-PROBNP SERPL-MCNC: 4579 PG/ML (ref 0–1800)
P AXIS - MUSE: 48 DEGREES
PLATELET # BLD AUTO: 149 10E3/UL (ref 150–450)
POTASSIUM SERPL-SCNC: 4.1 MMOL/L (ref 3.4–5.3)
PR INTERVAL - MUSE: 166 MS
PROCALCITONIN SERPL IA-MCNC: 0.04 NG/ML
QRS DURATION - MUSE: 80 MS
QT - MUSE: 402 MS
QTC - MUSE: 446 MS
R AXIS - MUSE: 231 DEGREES
RBC # BLD AUTO: 4.23 10E6/UL (ref 3.8–5.2)
RSV RNA SPEC NAA+PROBE: NEGATIVE
SARS-COV-2 RNA RESP QL NAA+PROBE: NEGATIVE
SODIUM SERPL-SCNC: 141 MMOL/L (ref 135–145)
SYSTOLIC BLOOD PRESSURE - MUSE: 118 MMHG
T AXIS - MUSE: -15 DEGREES
TROPONIN T SERPL HS-MCNC: 18 NG/L
TROPONIN T SERPL HS-MCNC: 20 NG/L
VENTRICULAR RATE- MUSE: 74 BPM
WBC # BLD AUTO: 3.7 10E3/UL (ref 4–11)

## 2025-02-15 PROCEDURE — 80048 BASIC METABOLIC PNL TOTAL CA: CPT

## 2025-02-15 PROCEDURE — 99223 1ST HOSP IP/OBS HIGH 75: CPT | Mod: AI | Performed by: STUDENT IN AN ORGANIZED HEALTH CARE EDUCATION/TRAINING PROGRAM

## 2025-02-15 PROCEDURE — 250N000013 HC RX MED GY IP 250 OP 250 PS 637: Performed by: STUDENT IN AN ORGANIZED HEALTH CARE EDUCATION/TRAINING PROGRAM

## 2025-02-15 PROCEDURE — 71275 CT ANGIOGRAPHY CHEST: CPT

## 2025-02-15 PROCEDURE — 85379 FIBRIN DEGRADATION QUANT: CPT

## 2025-02-15 PROCEDURE — 85049 AUTOMATED PLATELET COUNT: CPT

## 2025-02-15 PROCEDURE — 250N000011 HC RX IP 250 OP 636

## 2025-02-15 PROCEDURE — 93005 ELECTROCARDIOGRAM TRACING: CPT

## 2025-02-15 PROCEDURE — 84145 PROCALCITONIN (PCT): CPT

## 2025-02-15 PROCEDURE — 250N000011 HC RX IP 250 OP 636: Performed by: STUDENT IN AN ORGANIZED HEALTH CARE EDUCATION/TRAINING PROGRAM

## 2025-02-15 PROCEDURE — 85018 HEMOGLOBIN: CPT

## 2025-02-15 PROCEDURE — 96374 THER/PROPH/DIAG INJ IV PUSH: CPT | Mod: 59

## 2025-02-15 PROCEDURE — 84484 ASSAY OF TROPONIN QUANT: CPT

## 2025-02-15 PROCEDURE — 36415 COLL VENOUS BLD VENIPUNCTURE: CPT

## 2025-02-15 PROCEDURE — 99285 EMERGENCY DEPT VISIT HI MDM: CPT | Mod: 25

## 2025-02-15 PROCEDURE — 93306 TTE W/DOPPLER COMPLETE: CPT | Mod: 26 | Performed by: INTERNAL MEDICINE

## 2025-02-15 PROCEDURE — 87637 SARSCOV2&INF A&B&RSV AMP PRB: CPT

## 2025-02-15 PROCEDURE — 85004 AUTOMATED DIFF WBC COUNT: CPT

## 2025-02-15 PROCEDURE — 120N000001 HC R&B MED SURG/OB

## 2025-02-15 PROCEDURE — 93306 TTE W/DOPPLER COMPLETE: CPT

## 2025-02-15 PROCEDURE — 83880 ASSAY OF NATRIURETIC PEPTIDE: CPT

## 2025-02-15 RX ORDER — PANTOPRAZOLE SODIUM 40 MG/1
40 TABLET, DELAYED RELEASE ORAL
Status: DISCONTINUED | OUTPATIENT
Start: 2025-02-16 | End: 2025-02-20 | Stop reason: HOSPADM

## 2025-02-15 RX ORDER — ENOXAPARIN SODIUM 100 MG/ML
40 INJECTION SUBCUTANEOUS EVERY 24 HOURS
Status: DISCONTINUED | OUTPATIENT
Start: 2025-02-15 | End: 2025-02-20 | Stop reason: HOSPADM

## 2025-02-15 RX ORDER — ACETAMINOPHEN 325 MG/1
975 TABLET ORAL EVERY 6 HOURS PRN
Status: DISCONTINUED | OUTPATIENT
Start: 2025-02-15 | End: 2025-02-20 | Stop reason: HOSPADM

## 2025-02-15 RX ORDER — DOXYCYCLINE 100 MG/1
100 CAPSULE ORAL 2 TIMES DAILY
Status: ON HOLD | COMMUNITY
Start: 2025-02-14 | End: 2025-02-20

## 2025-02-15 RX ORDER — AMOXICILLIN 250 MG
2 CAPSULE ORAL 2 TIMES DAILY PRN
Status: DISCONTINUED | OUTPATIENT
Start: 2025-02-15 | End: 2025-02-20 | Stop reason: HOSPADM

## 2025-02-15 RX ORDER — CALCIUM CARBONATE 500 MG/1
1000 TABLET, CHEWABLE ORAL 4 TIMES DAILY PRN
Status: DISCONTINUED | OUTPATIENT
Start: 2025-02-15 | End: 2025-02-20 | Stop reason: HOSPADM

## 2025-02-15 RX ORDER — FUROSEMIDE 10 MG/ML
60 INJECTION INTRAMUSCULAR; INTRAVENOUS ONCE
Status: COMPLETED | OUTPATIENT
Start: 2025-02-15 | End: 2025-02-15

## 2025-02-15 RX ORDER — AMLODIPINE BESYLATE 5 MG/1
5 TABLET ORAL DAILY
Status: DISCONTINUED | OUTPATIENT
Start: 2025-02-16 | End: 2025-02-20 | Stop reason: HOSPADM

## 2025-02-15 RX ORDER — OMEPRAZOLE 20 MG/1
20 CAPSULE, DELAYED RELEASE ORAL DAILY
Status: DISCONTINUED | OUTPATIENT
Start: 2025-02-16 | End: 2025-02-15

## 2025-02-15 RX ORDER — GUAIFENESIN 400 MG/1
1 TABLET ORAL EVERY 6 HOURS PRN
COMMUNITY

## 2025-02-15 RX ORDER — MEMANTINE HYDROCHLORIDE 5 MG/1
10 TABLET ORAL 2 TIMES DAILY
Status: DISCONTINUED | OUTPATIENT
Start: 2025-02-15 | End: 2025-02-20 | Stop reason: HOSPADM

## 2025-02-15 RX ORDER — TROLAMINE SALICYLATE 10 G/100G
CREAM TOPICAL 2 TIMES DAILY
COMMUNITY

## 2025-02-15 RX ORDER — TROLAMINE SALICYLATE 10 G/100G
CREAM TOPICAL 2 TIMES DAILY
Status: DISCONTINUED | OUTPATIENT
Start: 2025-02-15 | End: 2025-02-20 | Stop reason: HOSPADM

## 2025-02-15 RX ORDER — ASPIRIN 81 MG/1
81 TABLET, CHEWABLE ORAL DAILY
Status: DISCONTINUED | OUTPATIENT
Start: 2025-02-16 | End: 2025-02-20 | Stop reason: HOSPADM

## 2025-02-15 RX ORDER — HYDROCORTISONE 25 MG/G
CREAM TOPICAL 2 TIMES DAILY PRN
Status: DISCONTINUED | OUTPATIENT
Start: 2025-02-15 | End: 2025-02-20 | Stop reason: HOSPADM

## 2025-02-15 RX ORDER — OMEPRAZOLE 20 MG/1
20 CAPSULE, DELAYED RELEASE ORAL DAILY
COMMUNITY

## 2025-02-15 RX ORDER — ONDANSETRON 4 MG/1
4 TABLET, ORALLY DISINTEGRATING ORAL EVERY 8 HOURS PRN
Status: DISCONTINUED | OUTPATIENT
Start: 2025-02-15 | End: 2025-02-20 | Stop reason: HOSPADM

## 2025-02-15 RX ORDER — FUROSEMIDE 20 MG/1
20 TABLET ORAL DAILY
Status: ON HOLD | COMMUNITY
End: 2025-02-20

## 2025-02-15 RX ORDER — FERROUS SULFATE 325(65) MG
325 TABLET ORAL
Status: DISCONTINUED | OUTPATIENT
Start: 2025-02-16 | End: 2025-02-20 | Stop reason: HOSPADM

## 2025-02-15 RX ORDER — AMLODIPINE BESYLATE 5 MG/1
10 TABLET ORAL DAILY
Status: DISCONTINUED | OUTPATIENT
Start: 2025-02-16 | End: 2025-02-15

## 2025-02-15 RX ORDER — AMLODIPINE BESYLATE 5 MG/1
5 TABLET ORAL DAILY
COMMUNITY

## 2025-02-15 RX ORDER — SALIVA STIMULANT COMB. NO.3
1 SPRAY, NON-AEROSOL (ML) MUCOUS MEMBRANE 4 TIMES DAILY PRN
Status: DISCONTINUED | OUTPATIENT
Start: 2025-02-15 | End: 2025-02-20 | Stop reason: HOSPADM

## 2025-02-15 RX ORDER — ONDANSETRON 4 MG/1
4 TABLET, ORALLY DISINTEGRATING ORAL EVERY 6 HOURS PRN
Status: DISCONTINUED | OUTPATIENT
Start: 2025-02-15 | End: 2025-02-20 | Stop reason: HOSPADM

## 2025-02-15 RX ORDER — ONDANSETRON 2 MG/ML
4 INJECTION INTRAMUSCULAR; INTRAVENOUS EVERY 6 HOURS PRN
Status: DISCONTINUED | OUTPATIENT
Start: 2025-02-15 | End: 2025-02-20 | Stop reason: HOSPADM

## 2025-02-15 RX ORDER — FUROSEMIDE 20 MG/1
20 TABLET ORAL DAILY
Status: DISCONTINUED | OUTPATIENT
Start: 2025-02-16 | End: 2025-02-20 | Stop reason: HOSPADM

## 2025-02-15 RX ORDER — NYSTATIN 100000 [USP'U]/G
POWDER TOPICAL 2 TIMES DAILY PRN
Status: DISCONTINUED | OUTPATIENT
Start: 2025-02-15 | End: 2025-02-15

## 2025-02-15 RX ORDER — LIDOCAINE 40 MG/G
CREAM TOPICAL
Status: DISCONTINUED | OUTPATIENT
Start: 2025-02-15 | End: 2025-02-20 | Stop reason: HOSPADM

## 2025-02-15 RX ORDER — PROCHLORPERAZINE MALEATE 5 MG/1
5 TABLET ORAL EVERY 6 HOURS PRN
Status: DISCONTINUED | OUTPATIENT
Start: 2025-02-15 | End: 2025-02-20 | Stop reason: HOSPADM

## 2025-02-15 RX ORDER — FUROSEMIDE 10 MG/ML
20 INJECTION INTRAMUSCULAR; INTRAVENOUS ONCE
Status: COMPLETED | OUTPATIENT
Start: 2025-02-15 | End: 2025-02-15

## 2025-02-15 RX ORDER — NYSTATIN 100000 [USP'U]/G
POWDER TOPICAL 2 TIMES DAILY PRN
COMMUNITY

## 2025-02-15 RX ORDER — POLYETHYLENE GLYCOL 3350 17 G/17G
17 POWDER, FOR SOLUTION ORAL DAILY PRN
Status: DISCONTINUED | OUTPATIENT
Start: 2025-02-15 | End: 2025-02-20 | Stop reason: HOSPADM

## 2025-02-15 RX ORDER — IOPAMIDOL 755 MG/ML
75 INJECTION, SOLUTION INTRAVASCULAR ONCE
Status: COMPLETED | OUTPATIENT
Start: 2025-02-15 | End: 2025-02-15

## 2025-02-15 RX ORDER — HYDROCORTISONE 25 MG/G
CREAM TOPICAL 2 TIMES DAILY PRN
COMMUNITY

## 2025-02-15 RX ORDER — AMOXICILLIN 250 MG
1 CAPSULE ORAL 2 TIMES DAILY PRN
Status: DISCONTINUED | OUTPATIENT
Start: 2025-02-15 | End: 2025-02-20 | Stop reason: HOSPADM

## 2025-02-15 RX ADMIN — FUROSEMIDE 20 MG: 10 INJECTION, SOLUTION INTRAVENOUS at 17:09

## 2025-02-15 RX ADMIN — ENOXAPARIN SODIUM 40 MG: 40 INJECTION SUBCUTANEOUS at 15:01

## 2025-02-15 RX ADMIN — IOPAMIDOL 75 ML: 755 INJECTION, SOLUTION INTRAVENOUS at 11:08

## 2025-02-15 RX ADMIN — QUETIAPINE FUMARATE 12.5 MG: 25 TABLET ORAL at 20:24

## 2025-02-15 RX ADMIN — FUROSEMIDE 60 MG: 10 INJECTION, SOLUTION INTRAVENOUS at 11:17

## 2025-02-15 RX ADMIN — MEMANTINE HYDROCHLORIDE 10 MG: 5 TABLET, FILM COATED ORAL at 20:24

## 2025-02-15 ASSESSMENT — ACTIVITIES OF DAILY LIVING (ADL)
DEPENDENT_IADLS:: CLEANING;COOKING;LAUNDRY;SHOPPING;MEAL PREPARATION;MEDICATION MANAGEMENT;MONEY MANAGEMENT;TRANSPORTATION;INCONTINENCE
ADLS_ACUITY_SCORE: 58

## 2025-02-15 NOTE — PHARMACY-ADMISSION MEDICATION HISTORY
Pharmacy Intern Admission Medication History    Admission medication history is complete. The information provided in this note is only as accurate as the sources available at the time of the update.    Information Source(s): MAR and CareEverywhere/SureScripts via in-person    Pertinent Information:   Patient is from [facility] and called ([phone]) to get a MAR.     Changes made to PTA medication list:  Added:   Aspercream 10% cream  Furosemide 20 mg tab  Doxycycline 100 mg cap  Omeprazole 20 mg cap   Guaifenesin 400 mg tab]  Hydrocortisone 2.5% cream   Nystatin Powder 100,000 usp/gm   Deleted: None  Changed: None    Allergies reviewed with patient and updates made in EHR: yes    PTA Med List   Medication Sig Note Last Dose/Taking    acetaminophen (TYLENOL) 325 MG tablet Take 3 tablets (975 mg) by mouth every 6 hours as needed for mild pain 2/15/2025: Received 4 doses yesterday but unsure of time of last dose.  2/14/2025    amLODIPine (NORVASC) 5 MG tablet Take 5 mg by mouth daily.  2/15/2025 at  7:45 AM    aspirin (ASPIRIN LOW DOSE) 81 MG chewable tablet Take 1 tablet (81 mg) by mouth daily Restart on 02/01/2024  2/15/2025 at  7:45 AM    doxycycline hyclate (VIBRAMYCIN) 100 MG capsule Take 100 mg by mouth 2 times daily.  2/15/2025 at  7:45 AM    ferrous sulfate (FEROSUL) 325 (65 Fe) MG tablet Take 1 tablet (325 mg) by mouth daily (with breakfast).  2/15/2025 at  7:45 AM    furosemide (LASIX) 20 MG tablet Take 20 mg by mouth daily.  2/15/2025 at  7:45 AM    guaiFENesin 400 MG TABS Take 1 tablet by mouth every 6 hours as needed. 2/15/2025: Patient received 4 doses yesterday but unsure of time of last dose 2/14/2025    hydrocortisone 2.5 % cream Apply topically 2 times daily as needed for other (Hemorrhoids). 2/15/2025: Patient received 2 doses yesterday but unsure of last dose 2/14/2025    ibuprofen (ADVIL/MOTRIN) 600 MG tablet Take 1 tablet (600 mg) by mouth every 6 hours as needed for moderate pain  2/15/2025: Unsure of time of patients last dose but she did receive 4 doses yesterday 2/14/2025    lovastatin (MEVACOR) 40 MG tablet 1 TABLET ORALLY AT BEDTIME  2/14/2025 at  8:00 PM    memantine (NAMENDA) 10 MG tablet 1 TABLET ORALLY 2 TIMES DAILY  2/15/2025 at  7:45 AM    nystatin (MYCOSTATIN) 728902 UNIT/GM external powder Apply topically 2 times daily as needed for other (Redness). 2/15/2025: Patient received 2 doses yesterday but unsure of time of last dose.  2/14/2025    omeprazole (PRILOSEC) 20 MG DR capsule Take 20 mg by mouth daily. Take 30 minutes prior to meal  2/15/2025 at  7:45 AM    ondansetron (ZOFRAN ODT) 4 MG ODT tab Take 1 tablet (4 mg) by mouth every 8 hours as needed for nausea 2/15/2025: Patient received 3 doses yesterday and unsure of time for last dose 2/14/2025    polyethylene glycol (MIRALAX) 17 GM/Dose powder Take 17 g by mouth daily as needed for constipation  2/14/2025    QUEtiapine (SEROQUEL) 25 MG tablet Take 0.5 tablets (12.5 mg) by mouth every evening.  2/14/2025 at  8:00 PM    trolamine salicylate (ASPERCREME) 10 % external cream Apply topically 2 times daily.  2/15/2025 at  7:45 AM    vitamin C (ASCORBIC ACID) 250 MG tablet Take 1 tablet (250 mg) by mouth daily.  2/15/2025 at  7:45 AM       Medications Available during hospital stay: NONE    Medication History Completed By: Shahla Swain  2/15/2025 11:40 AM

## 2025-02-15 NOTE — ED PROVIDER NOTES
IPatricia have reviewed the documentation, personally taken the patient's history, performed an exam and agree with the physical finds, diagnosis and management plan.    HPI: 89-year-old female history of HTN, HLD, previous CVA developed shortness of breath yesterday.  Had chest x-ray at care facility that reportedly showed pneumonia and CHF.  Was started on Doxy, Lasix 20 mg.  She has had 2 doses.  This morning increased shortness of breath and reportedly sats in the 50s at facility but when EMS arrived sats were 82% on room air.  She is on 4 L.  No history of DVT PE.  Patient is full code.    Physical Exam: Pleasant elderly female in no acute distress.  Breath sounds decreased in bilateral bases with some crackles.  She is on 3 L oxygen currently.  Trace pedal edema, wearing compression stockings bilaterally.    MDM: CHF, pulmonary edema, pneumonia, PE, symptomatic anemia, arrhythmia or ACS    ED Course/workup: Laboratory workup, EKG and CT PE study reveal elevated D-dimer but no PE, elevated BNP and evidence of pulmonary edema and pleural effusion.  Treated with Lasix, admitted to medicine given hypoxic respiratory failure.      ED Course as of 02/15/25 1152   Sat Feb 15, 2025   1027 N-Terminal Pro Bnp(!): 4,579  Nml 1 yr ago   1028 Troponin T, High Sensitivity(!): 20  Appears chronic   1032 D-Dimer Quantitative(!): 6.52   1116 CT Chest Pulmonary Embolism w Contrast  CT independently interpreted by myself without visualized central PE, bilateral effusions visualized       EKG:  EKG individually read and interpreted by myself:  Sinus rhythm rate 74.  T wave inversion in inferior leads III and aVF new compared to 6/20/2024.  Low voltages.  QRS 80 QTc 446.    Final Diagnosis:     ICD-10-CM    1. Acute hypoxemic respiratory failure (H)  J96.01       2. Congestive heart failure, unspecified HF chronicity, unspecified heart failure type (H)  I50.9       3. Pleural effusion  J90       4. Acute pulmonary edema (H)   J81.0               I personally saw the patient and performed a substantive portion of the visit including all aspects of the medical decision making.  I personally made/approved the management plan and take responsibility for the patient management.     MD Desmond Lobo Zabrina N, MD  02/15/25 1152

## 2025-02-15 NOTE — ED NOTES
Pt ate 25% of meal tray. Daughters report pt requires soft foods and for foods to be cut small d/t esophageal stricture. Pad changed and everardo cares provided. Skin to everardo area intact. Purewick in place. No BM at this time.

## 2025-02-15 NOTE — ED PROVIDER NOTES
EMERGENCY DEPARTMENT ENCOUNTER      NAME: Angle Agudelo  AGE: 89 year old female  YOB: 1932  MRN: [id number]  EVALUATION DATE & TIME: 2/15/2025  9:10 AM    PCP: Koko Betancourt    ED PROVIDER: Mari Gonzalez PA-C      Chief Complaint   Patient presents with    Shortness of Breath     FINAL IMPRESSION:  No diagnosis found.    ED COURSE & MEDICAL DECISION MAKING:    Pertinent Labs & Imaging studies reviewed. (See chart for details)  89 year old female presents to the Emergency Department for evaluation of shortness of breath.  Per EMS patient was recently diagnosed with CHF and pneumonia.  Patient is been taking 20 mg of Lasix daily and doxycycline.  Patient's nursing home staff noted the patient was increased short of breath this morning.  Patient's oxygen saturation was 82% and EMS placed her on 4 L of oxygen.  Patient denies any leg swelling or chest pain.  Patient is resting comfortably.  Vital signs reviewed and patient is satting at 92% on 4 L of oxygen.  On exam patient is alert moving all 4 extremities bilaterally.  Abdomen is soft and nontender.  Normal rate.  Lungs are clear to auscultation bilaterally.  No stridor.  No respiratory distress.  Bilateral lower extremities are without edema.    Differential diagnosis includes ACS, pericarditis, myocarditis, pneumonia, pneumothorax, hemothorax, PE, COVID, influenza, RSV, CHF.  CBC shows a white blood cell count of 3.7 and hemoglobin of 13.1.  Basic shows a sodium and potassium with which is an within normal notes.  Creatinine is 0.83.  GFR 65.  BNP is elevated 4579.  60mg of Lasix was ordered.  Initial troponin is 20.  Delta troponin is pending.  D-dimer is positive.  CT PE study shows no acute pulmonary embolism.  Small symmetric pleural effusions and mild mixed interstitial and alveolar lung edema.  There is also soft tissue edema in the mediastinal fat and symmetric enlarged omar and mediastinal lymph nodes consistent with reactive nodes.   Severe atheromatous coronary calcifications and small pericardial effusions.  No discernible change in the low-attenuation of the left thyroid mass procalcitonin is not elevated.  COVID, RSV is negative.  Patient is resting comfortably and patient and family were educated on the results.  Patient and family do not want to transfer to another hospital and would like to be admitted to [facility].  I spoke with the hospitalist who agrees to admit the patient.  All questions answered.  Patient family agree with plan.    ED COURSE:   9:17 AM  I saw the patient.   9:31 AM Called the nursing home facility.   9:31 AM Staffed with Dr. Logan.   10:36 AM Patient is full code.  11:44 AM I checked on the patient. Patient agrees with the plan. All questions answered.   12:03 PM I spoke with the hospitalist who agrees to meet the patient  ED Course as of 02/15/25 1142   Sat Feb 15, 2025   1027 N-Terminal Pro Bnp(!): 4,579  Nml 1 yr ago   1028 Troponin T, High Sensitivity(!): 20  Appears chronic   1032 D-Dimer Quantitative(!): 6.52   1116 CT Chest Pulmonary Embolism w Contrast  CT independently interpreted by myself without visualized central PE, bilateral effusions visualized       At the conclusion of the encounter I discussed the results of all of the tests and the disposition. The questions were answered. The patient or family acknowledged understanding and was agreeable with the care plan.     0 minutes of critical care time       Medical Decision Making  Obtained supplemental history:Supplemental history obtained?: No  Reviewed external records: External records reviewed?: No  Care impacted by chronic illness:Documented in Chart  Care significantly affected by social determinants of health:N/A  Did you consider but not order tests?: Work up considered but not performed and documented in chart, if applicable  Did you interpret images independently?: Independent interpretation of ECG and images noted in documentation, when  applicable.  Consultation discussion with other provider:Did you involve another provider (consultant, , pharmacy, etc.)?: I discussed the care with another health care provider, see documentation for details.  Admit.    Not Applicable    MEDICATIONS GIVEN IN THE EMERGENCY:  Medications - No data to display    NEW PRESCRIPTIONS STARTED AT TODAY'S ER VISIT  New Prescriptions    No medications on file          =================================================================    HPI    Patient information was obtained from: patient    Use of : N/A         Angle Agudelo is a 89 year old female with a pertinent history of memory impairment, CVA, dementia, hypertension, who presents to this ED for evaluation of shortness of breath.    Per nursing facility patient was  diagnosed with CHF and pneumonia via chest XR yesterday after patient had increased work of breathing.  Patient's been taking 20 mg of Lasix daily as well as doxycycline for one day. This morning patient's facility noted that patient's oxygen saturation was in the 50s which prompted them to call EMS.  Upon arrival of EMS, patient was satting at 82% was placed on 4 L of oxygen.      Denies headache, dizziness, lightheadedness, cough, congestion, runny nose, fevers, chills, chest pain, shortness of breath, abdominal pain, nausea, vomiting, diarrhea and all other symptoms.    REVIEW OF SYSTEMS   As per HPI    PAST MEDICAL HISTORY:  Past Medical History:   Diagnosis Date    Acute cerebrovascular accident (CVA) (H) 06/08/2020    Benign neoplasm of colon     Chronic bilateral low back pain without sciatica     Chronic bilateral low back pain without sciatica 08/12/2016    Dementia (H)     Disorder of bone and cartilage, unspecified     Dyspnea on exertion     Female genital prolapse     Gastroenteritis 6/14/2021    GI bleed     Hiatal hernia 6/16/2021    Memory impairment     Orthopnea     Pure hypercholesterolemia     Rectal prolapse     Skin cancer      Not Melanoma, not sure what kind though    Spinal stenosis of lumbar region     Swelling of both lower extremities     Thickened endometrium     Unspecified arthropathy, shoulder region     Unspecified essential hypertension     Walking troubles        PAST SURGICAL HISTORY:  Past Surgical History:   Procedure Laterality Date    APPENDECTOMY      BIOPSY CERVICAL, LOCAL EXCISION, SINGLE/MULTIPLE N/A 09/11/2018    Procedure: LOOP ELECTROSURGICAL EXCISION PROCEDURE, REMOVAL PESSARY;  Surgeon: Olive Biggs MD;  Location: [facility];  Service:     BIOPSY VULVA N/A 05/14/2020    Procedure: VULVAR BIOPSY;  Surgeon: Olive Biggs MD;  Location: [facility] OR;  Service: Gynecology    CATARACT EXTRACTION EXTRACAPSULAR W/ INTRAOCULAR LENS IMPLANTATION Bilateral     CERVICAL BIOPSY N/A 05/14/2020    Procedure: PUNCH BIOPSY, CERVIX;  Surgeon: Olive Biggs MD;  Location: [facility] OR;  Service: Gynecology    CERVICAL POLYPECTOMY N/A 10/12/2023    Procedure: AND POLYPECTOMY WITH ULTRASOUND GUIDANCE;  Surgeon: Olive Biggs MD;  Location: [facility]    CHOLECYSTECTOMY      COLONOSCOPY N/A 01/08/2020    Procedure: COLONOSCOPY;  Surgeon: Amanda Carson MD;  [facility]: [facility]    DILATION AND CURETTAGE, HYSTEROSCOPY WITH ULTRASOUND GUIDANCE N/A 10/12/2023    Procedure: HYSTEROSCOPY DILATION AND CURETTAGE;  Surgeon: Olive Biggs MD;  Location: [facility]    DILATION AND CURETTAGE, OPERATIVE HYSTEROSCOPY, COMBINED N/A 03/31/2022    Procedure: HYSTEROSCOPY;  Surgeon: Olive Biggs MD;  Location: [facility]    ESOPHAGOSCOPY, GASTROSCOPY, DUODENOSCOPY (EGD), COMBINED N/A 6/20/2024    Procedure: ESOPHAGOGASTRODUODENOSCOPY WITH CLIP PLACEMENT;  Surgeon: Truman Brown MD, MD;  Location: [facility]    ESOPHAGOSCOPY, GASTROSCOPY, DUODENOSCOPY (EGD), DILATATION, COMBINED N/A 9/25/2024    Procedure: ESOPHAGOGASTRODUODENOSCOPY WITH  Balloon DILATATION;  Surgeon: Fausto Hadley DO;  Location: [facility]    HC DILATION/CURETTAGE DIAG/THER NON OB N/A 05/14/2020    Procedure: DILATION AND CURETTAGE, UTERUS;  Surgeon: Olive Biggs MD;  Location: [facility];  Service: Gynecology    IR EXTREMITY ANGIOGRAM RIGHT  02/13/2018    LEFORTE COLPOCLEISIS PROCEDURE (VAGINAL OBLITERATION) N/A 1/5/2024    Procedure: COLPOCLEISIS;  Surgeon: Olive Biggs MD;  Location: [facility]    ORTHOPEDIC SURGERY      OTHER SURGICAL HISTORY      [id number]    OTHER SURGICAL HISTORY      finger abscess    PERINEORRHAPHY N/A 1/5/2024    Procedure: PERINEORRAPHY;  Surgeon: Olive Biggs MD;  Location: [facility]    WA ESOPHAGOGASTRODUODENOSCOPY TRANSORAL DIAGNOSTIC N/A 06/16/2021    Procedure: ESOPHAGOGASTRODUODENOSCOPY (EGD);  Surgeon: Primitivo Lafleur MD;  Location: [facility];  Service: Gastroenterology    RECTOSIGMOIDECTOMY PERINEAL N/A 1/5/2024    Procedure: PERINEAL RECTOSIGMOIDECTOMY;  Surgeon: Shannon Haines MD;  Location: [facility]    SHOULDER SURGERY      bilateral shoulder    SIGMOIDOSCOPY FLEXIBLE N/A 06/03/2018    Procedure: SIGMOIDOSCOPY;  Surgeon: Dayana Joshi MD;  Location: [facility] GI;  Service:     TONSILLECTOMY & ADENOIDECTOMY      TOTAL SHOULDER ARTHROPLASTY Bilateral R 7/12 L 9/11     BIOPSY THYROID FINE NEEDLE ASPIRATION  08/07/2020           CURRENT MEDICATIONS:    acetaminophen (TYLENOL) 325 MG tablet  amLODIPine (NORVASC) 10 MG tablet  aspirin (ASPIRIN LOW DOSE) 81 MG chewable tablet  ferrous sulfate (FEROSUL) 325 (65 Fe) MG tablet  ibuprofen (ADVIL/MOTRIN) 600 MG tablet  lovastatin (MEVACOR) 40 MG tablet  memantine (NAMENDA) 10 MG tablet  ondansetron (ZOFRAN ODT) 4 MG ODT tab  polyethylene glycol (MIRALAX) 17 GM/Dose powder  QUEtiapine (SEROQUEL) 25 MG tablet  vitamin C (ASCORBIC ACID) 250 MG tablet        ALLERGIES:  Allergies   Allergen Reactions    Atorvastatin  Muscle Pain (Myalgia)    Dextromethorphan Hives    Oxycodone Unknown    Pravastatin Muscle Pain (Myalgia)    Simvastatin Muscle Pain (Myalgia)    Codeine Rash       FAMILY HISTORY:  Family History   Problem Relation Age of Onset    Heart Disease Father     Coronary Artery Disease Father     Cerebrovascular Disease Father     Coronary Artery Disease Brother     Coronary Artery Disease Brother     Dementia Sister        SOCIAL HISTORY:   Social History     Socioeconomic History    Marital status:    Tobacco Use    Smoking status: Never    Smokeless tobacco: Never   Vaping Use    Vaping status: Never Used   Substance and Sexual Activity    Alcohol use: Yes     Alcohol/week: 2.0 standard drinks of alcohol     Comment: social    Drug use: No     Social Drivers of Health     Financial Resource Strain: Low Risk  (12/19/2023)    Financial Resource Strain     Within the past 12 months, have you or your family members you live with been unable to get utilities (heat, electricity) when it was really needed?: No   Food Insecurity: Low Risk  (12/19/2023)    Food Insecurity     Within the past 12 months, did you worry that your food would run out before you got money to buy more?: No     Within the past 12 months, did the food you bought just not last and you didn t have money to get more?: No   Transportation Needs: Low Risk  (12/19/2023)    Transportation Needs     Within the past 12 months, has lack of transportation kept you from medical appointments, getting your medicines, non-medical meetings or appointments, work, or from getting things that you need?: No   Interpersonal Safety: Low Risk  (9/12/2024)    Interpersonal Safety     Do you feel physically and emotionally safe where you currently live?: Yes     Within the past 12 months, have you been hit, slapped, kicked or otherwise physically hurt by someone?: No     Within the past 12 months, have you been humiliated or emotionally abused in other ways by your  partner or ex-partner?: No   Housing Stability: Low Risk  (12/19/2023)    Housing Stability     Do you have housing? : Yes     Are you worried about losing your housing?: No       VITALS:  LMP  (LMP Unknown)     PHYSICAL EXAM    Physical Exam  Vitals and nursing note reviewed.   Constitutional:       General: She is not in acute distress.     Appearance: Normal appearance. She is not ill-appearing, toxic-appearing or diaphoretic.      Interventions: She is not intubated.  HENT:      Head: Atraumatic.      Right Ear: External ear normal.      Left Ear: External ear normal.      Nose: Nose normal.      Mouth/Throat:      Mouth: Mucous membranes are moist.   Eyes:      Conjunctiva/sclera: Conjunctivae normal.      Pupils: Pupils are equal, round, and reactive to light.   Cardiovascular:      Rate and Rhythm: Normal rate and regular rhythm.      Pulses: Normal pulses.      Heart sounds: Normal heart sounds. No murmur heard.     No friction rub. No gallop.   Pulmonary:      Effort: Pulmonary effort is normal. No tachypnea, bradypnea, accessory muscle usage or respiratory distress. She is not intubated.      Breath sounds: Normal breath sounds. No stridor. No decreased breath sounds, wheezing, rhonchi or rales.   Abdominal:      Tenderness: There is no abdominal tenderness. There is no guarding or rebound.   Musculoskeletal:         General: Normal range of motion.      Cervical back: Normal range of motion.      Right lower leg: No tenderness. No edema.      Left lower leg: No tenderness. No edema.   Skin:     General: Skin is dry.      Capillary Refill: Capillary refill takes less than 2 seconds.   Neurological:      General: No focal deficit present.      Mental Status: She is alert. Mental status is at baseline.   Psychiatric:         Mood and Affect: Mood normal.         Thought Content: Thought content normal.          LAB:  All pertinent labs reviewed and interpreted.  Labs Ordered and Resulted from Time of ED  Arrival to Time of ED Departure - No data to display     RADIOLOGY:  Reviewed all pertinent imaging. Please see official radiology report.  No orders to display       EKG:    Performed at: 02/15/2025 09:17    Impression: Sinus Rhythm  Septal Infarct, age undetermined  Abnormal ECG    Rate: 74 BPM  Rhythm: Sinus Rhythm  Axis: 48 231 -15  AZ Interval: 166 ms  QRS Interval: 80 ms  QTc Interval: 402/446  ST Changes: Significant Changes have occurred  Comparison: 06/20/2024 12:30    I have independently reviewed and interpreted the EKG(s) documented above.      Mari Gonzalez PA-C  [facility] EMERGENCY ROOM  [facility] [facility] [address] [phone]   Troponin T, High Sensitivity 20 (*)    D DIMER QUANTITATIVE - Abnormal    D-Dimer Quantitative 6.52 (*)    CBC WITH PLATELETS AND DIFFERENTIAL - Abnormal    WBC Count 3.7 (*)     RBC Count 4.23      Hemoglobin 13.1      Hematocrit 42.2            MCH 31.0      MCHC 31.0 (*)     RDW 19.9 (*)     Platelet Count 149 (*)     % Neutrophils 70      % Lymphocytes 21      % Monocytes 7      % Eosinophils 1      % Basophils 1      % Immature Granulocytes 1      NRBCs per 100 WBC 0      Absolute Neutrophils 2.5      Absolute Lymphocytes 0.8      Absolute Monocytes 0.3      Absolute Eosinophils 0.0      Absolute Basophils 0.0      Absolute Immature Granulocytes 0.0      Absolute NRBCs 0.0     TROPONIN T, HIGH SENSITIVITY - Abnormal    Troponin T, High Sensitivity 18 (*)    INFLUENZA A/B, RSV AND SARS-COV2 PCR - Normal    Influenza A PCR Negative      Influenza B PCR Negative      RSV PCR Negative      SARS CoV2 PCR Negative     PROCALCITONIN - Normal    Procalcitonin 0.04          RADIOLOGY:  Reviewed all pertinent imaging. Please see official radiology report.  XR Chest Port 1 View   Final Result   IMPRESSION: The heart is mildly enlarged, similar prior exam. There is central pulmonary venous congestion with interstitial edema seen in the perihilar and lower lung zones. Small right-sided pleural effusion and a small left pleural effusion are    present      Echocardiogram Complete   Final Result      CT Chest Pulmonary Embolism w Contrast   Final Result   IMPRESSION:      1.  No acute pulmonary embolism.   2.  Small symmetric pleural effusions and mild mixed interstitial and alveolar lung edema. There is also soft tissue edema in the mediastinal fat and symmetric enlarged hilar and mediastinal lymph nodes consistent with reactive nodes.   3.  Severe atheromatous coronary calcifications and small pericardial effusion.   4.  No discernible change in low-attenuation left thyroid mass.          EKG:    Performed at:  02/15/2025 09:17    Impression: Sinus Rhythm  Septal Infarct, age undetermined  Abnormal ECG    Rate: 74 BPM  Rhythm: Sinus Rhythm  Axis: 48 231 -15  MT Interval: 166 ms  QRS Interval: 80 ms  QTc Interval: 402/446  ST Changes: Significant Changes have occurred  Comparison: 06/20/2024 12:30    I have independently reviewed and interpreted the EKG(s) documented above.      Mari Gonzalez PA-C  [facility] EMERGENCY ROOM  [facility] [facility] [address] [phone]     Mari Gonzalez PA-C  02/22/25 [id number]

## 2025-02-15 NOTE — H&P
[facility]    History and Physical - Hospitalist Service       Date of Admission:  2/15/2025    Assessment & Plan      Angle Agudelo is a 89 year old female admitted on 2/15/2025. She has a past medical history of hypertension, hyperlipidemia, dementia, prior history of Jihan-Carey tear, previous CVA, hospitalized in June 2024 (for hematemesis from a pill that was stuck, underwent EGD and discharged with Protonix and GI follow-up and also treated for UTI), who currently resides at [facility] (not in [facility] but [facility]) and recently diagnosed with pneumonia and CHF and given 20 mg oral Lasix and doxycycline and the patient had taken only 2 doses of each when on day of admission, she was found by facility staff to be very short of breath and was saturating reportedly in the 50s, EMS was contacted and they put her up to 5 L and she was brought here.      In the ER, she is currently hemodynamically vitally stable on 2 L.  Troponin was 20 and follow-up at 18 stable, BMP was not remarkable with creatinine 0.8, no leukocytosis, hemoglobin stable at 13.1, D-dimer was positive but follow-up CT study showed no PE but does demonstrate bilateral small effusions with interstitial findings consistent with volume overload, negative for flu COVID and RSV, elevated BNP at 4579, given IV Lasix 60 mg and admitted.  Will resume with a lower dose p.m. diuresis as well as follow-up morning diuresis.  Therapies and care management to see as well.  Stable on admission.  Will obtain echo (last echo in June 2020 with EF of 55-60%).  Consider cardiology input if hospitalization develops complications.     -----  Acute respiratory failure with hypoxia  Acute pulmonary edema, likely acute heart failure w/ preserved EF  Bilateral small effusions, elevated bnp   A- as above  P:  - admit to hospital  - received 60mg iv lasix this morning at 11pm; will give next lower dose of 20mg iv at 5pm  - in the AM  start 40mg iv lasix daily and re-assess  - I/Os and weights  - Echo (last in 2020)   - consider Cardiology input pending clinical course    Hypertension  Hyperlipidemia  Assessment-normotensive pressures currently.  On regimen of amlodipine  Plan:  -Continue home medications with hold parameters    History of dementia  A/P-comes from [facility], therapies and social work to see  Avoid Zyprexa given the dementia history, would opt for additional Seroquel prn, currently has in evening    Prior history of Jihan-Carey tear  A/P-noted, monitor clinically    Prior history of CVA  A/P-neurochecks and monitor clinically  - continue asa and statin    GOC and Code status  A/p-appreciate ER team already reaching out to multiple family members- Regarding code status, initially the 2 daughters, and son en route, are involved and they initially wanted to honor their stepfather's request in the past for FULL code which is what they told the ER team (that was only the 2 daughters, NOT the POA) but they report that pt's current  (the adult childrens' stepfather) requires help at the [facility] and is not decisional and hence the son Daniele is the POA. As such, writer called Daniele; Daniele and daughters Will and Daija -all 3 - are in agreement for DNR/DNI.  -As such, will update code to DNR/DNI as per POA's decision. This was all done with the patient next to writer.   - believe would benefit from GOC in long term  -Consult palliative care, they will be able to see as soon as 2/17             Diet: Fluid restriction 2000 ML FLUID  Combination Diet Low Saturated Fat Na <2400mg Diet, No Caffeine Diet   DVT Prophylaxis: Enoxaparin (Lovenox) SQ and Pneumatic Compression Devices  Graves Catheter: Not present  Lines: None     Cardiac Monitoring: ACTIVE order. Indication: Acute decompensated heart failure (48 hours)  Code Status: No CPR- Do NOT Intubate     Clinically Significant Risk Factors Present on Admission                 # Drug  Induced Platelet Defect: home medication list includes an antiplatelet medication   # Hypertension: Noted on problem list     # Dementia: noted on problem list           # Financial/Environmental Concerns:           Disposition Plan     Medically Ready for Discharge: Anticipated in 2-4 Days           Javed Reyna MD  Hospitalist Service  [facility]  Securely message with [employer] (more info)  Text page via [employer] Paging/Directory     ______________________________________________________________________    Chief Complaint   Not sure; she is not oriented fully    History is obtained from the patient's 3 adult children including Will and Daija here and we called Daniele the POA and Son on his way in. Pt gives very limited history 2/2 condition    History of Present Illness   Angle Agudelo is a 89 year old female who has a past medical history of hypertension, hyperlipidemia, dementia, prior history of Jihan-Carey tear, previous CVA, hospitalized in June 2024 (for hematemesis from a pill that was stuck, underwent EGD and discharged with Protonix and GI follow-up and also treated for UTI), who currently resides at [facility] (not in [facility] care but [facility]) and recently diagnosed with pneumonia and CHF and given 20 mg oral Lasix and doxycycline and the patient had taken only 2 doses of each when on day of admission, she was found by facility staff to be very short of breath and was saturating reportedly in the 50s, EMS was contacted and they put her up to 5 L and she was brought here.      In the ER, she is currently hemodynamically vitally stable on 2 L.  Troponin was 20 and follow-up at 18 stable, BMP was not remarkable with creatinine 0.8, no leukocytosis, hemoglobin stable at 13.1, D-dimer was positive but follow-up CT study showed no PE but does demonstrate bilateral small effusions with interstitial findings consistent with volume overload, negative for flu COVID and RSV, elevated  BNP at 4579, given IV Lasix 60 mg and admitted.  Will resume with a lower dose p.m. diuresis as well as follow-up morning diuresis.  Therapies and care management to see as well.  Stable on admission.  Will obtain echo (last echo in June 2020 with EF of 55-60%).  Consider cardiology input if hospitalization develops complications.     On interview, the patient's daughters confirm the aforementioned and also reports no other symptoms noted including no headaches, fevers, chills, chest pain or shortness of breath including tachypnea dyspnea or coughs, no abdominal pain, no diarrhea or constipation, no dysuria, no neurologic changes or sensory changes - that they know if. Updated the daughters.     Regarding code status, initially the 2 daughters, and son en route, are involved and they initially wanted to honor their stepfather's request in the past for FULL code which is what they told the ER team (that was only the 2 daughters, NOT the POA) but they report that pt's current  (the adult childrens' stepfather) requires help at the [facility] and is not decisional and hence the son Daniele is the POA. As such, writer called Daniele; Daniele and daughters Will and Daija -all 3 - are in agreement for DNR/DNI.        Past Medical History    Past Medical History:   Diagnosis Date    Acute cerebrovascular accident (CVA) (H) 06/08/2020    Benign neoplasm of colon     Chronic bilateral low back pain without sciatica     Chronic bilateral low back pain without sciatica 08/12/2016    Congestive heart failure, unspecified HF chronicity, unspecified heart failure type (H) 2/15/2025    Dementia (H)     Disorder of bone and cartilage, unspecified     Dyspnea on exertion     Female genital prolapse     Gastroenteritis 6/14/2021    GI bleed     Hiatal hernia 6/16/2021    Memory impairment     Orthopnea     Pure hypercholesterolemia     Rectal prolapse     Skin cancer     Not Melanoma, not sure what kind though    Spinal stenosis of lumbar  region     Swelling of both lower extremities     Thickened endometrium     Unspecified arthropathy, shoulder region     Unspecified essential hypertension     Walking troubles        Past Surgical History   Past Surgical History:   Procedure Laterality Date    APPENDECTOMY      BIOPSY CERVICAL, LOCAL EXCISION, SINGLE/MULTIPLE N/A 09/11/2018    Procedure: LOOP ELECTROSURGICAL EXCISION PROCEDURE, REMOVAL PESSARY;  Surgeon: Olive Biggs MD;  Location: [facility];  Service:     BIOPSY VULVA N/A 05/14/2020    Procedure: VULVAR BIOPSY;  Surgeon: Olive Biggs MD;  Location: [facility] OR;  Service: Gynecology    CATARACT EXTRACTION EXTRACAPSULAR W/ INTRAOCULAR LENS IMPLANTATION Bilateral     CERVICAL BIOPSY N/A 05/14/2020    Procedure: PUNCH BIOPSY, CERVIX;  Surgeon: Olive Biggs MD;  Location: [facility] OR;  Service: Gynecology    CERVICAL POLYPECTOMY N/A 10/12/2023    Procedure: AND POLYPECTOMY WITH ULTRASOUND GUIDANCE;  Surgeon: Olive Biggs MD;  Location: [facility]    CHOLECYSTECTOMY      COLONOSCOPY N/A 01/08/2020    Procedure: COLONOSCOPY;  Surgeon: Amanda Carson MD;  Location:  GI    DILATION AND CURETTAGE, HYSTEROSCOPY WITH ULTRASOUND GUIDANCE N/A 10/12/2023    Procedure: HYSTEROSCOPY DILATION AND CURETTAGE;  Surgeon: Olive Biggs MD;  Location: [facility]    DILATION AND CURETTAGE, OPERATIVE HYSTEROSCOPY, COMBINED N/A 03/31/2022    Procedure: HYSTEROSCOPY;  Surgeon: Olive Biggs MD;  Location: [facility]    ESOPHAGOSCOPY, GASTROSCOPY, DUODENOSCOPY (EGD), COMBINED N/A 6/20/2024    Procedure: ESOPHAGOGASTRODUODENOSCOPY WITH CLIP PLACEMENT;  Surgeon: Truman Brown MD, MD;  Location: [facility]    ESOPHAGOSCOPY, GASTROSCOPY, DUODENOSCOPY (EGD), DILATATION, COMBINED N/A 9/25/2024    Procedure: ESOPHAGOGASTRODUODENOSCOPY WITH Balloon DILATATION;  Surgeon: Fausto Hadley DO;  Location: [facility] OR    HC DILATION/CURETTAGE DIAG/THER NON OB N/A 2020    Procedure: DILATION AND CURETTAGE, UTERUS;  Surgeon: Olive Biggs MD;  Location: [facility];  Service: Gynecology    IR EXTREMITY ANGIOGRAM RIGHT  2018    LEFORTE COLPOCLEISIS PROCEDURE (VAGINAL OBLITERATION) N/A 2024    Procedure: COLPOCLEISIS;  Surgeon: Olive Biggs MD;  Location: [facility]    ORTHOPEDIC SURGERY      OTHER SURGICAL HISTORY      [id number]    OTHER SURGICAL HISTORY      finger abscess    PERINEORRHAPHY N/A 2024    Procedure: PERINEORRAPHY;  Surgeon: Olive Biggs MD;  Location: [facility]    IN ESOPHAGOGASTRODUODENOSCOPY TRANSORAL DIAGNOSTIC N/A 2021    Procedure: ESOPHAGOGASTRODUODENOSCOPY (EGD);  Surgeon: Primitivo Lafleur MD;  Location: [facility];  Service: Gastroenterology    RECTOSIGMOIDECTOMY PERINEAL N/A 2024    Procedure: PERINEAL RECTOSIGMOIDECTOMY;  Surgeon: Shannon Haines MD;  Location: [facility]    SHOULDER SURGERY      bilateral shoulder    SIGMOIDOSCOPY FLEXIBLE N/A 2018    Procedure: SIGMOIDOSCOPY;  Surgeon: Dayana Joshi MD;  Location: [facility];  Service:     TONSILLECTOMY & ADENOIDECTOMY      TOTAL SHOULDER ARTHROPLASTY Bilateral R  L      BIOPSY THYROID FINE NEEDLE ASPIRATION  2020       Prior to Admission Medications   Prior to Admission Medications   Prescriptions Last Dose Informant Patient Reported? Taking?   QUEtiapine (SEROQUEL) 25 MG tablet 2025 at  8:00 PM  No Yes   Sig: Take 0.5 tablets (12.5 mg) by mouth every evening.   acetaminophen (TYLENOL) 325 MG tablet 2025  No Yes   Sig: Take 3 tablets (975 mg) by mouth every 6 hours as needed for mild pain   amLODIPine (NORVASC) 10 MG tablet 2/15/2025 at  7:45 AM  No Yes   Si TABLET ORALLY DAILY   aspirin (ASPIRIN LOW DOSE) 81 MG chewable tablet 2/15/2025 at  7:45 AM  No Yes   Sig: Take 1 tablet (81 mg) by mouth daily  Restart on 2024   doxycycline hyclate (VIBRAMYCIN) 100 MG capsule 2/15/2025 at  7:45 AM  Yes Yes   Sig: Take 100 mg by mouth 2 times daily.   ferrous sulfate (FEROSUL) 325 (65 Fe) MG tablet 2/15/2025 at  7:45 AM  No Yes   Sig: Take 1 tablet (325 mg) by mouth daily (with breakfast).   furosemide (LASIX) 20 MG tablet 2/15/2025 at  7:45 AM  Yes Yes   Sig: Take 20 mg by mouth daily.   guaiFENesin 400 MG TABS 2025  Yes Yes   Sig: Take 1 tablet by mouth every 6 hours as needed.   hydrocortisone 2.5 % cream 2025  Yes Yes   Sig: Apply topically 2 times daily as needed for other (Hemorrhoids).   ibuprofen (ADVIL/MOTRIN) 600 MG tablet 2025 Nursing Home No Yes   Sig: Take 1 tablet (600 mg) by mouth every 6 hours as needed for moderate pain   lovastatin (MEVACOR) 40 MG tablet 2025 at  8:00 PM  No Yes   Si TABLET ORALLY AT BEDTIME   memantine (NAMENDA) 10 MG tablet 2/15/2025 at  7:45 AM  No Yes   Si TABLET ORALLY 2 TIMES DAILY   nystatin (MYCOSTATIN) 897065 UNIT/GM external powder 2025  Yes Yes   Sig: Apply topically 2 times daily as needed for other (Redness).   omeprazole (PRILOSEC) 20 MG DR capsule 2/15/2025 at  7:45 AM  Yes Yes   Sig: Take 20 mg by mouth daily. Take 30 minutes prior to meal   ondansetron (ZOFRAN ODT) 4 MG ODT tab 2025 Nursing Home No Yes   Sig: Take 1 tablet (4 mg) by mouth every 8 hours as needed for nausea   polyethylene glycol (MIRALAX) 17 GM/Dose powder 2025 [facility] Home No Yes   Sig: Take 17 g by mouth daily as needed for constipation   trolamine salicylate (ASPERCREME) 10 % external cream 2/15/2025 at  7:45 AM  Yes Yes   Sig: Apply topically 2 times daily.   vitamin C (ASCORBIC ACID) 250 MG tablet 2/15/2025 at  7:45 AM  No Yes   Sig: Take 1 tablet (250 mg) by mouth daily.      Facility-Administered Medications: None        Review of Systems    The 10 point Review of Systems is negative other than noted in the HPI or here.      Social History   I have  reviewed this patient's social history and updated it with pertinent information if needed.  Social History     Tobacco Use    Smoking status: Never    Smokeless tobacco: Never   Vaping Use    Vaping status: Never Used   Substance Use Topics    Alcohol use: Yes     Alcohol/week: 2.0 standard drinks of alcohol     Comment: social    Drug use: No         Allergies   Allergies   Allergen Reactions    Atorvastatin Muscle Pain (Myalgia)    Dextromethorphan Hives    Oxycodone Unknown    Pravastatin Muscle Pain (Myalgia)    Simvastatin Muscle Pain (Myalgia)    Codeine Rash        Physical Exam   Vital Signs: Temp: 97.8  F (36.6  C) Temp src: Oral BP: 122/76 Pulse: 79   Resp: 20 SpO2: (!) 91 % O2 Device: Oxymask Oxygen Delivery: 3 LPM  Weight: 0 lbs 0 oz      GENERAL:  Alert, appears comfortable, in no acute distress, appears stated age   HEAD:  Normocephalic, without obvious abnormality, atraumatic   EYES:  PERRL, conjunctiva/corneas clear, no scleral icterus, EOM's intact   NOSE: Nares normal, septum midline, mucosa normal, no drainage   THROAT: Lips, mucosa, and tongue normal; teeth and gums normal, mouth moist   NECK: Supple, symmetrical, trachea midline   BACK:   Symmetric, no curvature   LUNGS:   Bilateral rales and proximal to mid fields, on 2L, later 3L after writer evaluated; and increased effort with speaking    CHEST WALL:  No tenderness or conspicuous deformity   HEART:  Regular rate and rhythm, S1 and S2 normal, no murmur, rub, or gallop, no conspicuous jugular venous distention noted, peripheral pulses intact    ABDOMEN:   Soft, non-tender, bowel sounds auscultated in all four quadrants, no masses, no organomegaly, no rebound or guarding   EXTREMITIES: Extremities normal, atraumatic, no cyanosis but she does have mild bilateral lower leg/calf and ankle edema    SKIN: Dry to touch, no exanthems in the visualized areas or erythema   NEURO: Alert, oriented as at baseline, to herself and currently also to location  but typically that is it - however she answered all questions correctly as she was able to recall information in the ER discussions, moves all four extremities of their own volition, strength is 5/5 in 4 limbs    PSYCH: Cooperative and behavior is appropriate       Medical Decision Making       80 MINUTES SPENT BY ME on the date of service doing chart review, history, exam, documentation & further activities per the note.      Data   ------------------------- PAST 24 HR DATA REVIEWED -----------------------------------------------    I have personally reviewed the following data over the past 24 hrs:    3.7 (L)  \   13.1   / 149 (L)     141 104 11.4 /  100 (H)   4.1 24 0.83 \     Trop: 18 (H) BNP: 4,579 (H)     Procal: 0.04 CRP: N/A Lactic Acid: N/A       INR:  N/A PTT:  N/A   D-dimer:  6.52 (H) Fibrinogen:  N/A       Imaging results reviewed over the past 24 hrs:   Recent Results (from the past 24 hours)   CT Chest Pulmonary Embolism w Contrast    Narrative    EXAM: CT CHEST PULMONARY EMBOLISM W CONTRAST  LOCATION: [facility]  DATE: 2/15/2025    INDICATION: elevated ddimer with sob and hypoxia  COMPARISON: CT angiogram of the chest 6/20/2024  TECHNIQUE: CT chest pulmonary angiogram during arterial phase injection of IV contrast. Multiplanar reformats and MIP reconstructions were performed. Dose reduction techniques were used.   CONTRAST: 75ml Isovue 370    FINDINGS:  ANGIOGRAM CHEST: Pulmonary arteries are normal caliber and negative for pulmonary emboli. Suboptimal contrast opacification of the aorta. Mild aortic root and aortic valve leaflet calcifications. Preserved sinotubular junction. No thoracic aortic   aneurysm. Patchy moderate atheromatous calcifications of the arch and descending thoracic aorta. No findings to suggest acute aortic syndrome.    LUNGS AND PLEURA: Symmetric small bilateral pleural effusions layer dependently. Opacities and volume loss of the dependent lower lobes  and inferior lingula consistent with atelectasis. There is smooth interlobular septal thickening in the apices and   anterior bases consistent with interstitial lung edema. The lungs have diffuse gray attenuation and a mild diffuse alveolar edema could also be present. Central airways are patent and normal caliber. There are no peribronchial fluid cuffs around the   patrick. No endoluminal airway debris.    MEDIASTINUM: Moderate mitral annular calcifications. Cardiac chambers are not enlarged. There is variant pulmonary venous anatomy with a right top pulmonary vein. Small pericardial effusion is present. There is hazy attenuation in the paratracheal and   subcarinal mediastinal fat consistent with tissue edema. Mildly enlarged lower paratracheal and symmetric hilar lymph nodes are present favored for reactive nodes. Left paratracheal mass extending to the neck is not well characterized due to streak   artifacts from the left shoulder replacement but better characterized on prior CT consistent with a thyroid mass, unchanged. The esophagus is patulous with retained fluid in the middle and distal thirds consistent with dysmotility.    CORONARY ARTERY CALCIFICATION: Severe.    UPPER ABDOMEN: Prior cholecystectomy. The abdominal aorta is tortuous and calcified. There are no new findings in the imaged upper abdomen.    MUSCULOSKELETAL: Status post bilateral reverse shoulder arthroplasties. Diffuse bone demineralization. Small to moderate cervical and thoracic spine degenerative osteophytes and disc space narrowing. There are no aggressive or destructive bone lesions.      Impression    IMPRESSION:    1.  No acute pulmonary embolism.  2.  Small symmetric pleural effusions and mild mixed interstitial and alveolar lung edema. There is also soft tissue edema in the mediastinal fat and symmetric enlarged hilar and mediastinal lymph nodes consistent with reactive nodes.  3.  Severe atheromatous coronary calcifications and small  pericardial effusion.  4.  No discernible change in low-attenuation left thyroid mass.

## 2025-02-15 NOTE — ED TRIAGE NOTES
Arrived from [facility] staff at the facility called due to Sats in the 50s. EMS found sats in low 80s. Patient with recent diagnosis of Pneumonia and CHF she is currently taking oral abx. On arrival, sats 81% on room air T 97.8. placed on o2 5 liter via mask to keep rolando above 90%.     Triage Assessment (Adult)       Row Name 02/15/25 0917          Triage Assessment    Airway WDL WDL        Respiratory WDL    Respiratory WDL X        Skin Circulation/Temperature WDL    Skin Circulation/Temperature WDL WDL        Cardiac WDL    Cardiac WDL WDL        Peripheral/Neurovascular WDL    Peripheral Neurovascular WDL X;neurovascular assessment lower;all     Capillary Refill, General less than/equal to 3 secs     Capillary Refill, LUE less than/equal to 3 secs     Capillary Refill, RUE less than/equal to 3 secs     Capillary Refill, LLE less than/equal to 3 secs     Capillary Refill, RLE less than/equal to 3 secs        Cognitive/Neuro/Behavioral WDL    Cognitive/Neuro/Behavioral WDL WDL        LUE Neurovascular Assessment    Temperature LUE cool        RUE Neurovascular Assessment    Temperature RUE cool        LLE Neurovascular Assessment    Temperature LLE cool        RLE Neurovascular Assessment    Temperature RLE cool

## 2025-02-15 NOTE — CONSULTS
Care Management Initial Consult      General Information  Assessment completed with: VM-chart review,    Type of CM/SW Visit: Initial Assessment  Primary Care Provider verified and updated as needed: Yes   Readmission within the last 30 days: no previous admission in last 30 days   Reason for Consult: discharge planning  Advance Care Planning: Advance Care Planning Reviewed: present on chart, no concerns identified        Communication Assessment  Patient's communication style: spoken language (English or Bilingual)        Cognitive  Cognitive/Neuro/Behavioral: WDL                      Living Environment:   People in home: facility resident     Current living Arrangements: assisted living  Name of Facility: [facility]   Able to return to prior arrangements: yes  Living Arrangement Comments: [facility]    Family/Social Support:  Care provided by: other (see comments) (Facility staff assist as needed)  Provides care for: no one, unable/limited ability to care for self  Marital Status:   Support system: Children, , Facility resident(s)/Staff  Deejay       Description of Support System: Supportive, Involved (As needed)    Support Assessment: Adequate family and caregiver support, Adequate social supports (When at baseline)    Current Resources:   Patient receiving home care services: No  Community Resources: None  Equipment currently used at home: wheelchair, manual (Additional DME likely utilized)  Supplies currently used at home: Gloves, Wipes, Incontinence Supplies    Employment/Financial:  Employment Status: retired     Financial Concerns: none   Referral to Financial Worker: No     Does the patient's insurance plan have a 3 day qualifying hospital stay waiver?  Yes     Which insurance plan 3 day waiver is available? [employer]    Will the waiver be used for post-acute placement? Undetermined at this time    Lifestyle & Psychosocial Needs:  Social Drivers of Health     Food Insecurity: Low Risk   (12/19/2023)    Food Insecurity     Within the past 12 months, did you worry that your food would run out before you got money to buy more?: No     Within the past 12 months, did the food you bought just not last and you didn t have money to get more?: No   Depression: Not at risk (4/3/2024)    PHQ-2     PHQ-2 Score: 0   Housing Stability: Low Risk  (12/19/2023)    Housing Stability     Do you have housing? : Yes     Are you worried about losing your housing?: No   Tobacco Use: Low Risk  (10/3/2024)    Received from [employer] (ServiceCEO)    Patient History     Smoking Tobacco Use: Never     Smokeless Tobacco Use: Never     Passive Exposure: Not on file   Financial Resource Strain: Low Risk  (12/19/2023)    Financial Resource Strain     Within the past 12 months, have you or your family members you live with been unable to get utilities (heat, electricity) when it was really needed?: No   Alcohol Use: Not on file   Transportation Needs: Low Risk  (12/19/2023)    Transportation Needs     Within the past 12 months, has lack of transportation kept you from medical appointments, getting your medicines, non-medical meetings or appointments, work, or from getting things that you need?: No   Physical Activity: Not on file   Interpersonal Safety: Low Risk  (9/12/2024)    Interpersonal Safety     Do you feel physically and emotionally safe where you currently live?: Yes     Within the past 12 months, have you been hit, slapped, kicked or otherwise physically hurt by someone?: No     Within the past 12 months, have you been humiliated or emotionally abused in other ways by your partner or ex-partner?: No   Stress: Not on file   Social Connections: Not on file   Health Literacy: Not on file     Functional Status:  Prior to admission patient needed assistance:   Dependent ADLs:: Wheelchair-independent, Bathing, Dressing, Incontinence, Positioning, Transfers, Grooming, Toileting  Dependent IADLs:: Cleaning, Cooking, Laundry, Shopping,  "Meal Preparation, Medication Management, Money Management, Transportation, Incontinence  Assessment of Functional Status: Not at  functional baseline    Mental Health Status:  Mental Health Status: No Current Concerns       Chemical Dependency Status:  Chemical Dependency Status: No Current Concerns           Values/Beliefs:  Spiritual, Cultural Beliefs, Bahai Practices, Values that affect care: no             Discussed  Partnership in Safe Discharge Planning  document with patient/family: No    Additional Information:  Writer reviewed chart to obtain an initial assessment. Pt resides under AL within [facility]. Pt has known cognitive impairment and is not currently in a memory care unit. Facility staff support pt's I/ADL needs regularly as listed above. No additional in-home services identified. No barriers to AL return at discharge identified.     2/15 per Javed Mcdonald - \"89 year old female admitted on 2/15/2025. She has a past medical history of hypertension, hyperlipidemia, dementia, prior history of Jihan-Carey tear, previous CVA, hospitalized in June 2024 (for hematemesis from a pill that was stuck, underwent EGD and discharged with Protonix and GI follow-up and also treated for UTI), who currently resides at [facility] (not in memory care but [facility]) and recently diagnosed with pneumonia and CHF and given 20 mg oral Lasix and doxycycline and the patient had taken only 2 doses of each when on day of admission, she was found by facility staff to be very short of breath and was saturating reportedly in the 50s, EMS was contacted and they put her up to 5 L and she was brought here.       In the ER, she is currently hemodynamically vitally stable on 2 L.  Troponin was 20 and follow-up at 18 stable, BMP was not remarkable with creatinine 0.8, no leukocytosis, hemoglobin stable at 13.1, D-dimer was positive but follow-up CT study showed no PE but does demonstrate bilateral small effusions " "with interstitial findings consistent with volume overload, negative for flu COVID and RSV, elevated BNP at 4579, given IV Lasix 60 mg and admitted.  Will resume with a lower dose p.m. diuresis as well as follow-up morning diuresis.  Therapies and care management to see as well.  Stable on admission.  Will obtain echo (last echo in June 2020 with EF of 55-60%).  Consider cardiology input if hospitalization develops complications.\"    Next Steps:   PT/OT Consults pending. Anticipate improvement and return to [facility]. Possible transition to memory care unit if family is coordinating such with the facility. CM to follow-up on consults and assist with service coordination needs as indicated. [facility] transport anticipated at time of discharge.    Serafin Garces RN      "

## 2025-02-16 ENCOUNTER — APPOINTMENT (OUTPATIENT)
Dept: PHYSICAL THERAPY | Facility: CLINIC | Age: OVER 89
DRG: 291 | End: 2025-02-16
Attending: STUDENT IN AN ORGANIZED HEALTH CARE EDUCATION/TRAINING PROGRAM
Payer: MEDICARE

## 2025-02-16 LAB
ANION GAP SERPL CALCULATED.3IONS-SCNC: 12 MMOL/L (ref 7–15)
BUN SERPL-MCNC: 12 MG/DL (ref 8–23)
CALCIUM SERPL-MCNC: 9.1 MG/DL (ref 8.8–10.4)
CHLORIDE SERPL-SCNC: 106 MMOL/L (ref 98–107)
CREAT SERPL-MCNC: 0.9 MG/DL (ref 0.51–0.95)
EGFRCR SERPLBLD CKD-EPI 2021: 59 ML/MIN/1.73M2
ERYTHROCYTE [DISTWIDTH] IN BLOOD BY AUTOMATED COUNT: 20 % (ref 10–15)
GLUCOSE SERPL-MCNC: 89 MG/DL (ref 70–99)
HCO3 SERPL-SCNC: 27 MMOL/L (ref 22–29)
HCT VFR BLD AUTO: 40.3 % (ref 35–47)
HGB BLD-MCNC: 12.4 G/DL (ref 11.7–15.7)
MCH RBC QN AUTO: 30.7 PG (ref 26.5–33)
MCHC RBC AUTO-ENTMCNC: 30.8 G/DL (ref 31.5–36.5)
MCV RBC AUTO: 100 FL (ref 78–100)
PLATELET # BLD AUTO: 134 10E3/UL (ref 150–450)
POTASSIUM SERPL-SCNC: 3.7 MMOL/L (ref 3.4–5.3)
RBC # BLD AUTO: 4.04 10E6/UL (ref 3.8–5.2)
SODIUM SERPL-SCNC: 145 MMOL/L (ref 135–145)
WBC # BLD AUTO: 3.8 10E3/UL (ref 4–11)

## 2025-02-16 PROCEDURE — 97161 PT EVAL LOW COMPLEX 20 MIN: CPT | Mod: GP

## 2025-02-16 PROCEDURE — 250N000013 HC RX MED GY IP 250 OP 250 PS 637: Performed by: STUDENT IN AN ORGANIZED HEALTH CARE EDUCATION/TRAINING PROGRAM

## 2025-02-16 PROCEDURE — 80048 BASIC METABOLIC PNL TOTAL CA: CPT | Performed by: STUDENT IN AN ORGANIZED HEALTH CARE EDUCATION/TRAINING PROGRAM

## 2025-02-16 PROCEDURE — 36415 COLL VENOUS BLD VENIPUNCTURE: CPT | Performed by: STUDENT IN AN ORGANIZED HEALTH CARE EDUCATION/TRAINING PROGRAM

## 2025-02-16 PROCEDURE — 99233 SBSQ HOSP IP/OBS HIGH 50: CPT | Performed by: STUDENT IN AN ORGANIZED HEALTH CARE EDUCATION/TRAINING PROGRAM

## 2025-02-16 PROCEDURE — 250N000011 HC RX IP 250 OP 636: Performed by: STUDENT IN AN ORGANIZED HEALTH CARE EDUCATION/TRAINING PROGRAM

## 2025-02-16 PROCEDURE — 85027 COMPLETE CBC AUTOMATED: CPT | Performed by: STUDENT IN AN ORGANIZED HEALTH CARE EDUCATION/TRAINING PROGRAM

## 2025-02-16 PROCEDURE — 120N000001 HC R&B MED SURG/OB

## 2025-02-16 RX ORDER — FUROSEMIDE 10 MG/ML
40 INJECTION INTRAMUSCULAR; INTRAVENOUS DAILY
Status: DISCONTINUED | OUTPATIENT
Start: 2025-02-16 | End: 2025-02-17

## 2025-02-16 RX ADMIN — Medication: at 08:56

## 2025-02-16 RX ADMIN — QUETIAPINE FUMARATE 12.5 MG: 25 TABLET ORAL at 20:31

## 2025-02-16 RX ADMIN — ENOXAPARIN SODIUM 40 MG: 40 INJECTION SUBCUTANEOUS at 14:05

## 2025-02-16 RX ADMIN — FUROSEMIDE 40 MG: 10 INJECTION, SOLUTION INTRAMUSCULAR; INTRAVENOUS at 08:55

## 2025-02-16 RX ADMIN — FERROUS SULFATE TAB 325 MG (65 MG ELEMENTAL FE) 325 MG: 325 (65 FE) TAB at 08:54

## 2025-02-16 RX ADMIN — MEMANTINE HYDROCHLORIDE 10 MG: 5 TABLET, FILM COATED ORAL at 20:31

## 2025-02-16 RX ADMIN — ASPIRIN 81 MG CHEWABLE TABLET 81 MG: 81 TABLET CHEWABLE at 08:53

## 2025-02-16 RX ADMIN — AMLODIPINE BESYLATE 5 MG: 5 TABLET ORAL at 08:54

## 2025-02-16 RX ADMIN — PANTOPRAZOLE SODIUM 40 MG: 40 TABLET, DELAYED RELEASE ORAL at 06:49

## 2025-02-16 RX ADMIN — MEMANTINE HYDROCHLORIDE 10 MG: 5 TABLET, FILM COATED ORAL at 08:53

## 2025-02-16 ASSESSMENT — ACTIVITIES OF DAILY LIVING (ADL)
ADLS_ACUITY_SCORE: 68
ADLS_ACUITY_SCORE: 71
ADLS_ACUITY_SCORE: 68
ADLS_ACUITY_SCORE: 68
ADLS_ACUITY_SCORE: 71
ADLS_ACUITY_SCORE: 68
ADLS_ACUITY_SCORE: 70
ADLS_ACUITY_SCORE: 70
ADLS_ACUITY_SCORE: 71
ADLS_ACUITY_SCORE: 68
ADLS_ACUITY_SCORE: 71
ADLS_ACUITY_SCORE: 70
ADLS_ACUITY_SCORE: 71
ADLS_ACUITY_SCORE: 71
ADLS_ACUITY_SCORE: 70
ADLS_ACUITY_SCORE: 70
ADLS_ACUITY_SCORE: 68
ADLS_ACUITY_SCORE: 70

## 2025-02-16 NOTE — PROGRESS NOTES
Pt is not appropriate for skilled OT services at this time due to Ax1 with ADLs and fxl mob at her facility at baseline.  Will defer to PT for fxl mob and discharge planning.  Plan was discussed with PT, RN.  Will D/C current OT orders. Thank you.    2/16/2025 by Keyla Jesus OT, OTR/L

## 2025-02-16 NOTE — PROGRESS NOTES
[facility]    Medicine Progress Note - Hospitalist Service    Date of Admission:  2/15/2025    Assessment & Plan      Angle Agudelo is a 89 year old female admitted on 2/15/2025. She has a past medical history of hypertension, hyperlipidemia, dementia, prior history of Jihan-Carey tear, previous CVA, hospitalized in June 2024 (for hematemesis from a pill that was stuck, underwent EGD and discharged with Protonix and GI follow-up and also treated for UTI), who currently resides at [facility] (not in [facility] but [facility]) and recently diagnosed with pneumonia and CHF and given 20 mg oral Lasix and doxycycline and the patient had taken only 2 doses of each when on day of admission, she was found by facility staff to be very short of breath and was saturating reportedly in the 50s, EMS was contacted and they put her up to 5 L and she was brought here.      In the ER, she was hemodynamically vitally stable on 2 L.  Troponin was 20 and follow-up at 18 stable, BMP was not remarkable with creatinine 0.8, no leukocytosis, hemoglobin stable at 13.1, D-dimer was positive but follow-up CT study showed no PE but does demonstrate bilateral small effusions with interstitial findings consistent with volume overload, negative for flu COVID and RSV, elevated BNP at 4579, given IV Lasix 60 mg and admitted.  Resumed with a lower dose p.m. 20mg iv lasix.  Therapies and care management to see as well.  Stable on admission.  Pllan to [facility] echo (last echo in June 2020 with EF of 55-60%).      On HOD1, overnight increased to 4L of 02, so will continue with IV diuresis, 40mg daily for now. Therapies to see as well.  Repeat echocardiogram with EF of 60-65, right ventricular systolic pressure elevated consistent with pulmonary hypertension; will recommend follow up long term. Otherwise she remains stable. Is currently net -2L in terms of I/Os. Palliative Care Consult tomorrow for GOCs.    -----  Acute  respiratory failure with hypoxia  Acute pulmonary edema, likely acute heart failure w/ preserved EF  Bilateral small effusions, elevated bnp   A- as above  P:  - received 60mg iv lasix this morning at 11pm; will give next lower dose of 20mg iv at 5pm  - START 40mg iv lasix daily and re-assess daily in AM  - I/Os and weights  - Echo (last in 2020)    - see above; recommend outpatient follow up for long term care  - consider Cardiology input pending clinical course    Hypertension  Hyperlipidemia  Assessment-normotensive pressures currently.  On regimen of amlodipine  Plan:  -Continue home medications with hold parameters    History of dementia  A/P-comes from [facility], therapies and social work to see  Avoid Zyprexa given the dementia history, would opt for additional Seroquel prn, currently has in evening    Prior history of Jihan-Carey tear  A/P-noted, monitor clinically    Prior history of CVA  A/P-neurochecks and monitor clinically  - continue asa and statin    GOC and Code status  A/p-appreciate ER team already reaching out to multiple family members- Regarding code status, initially the 2 daughters, and son en route, are involved and they initially wanted to honor their stepfather's request in the past for FULL code which is what they told the ER team (that was only the 2 daughters, NOT the POA) but they report that pt's current  (the adult childrens' stepfather) requires help at the [facility] and is not decisional and hence the son Daniele is the POA. As such, writer called Daniele; Daniele and daughters Will and Daija -all 3 - were in agreement for DNR/DNI.  -As such, updated code to DNR/DNI as per POA's decision. This was all done with the patient next to writer.   - believe would benefit from GOC in long term   -Consult palliative care, they will be able to see as soon as 2/17               Diet: Fluid restriction 2000 ML FLUID  Combination Diet Low Saturated Fat Na <2400mg Diet, No Caffeine Diet     DVT Prophylaxis: Enoxaparin (Lovenox) SQ and Pneumatic Compression Devices  Graves Catheter: Not present  Lines: None     Cardiac Monitoring: ACTIVE order. Indication: Acute decompensated heart failure (48 hours)  Code Status: No CPR- Do NOT Intubate      Clinically Significant Risk Factors Present on Admission                 # Drug Induced Platelet Defect: home medication list includes an antiplatelet medication   # Hypertension: Noted on problem list  # Acute heart failure with preserved ejection fraction: heart failure noted on problem list, last echo with EF >50%, and receiving IV diuretics    # Dementia: noted on problem list           # Financial/Environmental Concerns: none         Social Drivers of Health            Disposition Plan     Medically Ready for Discharge: Anticipated Tomorrow (non-menu option: 1-3 days more accurate)             Javed Reyna MD  Hospitalist Service  [facility]  Securely message with [email or web address] (more info)  Text page via [employer] Paging/Directory   ______________________________________________________________________    Interval History   Naeo  Now on 4L  Net neg -2.2L  Discussed w/ RN  Pt has no new sx  She remembers writer but is oriented to self and location type but not else, but when given options she is able to correctly pick the year and [place] and other details  Discussed w/ sw    Physical Exam   Vital Signs: Temp: 97.3  F (36.3  C) Temp src: Axillary BP: 125/75 Pulse: 81   Resp: 12 SpO2: 92 % O2 Device: Nasal cannula Oxygen Delivery: 2 LPM  Weight: 133 lbs 11.2 oz    General: alert, oriented, and in no acute distress  Pulmonary: clear to auscultation bilaterally, normal respiratory effort, on room air, no rales or wheezes or evidence of accessory muscle use  CVS: regular rate and rhythm, no murmurs, rubs, or gallops; no blatant jugular venous distention; no extremity edema and extremities are warm to the touch  GI: soft, nontender, BS+, no  rebound or guarding, no conspicuous organomegaly   Neuro: nonfocal, stable, moves all limbs; She remembers writer but is oriented to self and location type but not else, but when she is given options she is able to correctly pick the year and [place] and other details  Psych: appropriate and cooperative           Medical Decision Making       49 MINUTES SPENT BY ME on the date of service doing chart review, history, exam, documentation & further activities per the note.      Data   ------------------------- PAST 24 HR DATA REVIEWED -----------------------------------------------    I have personally reviewed the following data over the past 24 hrs:    3.8 (L)  \   12.4   / 134 (L)     145 106 12.0 /  89   3.7 27 0.90 \     Trop: N/A BNP: N/A       Imaging results reviewed over the past 24 hrs:   Recent Results (from the past 24 hours)   Echocardiogram Complete   Result Value    LVEF  60-65%    Narrative    [id number]  [id number]  [id number]  [id number]^PERRY^TON[place] [address]     Name: MAI DAVALOS  MRN: [id number]  : 1932  Study Date: 02/15/2025 02:11 PM  Age: 89 yrs  Gender: Female  Patient Location: [facility]  Reason For Study: CHF  Ordering Physician: TONY AMADOR  Performed By: JARRELL     BSA: 1.6 m2  Height: 62 in  Weight: 125 lb  HR: 74  BP: 123/84 mmHg  ______________________________________________________________________________  Procedure  Echocardiogram with two-dimensional, color and spectral Doppler. There is no  comparison study available.  ______________________________________________________________________________  Interpretation Summary     1.Left ventricular size, wall motion and function are normal. The ejection  fraction is 60-65%.  2.The right ventricle is moderately dilated. Mildly decreased right  ventricular systolic function  3.Mitral valve not well-visualized, heavily calcified with calcification  mitral annulus, moderate to severe  mitral stenosis with calculated valve area  1.37 by pressure half-time.  4.Heavily calcified aortic valve, mild aortic stenosis, at SVI 42 mL/M2  dimensionless index 0.41 with a peak velocity 2.4 m/s mean gradient of 14  mmHg. Visually this appears to be an underestimation, looks to be at least  moderate aortic stenosis.  5.Right ventricular systolic pressure is elevated, consistent with moderate to  severe pulmonary hypertension.  There is no comparison study available.  ______________________________________________________________________________  I      WMSI = 1.00     % Normal = 100     X - Cannot   0 -                      (2) - Mildly 2 -          Segments  Size  Interpret    Hyperkinetic 1 - Normal  Hypokinetic  Hypokinetic  1-2     small                                                     7 -          3-5      moderate  3 - Akinetic 4 -          5 -         6 - Akinetic Dyskinetic   6-14    large               Dyskinetic   Aneurysmal  w/scar       w/scar       15-16   diffuse     Left Ventricle  Left ventricular size, wall motion and function are normal. The ejection  fraction is 60-65%. There is normal left ventricular wall thickness. Left  ventricular diastolic function is normal. No regional wall motion  abnormalities noted.     Right Ventricle  The right ventricle is moderately dilated. Mildly decreased right ventricular  systolic function. TAPSE is abnormal, which is consistent with abnormal right  ventricular systolic function.     Atria  The left atrium is mildly dilated. The right atrium is mildly dilated. There  is no color Doppler evidence of an atrial shunt.     Mitral Valve  There is trace mitral regurgitation. Mitral valve not well-visualized, heavily  calcified with calcification mitral annulus, moderate to severe mitral  stenosis with calculated valve area 1.37 by pressure half-time.     Tricuspid Valve  The tricuspid valve is not well visualized, but is grossly normal. There is  mild to  moderate (1-2+) tricuspid regurgitation. Right ventricular systolic  pressure is elevated, consistent with moderate to severe pulmonary  hypertension. There is no tricuspid stenosis.     Aortic Valve  No aortic regurgitation is present. Heavily calcified aortic valve, mild  aortic stenosis, at SVI 42 mL/M2 dimensionless index 0.41 with a peak velocity  2.4 m/s mean gradient of 14 mmHg. Visually this appears to be an  underestimation, looks to be at least moderate aortic stenosis.     Pulmonic Valve  The pulmonic valve is not well seen, but is grossly normal. This degree of  valvular regurgitation is within normal limits. There is no pulmonic valvular  stenosis.     Vessels  The aorta root is normal. Normal size ascending aorta. IVC diameter <2.1 cm  collapsing >50% with sniff suggests a normal RA pressure of 3 mmHg.     Pericardium  Small posterior pericardial effusion. There are no echocardiographic  indications of cardiac tamponade. Moderate left pleural effusion.     Rhythm  Sinus rhythm was noted.     ______________________________________________________________________________  MMode/2D Measurements & Calculations  IVSd: 1.0 cm  LVIDd: 3.7 cm  LVIDs: 2.1 cm  LVPWd: 1.1 cm  FS: 43.2 %     LV mass(C)d: 123.8 grams  LV mass(C)dI: 79.1 grams/m2  Ao root diam: 3.3 cm  LA dimension: 3.2 cm  asc Aorta Diam: 3.1 cm  LA/Ao: 0.97  LVOT diam: 2.0 cm  LVOT area: 3.1 cm2  Ao root diam index Ht(cm/m): 2.1  Ao root diam index BSA (cm/m2): 2.1  Asc Ao diam index BSA (cm/m2): 2.0  Asc Ao diam index Ht(cm/m): 2.0  EF Biplane: 64.1 %  LA Volume (BP): 43.2 ml     LA Volume Index (BP): 27.5 ml/m2  LA Volume Indexed (AL/bp): 29.0 ml/m2  RV Base: 4.8 cm  RWT: 0.61  TAPSE: 1.6 cm     Doppler Measurements & Calculations  MV E max more: 88.7 cm/sec  MV A max more: 176.0 cm/sec  MV E/A: 0.50  MV max P.1 mmHg  MV mean P.0 mmHg  MV V2 VTI: 46.4 cm  MVA(VTI): 1.4 cm2     MV dec slope: 161.0 cm/sec2  MV dec time: 0.55 sec  Ao V2 max:  234.0 cm/sec  Ao max P.0 mmHg  Ao V2 mean: 171.0 cm/sec  Ao mean P.0 mmHg  Ao V2 VTI: 50.5 cm  PRATIK(I,D): 1.3 cm2  RPATIK(V,D): 1.3 cm2  LV V1 max PG: 3.6 mmHg  LV V1 max: 95.0 cm/sec  LV V1 VTI: 21.0 cm  SV(LVOT): 66.0 ml  SI(LVOT): 42.1 ml/m2  PA V2 max: 81.0 cm/sec  PA max P.6 mmHg  PA acc time: 0.06 sec  TR max mike: 412.0 cm/sec  TR max P.9 mmHg  AV Mike Ratio (DI): 0.41  PRATIK Index (cm2/m2): 0.83  E/E': 13.4  E/E' av.0  Lateral E/e': 13.5  Medial E/e': 14.6  Peak E' Mike: 6.6 cm/sec  RV S Mike: 12.3 cm/sec     ______________________________________________________________________________  Report approved by: Aaron Cotto MD on 02/15/2025 04:43 PM

## 2025-02-16 NOTE — PLAN OF CARE
Problem: Gas Exchange Impaired  Goal: Optimal Gas Exchange  Outcome: Progressing     Problem: Skin Injury Risk Increased  Goal: Skin Health and Integrity  Outcome: Progressing  Intervention: Plan: Nurse Driven Intervention: Moisture Management  Recent Flowsheet Documentation  Taken 2/16/2025 0843 by Rosaura Weeks RN  Moisture Interventions: Encourage regular toileting     Problem: Delirium  Goal: Optimal Coping  Outcome: Progressing   Goal Outcome Evaluation:       Patient is alert to self, disoriented to time, situation . Can state she is in a hospital. Denies pain. Can stand pivot to chair with assist of 1 gait belt, walker. Vitally stable, weaned off to 2 L and is sating at 92%. Other vital signs stable. Continues with diuresis. Pure wick while in bed, Worked with therapies, transferred to chair for meals. Unable to use call light, anticipate needs.     /75 (BP Location: Right arm)   Pulse 81   Temp 97.3  F (36.3  C) (Axillary)   Resp 12   Wt 60.6 kg (133 lb 11.2 oz)   LMP  (LMP Unknown)   SpO2 92%   BMI 24.45 kg/m     HLM Admission: 3- Sit at Edge of Bed  HLM Daily4- Move to Chair

## 2025-02-16 NOTE — PLAN OF CARE
Goal Outcome Evaluation:       Patient is disoriented to time and situation. VSS on 3-4L via NC. Patient denies pain. Tele reads sinus dysrhythmia. Patient is due to ambulate, uses a w/c at home. Tolerating a regular diet and voiding with purewick. PIV in right AC saline locked. Patient had an uneventful shift and is resting in bed, is safe and call light is within reach. Anastasia Arango, RN      Problem: Adult Inpatient Plan of Care  Goal: Optimal Comfort and Wellbeing  Outcome: Progressing     Problem: Gas Exchange Impaired  Goal: Optimal Gas Exchange  Outcome: Progressing  Intervention: Optimize Oxygenation and Ventilation  Recent Flowsheet Documentation  Taken 2/15/2025 1950 by Anastasia Arango, RN  Head of Bed (HOB) Positioning: HOB at 30-45 degrees

## 2025-02-16 NOTE — PROGRESS NOTES
Physical Therapy Discharge Summary    Reason for therapy discharge:    No further expectations of functional progress.    Progress towards therapy goal(s). See goals on Care Plan in [facility] electronic health record for goal details.  Goals met    Therapy recommendation(s):    No further therapy is recommended.

## 2025-02-16 NOTE — PROGRESS NOTES
02/16/25 0838   Appointment Info   Signing Clinician's Name / Credentials (PT) Jhonatan Hayes, PT   Quick Adds   Quick Adds Certification   Living Environment   People in Home facility resident   Current Living Arrangements assisted living   Home Accessibility no concerns   Transportation Anticipated agency   Self-Care   Usual Activity Tolerance fair   Current Activity Tolerance fair   Equipment Currently Used at Home wheelchair, manual;walker, rolling   Fall history within last six months no   Activity/Exercise/Self-Care Comment needs assist with Wheelchair-independent, Bathing, Dressing, Incontinence, Positioning, Transfers, Grooming, Toileting  Dependent IADLs:: Cleaning, Cooking, Laundry, Shopping, Meal Preparation, Medication Management, Money Management, Transportation, Incontinence.  Per CM at baseline is assist of one with pivot transfers, bed mobility, will walk at times with use of walker   General Information   Onset of Illness/Injury or Date of Surgery 02/15/25   Referring Physician Javed Reyna MD   Patient/Family Therapy Goals Statement (PT) none stated   Pertinent History of Current Problem (include personal factors and/or comorbidities that impact the POC) 89 year old female admitted on 2/15/2025. She has a past medical history of hypertension, hyperlipidemia, dementia, prior history of Jihan-Carey tear, previous CVA, hospitalized in June 2024 (for hematemesis from a pill that was stuck, underwent EGD and discharged with Protonix and GI follow-up and also treated for UTI), who currently resides at [facility] (not in [facility] care but [facility]) and recently diagnosed with pneumonia and CHF and given 20 mg oral Lasix and doxycycline and the patient had taken only 2 doses of each when on day of admission, she was found by facility staff to be very short of breath and was saturating reportedly in the 50s, EMS was contacted and they put her up to 5 L and she was brought here.   Existing  Precautions/Restrictions no known precautions/restrictions   Cognition   Affect/Mental Status (Cognition) WFL   Follows Commands (Cognition) follows one-step commands   Range of Motion (ROM)   Range of Motion ROM is WFL   Strength (Manual Muscle Testing)   Strength Comments general weakness   Bed Mobility   Bed Mobility supine-sit   Supine-Sit Gaines (Bed Mobility) moderate assist (50% patient effort);minimum assist (75% patient effort)   Bed Mobility Limitations decreased ability to use arms for pushing/pulling;decreased ability to use legs for bridging/pushing   Transfers   Transfers sit-stand transfer   Sit-Stand Transfer   Sit-Stand Gaines (Transfers) verbal cues;minimum assist (75% patient effort);1 person assist   Assistive Device (Sit-Stand Transfers) walker, front-wheeled   Comment, (Sit-Stand Transfer) STS from bed and from recliner chair.  Paitent pivot transfers at baseline and able to do slowly with min assist of one   Balance   Balance no deficits were identified   Clinical Impression   Criteria for Skilled Therapeutic Intervention Evaluation only   PT Total Evaluation Time   PT Eval, Low Complexity Minutes (59458) 15   Therapy Certification   Start of care date 02/16/25   Certification date from 02/16/25   Certification date to 02/16/25   PT Discharge Planning   PT Plan dc PT   PT Discharge Recommendation (DC Rec) home with assist   PT Rationale for DC Rec Patient at baseline for functional mobility . Able to stand, pivot transfer with min assist of one   PT Brief overview of current status Min assist for transfers   PT Total Distance Amb During Session (feet) 0   PT Equipment Needed at Discharge walker, rolling   Physical Therapy Time and Intention   Total Session Time (sum of timed and untimed services) 15

## 2025-02-16 NOTE — PLAN OF CARE
Alert to self. Pt unsure why she is here nor how she got here; the date, but does state she is at the hospital. VSS on 4L via NC. Denies pain.   Call light within reach. Unable to use call light for needs. Pt will call out/yell for help, instead. Educated on call light use. Changes position independently. Frequent rounding performed.     Goal Outcome Evaluation:    Problem: Adult Inpatient Plan of Care  Goal: Optimal Comfort and Wellbeing  Outcome: Progressing     Problem: Delirium  Goal: Improved Attention and Thought Clarity  Outcome: Progressing  Intervention: Maximize Cognitive Function  Recent Flowsheet Documentation  Taken 2/16/2025 0000 by Jamia Haines, RN  Reorientation Measures:   clock in view   reorientation provided     Problem: Gas Exchange Impaired  Goal: Optimal Gas Exchange  Outcome: Progressing  Intervention: Optimize Oxygenation and Ventilation  Recent Flowsheet Documentation  Taken 2/16/2025 0000 by Jamia Haines, RN  Head of Bed (HOB) Positioning: HOB at 20-30 degrees

## 2025-02-16 NOTE — PROGRESS NOTES
"Care Management Follow Up    Length of Stay (days): 1    Expected Discharge Date: 02/17/2025     Concerns to be Addressed: discharge planning  Discharge coordination with [facility] or possibly TCU needed  Patient plan of care discussed at interdisciplinary rounds: Yes    Anticipated Discharge Disposition: Assisted Living, Home Care      Anticipated Discharge Services: Home Care  Anticipated Discharge DME: None    Patient/family educated on Medicare website which has current facility and service quality ratings: no  Education Provided on the Discharge Plan: No  Patient/Family in Agreement with the Plan: unable to assess    Referrals Placed by CM/SW:  (Not yet indicated)  Private pay costs discussed: Not applicable    Discussed  Partnership in Safe Discharge Planning  document with patient/family: Yes:      Handoff Completed: No, handoff not indicated or clinically appropriate    Additional Information:    Chart reviewed.    Per CM consult note 2/15: \" Pt resides under AL within [facility]. Pt has known cognitive impairment and is not currently in a memory care unit. . . . PT/OT Consults pending. Anticipate improvement and return to [facility]. Possible transition to memory care unit if family is coordinating such with the facility. CM to follow-up on consults and assist with service coordination needs as indicated. [facility] transport anticipated at time of discharge. \"    [facility] - Nurse Station [phone]; Fax [phone] - Per Nurse Lela, pt's baseline:  Pt is assist x1 transfer with gait belt.  Pivots to wheelchair.  Spends most of the day in wheelchair or recliner.  Occasionally walks with assistx1 with walker from bathroom to bed.  Extensive assist x1 for dressing, grooming, bathing.  Med management by [facility] nursing.  [facility]: [facility].  No outside homecare agencies involved.  Pt does not use home O2 at baseline.  CM welcome to arrange home O2 through [facility] if needed at discharge; " jail will then eventually switch pt over to [facility].    10:29 AM  PT recommends discharge to home with assist.    Anticipate return to [facility] when medically ready.  Transportation TBD.    Next Steps:   Coordinate discharge plans with [facility] and family    CM will continue to follow.     Catherine Medley RN

## 2025-02-17 ENCOUNTER — LAB REQUISITION (OUTPATIENT)
Dept: LAB | Facility: CLINIC | Age: OVER 89
End: 2025-02-17
Payer: MEDICARE

## 2025-02-17 DIAGNOSIS — I10 ESSENTIAL (PRIMARY) HYPERTENSION: ICD-10-CM

## 2025-02-17 DIAGNOSIS — D64.9 ANEMIA, UNSPECIFIED: ICD-10-CM

## 2025-02-17 DIAGNOSIS — E03.9 HYPOTHYROIDISM, UNSPECIFIED: ICD-10-CM

## 2025-02-17 LAB
ANION GAP SERPL CALCULATED.3IONS-SCNC: 9 MMOL/L (ref 7–15)
BUN SERPL-MCNC: 12.9 MG/DL (ref 8–23)
CALCIUM SERPL-MCNC: 9.3 MG/DL (ref 8.8–10.4)
CHLORIDE SERPL-SCNC: 103 MMOL/L (ref 98–107)
CREAT SERPL-MCNC: 0.94 MG/DL (ref 0.51–0.95)
EGFRCR SERPLBLD CKD-EPI 2021: 56 ML/MIN/1.73M2
GLUCOSE SERPL-MCNC: 88 MG/DL (ref 70–99)
HCO3 SERPL-SCNC: 30 MMOL/L (ref 22–29)
POTASSIUM SERPL-SCNC: 3.7 MMOL/L (ref 3.4–5.3)
SODIUM SERPL-SCNC: 142 MMOL/L (ref 135–145)

## 2025-02-17 PROCEDURE — 80048 BASIC METABOLIC PNL TOTAL CA: CPT | Performed by: STUDENT IN AN ORGANIZED HEALTH CARE EDUCATION/TRAINING PROGRAM

## 2025-02-17 PROCEDURE — 120N000001 HC R&B MED SURG/OB

## 2025-02-17 PROCEDURE — 99222 1ST HOSP IP/OBS MODERATE 55: CPT | Performed by: CLINICAL NURSE SPECIALIST

## 2025-02-17 PROCEDURE — 36415 COLL VENOUS BLD VENIPUNCTURE: CPT | Performed by: STUDENT IN AN ORGANIZED HEALTH CARE EDUCATION/TRAINING PROGRAM

## 2025-02-17 PROCEDURE — 250N000011 HC RX IP 250 OP 636: Performed by: STUDENT IN AN ORGANIZED HEALTH CARE EDUCATION/TRAINING PROGRAM

## 2025-02-17 PROCEDURE — 99232 SBSQ HOSP IP/OBS MODERATE 35: CPT | Performed by: STUDENT IN AN ORGANIZED HEALTH CARE EDUCATION/TRAINING PROGRAM

## 2025-02-17 PROCEDURE — 82565 ASSAY OF CREATININE: CPT | Performed by: STUDENT IN AN ORGANIZED HEALTH CARE EDUCATION/TRAINING PROGRAM

## 2025-02-17 PROCEDURE — 250N000013 HC RX MED GY IP 250 OP 250 PS 637: Performed by: STUDENT IN AN ORGANIZED HEALTH CARE EDUCATION/TRAINING PROGRAM

## 2025-02-17 RX ORDER — FUROSEMIDE 10 MG/ML
40 INJECTION INTRAMUSCULAR; INTRAVENOUS
Status: COMPLETED | OUTPATIENT
Start: 2025-02-17 | End: 2025-02-18

## 2025-02-17 RX ADMIN — ENOXAPARIN SODIUM 40 MG: 40 INJECTION SUBCUTANEOUS at 14:14

## 2025-02-17 RX ADMIN — FUROSEMIDE 40 MG: 10 INJECTION, SOLUTION INTRAMUSCULAR; INTRAVENOUS at 17:25

## 2025-02-17 RX ADMIN — QUETIAPINE FUMARATE 12.5 MG: 25 TABLET ORAL at 21:07

## 2025-02-17 RX ADMIN — FUROSEMIDE 40 MG: 10 INJECTION, SOLUTION INTRAMUSCULAR; INTRAVENOUS at 09:31

## 2025-02-17 RX ADMIN — MEMANTINE HYDROCHLORIDE 10 MG: 5 TABLET, FILM COATED ORAL at 09:31

## 2025-02-17 RX ADMIN — ASPIRIN 81 MG CHEWABLE TABLET 81 MG: 81 TABLET CHEWABLE at 09:32

## 2025-02-17 RX ADMIN — FERROUS SULFATE TAB 325 MG (65 MG ELEMENTAL FE) 325 MG: 325 (65 FE) TAB at 09:31

## 2025-02-17 RX ADMIN — Medication: at 21:07

## 2025-02-17 RX ADMIN — PANTOPRAZOLE SODIUM 40 MG: 40 TABLET, DELAYED RELEASE ORAL at 06:34

## 2025-02-17 RX ADMIN — MEMANTINE HYDROCHLORIDE 10 MG: 5 TABLET, FILM COATED ORAL at 21:06

## 2025-02-17 ASSESSMENT — ACTIVITIES OF DAILY LIVING (ADL)
ADLS_ACUITY_SCORE: 71
ADLS_ACUITY_SCORE: 75
ADLS_ACUITY_SCORE: 75
ADLS_ACUITY_SCORE: 69
ADLS_ACUITY_SCORE: 71
ADLS_ACUITY_SCORE: 75
ADLS_ACUITY_SCORE: 71
ADLS_ACUITY_SCORE: 75
ADLS_ACUITY_SCORE: 71
ADLS_ACUITY_SCORE: 71
ADLS_ACUITY_SCORE: 75
ADLS_ACUITY_SCORE: 71
ADLS_ACUITY_SCORE: 69
ADLS_ACUITY_SCORE: 69

## 2025-02-17 NOTE — PLAN OF CARE
Problem: Adult Inpatient Plan of Care  Goal: Absence of Hospital-Acquired Illness or Injury  Intervention: Identify and Manage Fall Risk  Recent Flowsheet Documentation  Taken 2/17/2025 0924 by Erna Xiong RN  Safety Promotion/Fall Prevention: safety round/check completed     Problem: Adult Inpatient Plan of Care  Goal: Absence of Hospital-Acquired Illness or Injury  Intervention: Prevent Skin Injury  Recent Flowsheet Documentation  Taken 2/17/2025 0924 by Erna Xiong RN  Body Position: upper extremity elevated     Problem: Skin Injury Risk Increased  Goal: Skin Health and Integrity  Intervention: Optimize Skin Protection  Recent Flowsheet Documentation  Taken 2/17/2025 0924 by Erna Xiong RN  Activity Management: activity adjusted per tolerance  Head of Bed (HOB) Positioning: HOB at 60 degrees     Problem: Adult Inpatient Plan of Care  Goal: Optimal Comfort and Wellbeing  Outcome: Progressing    Goal Outcome Evaluation: Assessed per dementia baseline alert and oriented to self, denied pain, vital signs assessed noted SBPs were trending low, with increased dose of IV lasix given per order by Dr. Reyna, amlodipine not given, Dr. Reyna informed. Otherwise the rest of the vital signs are stable on 2-3LPM O2 via nasal cannula. Palliative was at the bedside with patient's son Sreedhar. Will continue to monitor. -Erna ROSS RN

## 2025-02-17 NOTE — PROGRESS NOTES
Care Management Follow Up    Length of Stay (days): 2    Expected Discharge Date: 02/18/2025     Concerns to be Addressed: discharge planning     Patient plan of care discussed at interdisciplinary rounds: Yes    Anticipated Discharge Disposition:  [facility] ASSISTED [facility]     Anticipated Discharge Services:  TBD    Anticipated Discharge DME: None    Education Provided on the Discharge Plan: Yes (AVS per bedside RN)    Patient/Family in Agreement with the Plan: unable to assess         Additional Information:  CM reviewed chart. CM called [facility] to coordinate discharge. CM spoke to Yara - nurse at [facility]. [facility] nursing does not felicity notes prior to discharge. 10:57 AM   CM following.     [facility] - Fax discharge to [phone]. RN to RN report call [phone].     Next Steps: coordinate with assisted living        Aida Payton RN  Care Coordinator

## 2025-02-17 NOTE — PLAN OF CARE
Problem: Gas Exchange Impaired  Goal: Optimal Gas Exchange  Outcome: Progressing   Goal Outcome Evaluation:         Alert and oriented to self only. Decreased lung sounds bilaterally. Maintaining oxygen saturation in the 90's on 2.5 liters of oxygen via nasal cannula. Telemetry monitoring reading sinus dysrhythmia. Bed alarm on for safety.

## 2025-02-17 NOTE — PROGRESS NOTES
[facility]    Medicine Progress Note - Hospitalist Service    Date of Admission:  2/15/2025    Assessment & Plan      Angle Agudelo is a 89 year old female admitted on 2/15/2025. She has a past medical history of hypertension, hyperlipidemia, dementia, prior history of Jihan-Carey tear, previous CVA, hospitalized in June 2024 (for hematemesis from a pill that was stuck, underwent EGD and discharged with Protonix and GI follow-up and also treated for UTI), who currently resides at [facility] (not in [facility] but [facility]) and recently diagnosed with pneumonia and CHF and given 20 mg oral Lasix and doxycycline and the patient had taken only 2 doses of each when on day of admission, she was found by facility staff to be very short of breath and was saturating reportedly in the 50s, EMS was contacted and they put her up to 5 L and she was brought here.      In the ER, she was hemodynamically vitally stable on 2 L.  Troponin was 20 and follow-up at 18 stable, BMP was not remarkable with creatinine 0.8, no leukocytosis, hemoglobin stable at 13.1, D-dimer was positive but follow-up CT study showed no PE but does demonstrate bilateral small effusions with interstitial findings consistent with volume overload, negative for flu COVID and RSV, elevated BNP at 4579, given IV Lasix 60 mg and admitted.  Resumed with a lower dose p.m. 20mg iv lasix.  Therapies and care management to see as well.  Stable on admission.  Pllan to [facility] echo (last echo in June 2020 with EF of 55-60%).      On HOD1, overnight increased to 4L of 02, so will continue with IV diuresis, 40mg daily, had good output -2L. Therapies to see as well.  Repeat echocardiogram with EF of 60-65, right ventricular systolic pressure elevated consistent with pulmonary hypertension; will recommend follow up long term. Otherwise she remains stable.     HOD2, output slowed so increasing to 40mg iv BID. And also Palliative Care Consult  for GOCs (see H&P or below regarding adult children vs their stepfather).    -----  Acute respiratory failure with hypoxia  Acute pulmonary edema, likely acute heart failure w/ preserved EF  Bilateral small effusions, elevated bnp   A- as above  P:  - increase diuresis to 40mg bid lasix  - I/Os and weights  - Echo (last in 2020) stable  - consider Cardiology input pending clinical course    Hypertension  Hyperlipidemia  Assessment-normotensive pressures currently.  On regimen of amlodipine  Plan:  -Continue home medications with hold parameters, caution with increased diuretics    History of dementia  A/P-comes from [facility], therapies and social work to see  Avoid Zyprexa given the dementia history, would opt for additional Seroquel prn, currently has in evening    Prior history of Jihan-Carey tear  A/P-noted, monitor clinically    Prior history of CVA  A/P-neurochecks and monitor clinically  - continue asa and statin    GOC and Code status  A/p-appreciate ER team already reaching out to multiple family members- Regarding code status, initially the 2 daughters, and son en route, are involved and they initially wanted to honor their stepfather's request in the past for FULL code which is what they told the ER team (that was only the 2 daughters, NOT the POA) but they report that pt's current  (the adult childrens' stepfather) requires help at the [facility] and is not decisional and hence the son Daniele is the POA. As such, writer called Daniele; Daniele and daughters Will and Daija -all 3 - were in agreement for DNR/DNI.  -As such, updated code to DNR/DNI as per POA's decision. This was all done with the patient next to writer.   - believe would benefit from GOC in long term   -Consult palliative care, they will be able to see    - discussed and appreciated          Diet: Fluid restriction 2000 ML FLUID  Combination Diet Low Saturated Fat Na <2400mg Diet, No Caffeine Diet    DVT Prophylaxis: Enoxaparin  (Lovenox) SQ and Pneumatic Compression Devices  Graves Catheter: Not present  Lines: None     Cardiac Monitoring: ACTIVE order. Indication: Acute decompensated heart failure (48 hours)  Code Status: No CPR- Do NOT Intubate      Clinically Significant Risk Factors                   # Hypertension: Noted on problem list  # Acute heart failure with preserved ejection fraction: heart failure noted on problem list, last echo with EF >50%, and receiving IV diuretics    # Dementia: noted on problem list            # Financial/Environmental Concerns: none         Social Drivers of Health            Disposition Plan     Medically Ready for Discharge: Anticipated Tomorrow (non-menu option: 1-2 days more accurate)         Javed Reyna MD  Hospitalist Service  [facility]  Securely message with [employer] (more info)  Text page via [employer] Paging/Directory   ______________________________________________________________________    Interval History   Naeo  Remains on 2L  Discussed care plans and pt has no questions  She has no new sx  Feels better but discussed her 02  Discussed w/ RN  Discussed with palliative care  Updated sw    Physical Exam   Vital Signs: Temp: 97.7  F (36.5  C) Temp src: Oral BP: 120/66 Pulse: 75   Resp: 16 SpO2: 93 % O2 Device: Nasal cannula Oxygen Delivery: 2 LPM  Weight: 134 lbs 14.74 oz    General: alert, partially oriented (waxes/wanes), and in no acute distress  Pulmonary: clear to auscultation bilaterally, normal respiratory effort, on room air, no rales or wheezes or evidence of accessory muscle use  CVS: regular rate and rhythm, no murmurs, rubs, or gallops; no blatant jugular venous distention; no extremity edema and extremities are warm to the touch  GI: soft, nontender, BS+, no rebound or guarding, no conspicuous organomegaly   Neuro: nonfocal, stable, moves all limbs; stable and oriented to self and location type as baseline and will occasionally correctly answer other  questions correctly   Psych: appropriate and cooperative           Medical Decision Making       43 MINUTES SPENT BY ME on the date of service doing chart review, history, exam, documentation & further activities per the note.      Data   ------------------------- PAST 24 HR DATA REVIEWED -----------------------------------------------    I have personally reviewed the following data over the past 24 hrs:    3.8 (L)  \   12.4   / 134 (L)     145 106 12.0 /  89   3.7 27 0.90 \       Imaging results reviewed over the past 24 hrs:   No results found for this or any previous visit (from the past 24 hours).

## 2025-02-17 NOTE — PLAN OF CARE
Problem: Adult Inpatient Plan of Care  Goal: Optimal Comfort and Wellbeing  Outcome: Progressing   Problem: Gas Exchange Impaired  Goal: Optimal Gas Exchange  Outcome: Progressing  Intervention: Optimize Oxygenation and Ventilation  Recent Flowsheet Documentation  Taken 2/17/2025 0330 by Kaci Latham, RN  Head of Bed ([facility]) Positioning: HOB lowered  Taken 2/17/2025 0119 by Kaci Latham, RN  Head of Bed (HOB) Positioning: HOB at 20 degrees     Goal Outcome Evaluation:       Patient alert. Oriented to self only (base line).   Nasal canula 2 L O2.   Denied pain.   Incontinent. Purewick on place.   Q2 turn.  Assist of 2 to the commode with a walker and gait belt. Pivot. Uses walker and wheel chair at baseline.   VSS. Afebrile.     Tele: Sinus dysrhythmia, prolonged QTc    Makes needs known. Call light within reach. Bed at lowest position. Bed alarm on.       HLM Admission: 5- Standing (1 or more minutes)  HLM Daily5- Standing (1 or more minutes)

## 2025-02-17 NOTE — CONSULTS
Palliative Care Consultation Note  [facility]      Patient: Angle Agudelo  Date of Admission:  2/15/2025    Requesting Clinician / Team: Dr. Javed Reyna  Reason for consult: Goals of care       Recommendations & Counseling     GOALS OF CARE:   Life-prolonging with limits -DNR/DNI-confirmed with son Sreedhar.  Met with son Sreedhar today. States daughter Daija is primary contact (possibly son Daniele is health  care agent-no documents on file,  no  phone  number).  Per Sreedhar, Daija  is primary contact for health care decisions.  He will request to  have family  bring HCD  documents  to  hospital.  Also left message for daughter Daija, awaiting return call.   Currently  patient does not have medical  decision  making capacity.  She is oriented to self and family.  Defer to family for supported/surrogate decision making capacity.  Discussed with son  Sreedhar today.  Reviewed  that patient is at  high  risk  for future  readmissions  given CHF and valvular  heart  disease.  Currently  on IV diuretics.  Appreciate recommendations from [facility] team to inform ongoing goals of care discussions.  Goals of care discussions ongoing as needing to clarify if patient  has health care directive.  Palliative care will continue to  follow     ADVANCE CARE PLANNING:  No health care directive on file. Per system policy, Surrogate Decision-makers for Patients With Diminished Decision-making Capacity offers guidance on possible decision-makers. Daughter  Daija, possibly  son Daniele has been identified as a surrogate decision maker.   Per discussion with daughter Pilar, patient has 5 children, Daija, Sreedhar, Pilar,  Jamal and Daniele.  Daniele is reportedly health care agent.  Family makes decision collaboratively.  Patient's current  is reportedly elderly and non-decisional.   There is a POLST form on file, but will need to be updated prior to discharge.  Code status: No CPR- Do NOT Intubate    MEDICAL MANAGEMENT:   We are not actively  managing symptoms at this time.    PSYCHOSOCIAL/SPIRITUAL SUPPORT:  Family 5 children    Palliative Care will continue to follow. Thank you for the consult and allowing us to aid in the care of Angle Agudelo.    These recommendations have been discussed with Dr. Reyna and bedside RN.    Arlyn Swanson, CNS  [facility], Palliative Care  Securely message with the [employer] Web Console (learn more here) or  Text page via [facility] Paging/Directory         Assessment      Angle Agudelo is a 89 year old female with a past medical history of HTN, dementia (followed by [facility] neurology), prior history of Jihan-Carey tear, hospitalization in 6/2024 for hematemesis, previous CVA, recent diagnosis of pnemonia and CHF who presented on 2/15/25 with shortness of breath and decreased oxygen saturation.  Admitted with acute respiraotry failure with hypoxia, acute pulmonary edema, likely acute heart failure with preserved EF.  Patient receiving IV lasix.      Per discussion with ER/HMS it was identified that patient's health care agent is son Daniele.  Decision made to change code status to DNR/DNI.  Palliative care was consulted to discuss goals of care.     Today, the patient was seen for:  Initial palliative care visit, goals of care    History of Present Illness   Met with patient and son Sreedhar.  Patient confused and unable to participate in goals of care discussion.   Introduced the role of palliative care as an interdisciplinary team that cares for patients with serious illness to help support symptom management, communication, coping for patients and their families as well as support with medical decision making.    Prognosis, Goals, & Planning:   Functional Status just prior to this current hospitalization:  ECOG3 (Capable of only limited self-care; needs help with ADLs; in bed/chair >50% of waking hours)  Per son, patient lives in assisted living, mostly w/c bound  at baseline.     Prognosis, Goals, and/or Advance Care  "Planning:  We discussed general treatment options (full/restorative, selective/conservatives, and comfort only/hospice). We then discussed how these specifically apply to Angle Hughes.  Based on this discussion, son has decided to continue with current cares.  Defers to  other family members, should she decompensate and require more discussions  re: goals of  care as inpatient.  Palliative care reaching out to other family members, as unclear as to who is surrogate  decision  maker    Code Status was addressed today:   Confirmed DNR/DNI  with  son Sreedhar    Patient's decision making preferences: unable to assess        Patient has decision-making capacity today for complex decisions: Unreliable          Coping, Meaning, & Spirituality:   Mood, coping, and/or meaning in the context of serious illness were addressed today: Yes    Social:   Living situation: resides in assisted living [facility]  Important relationships/caregivers: lives with  who also has significant health concerns and reportedly is not able to be surrogate decision  maker.  Has 5 children.  and remarried to current   Occupation: worked for [employer]  Areas of fulfillment/jersey: \"golf\" per patient. Family  reports she enjoys watching TV  currently    Medications:  Reviewed this patient's medication profile and medications from this hospitalization.    ROS:  Comprehensive ROS is reviewed and is negative except as here & per HPI: Denies pain or shortness of breath.  Limited ROS due to cognitive status    Physical Exam   Vital Signs with Ranges  Temp:  [97.3  F (36.3  C)-98.5  F (36.9  C)] 97.7  F (36.5  C)  Pulse:  [67-82] 75  Resp:  [12-16] 16  BP: ()/(57-75) 120/66  SpO2:  [90 %-95 %] 93 %  Wt Readings from Last 10 Encounters:   02/17/25 61.2 kg (134 lb 14.7 oz)   12/26/24 56.7 kg (125 lb)   09/12/24 61.7 kg (136 lb)   06/22/24 61.2 kg (134 lb 14.4 oz)   04/03/24 60.3 kg (132 lb 14.4 oz)   01/05/24 64 kg (141 lb 1.5 oz) " "  12/19/23 63 kg (139 lb)   10/12/23 60.8 kg (134 lb 0.6 oz)   10/06/23 60.9 kg (134 lb 3.2 oz)   09/21/22 61.9 kg (136 lb 8 oz)     134 lbs 14.74 oz    PHYSICAL EXAM:  Constitutional: Alert, pleasant, NAD   Respiratory: Breathing nonlabored  NEURO: Oriented to self and family and  \"a hospital\"    Data reviewed:  Results for orders placed or performed during the hospital encounter of 02/15/25 (from the past 24 hours)   Basic metabolic panel   Result Value Ref Range    Sodium 142 135 - 145 mmol/L    Potassium 3.7 3.4 - 5.3 mmol/L    Chloride 103 98 - 107 mmol/L    Carbon Dioxide (CO2) 30 (H) 22 - 29 mmol/L    Anion Gap 9 7 - 15 mmol/L    Urea Nitrogen 12.9 8.0 - 23.0 mg/dL    Creatinine 0.94 0.51 - 0.95 mg/dL    GFR Estimate 56 (L) >60 mL/min/1.73m2    Calcium 9.3 8.8 - 10.4 mg/dL    Glucose 88 70 - 99 mg/dL     ECHO 2/15/25  Interpretation Summary  1.Left ventricular size, wall motion and function are normal. The ejection  fraction is 60-65%.  2.The right ventricle is moderately dilated. Mildly decreased right  ventricular systolic function  3.Mitral valve not well-visualized, heavily calcified with calcification  mitral annulus, moderate to severe mitral stenosis with calculated valve area  1.37 by pressure half-time.  4.Heavily calcified aortic valve, mild aortic stenosis, at SVI 42 mL/M2  dimensionless index 0.41 with a peak velocity 2.4 m/s mean gradient of 14  mmHg. Visually this appears to be an underestimation, looks to be at least  moderate aortic stenosis.  5.Right ventricular systolic pressure is elevated, consistent with moderate to  severe pulmonary hypertension.  There is no comparison study available.      60 MINUTES SPENT BY ME on the date of service doing chart review, history, exam, documentation & further activities per the note.          "

## 2025-02-18 LAB
ANION GAP SERPL CALCULATED.3IONS-SCNC: 11 MMOL/L (ref 7–15)
BUN SERPL-MCNC: 12.6 MG/DL (ref 8–23)
CALCIUM SERPL-MCNC: 9.1 MG/DL (ref 8.8–10.4)
CHLORIDE SERPL-SCNC: 100 MMOL/L (ref 98–107)
CREAT SERPL-MCNC: 0.88 MG/DL (ref 0.51–0.95)
EGFRCR SERPLBLD CKD-EPI 2021: 61 ML/MIN/1.73M2
GLUCOSE SERPL-MCNC: 91 MG/DL (ref 70–99)
HCO3 SERPL-SCNC: 30 MMOL/L (ref 22–29)
HOLD SPECIMEN: NORMAL
POTASSIUM SERPL-SCNC: 4 MMOL/L (ref 3.4–5.3)
SODIUM SERPL-SCNC: 141 MMOL/L (ref 135–145)

## 2025-02-18 PROCEDURE — 99232 SBSQ HOSP IP/OBS MODERATE 35: CPT | Performed by: STUDENT IN AN ORGANIZED HEALTH CARE EDUCATION/TRAINING PROGRAM

## 2025-02-18 PROCEDURE — 120N000001 HC R&B MED SURG/OB

## 2025-02-18 PROCEDURE — 99232 SBSQ HOSP IP/OBS MODERATE 35: CPT | Performed by: CLINICAL NURSE SPECIALIST

## 2025-02-18 PROCEDURE — 82310 ASSAY OF CALCIUM: CPT | Performed by: STUDENT IN AN ORGANIZED HEALTH CARE EDUCATION/TRAINING PROGRAM

## 2025-02-18 PROCEDURE — 82565 ASSAY OF CREATININE: CPT | Performed by: STUDENT IN AN ORGANIZED HEALTH CARE EDUCATION/TRAINING PROGRAM

## 2025-02-18 PROCEDURE — 250N000013 HC RX MED GY IP 250 OP 250 PS 637: Performed by: STUDENT IN AN ORGANIZED HEALTH CARE EDUCATION/TRAINING PROGRAM

## 2025-02-18 PROCEDURE — 80048 BASIC METABOLIC PNL TOTAL CA: CPT | Performed by: STUDENT IN AN ORGANIZED HEALTH CARE EDUCATION/TRAINING PROGRAM

## 2025-02-18 PROCEDURE — 250N000011 HC RX IP 250 OP 636: Performed by: STUDENT IN AN ORGANIZED HEALTH CARE EDUCATION/TRAINING PROGRAM

## 2025-02-18 PROCEDURE — 36415 COLL VENOUS BLD VENIPUNCTURE: CPT | Performed by: STUDENT IN AN ORGANIZED HEALTH CARE EDUCATION/TRAINING PROGRAM

## 2025-02-18 RX ORDER — SIMETHICONE 80 MG
80 TABLET,CHEWABLE ORAL ONCE
Status: COMPLETED | OUTPATIENT
Start: 2025-02-18 | End: 2025-02-18

## 2025-02-18 RX ORDER — CALCIUM CARBONATE 500 MG/1
1000 TABLET, CHEWABLE ORAL ONCE
Status: COMPLETED | OUTPATIENT
Start: 2025-02-18 | End: 2025-02-18

## 2025-02-18 RX ADMIN — PANTOPRAZOLE SODIUM 40 MG: 40 TABLET, DELAYED RELEASE ORAL at 06:19

## 2025-02-18 RX ADMIN — SIMETHICONE 80 MG: 80 TABLET, CHEWABLE ORAL at 11:36

## 2025-02-18 RX ADMIN — ENOXAPARIN SODIUM 40 MG: 40 INJECTION SUBCUTANEOUS at 14:24

## 2025-02-18 RX ADMIN — FERROUS SULFATE TAB 325 MG (65 MG ELEMENTAL FE) 325 MG: 325 (65 FE) TAB at 07:51

## 2025-02-18 RX ADMIN — CALCIUM CARBONATE (ANTACID) CHEW TAB 500 MG 1000 MG: 500 CHEW TAB at 11:36

## 2025-02-18 RX ADMIN — Medication 1 MG: at 00:08

## 2025-02-18 RX ADMIN — ASPIRIN 81 MG CHEWABLE TABLET 81 MG: 81 TABLET CHEWABLE at 07:52

## 2025-02-18 RX ADMIN — AMLODIPINE BESYLATE 5 MG: 5 TABLET ORAL at 07:51

## 2025-02-18 RX ADMIN — ACETAMINOPHEN 975 MG: 325 TABLET ORAL at 00:08

## 2025-02-18 RX ADMIN — MEMANTINE HYDROCHLORIDE 10 MG: 5 TABLET, FILM COATED ORAL at 19:56

## 2025-02-18 RX ADMIN — FUROSEMIDE 40 MG: 10 INJECTION, SOLUTION INTRAMUSCULAR; INTRAVENOUS at 18:15

## 2025-02-18 RX ADMIN — QUETIAPINE FUMARATE 12.5 MG: 25 TABLET ORAL at 19:56

## 2025-02-18 RX ADMIN — MEMANTINE HYDROCHLORIDE 10 MG: 5 TABLET, FILM COATED ORAL at 07:51

## 2025-02-18 RX ADMIN — Medication: at 19:59

## 2025-02-18 RX ADMIN — FUROSEMIDE 40 MG: 10 INJECTION, SOLUTION INTRAMUSCULAR; INTRAVENOUS at 10:11

## 2025-02-18 ASSESSMENT — ACTIVITIES OF DAILY LIVING (ADL)
ADLS_ACUITY_SCORE: 76
ADLS_ACUITY_SCORE: 74
ADLS_ACUITY_SCORE: 74
ADLS_ACUITY_SCORE: 76
ADLS_ACUITY_SCORE: 74
ADLS_ACUITY_SCORE: 76
ADLS_ACUITY_SCORE: 74
ADLS_ACUITY_SCORE: 76
ADLS_ACUITY_SCORE: 76
ADLS_ACUITY_SCORE: 74
ADLS_ACUITY_SCORE: 76
ADLS_ACUITY_SCORE: 74
ADLS_ACUITY_SCORE: 74
ADLS_ACUITY_SCORE: 76
ADLS_ACUITY_SCORE: 74
ADLS_ACUITY_SCORE: 76
ADLS_ACUITY_SCORE: 76
ADLS_ACUITY_SCORE: 74
ADLS_ACUITY_SCORE: 74
ADLS_ACUITY_SCORE: 76
ADLS_ACUITY_SCORE: 75
ADLS_ACUITY_SCORE: 75
ADLS_ACUITY_SCORE: 74

## 2025-02-18 NOTE — PROGRESS NOTES
[facility]    Medicine Progress Note - Hospitalist Service    Date of Admission:  2/15/2025    Assessment & Plan      Angle Agudelo is a 89 year old female admitted on 2/15/2025. She has a past medical history of hypertension, hyperlipidemia, dementia, prior history of Jihan-Carey tear, previous CVA, hospitalized in June 2024 (for hematemesis from a pill that was stuck, underwent EGD and discharged with Protonix and GI follow-up and also treated for UTI), who currently resides at [facility] (not in [facility] but [facility]) and recently diagnosed with pneumonia and CHF and given 20 mg oral Lasix and doxycycline and the patient had taken only 2 doses of each when on day of admission, she was found by facility staff to be very short of breath and was saturating reportedly in the 50s, EMS was contacted and they put her up to 5 L and she was brought here.      In the ER, she was hemodynamically vitally stable on 2 L.  Troponin was 20 and follow-up at 18 stable, BMP was not remarkable with creatinine 0.8, no leukocytosis, hemoglobin stable at 13.1, D-dimer was positive but follow-up CT study showed no PE but does demonstrate bilateral small effusions with interstitial findings consistent with volume overload, negative for flu COVID and RSV, elevated BNP at 4579, given IV Lasix 60 mg and admitted.  Resumed with a lower dose p.m. 20mg iv lasix.  Therapies and care management to see as well.  Stable on admission.  Pllan to [facility] echo (last echo in June 2020 with EF of 55-60%).      On HOD1, overnight increased to 4L of 02, so will continue with IV diuresis, 40mg daily, had good output -2L. Therapies to see as well.  Repeat echocardiogram with EF of 60-65, right ventricular systolic pressure elevated consistent with pulmonary hypertension; will recommend follow up long term. Otherwise she remains stable.     HOD2, output slowed so increasing to 40mg iv BID. And also Palliative Care Consult  for GOCs (see H&P or below regarding adult children vs their stepfather).    HOD3, she was saturating at mid--90s at 2L, so I turned it down to 1L and saturating at 91-92%. Will benefit from one more day of iv diuresis before transition back to po. Appreciate palliative care evaluation.    Dispo-likely tmr    -----  Acute respiratory failure with hypoxia  Acute pulmonary edema, likely acute heart failure w/ preserved EF  Bilateral small effusions, elevated bnp   A- as above. Now on 1L  P:  - continue diuresis to 40mg bid lasix for 2/18/2025   - I/Os and weights  - Echo (last in 2020) stable  - consider Cardiology input pending clinical course    Hypertension  Hyperlipidemia  Assessment-normotensive pressures currently.  On regimen of amlodipine  Plan:  -Continue home medications with hold parameters, caution with increased diuretics    History of dementia  A/P-comes from [facility], therapies and social work to see  Avoid Zyprexa given the dementia history, would opt for additional Seroquel prn, currently has in evening  Would give extra seroquel if delirium overnight    Prior history of Jihan-Carey tear  A/P-noted, monitor clinically    Prior history of CVA  A/P-neurochecks and monitor clinically  - continue asa and statin    GOC and Code status  A/p-appreciate ER team already reaching out to multiple family members- Regarding code status, initially the 2 daughters, and son en route, are involved and they initially wanted to honor their stepfather's request in the past for FULL code which is what they told the ER team (that was only the 2 daughters, NOT the POA) but they report that pt's current  (the adult childrens' stepfather) requires help at the [facility] and is not decisional and hence the son Daniele is the POA. As such, writer called Daniele; Daniele and daughters Will and Daija -all 3 - were in agreement for DNR/DNI.  -As such, updated code to DNR/DNI as per POA's decision. This was all done with the  patient next to writer.   - believe would benefit from GOC in long term   -Consult palliative care,    - discussed and appreciated          Diet: Fluid restriction 2000 ML FLUID  Combination Diet Low Saturated Fat Na <2400mg Diet, No Caffeine Diet    DVT Prophylaxis: Enoxaparin (Lovenox) SQ and Pneumatic Compression Devices  Graves Catheter: Not present  Lines: None     Cardiac Monitoring: ACTIVE order. Indication: Acute decompensated heart failure (48 hours)  Code Status: No CPR- Do NOT Intubate      Clinically Significant Risk Factors                   # Hypertension: Noted on problem list  # Acute heart failure with preserved ejection fraction: heart failure noted on problem list, last echo with EF >50%, and receiving IV diuretics    # Dementia: noted on problem list            # Financial/Environmental Concerns: none         Social Drivers of Health            Disposition Plan     Medically Ready for Discharge: Anticipated Tomorrow          Javed Reyna MD  Hospitalist Service  [facility]  Securely message with [employer] (more info)  Text page via [employer] Paging/Directory   ______________________________________________________________________    Interval History   Naeo  Remains on 2L but saturating mid-90s  Pt has no new symptoms but was tired  Updated daughter Daija  Discussed iv diuresis  I turned down her 02 from 2L to 1L and pt is still saturating at 91-92%.   Discussed she'll benefit from one more day of iv diuresis before transition back to po with goal to wean off 02 completely.   Discussed care plans and pt has no questions  Attempted to find RN who was unavailable on rounds  Updated sw again      Physical Exam   Vital Signs: Temp: 98.1  F (36.7  C) Temp src: Oral BP: 101/61 Pulse: 66   Resp: 18 SpO2: 93 % O2 Device: Nasal cannula Oxygen Delivery: turned from 2L to 1 LPM  Weight: 127 lbs 6.81 oz    General: alert, partially oriented (waxes/wanes), and in no acute  distress  Pulmonary: basilar rales, on 1L, normal speaking effort  CVS: regular rate and rhythm, no murmurs, rubs, or gallops; no blatant jugular venous distention; no extremity edema and extremities are warm to the touch  GI: soft, nontender, BS+, no rebound or guarding, no conspicuous organomegaly   Neuro: nonfocal, stable, moves all limbs; stable and oriented to self and location type as baseline and will occasionally correctly answer other questions correctly   Psych: appropriate and cooperative       Medical Decision Making       40 MINUTES SPENT BY ME on the date of service doing chart review, history, exam, documentation & further activities per the note.      Data   ------------------------- PAST 24 HR DATA REVIEWED -----------------------------------------------    I have personally reviewed the following data over the past 24 hrs:    N/A  \   N/A   / N/A     141 100 12.6 /  91   4.0 30 (H) 0.88 \       Imaging results reviewed over the past 24 hrs:   No results found for this or any previous visit (from the past 24 hours).

## 2025-02-18 NOTE — PLAN OF CARE
Problem: Adult Inpatient Plan of Care  Goal: Absence of Hospital-Acquired Illness or Injury  Intervention: Prevent Infection  Recent Flowsheet Documentation  Taken 2/17/2025 2000 by Leyla Clark RN  Infection Prevention:   rest/sleep promoted   hand hygiene promoted     Problem: Delirium  Goal: Improved Behavioral Control  Intervention: Minimize Safety Risk  Recent Flowsheet Documentation  Taken 2/17/2025 2000 by Leyla Clark RN  Enhanced Safety Measures: room near unit station  Trust Relationship/Rapport: care explained     Problem: Gas Exchange Impaired  Goal: Optimal Gas Exchange  Intervention: Optimize Oxygenation and Ventilation  Recent Flowsheet Documentation  Taken 2/18/2025 0320 by Leyla Clark RN  Head of Bed (HOB) Positioning: HOB at 15 degrees   Goal Outcome Evaluation:       Pt is alert to self only. Denies pain. Denies SOB or chest pain. O2 @ 2LPM. Pt unable to tolerate nasal cannula and keeps on taking it off while asleep. Desating to 83% on RA. Incontinent of urine. Pressure relief given. VSS.

## 2025-02-18 NOTE — PLAN OF CARE
Problem: Adult Inpatient Plan of Care  Goal: Plan of Care Review  Description: The Plan of Care Review/Shift note should be completed every shift.  The Outcome Evaluation is a brief statement about your assessment that the patient is improving, declining, or no change.  This information will be displayed automatically on your shift  note.  Outcome: Progressing   Goal Outcome Evaluation:  VSS and afebrile. Remains on 1L O2. After falling asleep this evening, patient woke up a couple times quite confused, pulling off telemetry and asking who she was and where she was, but was redirectable. No complaints of pain. [facility] called for update on patient. Hopeful for discharge back to [facility] tomorrow.

## 2025-02-18 NOTE — PROGRESS NOTES
PALLIATIVE CARE PROGRESS NOTE  [facility]     Patient Name: Angle Agudelo  Date of Admission: 2/15/2025   Today the patient was seen for: follow up on goals of care     Recommendations & Counseling       GOALS OF CARE:   Life-prolonging with limits -DNR/DNI  Hope is that patient can return to her assisted living apartment at [facility].   Currently  patient does not have medical  decision  making capacity.  She is oriented to self and family.  Defer to family for supported/surrogate decision making capacity.  Discussed with daughter Daija today.  Reviewed  that patient is at  high  risk  for future  readmissions  given CHF and valvular  heart  disease.  Currently  on IV diuretics.     Palliative care will sign off at this time.  Please reach out if further questions or concerns.      ADVANCE CARE PLANNING:  No health care directive on file. Per system policy, Surrogate Decision-makers for Patients With Diminished Decision-making Capacity offers guidance on possible decision-makers. Daughter  Daija.  Clarified today that son Daniele has financial power of  but not health care POA.  Patient identified Daija as surrogate decision maker.  Per Daija, patient's family (5 children with attempts of including patient's  when able) make decisions collectively.  There is some question as to whether patient's  is decisional to make decisions on Marielos's behalf (due to his own health conditions).   Provided Daija with POLST form.  Family plans to review and possibly complete after discharge with her PCP at [facility]. Want to discuss as a family.  Code status: No CPR- Do NOT Intubate     MEDICAL MANAGEMENT:   We are not actively managing symptoms at this time.     PSYCHOSOCIAL/SPIRITUAL SUPPORT:  Family 5 children       Palliative Care will continue to follow. Thank you for the consult and allowing us to aid in the care of Angle Agudelo.    Arlyn Swanson, [facility],  "Palliative Care  Securely message with the [employer] Web Console (learn more here) or  Text page via [facility] Paging/Directory        Assessment          Angle Agudelo is a 89 year old female with a past medical history of HTN, dementia (followed by [facility] neurology), prior history of Jihan-Carey tear, hospitalization in 6/2024 for hematemesis, previous CVA, recent diagnosis of pnemonia and CHF who presented on 2/15/25 with shortness of breath and decreased oxygen saturation.  Admitted with acute respiraotry failure with hypoxia, acute pulmonary edema, likely acute heart failure with preserved EF.  Patient receiving IV lasix.       Per discussion with ER/HMS it was identified that patient's health care agent is son Daniele.  Decision made to change code status to DNR/DNI.  Palliative care was consulted to discuss goals of care.         Interval History:     Multidisciplinary collaboration:  No significant overnight events.    Continues on IV diuretics     Patient/family narrative  Met in room with patient and daughter Daija at bedside.    Patient denies pain or shortness of breath.  Thinks she slept well last night.  Knows she is in \"a hospital\".  Disoriented to time and situation.      Review of Systems:     Besides above, ROS was reviewed and is unremarkable        Physical Exam:   Temp:  [97.1  F (36.2  C)-98.1  F (36.7  C)] 98.1  F (36.7  C)  Pulse:  [66] 66  Resp:  [16-18] 18  BP: ()/(55-82) 101/61  SpO2:  [93 %-94 %] 93 %  127 lbs 6.81 oz    Physical Exam  PHYSICAL EXAM:  Constitutional: Alert, pleasant, NAD   Respiratory: Breathing nonlabored  NEURO: Oriented to self and family and  \"a hospital\"          Data Reviewed:     Results for orders placed or performed during the hospital encounter of 02/15/25 (from the past 24 hours)   Basic metabolic panel   Result Value Ref Range    Sodium 141 135 - 145 mmol/L    Potassium 4.0 3.4 - 5.3 mmol/L    Chloride 100 98 - 107 mmol/L    Carbon Dioxide (CO2) 30 (H) " 22 - 29 mmol/L    Anion Gap 11 7 - 15 mmol/L    Urea Nitrogen 12.6 8.0 - 23.0 mg/dL    Creatinine 0.88 0.51 - 0.95 mg/dL    GFR Estimate 61 >60 mL/min/1.73m2    Calcium 9.1 8.8 - 10.4 mg/dL    Glucose 91 70 - 99 mg/dL   Extra Tube    Narrative    The following orders were created for panel order Extra Tube.  Procedure                               Abnormality         Status                     ---------                               -----------         ------                     Extra Purple Top Tube[[id number]]                            Final result                 Please view results for these tests on the individual orders.   Extra Purple Top Tube   Result Value Ref Range    Hold Specimen JIC        35 MINUTES SPENT BY ME on the date of service doing chart review, history, exam, documentation & further activities per the note.

## 2025-02-18 NOTE — PLAN OF CARE
Pt is alert and oriented to self and situation and able to make needs known. Assist of 1 with ambulation. Vitals signs are stable, afebrile, oxygen 93% on 2L NC, and denied pain. Pt is tolerating cardiac diet. Alarms in place for safety.    Plan: Discharge to [place] jignesh Bella RN  February 17, 2025, 6:43 PM    Problem: Adult Inpatient Plan of Care  Goal: Absence of Hospital-Acquired Illness or Injury  Outcome: Progressing  Intervention: Identify and Manage Fall Risk  Recent Flowsheet Documentation  Taken 2/17/2025 1700 by Dede Bella RN  Safety Promotion/Fall Prevention: safety round/check completed  Intervention: Prevent Skin Injury  Recent Flowsheet Documentation  Taken 2/17/2025 1700 by Dede Bella RN  Body Position:   left   turned  Intervention: Prevent Infection  Recent Flowsheet Documentation  Taken 2/17/2025 1700 by Dede Bella RN  Infection Prevention:   rest/sleep promoted   hand hygiene promoted     Problem: Skin Injury Risk Increased  Goal: Skin Health and Integrity  Outcome: Progressing  Intervention: Plan: Nurse Driven Intervention: Moisture Management  Recent Flowsheet Documentation  Taken 2/17/2025 1700 by Dede Bella RN  Moisture Interventions:   Encourage regular toileting   Urinary collection device  Intervention: Optimize Skin Protection  Recent Flowsheet Documentation  Taken 2/17/2025 1700 by Dede Bella RN  Activity Management: activity adjusted per tolerance   Goal Outcome Evaluation:

## 2025-02-18 NOTE — PROGRESS NOTES
[id number]    Alert to self only. Crackles heard in lungs. Remains on 2L NC. IV SL 2,000 mL fluid restriction. Family at bedside. IV lasix given. i

## 2025-02-19 ENCOUNTER — APPOINTMENT (OUTPATIENT)
Dept: RADIOLOGY | Facility: CLINIC | Age: OVER 89
DRG: 291 | End: 2025-02-19
Attending: STUDENT IN AN ORGANIZED HEALTH CARE EDUCATION/TRAINING PROGRAM
Payer: MEDICARE

## 2025-02-19 PROCEDURE — 250N000013 HC RX MED GY IP 250 OP 250 PS 637: Performed by: STUDENT IN AN ORGANIZED HEALTH CARE EDUCATION/TRAINING PROGRAM

## 2025-02-19 PROCEDURE — 99232 SBSQ HOSP IP/OBS MODERATE 35: CPT | Performed by: STUDENT IN AN ORGANIZED HEALTH CARE EDUCATION/TRAINING PROGRAM

## 2025-02-19 PROCEDURE — 120N000001 HC R&B MED SURG/OB

## 2025-02-19 PROCEDURE — 71045 X-RAY EXAM CHEST 1 VIEW: CPT

## 2025-02-19 PROCEDURE — 250N000011 HC RX IP 250 OP 636: Performed by: STUDENT IN AN ORGANIZED HEALTH CARE EDUCATION/TRAINING PROGRAM

## 2025-02-19 RX ORDER — FUROSEMIDE 10 MG/ML
40 INJECTION INTRAMUSCULAR; INTRAVENOUS 3 TIMES DAILY
Status: COMPLETED | OUTPATIENT
Start: 2025-02-19 | End: 2025-02-19

## 2025-02-19 RX ADMIN — Medication: at 08:01

## 2025-02-19 RX ADMIN — FUROSEMIDE 40 MG: 10 INJECTION, SOLUTION INTRAVENOUS at 08:00

## 2025-02-19 RX ADMIN — ASPIRIN 81 MG CHEWABLE TABLET 81 MG: 81 TABLET CHEWABLE at 08:00

## 2025-02-19 RX ADMIN — AMLODIPINE BESYLATE 5 MG: 5 TABLET ORAL at 08:00

## 2025-02-19 RX ADMIN — ENOXAPARIN SODIUM 40 MG: 40 INJECTION SUBCUTANEOUS at 13:52

## 2025-02-19 RX ADMIN — FERROUS SULFATE TAB 325 MG (65 MG ELEMENTAL FE) 325 MG: 325 (65 FE) TAB at 08:00

## 2025-02-19 RX ADMIN — FUROSEMIDE 40 MG: 10 INJECTION, SOLUTION INTRAMUSCULAR; INTRAVENOUS at 20:02

## 2025-02-19 RX ADMIN — FUROSEMIDE 40 MG: 10 INJECTION, SOLUTION INTRAMUSCULAR; INTRAVENOUS at 13:53

## 2025-02-19 RX ADMIN — Medication: at 20:08

## 2025-02-19 RX ADMIN — MEMANTINE HYDROCHLORIDE 10 MG: 5 TABLET, FILM COATED ORAL at 08:00

## 2025-02-19 RX ADMIN — MEMANTINE HYDROCHLORIDE 10 MG: 5 TABLET, FILM COATED ORAL at 19:59

## 2025-02-19 RX ADMIN — QUETIAPINE FUMARATE 12.5 MG: 25 TABLET ORAL at 19:59

## 2025-02-19 RX ADMIN — PANTOPRAZOLE SODIUM 40 MG: 40 TABLET, DELAYED RELEASE ORAL at 06:32

## 2025-02-19 ASSESSMENT — ACTIVITIES OF DAILY LIVING (ADL)
ADLS_ACUITY_SCORE: 76
ADLS_ACUITY_SCORE: 75
ADLS_ACUITY_SCORE: 76
ADLS_ACUITY_SCORE: 76
ADLS_ACUITY_SCORE: 75
ADLS_ACUITY_SCORE: 72
ADLS_ACUITY_SCORE: 73
ADLS_ACUITY_SCORE: 76
ADLS_ACUITY_SCORE: 72
ADLS_ACUITY_SCORE: 76
ADLS_ACUITY_SCORE: 76
ADLS_ACUITY_SCORE: 75
ADLS_ACUITY_SCORE: 76
ADLS_ACUITY_SCORE: 76
ADLS_ACUITY_SCORE: 73
ADLS_ACUITY_SCORE: 72
ADLS_ACUITY_SCORE: 76

## 2025-02-19 NOTE — PROGRESS NOTES
Care Management Follow Up    Length of Stay (days): 4    Expected Discharge Date: 02/19/2025     Concerns to be Addressed: discharge planning  Discharge coordination with [facility] or possibly TCU needed  Patient plan of care discussed at interdisciplinary rounds: Yes    Anticipated Discharge Disposition:  ([facility])      Anticipated Discharge Services:  (TBD)  Anticipated Discharge DME: None    Patient/family educated on Medicare website which has current facility and service quality ratings: no  Education Provided on the Discharge Plan: Yes (AVS per bedside RN)  Patient/Family in Agreement with the Plan: unable to assess    Referrals Placed by CM/SW:  (Not yet indicated)  Private pay costs discussed: Not applicable    Discussed  Partnership in Safe Discharge Planning  document with patient/family: No     Handoff Completed: No, handoff not indicated or clinically appropriate    Additional Information:    Per Dr. Javed Reyna, anticipate discharge tomorrow 2/20.    [place]  [facility] - Nurse Station [phone]; Fax [phone] - Nurse Del Rosario - ANURADHA called; update given.  Nurse Del Rosario confirms pt is welcome to return to this [facility] at any time of day d/t this [facility] unit has nursing staff 24 hours/day.  [facility] uses: [facility].     Daughter Daija - declines medical transport; states family will transport back to [facility]    Next Steps:   Coordinate discharge with [facility] and family.    CM will continue to follow.     Catherine Medley RN

## 2025-02-19 NOTE — PLAN OF CARE
Goal Outcome Evaluation:      Plan of Care Reviewed With: patient    Overall Patient Progress: no changeOverall Patient Progress: no change    Patient is A&Ox1-2, intermittently confused. Family at bedside during this shift. VSS, on RA, denies pain or discomfort. Pt tolerating cardiac soft diet with 2L FR. Pt up to bedside commode with A2, GB/walker.

## 2025-02-19 NOTE — PROGRESS NOTES
SPIRITUAL HEALTH SERVICES Progress Note    Saw pt Angle Agudelo and offered Confucianism sacrament of anointing for the healing of the sick.     Fr. Parag Stinson

## 2025-02-19 NOTE — PROGRESS NOTES
[facility]    Medicine Progress Note - Hospitalist Service    Date of Admission:  2/15/2025    Assessment & Plan      Angle Agudelo is a 89 year old female admitted on 2/15/2025. She has a past medical history of hypertension, hyperlipidemia, dementia, prior history of Jihan-Carey tear, previous CVA, hospitalized in June 2024 (for hematemesis from a pill that was stuck, underwent EGD and discharged with Protonix and GI follow-up and also treated for UTI), who currently resides at [facility] (not in [facility] but [facility]) and recently diagnosed with pneumonia and CHF and given 20 mg oral Lasix and doxycycline and the patient had taken only 2 doses of each when on day of admission, she was found by facility staff to be very short of breath and was saturating reportedly in the 50s, EMS was contacted and they put her up to 5 L and she was brought here.      In the ER, she was hemodynamically vitally stable on 2 L.  Troponin was 20 and follow-up at 18 stable, BMP was not remarkable with creatinine 0.8, no leukocytosis, hemoglobin stable at 13.1, D-dimer was positive but follow-up CT study showed no PE but does demonstrate bilateral small effusions with interstitial findings consistent with volume overload, negative for flu COVID and RSV, elevated BNP at 4579, given IV Lasix 60 mg and admitted.  Resumed with a lower dose p.m. 20mg iv lasix.  Therapies and care management to see as well.  Stable on admission.  Pllan to [facility] echo (last echo in June 2020 with EF of 55-60%).      On HOD1, overnight increased to 4L of 02, so will continue with IV diuresis, 40mg daily, had good output -2L. Therapies to see as well.  Repeat echocardiogram with EF of 60-65, right ventricular systolic pressure elevated consistent with pulmonary hypertension; will recommend follow up long term. Otherwise she remains stable.     HOD2, output slowed so increasing to 40mg iv BID. And also Palliative Care Consult  for GOCs (see H&P or below regarding adult children vs their stepfather).    HOD3, she was saturating at mid--90s at 2L, so I turned it down to 1L and saturating at 91-92%. Will benefit from one more day of iv diuresis before transition back to po. Appreciate palliative care evaluation.    HOD4- repeat CXR shows some persistent vascular congestion, b/l small effusions. Will do TID iv lasix and hope to wean off 02 and home tmr    -----  Acute respiratory failure with hypoxia  Acute pulmonary edema, likely acute heart failure w/ preserved EF  Bilateral small effusions, elevated bnp   A- as above. Now on 1L still some congestion on imaging, will increase diuresis, discussed with patient and her daughter  P:  -Increase to 3 times daily for diuresis today only  - I/Os and weights  - Echo (last in 2020) stable  - consider Cardiology input pending clinical course    Hypertension  Hyperlipidemia  Assessment-normotensive pressures currently.  On regimen of amlodipine  Plan:  -Continue home medications with hold parameters, caution with increased diuretics    History of dementia  A/P-comes from [facility], therapies and social work to see  Avoid Zyprexa given the dementia history, would opt for additional Seroquel prn, currently has in evening  Would give extra seroquel if delirium overnight    Prior history of Jihan-Carey tear  A/P-noted, monitor clinically    Prior history of CVA  A/P-neurochecks and monitor clinically  - continue asa and statin    GOC and Code status  A/p-appreciate ER team already reaching out to multiple family members- Regarding code status, initially the 2 daughters, and son en route, are involved and they initially wanted to honor their stepfather's request in the past for FULL code which is what they told the ER team (that was only the 2 daughters, NOT the POA) but they report that pt's current  (the adult childrens' stepfather) requires help at the [facility] and is not decisional and  hence the son Daniele is the POA. As such, writer called Daniele; Daniele and daughters Will and Daija -all 3 - were in agreement for DNR/DNI.  -As such, updated code to DNR/DNI as per POA's decision. This was all done with the patient next to writer.   - believe would benefit from GOC in long term   -Consult palliative care,    - discussed and appreciated          Diet: Fluid restriction 2000 ML FLUID  Combination Diet Low Saturated Fat Na <2400mg Diet, No Caffeine Diet    DVT Prophylaxis: Enoxaparin (Lovenox) SQ and Pneumatic Compression Devices  Graves Catheter: Not present  Lines: None     Cardiac Monitoring: ACTIVE order. Indication: Acute decompensated heart failure (48 hours)  Code Status: No CPR- Do NOT Intubate      Clinically Significant Risk Factors                   # Hypertension: Noted on problem list  # Acute heart failure with preserved ejection fraction: heart failure noted on problem list, last echo with EF >50%, and receiving IV diuretics    # Dementia: noted on problem list            # Financial/Environmental Concerns: none         Social Drivers of Health            Disposition Plan     Medically Ready for Discharge: Anticipated Tomorrow          Javed Reyna MD  Hospitalist Service  [facility]  Securely message with [employer] (more info)  Text page via [employer] Paging/Directory   ______________________________________________________________________    Interval History   Naeo  She remains on 1 L  Feels about the same  We discussed disposition  Discussed imaging from this morning and plan to increase diuresis, patient and daughter were both in agreement  Discussed likely hopefully discharge tomorrow  They plan to celebrate patient's 's birthday tomorrow  Updated sw again      Physical Exam   Vital Signs: Temp: 97.3  F (36.3  C) Temp src: Axillary BP: 102/59 Pulse: 65   Resp: 16 SpO2: 92 % O2 Device: Nasal cannula Oxygen Delivery: turned from 2L to 1 LPM  Weight: 131 lbs 2.78  oz    General: alert, currently oriented though waxes and wanes; and in no acute distress  Pulmonary: Faint basilar rales, on 1L, normal speaking effort  CVS: regular rate and rhythm, no murmurs, rubs, or gallops; no blatant jugular venous distention; no extremity edema and extremities are warm to the touch  GI: soft, nontender, BS+, no rebound or guarding, no conspicuous organomegaly   Neuro: nonfocal, stable, moves all limbs; stable and oriented to self and location type as baseline and will occasionally correctly answer other orientation questions correctly   Psych: appropriate and cooperative       Medical Decision Making       41 MINUTES SPENT BY ME on the date of service doing chart review, history, exam, documentation & further activities per the note.      Data   ------------------------- PAST 24 HR DATA REVIEWED -----------------------------------------------    I have personally reviewed the following data over the past 24 hrs:    N/A  \   N/A   / N/A     141 100 12.6 /  91   4.0 30 (H) 0.88 \       Imaging results reviewed over the past 24 hrs:   Recent Results (from the past 24 hours)   XR Chest Port 1 View    Narrative    EXAM: XR CHEST PORT 1 VIEW  LOCATION: [facility]  DATE: 2/19/2025    INDICATION: follow up on effusions after diuresis, thank you  COMPARISON: 2/15/2025      Impression    IMPRESSION: The heart is mildly enlarged, similar prior exam. There is central pulmonary venous congestion with interstitial edema seen in the perihilar and lower lung zones. Small right-sided pleural effusion and a small left pleural effusion are   present

## 2025-02-19 NOTE — PLAN OF CARE
Problem: Gas Exchange Impaired  Goal: Optimal Gas Exchange  Outcome: Progressing     Problem: Adult Inpatient Plan of Care  Goal: Optimal Comfort and Wellbeing  Outcome: Progressing   Goal Outcome Evaluation:       Pt is alert but confused. Denies pain. O2 @ 1LPM via NC. Unconsciously takes nasal cannula off nostrils while asleep. Incontinent of urine. VSS. Tele read Sinus Dysrhythmia. CXR done, pending result.

## 2025-02-20 VITALS
WEIGHT: 126.76 LBS | RESPIRATION RATE: 18 BRPM | SYSTOLIC BLOOD PRESSURE: 108 MMHG | TEMPERATURE: 97.8 F | OXYGEN SATURATION: 94 % | DIASTOLIC BLOOD PRESSURE: 61 MMHG | HEART RATE: 73 BPM | BODY MASS INDEX: 23.19 KG/M2

## 2025-02-20 LAB
ANION GAP SERPL CALCULATED.3IONS-SCNC: 11 MMOL/L (ref 7–15)
BUN SERPL-MCNC: 13.2 MG/DL (ref 8–23)
CALCIUM SERPL-MCNC: 9.4 MG/DL (ref 8.8–10.4)
CHLORIDE SERPL-SCNC: 98 MMOL/L (ref 98–107)
CREAT SERPL-MCNC: 0.82 MG/DL (ref 0.51–0.95)
EGFRCR SERPLBLD CKD-EPI 2021: 66 ML/MIN/1.73M2
GLUCOSE SERPL-MCNC: 90 MG/DL (ref 70–99)
HCO3 SERPL-SCNC: 32 MMOL/L (ref 22–29)
POTASSIUM SERPL-SCNC: 3.5 MMOL/L (ref 3.4–5.3)
SODIUM SERPL-SCNC: 141 MMOL/L (ref 135–145)

## 2025-02-20 PROCEDURE — 250N000013 HC RX MED GY IP 250 OP 250 PS 637: Performed by: STUDENT IN AN ORGANIZED HEALTH CARE EDUCATION/TRAINING PROGRAM

## 2025-02-20 PROCEDURE — 36415 COLL VENOUS BLD VENIPUNCTURE: CPT | Performed by: STUDENT IN AN ORGANIZED HEALTH CARE EDUCATION/TRAINING PROGRAM

## 2025-02-20 PROCEDURE — 80048 BASIC METABOLIC PNL TOTAL CA: CPT | Performed by: STUDENT IN AN ORGANIZED HEALTH CARE EDUCATION/TRAINING PROGRAM

## 2025-02-20 PROCEDURE — 82310 ASSAY OF CALCIUM: CPT | Performed by: STUDENT IN AN ORGANIZED HEALTH CARE EDUCATION/TRAINING PROGRAM

## 2025-02-20 PROCEDURE — 99239 HOSP IP/OBS DSCHRG MGMT >30: CPT | Performed by: STUDENT IN AN ORGANIZED HEALTH CARE EDUCATION/TRAINING PROGRAM

## 2025-02-20 RX ORDER — FUROSEMIDE 20 MG/1
40 TABLET ORAL DAILY
Qty: 60 TABLET | Refills: 0 | Status: SHIPPED | OUTPATIENT
Start: 2025-02-20 | End: 2025-03-22

## 2025-02-20 RX ADMIN — AMLODIPINE BESYLATE 5 MG: 5 TABLET ORAL at 07:49

## 2025-02-20 RX ADMIN — Medication: at 07:49

## 2025-02-20 RX ADMIN — ASPIRIN 81 MG CHEWABLE TABLET 81 MG: 81 TABLET CHEWABLE at 07:49

## 2025-02-20 RX ADMIN — PANTOPRAZOLE SODIUM 40 MG: 40 TABLET, DELAYED RELEASE ORAL at 06:10

## 2025-02-20 RX ADMIN — FERROUS SULFATE TAB 325 MG (65 MG ELEMENTAL FE) 325 MG: 325 (65 FE) TAB at 07:49

## 2025-02-20 RX ADMIN — MEMANTINE HYDROCHLORIDE 10 MG: 5 TABLET, FILM COATED ORAL at 07:49

## 2025-02-20 ASSESSMENT — ACTIVITIES OF DAILY LIVING (ADL)
ADLS_ACUITY_SCORE: 73

## 2025-02-20 NOTE — DISCHARGE SUMMARY
[facility]  Hospitalist Discharge Summary      Date of Admission:  2/15/2025  Date of Discharge:  2/20/2025 10:38 AM  Discharging Provider: Javed Renya MD  Discharge Service: Hospitalist Service    Discharge Diagnoses   Acute respiratory failure with hypoxia  Acute pulmonary edema, acute heart failure w/ preserved EF  Bilateral small effusions, elevated bnp   Hypertension  Hyperlipidemia  History of dementia  Prior history of Jihan-Carey tear  Prior history of CVA    Clinically Significant Risk Factors          Follow-ups Needed After Discharge   Follow-up Appointments       Follow-up and recommended labs and tests       Follow up with primary care provider, Koko Betancourt, within 7 days for hospital follow- up.  The following labs/tests are recommended: bmp. Lasix PO dose increased.                 Unresulted Labs Ordered in the Past 30 Days of this Admission       No orders found from 1/16/2025 to 2/16/2025.        These results will be followed up by [facility]    Discharge Disposition   Discharged to home with home cares  Condition at discharge: Stable    Hospital Course   Angle Agudelo is a 89 year old female admitted on 2/15/2025. She has a past medical history of hypertension, hyperlipidemia, dementia, prior history of Jihan-Carey tear, previous CVA, hospitalized in June 2024 (for hematemesis from a pill that was stuck, underwent EGD and discharged with Protonix and GI follow-up and also treated for UTI), who currently resides at [facility] (not in [facility]) and recently diagnosed with pneumonia and CHF and given 20 mg oral Lasix and doxycycline and the patient had taken only 2 doses of each when on day of admission, she was found by facility staff to be very short of breath and was saturating reportedly in the 50s, EMS was contacted and they put her up to 5 L and she was brought here. Admitted for acute on chronic CHF.    In the ER, she was hemodynamically  vitally stable on 2 L.  Troponin was 20 and follow-up at 18 stable, BMP was not remarkable with creatinine 0.8, no leukocytosis, hemoglobin stable at 13.1, D-dimer was positive but follow-up CT study showed no PE but does demonstrate bilateral small effusions with interstitial findings consistent with volume overload, negative for flu COVID and RSV, elevated BNP at 4579, given IV Lasix 60 mg and admitted.  Resumed with a lower dose p.m. 20mg iv lasix.     The patient was treated with IV diuresis twice daily, repeat echocardiogram with EF of 60-65, right ventricular systolic pressure elevated consistent with pulmonary hypertension; will recommend follow up long term.  Palliative care also consulted to help with goals of care and this was all laid out for patient and family with everyone in consensus.  She was weaned to 1 L on 2/19 and the goal was to avoid any home oxygen so she was treated with additional IV diuresis and on 2/20 she was weaned back to and tolerated room air.    As she is clinically improved and back to her baseline and remains hemodynamically vitally stable, she will be discharged back home with home cares.  Lasix oral dose is increased to 40 mg oral once daily.  This was reviewed with the patient but also with her daughter.  Should have follow-up with her PCP.      Consultations This Hospital Stay   PHYSICAL THERAPY ADULT IP CONSULT  OCCUPATIONAL THERAPY ADULT IP CONSULT  CARE MANAGEMENT / SOCIAL WORK IP CONSULT  PALLIATIVE CARE ADULT IP CONSULT    Code Status   No CPR- Do NOT Intubate    Time Spent on this Encounter   I, Javed Reyna MD, personally saw the patient today and spent greater than 30 minutes discharging this patient.       Javed Reyna MD  [address] [address]  Phone: [phone]  Fax: [phone]  ______________________________________________________________________    Physical Exam   Vital Signs: Temp:  97.8  F (36.6  C) Temp src: Oral BP: 108/61 Pulse: 73   Resp: 18 SpO2: 94 % O2 Device: None (Room air)   Weight: 126 lbs 12.23 oz     General: alert, currently oriented though waxes and wanes; and in no acute distress  Pulmonary: Faint basilar rales, on room air, normal speaking effort  CVS: regular rate and rhythm, no murmurs, rubs, or gallops; no blatant jugular venous distention; no extremity edema and extremities are warm to the touch  GI: soft, nontender, BS+, no rebound or guarding, no conspicuous organomegaly   Neuro: nonfocal, stable, moves all limbs; stable and oriented to self and location type as at  her baseline and note she occasionally correctly answers other orientation questions   Psych: appropriate and cooperative        Primary Care Physician   Koko Betancourt    Discharge Orders      Primary Care - Care Coordination Referral      Home Care Referral      Reason for your hospital stay    Acute on chronic heart failure, this improved with IV diuresis (water pill medicine), resume your water pill on discharge (at a new higher dose) and follow-up with your primary care provider closely within a week for posthospital visit.     Follow-up and recommended labs and tests     Follow up with primary care provider, Koko Betancourt, within 7 days for hospital follow- up.  The following labs/tests are recommended: bmp. Lasix PO dose increased.     Activity    Your activity upon discharge: activity as tolerated     Diet    Follow this diet upon discharge: Current Diet:Orders Placed This Encounter      Fluid restriction 2000 ML FLUID      Combination Diet Low Saturated Fat Na <2400mg Diet, No Caffeine Diet       Significant Results and Procedures   Results for orders placed or performed during the hospital encounter of 02/15/25   CT Chest Pulmonary Embolism w Contrast    Narrative    EXAM: CT CHEST PULMONARY EMBOLISM W CONTRAST  LOCATION: [facility]  DATE: 2/15/2025    INDICATION:  elevated ddimer with sob and hypoxia  COMPARISON: CT angiogram of the chest 6/20/2024  TECHNIQUE: CT chest pulmonary angiogram during arterial phase injection of IV contrast. Multiplanar reformats and MIP reconstructions were performed. Dose reduction techniques were used.   CONTRAST: 75ml Isovue 370    FINDINGS:  ANGIOGRAM CHEST: Pulmonary arteries are normal caliber and negative for pulmonary emboli. Suboptimal contrast opacification of the aorta. Mild aortic root and aortic valve leaflet calcifications. Preserved sinotubular junction. No thoracic aortic   aneurysm. Patchy moderate atheromatous calcifications of the arch and descending thoracic aorta. No findings to suggest acute aortic syndrome.    LUNGS AND PLEURA: Symmetric small bilateral pleural effusions layer dependently. Opacities and volume loss of the dependent lower lobes and inferior lingula consistent with atelectasis. There is smooth interlobular septal thickening in the apices and   anterior bases consistent with interstitial lung edema. The lungs have diffuse gray attenuation and a mild diffuse alveolar edema could also be present. Central airways are patent and normal caliber. There are no peribronchial fluid cuffs around the   patrick. No endoluminal airway debris.    MEDIASTINUM: Moderate mitral annular calcifications. Cardiac chambers are not enlarged. There is variant pulmonary venous anatomy with a right top pulmonary vein. Small pericardial effusion is present. There is hazy attenuation in the paratracheal and   subcarinal mediastinal fat consistent with tissue edema. Mildly enlarged lower paratracheal and symmetric hilar lymph nodes are present favored for reactive nodes. Left paratracheal mass extending to the neck is not well characterized due to streak   artifacts from the left shoulder replacement but better characterized on prior CT consistent with a thyroid mass, unchanged. The esophagus is patulous with retained fluid in the middle and  distal thirds consistent with dysmotility.    CORONARY ARTERY CALCIFICATION: Severe.    UPPER ABDOMEN: Prior cholecystectomy. The abdominal aorta is tortuous and calcified. There are no new findings in the imaged upper abdomen.    MUSCULOSKELETAL: Status post bilateral reverse shoulder arthroplasties. Diffuse bone demineralization. Small to moderate cervical and thoracic spine degenerative osteophytes and disc space narrowing. There are no aggressive or destructive bone lesions.      Impression    IMPRESSION:    1.  No acute pulmonary embolism.  2.  Small symmetric pleural effusions and mild mixed interstitial and alveolar lung edema. There is also soft tissue edema in the mediastinal fat and symmetric enlarged hilar and mediastinal lymph nodes consistent with reactive nodes.  3.  Severe atheromatous coronary calcifications and small pericardial effusion.  4.  No discernible change in low-attenuation left thyroid mass.   XR Chest Port 1 View    Narrative    EXAM: XR CHEST PORT 1 VIEW  LOCATION: [facility]  DATE: 2025    INDICATION: follow up on effusions after diuresis, thank you  COMPARISON: 2/15/2025      Impression    IMPRESSION: The heart is mildly enlarged, similar prior exam. There is central pulmonary venous congestion with interstitial edema seen in the perihilar and lower lung zones. Small right-sided pleural effusion and a small left pleural effusion are   present   Echocardiogram Complete     Value    LVEF  60-65%    Narrative    [id number]  [id number]  [id number]  [id number]^PERRY^[place] [address]     Name: MAI DAVALOS  MRN: [id number]  : 1932  Study Date: 02/15/2025 02:11 PM  Age: 89 yrs  Gender: Female  Patient Location: [facility]  Reason For Study: CHF  Ordering Physician: TONY AMADOR  Performed By: JARRELL     BSA: 1.6 m2  Height: 62 in  Weight: 125 lb  HR: 74  BP: 123/84  mmHg  ______________________________________________________________________________  Procedure  Echocardiogram with two-dimensional, color and spectral Doppler. There is no  comparison study available.  ______________________________________________________________________________  Interpretation Summary     1.Left ventricular size, wall motion and function are normal. The ejection  fraction is 60-65%.  2.The right ventricle is moderately dilated. Mildly decreased right  ventricular systolic function  3.Mitral valve not well-visualized, heavily calcified with calcification  mitral annulus, moderate to severe mitral stenosis with calculated valve area  1.37 by pressure half-time.  4.Heavily calcified aortic valve, mild aortic stenosis, at SVI 42 mL/M2  dimensionless index 0.41 with a peak velocity 2.4 m/s mean gradient of 14  mmHg. Visually this appears to be an underestimation, looks to be at least  moderate aortic stenosis.  5.Right ventricular systolic pressure is elevated, consistent with moderate to  severe pulmonary hypertension.  There is no comparison study available.  ______________________________________________________________________________  I      WMSI = 1.00     % Normal = 100     X - Cannot   0 -                      (2) - Mildly 2 -          Segments  Size  Interpret    Hyperkinetic 1 - Normal  Hypokinetic  Hypokinetic  1-2     small                                                     7 -          3-5      moderate  3 - Akinetic 4 -          5 -         6 - Akinetic Dyskinetic   6-14    large               Dyskinetic   Aneurysmal  w/scar       w/scar       15-16   diffuse     Left Ventricle  Left ventricular size, wall motion and function are normal. The ejection  fraction is 60-65%. There is normal left ventricular wall thickness. Left  ventricular diastolic function is normal. No regional wall motion  abnormalities noted.     Right Ventricle  The right ventricle is moderately dilated. Mildly  decreased right ventricular  systolic function. TAPSE is abnormal, which is consistent with abnormal right  ventricular systolic function.     Atria  The left atrium is mildly dilated. The right atrium is mildly dilated. There  is no color Doppler evidence of an atrial shunt.     Mitral Valve  There is trace mitral regurgitation. Mitral valve not well-visualized, heavily  calcified with calcification mitral annulus, moderate to severe mitral  stenosis with calculated valve area 1.37 by pressure half-time.     Tricuspid Valve  The tricuspid valve is not well visualized, but is grossly normal. There is  mild to moderate (1-2+) tricuspid regurgitation. Right ventricular systolic  pressure is elevated, consistent with moderate to severe pulmonary  hypertension. There is no tricuspid stenosis.     Aortic Valve  No aortic regurgitation is present. Heavily calcified aortic valve, mild  aortic stenosis, at SVI 42 mL/M2 dimensionless index 0.41 with a peak velocity  2.4 m/s mean gradient of 14 mmHg. Visually this appears to be an  underestimation, looks to be at least moderate aortic stenosis.     Pulmonic Valve  The pulmonic valve is not well seen, but is grossly normal. This degree of  valvular regurgitation is within normal limits. There is no pulmonic valvular  stenosis.     Vessels  The aorta root is normal. Normal size ascending aorta. IVC diameter <2.1 cm  collapsing >50% with sniff suggests a normal RA pressure of 3 mmHg.     Pericardium  Small posterior pericardial effusion. There are no echocardiographic  indications of cardiac tamponade. Moderate left pleural effusion.     Rhythm  Sinus rhythm was noted.     ______________________________________________________________________________  MMode/2D Measurements & Calculations  IVSd: 1.0 cm  LVIDd: 3.7 cm  LVIDs: 2.1 cm  LVPWd: 1.1 cm  FS: 43.2 %     LV mass(C)d: 123.8 grams  LV mass(C)dI: 79.1 grams/m2  Ao root diam: 3.3 cm  LA dimension: 3.2 cm  asc Aorta Diam: 3.1  cm  LA/Ao: 0.97  LVOT diam: 2.0 cm  LVOT area: 3.1 cm2  Ao root diam index Ht(cm/m): 2.1  Ao root diam index BSA (cm/m2): 2.1  Asc Ao diam index BSA (cm/m2): 2.0  Asc Ao diam index Ht(cm/m): 2.0  EF Biplane: 64.1 %  LA Volume (BP): 43.2 ml     LA Volume Index (BP): 27.5 ml/m2  LA Volume Indexed (AL/bp): 29.0 ml/m2  RV Base: 4.8 cm  RWT: 0.61  TAPSE: 1.6 cm     Doppler Measurements & Calculations  MV E max mike: 88.7 cm/sec  MV A max mike: 176.0 cm/sec  MV E/A: 0.50  MV max P.1 mmHg  MV mean P.0 mmHg  MV V2 VTI: 46.4 cm  MVA(VTI): 1.4 cm2     MV dec slope: 161.0 cm/sec2  MV dec time: 0.55 sec  Ao V2 max: 234.0 cm/sec  Ao max P.0 mmHg  Ao V2 mean: 171.0 cm/sec  Ao mean P.0 mmHg  Ao V2 VTI: 50.5 cm  PRATIK(I,D): 1.3 cm2  PRATIK(V,D): 1.3 cm2  LV V1 max PG: 3.6 mmHg  LV V1 max: 95.0 cm/sec  LV V1 VTI: 21.0 cm  SV(LVOT): 66.0 ml  SI(LVOT): 42.1 ml/m2  PA V2 max: 81.0 cm/sec  PA max P.6 mmHg  PA acc time: 0.06 sec  TR max mike: 412.0 cm/sec  TR max P.9 mmHg  AV Mike Ratio (DI): 0.41  PRATIK Index (cm2/m2): 0.83  E/E': 13.4  E/E' av.0  Lateral E/e': 13.5  Medial E/e': 14.6  Peak E' Mike: 6.6 cm/sec  RV S Mike: 12.3 cm/sec     ______________________________________________________________________________  Report approved by: Aaron Cotto MD on 02/15/2025 04:43 PM             Discharge Medications   Current Discharge Medication List        CONTINUE these medications which have CHANGED    Details   furosemide (LASIX) 20 MG tablet Take 2 tablets (40 mg) by mouth daily.  Qty: 60 tablet, Refills: 0    Associated Diagnoses: Acute pulmonary edema (H)           CONTINUE these medications which have NOT CHANGED    Details   acetaminophen (TYLENOL) 325 MG tablet Take 3 tablets (975 mg) by mouth every 6 hours as needed for mild pain  Qty: 50 tablet, Refills: 0    Associated Diagnoses: Vagina bleeding      amLODIPine (NORVASC) 5 MG tablet Take 5 mg by mouth daily.      aspirin (ASPIRIN LOW DOSE) 81 MG chewable  tablet Take 1 tablet (81 mg) by mouth daily Restart on 02/01/2024  Qty: 30 tablet, Refills: 11    Associated Diagnoses: History of CVA (cerebrovascular accident)      ferrous sulfate (FEROSUL) 325 (65 Fe) MG tablet Take 1 tablet (325 mg) by mouth daily (with breakfast).  Qty: 90 tablet, Refills: 3    Associated Diagnoses: Iron deficiency anemia, unspecified iron deficiency anemia type      guaiFENesin 400 MG TABS Take 1 tablet by mouth every 6 hours as needed.      hydrocortisone 2.5 % cream Apply topically 2 times daily as needed for other (Hemorrhoids).      ibuprofen (ADVIL/MOTRIN) 600 MG tablet Take 1 tablet (600 mg) by mouth every 6 hours as needed for moderate pain  Qty: 30 tablet, Refills: 0    Associated Diagnoses: Vagina bleeding      lovastatin (MEVACOR) 40 MG tablet 1 TABLET ORALLY AT BEDTIME  Qty: 90 tablet, Refills: 2    Comments: Please send refills for cycle fill. Thank you!  Associated Diagnoses: History of CVA (cerebrovascular accident)      memantine (NAMENDA) 10 MG tablet 1 TABLET ORALLY 2 TIMES DAILY  Qty: 60 tablet, Refills: 11    Comments: Please send refills for cycle fill. Thank you!  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)      nystatin (MYCOSTATIN) 719551 UNIT/GM external powder Apply topically 2 times daily as needed for other (Redness).      omeprazole (PRILOSEC) 20 MG DR capsule Take 20 mg by mouth daily. Take 30 minutes prior to meal      ondansetron (ZOFRAN ODT) 4 MG ODT tab Take 1 tablet (4 mg) by mouth every 8 hours as needed for nausea  Qty: 4 tablet, Refills: 0    Associated Diagnoses: Vagina bleeding      polyethylene glycol (MIRALAX) 17 GM/Dose powder Take 17 g by mouth daily as needed for constipation  Qty: 510 g, Refills: 0    Associated Diagnoses: Rectal prolapse      QUEtiapine (SEROQUEL) 25 MG tablet Take 0.5 tablets (12.5 mg) by mouth every evening.  Qty: 30 tablet, Refills: 2    Associated Diagnoses: Agitation       trolamine salicylate (ASPERCREME) 10 % external cream Apply topically 2 times daily.      vitamin C (ASCORBIC ACID) 250 MG tablet Take 1 tablet (250 mg) by mouth daily.  Qty: 90 tablet, Refills: 3    Associated Diagnoses: Iron deficiency anemia, unspecified iron deficiency anemia type           STOP taking these medications       doxycycline hyclate (VIBRAMYCIN) 100 MG capsule Comments:   Reason for Stopping:             Allergies   Allergies   Allergen Reactions    Atorvastatin Muscle Pain (Myalgia)    Dextromethorphan Hives    Oxycodone Unknown    Pravastatin Muscle Pain (Myalgia)    Simvastatin Muscle Pain (Myalgia)    Codeine Rash

## 2025-02-20 NOTE — PROGRESS NOTES
Care Management Discharge Note    Discharge Date: 02/20/2025       Discharge Disposition: Assisted Living, Home Care    Discharge Services: None    Discharge DME: None    Discharge Transportation:  family    Private pay costs discussed: Not applicable    Does the patient's insurance plan have a 3 day qualifying hospital stay waiver?  No    PAS Confirmation Code:  N/A  Patient/family educated on Medicare website which has current facility and service quality ratings: no    Education Provided on the Discharge Plan: Yes  Persons Notified of Discharge Plans: patient, daughter Daija  Patient/Family in Agreement with the Plan: yes    Handoff Referral Completed: Yes, GENOVEVAFV PCP: Internal handoff referral completed    Additional Information:    Pt discharging back to her home at [facility] today, 2/20, pickup at 10:30am by one of pt's daughters, as coordinated by daughter Daija.    Discharge plans confirmed with facility Nurse Carin [phone]; Fax [phone] .  Discharge orders sent to facility via fax.    HUC to fax to facility/send with patient: discharge orders, hard scripts, additional discharge paperwork, as per protocol.      Nurse Del Rosario requested homecare PT and OT orders from hospitalist.  Nurse Del Rosario declined HC coordination by CM.  Nurse Del Rosario will coordinate HC via their preferred HC agency.  CM paged Dr. Reyna for homecare orders.    9:59 AM  Updated discharge orders (with homecare PT, OT orders) faxed to Nurse Del Rosario at [facility].    No further care management intervention anticipated at this time.  Please re-consult if further needs arise.  Care management signing off.      Catherine Medley RN

## 2025-02-20 NOTE — PROGRESS NOTES
Pt is confused. Unable to state . Denies pain. Desats to 85-88% on RA.  Applied 1L of O2 via nasal cannula but pt unable to tolerate NC while asleep. Incontinent of urine. BM x2, formed and small. Repositioned self in bed. A-1-2, gait belt and walker for transfers and when ambulating to the bathroom. VSS.

## 2025-02-20 NOTE — PROGRESS NOTES
Documentation of Face to Face and Certification for Home Health Services    I certify that patient: Angle Agudelo is under my care and that I, or a nurse practitioner or physician's assistant working with me, had a face-to-face encounter that meets the physician face-to-face encounter requirements with this patient on: 2/20/2025 .    This encounter with the patient was in whole, or in part, for the following medical condition, which is the primary reason for home health care: weakness  Patient Active Problem List   Diagnosis    Memory impairment    Myalgia and myositis, unspecified    Spinal stenosis of lumbar region    Primary hypercholesterolemia    History of CVA (cerebrovascular accident)    Backache    Benign neoplasm of colon    Chronic bilateral low back pain without sciatica    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    Disorder of bone and cartilage    GI bleed    Thickened endometrium    Essential hypertension    Uterine perforation    Rectal prolapse    Abnormal glucose    Actinic keratosis    Arthropathy of shoulder region    Arthropathy, multiple sites    Diverticulosis of large intestine with hemorrhage    Female cystocele    Hiatal hernia    Human papilloma virus infection    Jihan-Carey tear    Myopathy    Obesity    Urinary incontinence    Uterine lump    Cerebrovascular accident (H)    Dementia (H)    Hypercholesterolemia    Esophagitis    Esophageal obstruction due to food impaction    Nonrheumatic aortic valve stenosis    Acute pulmonary edema (H)    Pleural effusion    Acute hypoxemic respiratory failure (H)    Congestive heart failure, unspecified HF chronicity, unspecified heart failure type (H)    .    I certify that, based on my findings, the following services are medically necessary home health services: Occupational Therapy and Physical Therapy.    My clinical findings support the need for the above services because: Occupational Therapy Services are  needed to assess and treat cognitive ability and address ADL safety due to impairment in weakness. and Physical Therapy Services are needed to assess and treat the following functional impairments: weakness.    Further, I certify that my clinical findings support that this patient is homebound (i.e. absences from home require considerable and taxing effort and are for medical reasons or [employer] services or infrequently or of short duration when for other reasons) because: Leaving home is medically contraindicated for the following reason(s): weakness and difficulty ambulating >100 ft..    Based on the above findings. I certify that this patient is confined to the home and needs intermittent skilled nursing care, physical therapy and/or speech therapy.  The patient is under my care, and I have initiated the establishment of the plan of care.  This patient will be followed by a physician who will periodically review the plan of care.  Physician/Provider to provide follow up care: Koko Betancourt    Attending hospital physician (the Medicare certified [facility] provider): Javed Reyna MD  Physician Signature: See electronic signature associated with these discharge orders.  Date: 2/20/2025

## 2025-02-22 ENCOUNTER — LAB REQUISITION (OUTPATIENT)
Dept: LAB | Facility: CLINIC | Age: OVER 89
End: 2025-02-22
Payer: MEDICARE

## 2025-02-22 DIAGNOSIS — I50.20 UNSPECIFIED SYSTOLIC (CONGESTIVE) HEART FAILURE (H): ICD-10-CM

## 2025-02-25 ENCOUNTER — DOCUMENTATION ONLY (OUTPATIENT)
Dept: OTHER | Facility: CLINIC | Age: OVER 89
End: 2025-02-25
Payer: MEDICARE

## 2025-02-26 LAB
ANION GAP SERPL CALCULATED.3IONS-SCNC: 12 MMOL/L (ref 7–15)
BUN SERPL-MCNC: 14 MG/DL (ref 8–23)
CALCIUM SERPL-MCNC: 9.1 MG/DL (ref 8.8–10.4)
CHLORIDE SERPL-SCNC: 101 MMOL/L (ref 98–107)
CREAT SERPL-MCNC: 0.73 MG/DL (ref 0.51–0.95)
EGFRCR SERPLBLD CKD-EPI 2021: 76 ML/MIN/1.73M2
GLUCOSE SERPL-MCNC: 87 MG/DL (ref 70–99)
HCO3 SERPL-SCNC: 27 MMOL/L (ref 22–29)
POTASSIUM SERPL-SCNC: 3.9 MMOL/L (ref 3.4–5.3)
SODIUM SERPL-SCNC: 140 MMOL/L (ref 135–145)

## 2025-02-26 PROCEDURE — 36415 COLL VENOUS BLD VENIPUNCTURE: CPT | Mod: ORL | Performed by: NURSE PRACTITIONER

## 2025-02-26 PROCEDURE — 80048 BASIC METABOLIC PNL TOTAL CA: CPT | Mod: ORL | Performed by: NURSE PRACTITIONER

## 2025-02-26 PROCEDURE — P9604 ONE-WAY ALLOW PRORATED TRIP: HCPCS | Mod: ORL | Performed by: NURSE PRACTITIONER

## 2025-03-03 ENCOUNTER — LAB REQUISITION (OUTPATIENT)
Dept: LAB | Facility: CLINIC | Age: OVER 89
End: 2025-03-03
Payer: MEDICARE

## 2025-03-03 DIAGNOSIS — I10 ESSENTIAL (PRIMARY) HYPERTENSION: ICD-10-CM

## 2025-03-07 LAB
ANION GAP SERPL CALCULATED.3IONS-SCNC: 9 MMOL/L (ref 7–15)
BUN SERPL-MCNC: 13 MG/DL (ref 8–23)
CALCIUM SERPL-MCNC: 9 MG/DL (ref 8.8–10.4)
CHLORIDE SERPL-SCNC: 104 MMOL/L (ref 98–107)
CREAT SERPL-MCNC: 0.74 MG/DL (ref 0.51–0.95)
EGFRCR SERPLBLD CKD-EPI 2021: 75 ML/MIN/1.73M2
GLUCOSE SERPL-MCNC: 89 MG/DL (ref 70–99)
HCO3 SERPL-SCNC: 29 MMOL/L (ref 22–29)
POTASSIUM SERPL-SCNC: 4.2 MMOL/L (ref 3.4–5.3)
SODIUM SERPL-SCNC: 142 MMOL/L (ref 135–145)

## 2025-03-07 PROCEDURE — 80048 BASIC METABOLIC PNL TOTAL CA: CPT | Mod: ORL | Performed by: NURSE PRACTITIONER

## 2025-03-07 PROCEDURE — 36415 COLL VENOUS BLD VENIPUNCTURE: CPT | Mod: ORL | Performed by: NURSE PRACTITIONER

## 2025-03-07 PROCEDURE — P9603 ONE-WAY ALLOW PRORATED MILES: HCPCS | Mod: ORL | Performed by: NURSE PRACTITIONER

## 2025-04-25 ENCOUNTER — LAB REQUISITION (OUTPATIENT)
Dept: LAB | Facility: CLINIC | Age: OVER 89
End: 2025-04-25
Payer: MEDICARE

## 2025-04-25 DIAGNOSIS — I10 ESSENTIAL (PRIMARY) HYPERTENSION: ICD-10-CM

## 2025-04-25 DIAGNOSIS — D64.9 ANEMIA, UNSPECIFIED: ICD-10-CM

## 2025-04-30 LAB
ANION GAP SERPL CALCULATED.3IONS-SCNC: 11 MMOL/L (ref 7–15)
BUN SERPL-MCNC: 14.4 MG/DL (ref 8–23)
CALCIUM SERPL-MCNC: 9.7 MG/DL (ref 8.8–10.4)
CHLORIDE SERPL-SCNC: 109 MMOL/L (ref 98–107)
CREAT SERPL-MCNC: 0.99 MG/DL (ref 0.51–0.95)
EGFRCR SERPLBLD CKD-EPI 2021: 53 ML/MIN/1.73M2
ERYTHROCYTE [DISTWIDTH] IN BLOOD BY AUTOMATED COUNT: 22.4 % (ref 10–15)
GLUCOSE SERPL-MCNC: 97 MG/DL (ref 70–99)
HCO3 SERPL-SCNC: 23 MMOL/L (ref 22–29)
HCT VFR BLD AUTO: 31.7 % (ref 35–47)
HGB BLD-MCNC: 9.6 G/DL (ref 11.7–15.7)
MCH RBC QN AUTO: 34 PG (ref 26.5–33)
MCHC RBC AUTO-ENTMCNC: 30.3 G/DL (ref 31.5–36.5)
MCV RBC AUTO: 112 FL (ref 78–100)
PLATELET # BLD AUTO: 109 10E3/UL (ref 150–450)
POTASSIUM SERPL-SCNC: 4.4 MMOL/L (ref 3.4–5.3)
RBC # BLD AUTO: 2.82 10E6/UL (ref 3.8–5.2)
SODIUM SERPL-SCNC: 143 MMOL/L (ref 135–145)
WBC # BLD AUTO: 3.6 10E3/UL (ref 4–11)

## 2025-04-30 PROCEDURE — 80048 BASIC METABOLIC PNL TOTAL CA: CPT | Mod: ORL | Performed by: NURSE PRACTITIONER

## 2025-04-30 PROCEDURE — P9604 ONE-WAY ALLOW PRORATED TRIP: HCPCS | Mod: ORL | Performed by: NURSE PRACTITIONER

## 2025-04-30 PROCEDURE — 85027 COMPLETE CBC AUTOMATED: CPT | Mod: ORL | Performed by: NURSE PRACTITIONER

## 2025-04-30 PROCEDURE — 36415 COLL VENOUS BLD VENIPUNCTURE: CPT | Mod: ORL | Performed by: NURSE PRACTITIONER

## 2025-05-06 ENCOUNTER — LAB REQUISITION (OUTPATIENT)
Dept: LAB | Facility: CLINIC | Age: OVER 89
End: 2025-05-06
Payer: MEDICARE

## 2025-05-06 DIAGNOSIS — D52.0 DIETARY FOLATE DEFICIENCY ANEMIA: ICD-10-CM

## 2025-05-06 DIAGNOSIS — D51.9 VITAMIN B12 DEFICIENCY ANEMIA, UNSPECIFIED: ICD-10-CM

## 2025-05-06 DIAGNOSIS — R79.9 ABNORMAL FINDING OF BLOOD CHEMISTRY, UNSPECIFIED: ICD-10-CM

## 2025-05-07 LAB
ERYTHROCYTE [DISTWIDTH] IN BLOOD BY AUTOMATED COUNT: 23.1 % (ref 10–15)
FOLATE SERPL-MCNC: 7.2 NG/ML (ref 4.6–34.8)
HCT VFR BLD AUTO: 33.2 % (ref 35–47)
HGB BLD-MCNC: 9.9 G/DL (ref 11.7–15.7)
MCH RBC QN AUTO: 34.7 PG (ref 26.5–33)
MCHC RBC AUTO-ENTMCNC: 29.8 G/DL (ref 31.5–36.5)
MCV RBC AUTO: 117 FL (ref 78–100)
PLAT MORPH BLD: NORMAL
PLATELET # BLD AUTO: 122 10E3/UL (ref 150–450)
RBC # BLD AUTO: 2.85 10E6/UL (ref 3.8–5.2)
RBC MORPH BLD: NORMAL
VIT B12 SERPL-MCNC: 577 PG/ML (ref 232–1245)
WBC # BLD AUTO: 5.8 10E3/UL (ref 4–11)

## 2025-05-07 PROCEDURE — 36415 COLL VENOUS BLD VENIPUNCTURE: CPT | Mod: ORL | Performed by: NURSE PRACTITIONER

## 2025-05-07 PROCEDURE — 82746 ASSAY OF FOLIC ACID SERUM: CPT | Mod: ORL | Performed by: NURSE PRACTITIONER

## 2025-05-07 PROCEDURE — 85007 BL SMEAR W/DIFF WBC COUNT: CPT | Mod: ORL | Performed by: NURSE PRACTITIONER

## 2025-05-07 PROCEDURE — 85027 COMPLETE CBC AUTOMATED: CPT | Mod: ORL | Performed by: NURSE PRACTITIONER

## 2025-05-07 PROCEDURE — 82607 VITAMIN B-12: CPT | Mod: ORL | Performed by: NURSE PRACTITIONER

## 2025-05-07 PROCEDURE — P9603 ONE-WAY ALLOW PRORATED MILES: HCPCS | Mod: ORL | Performed by: NURSE PRACTITIONER

## 2025-05-08 LAB
BASOPHILS # BLD MANUAL: 0.1 10E3/UL (ref 0–0.2)
BASOPHILS NFR BLD MANUAL: 1 %
EOSINOPHIL # BLD MANUAL: 0.1 10E3/UL (ref 0–0.7)
EOSINOPHIL NFR BLD MANUAL: 1 %
LYMPHOCYTES # BLD MANUAL: 1.4 10E3/UL (ref 0.8–5.3)
LYMPHOCYTES NFR BLD MANUAL: 24 %
MONOCYTES # BLD MANUAL: 0.7 10E3/UL (ref 0–1.3)
MONOCYTES NFR BLD MANUAL: 12 %
MYELOCYTES # BLD MANUAL: 0.1 10E3/UL
MYELOCYTES NFR BLD MANUAL: 1 %
NEUTROPHILS # BLD MANUAL: 3.4 10E3/UL (ref 1.6–8.3)
NEUTROPHILS NFR BLD MANUAL: 58 %
OTHER CELLS # BLD MANUAL: 0.2 10E3/UL
OTHER CELLS NFR BLD MANUAL: 3 %
PATH REV: ABNORMAL

## 2025-05-28 ENCOUNTER — PATIENT OUTREACH (OUTPATIENT)
Dept: FAMILY MEDICINE | Facility: CLINIC | Age: OVER 89
End: 2025-05-28
Payer: MEDICARE

## 2025-05-28 NOTE — LETTER
May 28, 2025    Angle Agudelo  [address]   [facility] [address]    Hello,     Your care team at [facility]  values your health and well-being. After reviewing your chart, we have identified recommendation(s) to help you better manage your health.    It's time for your Annual Wellness visit. During your visit, we'll discuss your health, well-being, and any questions you may have related to preventive care. We'll also review any recommended tests, exams, or screenings you might need. To schedule please call [phone] ([phone] or use your [employer] account.     If you recently had or are having any of these services soon, please contact the clinic via phone or [employer] so that your care team can update your records.    We look forward to seeing you at your upcoming visit.    If you have any questions or concerns, please contact our clinic. Thank you for continuing to trust us with your care.    Sincerely,    Your [facility] Care Team

## 2025-05-28 NOTE — TELEPHONE ENCOUNTER
Patient Quality Outreach    Patient is due for the following:   Physical Annual Wellness Visit    Action(s) Taken:   Schedule a Annual Wellness Visit    Type of outreach:    Sent letter.    Questions for provider review:    None         Silvia Steele MA  Chart routed to None.

## (undated) DEVICE — GOWN LG DISP 9515

## (undated) DEVICE — GLOVE UNDER INDICATOR PI SZ 6 LF 41660

## (undated) DEVICE — KIT PROCEDURE FLUENT IN/OUT FLOWPAK TISS TRAP FLT-112S

## (undated) DEVICE — DRSG TELFA 3X4" 1050

## (undated) DEVICE — MAT FLOOR SURGICAL 40X38 0702140238

## (undated) DEVICE — GLOVE PI ULTRATCH M LF SZ 6.5 PF CUFF TEXT STRL LF 42665

## (undated) DEVICE — SUTURE VICRYL+ 0 27IN CT-1 UND VCP260H

## (undated) DEVICE — SOL NACL 0.9% IRRIG 1000ML BOTTLE 2F7124

## (undated) DEVICE — SYR BULB IRRIG DOVER 60 ML LATEX FREE 67000

## (undated) DEVICE — SUTURE VICRYL+ 2-0 27 CT-2 VLT VCP333H

## (undated) DEVICE — PREP POVIDONE-IODINE 10% SOLUTION 4OZ BOTTLE MDS093944

## (undated) DEVICE — INFLATION DEVICE BIG 60 ENDO-AN6012

## (undated) DEVICE — ESU LIGASURE OPEN SEALER/DIVIDER SM JAW 16.5MM LF1212A

## (undated) DEVICE — SYR 10ML FINGER CONTROL W/O NDL 309695

## (undated) DEVICE — BRIEF STRETCH XL MPS40

## (undated) DEVICE — ADH LIQUID MASTISOL TOPICAL VIAL 2-3ML 0523-48

## (undated) DEVICE — SUCTION MANIFOLD NEPTUNE 2 SYS 1 PORT 702-025-000

## (undated) DEVICE — SYR 10ML LL W/O NDL 302995

## (undated) DEVICE — CLIP HEMOSTATIC ASSURANCE W11 MM 00711882

## (undated) DEVICE — SUTURE VICRYL+ 2-0 27IN SH UND VCP417H

## (undated) DEVICE — SEAL SET MYOSURE ROD LENS SCOPE SINGLE USE 40-902

## (undated) DEVICE — TUBING SUCTION MEDI-VAC 1/4"X20' N620A

## (undated) DEVICE — DILATOR ESOPHAGEAL BALLOON #15

## (undated) DEVICE — DECANTER VIAL 2006S

## (undated) DEVICE — PREP DYNA-HEX 4% CHG SCRUB 4OZ BOTTLE MDS098710

## (undated) DEVICE — Device

## (undated) DEVICE — TRAY PREP DRY SKIN SCRUB 067

## (undated) DEVICE — SOL WATER IRRIG 1000ML BOTTLE 2F7114

## (undated) DEVICE — NEEDLE HYPO MAGELLAN SAFETY 22GA 1 1/2IN 8881850215

## (undated) DEVICE — CUSTOM PACK PERI GYN SMA5BPGHEA

## (undated) DEVICE — TAPE ADH POROUS 3IN CURITY STD 7046C

## (undated) DEVICE — DRAPE OR LAPAROTOMY KC 89228*

## (undated) DEVICE — SURGICEL POWDER ABSORBABLE HEMOSTAT 3GM 3013SP

## (undated) DEVICE — CUSTOM PACK GEN MAJOR SBA5BGMHEA

## (undated) DEVICE — SOLUTION IRRIG 2B7127 .9NS 3000ML BAG

## (undated) DEVICE — GLOVE SURG PI ULTRA TOUCH M SZ 7 LF 42670

## (undated) DEVICE — BAG URINARY DRAIN 2000ML LF 154002

## (undated) DEVICE — ESU PENCIL SMOKE EVAC W/ROCKER SWITCH 0703-047-000

## (undated) DEVICE — PREP SCRUB SOL EXIDINE 4% CHG 4OZ 29002-404

## (undated) DEVICE — SUCTION TIP YANKAUER W/O VENT K86

## (undated) DEVICE — GLOVE UNDER INDICATOR PI SZ 6.5 LF 41665

## (undated) DEVICE — BLADE KNIFE SURG 10 371110

## (undated) DEVICE — DRSG KERLIX FLUFFS X5

## (undated) DEVICE — PACKING VAG 2 X 15 XRAY DETECT 32-1359

## (undated) DEVICE — GLOVE BIOGEL PI ULTRATOUCH G SZ 6.0 42160

## (undated) DEVICE — CLIP HEMOSTASIS ASSURANCE W16 MM BX00711884

## (undated) DEVICE — NEEDLE HYPO 18X1-1/2 SAFETY 305918

## (undated) DEVICE — RETR RING LONE STAR 14.1X14.1CM 3307G

## (undated) DEVICE — ESU GROUND PAD ADULT REM W/15' CORD E7507DB

## (undated) DEVICE — SU VICRYL+ 0 8-18 CT1/CR UND VCP840D

## (undated) DEVICE — PACK MINOR SINGLE BASIN SSK3001

## (undated) DEVICE — RETR ELASTIC STAYS LONE STAR SHARP 5MM 8/PACK 3311-8G

## (undated) DEVICE — GLOVE UNDER INDICATOR PI SZ 7.0 LF 41670

## (undated) DEVICE — CAUTERY BLADE NEEDLE 1IN COATED E1452

## (undated) RX ORDER — LIDOCAINE HYDROCHLORIDE 10 MG/ML
INJECTION, SOLUTION EPIDURAL; INFILTRATION; INTRACAUDAL; PERINEURAL
Status: DISPENSED
Start: 2023-10-12

## (undated) RX ORDER — ONDANSETRON 2 MG/ML
INJECTION INTRAMUSCULAR; INTRAVENOUS
Status: DISPENSED
Start: 2024-01-05

## (undated) RX ORDER — BUPIVACAINE HYDROCHLORIDE 2.5 MG/ML
INJECTION, SOLUTION EPIDURAL; INFILTRATION; INTRACAUDAL
Status: DISPENSED
Start: 2024-01-05

## (undated) RX ORDER — FENTANYL CITRATE 50 UG/ML
INJECTION, SOLUTION INTRAMUSCULAR; INTRAVENOUS
Status: DISPENSED
Start: 2023-10-12

## (undated) RX ORDER — ONDANSETRON 2 MG/ML
INJECTION INTRAMUSCULAR; INTRAVENOUS
Status: DISPENSED
Start: 2023-10-12

## (undated) RX ORDER — GINSENG 100 MG
CAPSULE ORAL
Status: DISPENSED
Start: 2024-01-05

## (undated) RX ORDER — PROPOFOL 10 MG/ML
INJECTION, EMULSION INTRAVENOUS
Status: DISPENSED
Start: 2024-06-20

## (undated) RX ORDER — PROPOFOL 10 MG/ML
INJECTION, EMULSION INTRAVENOUS
Status: DISPENSED
Start: 2024-01-05

## (undated) RX ORDER — FENTANYL CITRATE 50 UG/ML
INJECTION, SOLUTION INTRAMUSCULAR; INTRAVENOUS
Status: DISPENSED
Start: 2020-01-08

## (undated) RX ORDER — FENTANYL CITRATE 50 UG/ML
INJECTION, SOLUTION INTRAMUSCULAR; INTRAVENOUS
Status: DISPENSED
Start: 2024-06-20

## (undated) RX ORDER — LIDOCAINE HYDROCHLORIDE 10 MG/ML
INJECTION, SOLUTION EPIDURAL; INFILTRATION; INTRACAUDAL; PERINEURAL
Status: DISPENSED
Start: 2022-03-31

## (undated) RX ORDER — DEXAMETHASONE SODIUM PHOSPHATE 4 MG/ML
INJECTION, SOLUTION INTRA-ARTICULAR; INTRALESIONAL; INTRAMUSCULAR; INTRAVENOUS; SOFT TISSUE
Status: DISPENSED
Start: 2023-10-12

## (undated) RX ORDER — LIDOCAINE HYDROCHLORIDE AND EPINEPHRINE 10; 10 MG/ML; UG/ML
INJECTION, SOLUTION INFILTRATION; PERINEURAL
Status: DISPENSED
Start: 2022-03-31

## (undated) RX ORDER — DEXAMETHASONE SODIUM PHOSPHATE 10 MG/ML
INJECTION, EMULSION INTRAMUSCULAR; INTRAVENOUS
Status: DISPENSED
Start: 2024-06-20

## (undated) RX ORDER — FENTANYL CITRATE 50 UG/ML
INJECTION, SOLUTION INTRAMUSCULAR; INTRAVENOUS
Status: DISPENSED
Start: 2024-01-05

## (undated) RX ORDER — LIDOCAINE HYDROCHLORIDE 10 MG/ML
INJECTION, SOLUTION EPIDURAL; INFILTRATION; INTRACAUDAL; PERINEURAL
Status: DISPENSED
Start: 2024-06-20

## (undated) RX ORDER — DEXAMETHASONE SODIUM PHOSPHATE 10 MG/ML
INJECTION, SOLUTION INTRAMUSCULAR; INTRAVENOUS
Status: DISPENSED
Start: 2024-01-05

## (undated) RX ORDER — PROPOFOL 10 MG/ML
INJECTION, EMULSION INTRAVENOUS
Status: DISPENSED
Start: 2022-03-31

## (undated) RX ORDER — LIDOCAINE HYDROCHLORIDE AND EPINEPHRINE 10; 10 MG/ML; UG/ML
INJECTION, SOLUTION INFILTRATION; PERINEURAL
Status: DISPENSED
Start: 2024-01-05

## (undated) RX ORDER — ONDANSETRON 2 MG/ML
INJECTION INTRAMUSCULAR; INTRAVENOUS
Status: DISPENSED
Start: 2024-06-20